# Patient Record
Sex: FEMALE | Race: WHITE | NOT HISPANIC OR LATINO | Employment: OTHER | URBAN - METROPOLITAN AREA
[De-identification: names, ages, dates, MRNs, and addresses within clinical notes are randomized per-mention and may not be internally consistent; named-entity substitution may affect disease eponyms.]

---

## 2018-08-29 ENCOUNTER — HISTORICAL (OUTPATIENT)
Dept: RADIOLOGY | Facility: HOSPITAL | Age: 48
End: 2018-08-29

## 2018-09-13 ENCOUNTER — HISTORICAL (OUTPATIENT)
Dept: RADIOLOGY | Facility: HOSPITAL | Age: 48
End: 2018-09-13

## 2019-08-27 ENCOUNTER — HISTORICAL (OUTPATIENT)
Dept: ADMINISTRATIVE | Facility: HOSPITAL | Age: 49
End: 2019-08-27

## 2019-09-13 ENCOUNTER — HISTORICAL (OUTPATIENT)
Dept: RADIOLOGY | Facility: HOSPITAL | Age: 49
End: 2019-09-13

## 2019-09-25 ENCOUNTER — HISTORICAL (OUTPATIENT)
Dept: RADIOLOGY | Facility: HOSPITAL | Age: 49
End: 2019-09-25

## 2023-06-20 DIAGNOSIS — D37.4 VILLOUS ADENOMA OF COLON: ICD-10-CM

## 2023-06-20 DIAGNOSIS — K56.600 PARTIAL SMALL BOWEL OBSTRUCTION: Primary | ICD-10-CM

## 2023-06-21 ENCOUNTER — HOSPITAL ENCOUNTER (OUTPATIENT)
Dept: RADIOLOGY | Facility: HOSPITAL | Age: 53
Discharge: HOME | End: 2023-06-21
Attending: INTERNAL MEDICINE
Payer: MEDICAID

## 2023-06-21 DIAGNOSIS — D37.4 VILLOUS ADENOMA OF COLON: ICD-10-CM

## 2023-06-21 DIAGNOSIS — K56.600 PARTIAL SMALL BOWEL OBSTRUCTION: ICD-10-CM

## 2023-06-21 PROCEDURE — 74178 CT ABD&PLV WO CNTR FLWD CNTR: CPT | Mod: TC

## 2023-06-21 PROCEDURE — 25500020 PHARM REV CODE 255

## 2023-06-21 RX ADMIN — IOPAMIDOL 100 ML: 755 INJECTION, SOLUTION INTRAVENOUS at 09:06

## 2023-06-28 DIAGNOSIS — C20 RECTAL CANCER: Primary | ICD-10-CM

## 2023-06-29 ENCOUNTER — TELEPHONE (OUTPATIENT)
Dept: HEMATOLOGY/ONCOLOGY | Facility: CLINIC | Age: 53
End: 2023-06-29
Payer: MEDICAID

## 2023-06-29 NOTE — NURSING
INNA Us spoke with patient for pre-visit introduction and to assist with any questions or concerns. Patient asking about foods she can eat due to GI issues; says she did get some medication from Dr. Ortiz office for cramping and spasms. INNA Us provided information on BRAT diet/bland foods, and eating 5-6 small meals per day. Informed of using Ensure/Boost. Discussed arrival time, location of clinic, and what to expect at consult visit. Provided contact number to call. Confirmed that patient plans to attend appointment for 7/5/23 at 1:00 pm.

## 2023-07-01 PROBLEM — C20 ADENOCARCINOMA OF RECTUM: Status: ACTIVE | Noted: 2023-07-01

## 2023-07-01 PROBLEM — K92.1 HEMATOCHEZIA: Status: ACTIVE | Noted: 2023-07-01

## 2023-07-01 PROBLEM — R59.0 PELVIC LYMPHADENOPATHY: Status: ACTIVE | Noted: 2023-07-01

## 2023-07-01 PROBLEM — C20 ADENOCARCINOMA OF RECTUM, STAGE 3: Status: ACTIVE | Noted: 2023-07-01

## 2023-07-01 PROBLEM — Z83.719 FAMILY HX COLONIC POLYPS: Status: ACTIVE | Noted: 2023-07-01

## 2023-07-01 PROBLEM — Z80.0 FAMILY HISTORY OF COLON CANCER: Status: ACTIVE | Noted: 2023-07-01

## 2023-07-01 NOTE — PROGRESS NOTES
History:  History reviewed. No pertinent past medical history.    History reviewed. No pertinent surgical history.   Past medical history:  Anxiety.    Procedure/surgical history:  Appendectomy.  Back surgery.  Social history:  Single.  Lives in [place], [place].  Has 2 children.  Works as a .  No history of tobacco, alcohol, or illicit drug abuse.  Family history:  Paternal grandfather experience colon cancer in his 70s.  Father experienced multiple colon polyps.  Health maintenance:  No screening colonoscopy prior to most recent colonoscopy which led to the diagnosis of rectal cancer.  -09/13/2019:  Bilateral screening mammogram pelvic comparison:  09/13/2018 mammogram):  BI-RADS 0 (indeterminate)  -09/25/2019:  Diagnostic left mammogram, limited ultrasound left breast (comparison:  09/13/2019 mammogram):  Overall study BI-RADS 2: Benign     History reviewed. No pertinent family history.     Reason for Follow-up:  Reason for consultation:  -adenocarcinoma rectum, moderately differentiated, partially obstructing mass, S/P colonoscopy 06/19/2023   -tubular adenoma transverse colon   -regional lymphadenopathy on CT abdomen pelvis with contrast 06/21/2023  -MMR proficient  -grandfather experienced colon cancer; father experienced colon polyp    History of Present Illness:   Anemia        Oncologic/Hematologic History:  Oncology History    No history exists.   53-year-old lady, referred by Migel Ortiz MD, GI, with rectal cancer.    Family history pos for colon cancer in grandfather, colon polyp in father  Evaluated by Migel Ortiz MD, GI, 05/22/2023 for nervous stomach; change in bowel habits/pattern; change in bowel function started several months ago; currently, 1 bowel movement daily.  Blood in stool.  Lower abdominal pain.  Heartburn.  Epigastric pain.  MiraLax resulted in improvement.  Bright red blood in stool.    06/13/2023: EGD:  1. Esophagus: Erythema of mucosa in the lower 3rd of esophagus,  compatible esophagitis  2. Stomach: Erythema of mucosa in the antrum, compatible with gastritis  3. Duodenum:  Normal mucosa in the whole duodenum      06/13/2023:  EGD:  1. Gastric antrum, biopsy:  Mild chronic inactive gastritis; negative for intestinal metaplasia; negative for H pylori organisms  2. Gastric body, biopsy:  Mild chronic inactive gastritis; negative for intestinal metaplasia; negative for H pylori organisms    06/19/2023:  Colonoscopy (screening colonoscopy):  -single 9 mm polyp transverse colon, polypectomy, completely removed  -ulcerated necrotic infiltrative 90% circumferential, bleeding, 5 cm in length, mass of malignant appearance in the rectum at 4 cm from the anus, causing partial obstruction; scope traversed the lesion  Pathology:  1. Transverse colon polyp, polypectomy:  Tubular adenoma  2. Rectal mass, biopsy:  Invasive moderately differentiated adenocarcinoma; no LVI    MMR proficient  Low probability of MSI-H    06/21/2023: CT abdomen pelvis with and without contrast:  -large rectal mass consistent with malignancy; some lymphadenopathy is seen adjacent to the mass  (large rectal mass with circumferential wall thickening in rectum; associated inflammatory changes; no bowel obstruction; some lymph nodes are seen in the perirectal region on the right and left side; representative lymph node on right side of rectum 1 cm x 8 mm; representative lymph node on the left side of rectum 8 mm x 8 mm)    Labs reviewed:  05/24/2023:  WBC 8.0.  Hemoglobin 12.5.  MCV 88.  Platelets 306 K.  Differential count normal.    07/05/2023:   Pleasant lady who presents for initial medical oncology consultation, accompanied by her brother.    Her symptoms started 2 years ago, in the form of intermittent small amount hematochezia, initially on toilet wife's, and for last few weeks, in toilet bowel as well.  She brought the symptoms to the attention of her gyn several months ago and does not remember the  recommendation.  Regardless, she never got a screening colonoscopy done.  Around [place] time 2022, she started feeling bloated.  She started having constant uncomfortable feeling because of bloating, as well as in the anorectal area.  Hematochezia continued.  She brought this to the attention of her PCP in [place] who suggested taking MiraLax.  The PCP also arranged for colonoscopy.  On today's visit, she reports that she has been constipated her entire life.  She has been constipated for at least 10 years.  Hematochezia for 2 years.  No anorectal pain.  Does have constant feeling of incomplete evacuation.  Appetite is down and she has lost 30 lb in last 3-4 months.  Appetite is more less preserved but she is afraid to eat because eating causes bloating and worsening of uncomfortable feeling in rectum.      Interval History:  [No matching plan found]   [No matching plan found]       Medications:  No current outpatient medications on file prior to visit.     No current facility-administered medications on file prior to visit.       Review of Systems:   All systems reviewed and found to be negative except for the symptoms detailed above    Physical Examination:   VITAL SIGNS:   Vitals:    07/05/23 1327   BP: 111/79   Pulse: 102   Resp: 20   Temp: 98.7 °F (37.1 °C)     GENERAL:  In no apparent distress.    HEAD:  No signs of head trauma.  EYES:  Pupils are equal.  Extraocular motions intact.    EARS:  Hearing grossly intact.  MOUTH:  Oropharynx is normal.   NECK:  No adenopathy, no JVD.     CHEST:  Chest with clear breath sounds bilaterally.  No wheezes, rales, rhonchi.    CARDIAC:  Regular rate and rhythm.  S1 and S2, without murmurs, gallops, rubs.  VASCULAR:  No Edema.  Peripheral pulses normal and equal in all extremities.  ABDOMEN:  Soft, without detectable tenderness.  No sign of distention.  No   rebound or guarding, and no masses palpated.   Bowel Sounds normal.  MUSCULOSKELETAL:  Good range of motion of  all major joints. Extremities without clubbing, cyanosis or edema.    NEUROLOGIC EXAM:  Alert and oriented x 3.  No focal sensory or strength deficits.   Speech normal.  Follows commands.  PSYCHIATRIC:  Mood normal.    No results for input(s): CBC in the last 72 hours.   No results for input(s): CMP in the last 72 hours.     Assessment:  Problem List Items Addressed This Visit          Oncology    Adenocarcinoma of rectum    Adenocarcinoma of rectum, stage 3    Family history of colon cancer       GI    Family hx colonic polyps    Hematochezia       Other    Pelvic lymphadenopathy     Other Visit Diagnoses       Rectal cancer              Adenocarcinoma of rectum, moderately differentiated:   -presentation:  05/2023:  Change in bowel habits, hematochezia, lower abdominal pain  -colonoscopy 06/19/2023:    9 mm tubular adenoma transverse colon, removed;   invasive moderately differentiated adenocarcinoma of rectum presenting as ulcerated necrotic infiltrative 90% circumferential, bleeding, partially obstructing rectal mass, 5 cm long, 4 cm from anus, scope traversed the lesion  -MMR proficient; low probability of MSI-H  -CT abdomen pelvis with contrast 06/21/2023:  No metastasis; large rectal mass and some regional lymphadenopathy on CT abdomen pelvis with contrast 06/21/2023, largest perirectal lymph node 1 cm x 8 mm  >>>  By virtue of regional lymphadenopathy, at least stage III      Family history of colon cancer:  -grandfather experienced colon cancer; father experienced colon polyp      Plan:  Need the following for workup:   CBC, CMP, CEA level  Noncontrast chest CT to rule out metastasis  Pelvic MRI with and without contrast for T and N staging  PET-CT scan is not indicated  Fertility risk discussion/counseling if premenopausal (she has been postmenopausal for last 2 years and that this time, has no desire to go for add preservation)    Family history of colon cancer & colon polyp   We will arrange for genetic  testing and counseling.    After appropriate workup, will most likely need total neoadjuvant therapy including chemotherapy with FOLFOX or CAPOX or FOLFIRINOX X 12-16 weeks, followed by long course chemoradiation therapy, then restaging, followed by resection versus surveillance, etc.  Perioperative treatment is recommended for up to a total of 6 months.    At least based upon CT scans of A/P, has at least stage III adenocarcinoma of rectum.  MMR proficient.  Low probability of MSI-H.  Treatment will be as follows:  Total neoadjuvant therapy:   1. Modified FOLFOX every 2 weeks X 16 weeks (8 cycles); followed by   2. Long course chemoradiation therapy with capecitabine; followed by  3. Restaging; followed by   4. Transabdominal resection; or if complete clinical response, consider surveillance    Response to treatment will be assessed as follows:  Contrast-enhanced CT scans of abdomen and chest, and MRI scan of rectum with and without contrast:  -after completion of 8 cycles of modified FOLFOX; aunt  -after completion of chemoradiation therapy    Refer to surgery for MediPort placement.    Will refer to [facility] for supportive services, including boost/ensure for high protein supplementation.    We will arrange for chemotherapy teaching with nursing staff ASAP.  Follow-up visit with me in 2 weeks, with results of requested studies.    Above discussed at length with the patient.  All questions answered.    Discussed labs, scans, pathology report, and staging of rectal cancer (pending MRI scan), and gave her copies of relevant reports.  Plan of management discussed, especially, neoadjuvant chemotherapy and chemoradiation therapy (to be finalized after MRI scan of pelvis).  She understands and agrees with this plan.    Follow-up:  No follow-ups on file.    Answers submitted by the patient for this visit:  Review of Systems Questionnaire (Submitted on 7/5/2023)  appetite change : Yes  unexpected weight change:  Yes  mouth sores: No  visual disturbance: No  cough: No  shortness of breath: No  chest pain: No  abdominal pain: No  diarrhea: No  frequency: Yes  back pain: No  rash: No  headaches: No  adenopathy: No  nervous/ anxious: Yes

## 2023-07-05 ENCOUNTER — OFFICE VISIT (OUTPATIENT)
Dept: HEMATOLOGY/ONCOLOGY | Facility: CLINIC | Age: 53
End: 2023-07-05
Attending: INTERNAL MEDICINE
Payer: MEDICAID

## 2023-07-05 VITALS
HEART RATE: 102 BPM | TEMPERATURE: 99 F | BODY MASS INDEX: 23.39 KG/M2 | RESPIRATION RATE: 20 BRPM | WEIGHT: 132 LBS | SYSTOLIC BLOOD PRESSURE: 111 MMHG | HEIGHT: 63 IN | DIASTOLIC BLOOD PRESSURE: 79 MMHG | OXYGEN SATURATION: 98 %

## 2023-07-05 DIAGNOSIS — C20 RECTAL CANCER: ICD-10-CM

## 2023-07-05 DIAGNOSIS — C20 ADENOCARCINOMA OF RECTUM, STAGE 3: ICD-10-CM

## 2023-07-05 DIAGNOSIS — K92.1 HEMATOCHEZIA: ICD-10-CM

## 2023-07-05 DIAGNOSIS — C20 ADENOCARCINOMA OF RECTUM: Primary | ICD-10-CM

## 2023-07-05 DIAGNOSIS — E87.6 HYPOKALEMIA: Primary | ICD-10-CM

## 2023-07-05 DIAGNOSIS — R59.0 PELVIC LYMPHADENOPATHY: ICD-10-CM

## 2023-07-05 DIAGNOSIS — C20 ADENOCARCINOMA OF RECTUM: ICD-10-CM

## 2023-07-05 DIAGNOSIS — Z80.0 FAMILY HISTORY OF COLON CANCER: ICD-10-CM

## 2023-07-05 DIAGNOSIS — Z83.719 FAMILY HX COLONIC POLYPS: ICD-10-CM

## 2023-07-05 LAB
ALBUMIN SERPL-MCNC: 3.6 G/DL (ref 3.5–5)
ALBUMIN/GLOB SERPL: 1.1 RATIO (ref 1.1–2)
ALP SERPL-CCNC: 74 UNIT/L (ref 40–150)
ALT SERPL-CCNC: 13 UNIT/L (ref 0–55)
AST SERPL-CCNC: 18 UNIT/L (ref 5–34)
BASOPHILS # BLD AUTO: 0.06 X10(3)/MCL
BASOPHILS NFR BLD AUTO: 0.8 %
BILIRUBIN DIRECT+TOT PNL SERPL-MCNC: 0.8 MG/DL
BUN SERPL-MCNC: 6.3 MG/DL (ref 9.8–20.1)
CALCIUM SERPL-MCNC: 9.7 MG/DL (ref 8.4–10.2)
CEA SERPL-MCNC: <1.73 NG/ML (ref 0–3)
CHLORIDE SERPL-SCNC: 108 MMOL/L (ref 98–107)
CO2 SERPL-SCNC: 21 MMOL/L (ref 22–29)
CREAT SERPL-MCNC: 0.81 MG/DL (ref 0.55–1.02)
EOSINOPHIL # BLD AUTO: 0.16 X10(3)/MCL (ref 0–0.9)
EOSINOPHIL NFR BLD AUTO: 2.2 %
ERYTHROCYTE [DISTWIDTH] IN BLOOD BY AUTOMATED COUNT: 13.8 % (ref 11.5–17)
GFR SERPLBLD CREATININE-BSD FMLA CKD-EPI: >60 MLS/MIN/1.73/M2
GLOBULIN SER-MCNC: 3.2 GM/DL (ref 2.4–3.5)
GLUCOSE SERPL-MCNC: 94 MG/DL (ref 74–100)
HCT VFR BLD AUTO: 39.4 % (ref 37–47)
HGB BLD-MCNC: 13.2 G/DL (ref 12–16)
IMM GRANULOCYTES # BLD AUTO: 0.03 X10(3)/MCL (ref 0–0.04)
IMM GRANULOCYTES NFR BLD AUTO: 0.4 %
LYMPHOCYTES # BLD AUTO: 1.72 X10(3)/MCL (ref 0.6–4.6)
LYMPHOCYTES NFR BLD AUTO: 23.4 %
MAGNESIUM SERPL-MCNC: 1.9 MG/DL (ref 1.6–2.6)
MCH RBC QN AUTO: 28.3 PG (ref 27–31)
MCHC RBC AUTO-ENTMCNC: 33.5 G/DL (ref 33–36)
MCV RBC AUTO: 84.4 FL (ref 80–94)
MONOCYTES # BLD AUTO: 0.53 X10(3)/MCL (ref 0.1–1.3)
MONOCYTES NFR BLD AUTO: 7.2 %
NEUTROPHILS # BLD AUTO: 4.86 X10(3)/MCL (ref 2.1–9.2)
NEUTROPHILS NFR BLD AUTO: 66 %
NRBC BLD AUTO-RTO: 0 %
PLATELET # BLD AUTO: 338 X10(3)/MCL (ref 130–400)
PMV BLD AUTO: 9.8 FL (ref 7.4–10.4)
POTASSIUM SERPL-SCNC: 3.3 MMOL/L (ref 3.5–5.1)
PROT SERPL-MCNC: 6.8 GM/DL (ref 6.4–8.3)
RBC # BLD AUTO: 4.67 X10(6)/MCL (ref 4.2–5.4)
SODIUM SERPL-SCNC: 144 MMOL/L (ref 136–145)
WBC # SPEC AUTO: 7.36 X10(3)/MCL (ref 4.5–11.5)

## 2023-07-05 PROCEDURE — 83735 ASSAY OF MAGNESIUM: CPT | Performed by: INTERNAL MEDICINE

## 2023-07-05 PROCEDURE — 82378 CARCINOEMBRYONIC ANTIGEN: CPT | Performed by: INTERNAL MEDICINE

## 2023-07-05 PROCEDURE — 3078F DIAST BP <80 MM HG: CPT | Mod: CPTII,,, | Performed by: INTERNAL MEDICINE

## 2023-07-05 PROCEDURE — 99213 OFFICE O/P EST LOW 20 MIN: CPT | Mod: PBBFAC | Performed by: INTERNAL MEDICINE

## 2023-07-05 PROCEDURE — 1160F RVW MEDS BY RX/DR IN RCRD: CPT | Mod: CPTII,,, | Performed by: INTERNAL MEDICINE

## 2023-07-05 PROCEDURE — 1159F MED LIST DOCD IN RCRD: CPT | Mod: CPTII,,, | Performed by: INTERNAL MEDICINE

## 2023-07-05 PROCEDURE — 80053 COMPREHEN METABOLIC PANEL: CPT | Performed by: INTERNAL MEDICINE

## 2023-07-05 PROCEDURE — 3074F PR MOST RECENT SYSTOLIC BLOOD PRESSURE < 130 MM HG: ICD-10-PCS | Mod: CPTII,,, | Performed by: INTERNAL MEDICINE

## 2023-07-05 PROCEDURE — 3008F BODY MASS INDEX DOCD: CPT | Mod: CPTII,,, | Performed by: INTERNAL MEDICINE

## 2023-07-05 PROCEDURE — 1160F PR REVIEW ALL MEDS BY PRESCRIBER/CLIN PHARMACIST DOCUMENTED: ICD-10-PCS | Mod: CPTII,,, | Performed by: INTERNAL MEDICINE

## 2023-07-05 PROCEDURE — 3078F PR MOST RECENT DIASTOLIC BLOOD PRESSURE < 80 MM HG: ICD-10-PCS | Mod: CPTII,,, | Performed by: INTERNAL MEDICINE

## 2023-07-05 PROCEDURE — 99205 PR OFFICE/OUTPT VISIT, NEW, LEVL V, 60-74 MIN: ICD-10-PCS | Mod: S$PBB,,, | Performed by: INTERNAL MEDICINE

## 2023-07-05 PROCEDURE — 3008F PR BODY MASS INDEX (BMI) DOCUMENTED: ICD-10-PCS | Mod: CPTII,,, | Performed by: INTERNAL MEDICINE

## 2023-07-05 PROCEDURE — 3074F SYST BP LT 130 MM HG: CPT | Mod: CPTII,,, | Performed by: INTERNAL MEDICINE

## 2023-07-05 PROCEDURE — 99205 OFFICE O/P NEW HI 60 MIN: CPT | Mod: S$PBB,,, | Performed by: INTERNAL MEDICINE

## 2023-07-05 PROCEDURE — 36415 COLL VENOUS BLD VENIPUNCTURE: CPT | Performed by: INTERNAL MEDICINE

## 2023-07-05 PROCEDURE — 1159F PR MEDICATION LIST DOCUMENTED IN MEDICAL RECORD: ICD-10-PCS | Mod: CPTII,,, | Performed by: INTERNAL MEDICINE

## 2023-07-05 PROCEDURE — 85025 COMPLETE CBC W/AUTO DIFF WBC: CPT | Performed by: INTERNAL MEDICINE

## 2023-07-05 RX ORDER — FLUOROURACIL 50 MG/ML
400 INJECTION, SOLUTION INTRAVENOUS
Status: CANCELLED | OUTPATIENT
Start: 2023-07-19

## 2023-07-05 RX ORDER — SODIUM CHLORIDE 0.9 % (FLUSH) 0.9 %
10 SYRINGE (ML) INJECTION
Status: CANCELLED | OUTPATIENT
Start: 2023-07-19

## 2023-07-05 RX ORDER — HEPARIN 100 UNIT/ML
500 SYRINGE INTRAVENOUS
Status: CANCELLED | OUTPATIENT
Start: 2023-08-01

## 2023-07-05 RX ORDER — EPINEPHRINE 0.3 MG/.3ML
0.3 INJECTION SUBCUTANEOUS ONCE AS NEEDED
Status: CANCELLED | OUTPATIENT
Start: 2023-07-19

## 2023-07-05 RX ORDER — EPINEPHRINE 0.3 MG/.3ML
0.3 INJECTION SUBCUTANEOUS ONCE AS NEEDED
Status: CANCELLED | OUTPATIENT
Start: 2023-08-01

## 2023-07-05 RX ORDER — POTASSIUM CHLORIDE 20 MEQ/1
20 TABLET, EXTENDED RELEASE ORAL DAILY
Qty: 14 TABLET | Refills: 0 | Status: SHIPPED | OUTPATIENT
Start: 2023-07-05 | End: 2023-08-14

## 2023-07-05 RX ORDER — SODIUM CHLORIDE 0.9 % (FLUSH) 0.9 %
10 SYRINGE (ML) INJECTION
Status: CANCELLED | OUTPATIENT
Start: 2023-08-03

## 2023-07-05 RX ORDER — SODIUM CHLORIDE 0.9 % (FLUSH) 0.9 %
10 SYRINGE (ML) INJECTION
Status: CANCELLED | OUTPATIENT
Start: 2023-08-01

## 2023-07-05 RX ORDER — HEPARIN 100 UNIT/ML
500 SYRINGE INTRAVENOUS
Status: CANCELLED | OUTPATIENT
Start: 2023-08-03

## 2023-07-05 RX ORDER — DIPHENHYDRAMINE HYDROCHLORIDE 50 MG/ML
50 INJECTION INTRAMUSCULAR; INTRAVENOUS ONCE AS NEEDED
Status: CANCELLED | OUTPATIENT
Start: 2023-08-01

## 2023-07-05 RX ORDER — FLUOROURACIL 50 MG/ML
400 INJECTION, SOLUTION INTRAVENOUS
Status: CANCELLED | OUTPATIENT
Start: 2023-08-01

## 2023-07-05 RX ORDER — HEPARIN 100 UNIT/ML
500 SYRINGE INTRAVENOUS
Status: CANCELLED | OUTPATIENT
Start: 2023-07-21

## 2023-07-05 RX ORDER — HEPARIN 100 UNIT/ML
500 SYRINGE INTRAVENOUS
Status: CANCELLED | OUTPATIENT
Start: 2023-07-19

## 2023-07-05 RX ORDER — SODIUM CHLORIDE 0.9 % (FLUSH) 0.9 %
10 SYRINGE (ML) INJECTION
Status: CANCELLED | OUTPATIENT
Start: 2023-07-21

## 2023-07-05 RX ORDER — DIPHENHYDRAMINE HYDROCHLORIDE 50 MG/ML
50 INJECTION INTRAMUSCULAR; INTRAVENOUS ONCE AS NEEDED
Status: CANCELLED | OUTPATIENT
Start: 2023-07-19

## 2023-07-05 NOTE — Clinical Note
Patient will need neoadjuvant chemotherapy, followed by neoadjuvant chemoradiation therapy  Chemotherapy: Modified FOLFOX every 2 weeks x8 cycles  Please arrange for chemo teaching with nursing staff ASAP Refer to surgery for MediPort placement Nutrition consult Refer the patient to [facility] for supportive services, especially for Ensure/boost.

## 2023-07-05 NOTE — PROGRESS NOTES
Labs reviewed.      Potassium 3.3, low.    Check magnesium level.    Start potassium chloride 20 mEq p.o. q.day x2 weeks; no refills   In 2 weeks, recheck CMP and magnesium level.

## 2023-07-05 NOTE — Clinical Note
Orders for today:  Check CBC, CMP, CEA level  Noncontrast chest CT at this time to rule out metastases Please order pelvic MRI with and without contrast for staging of rectal cancer  Genetic testing and counseling (she has family history of colon cancer and colon polyps)  Follow-up visit in 2 weeks, with results of requested studies.

## 2023-07-10 ENCOUNTER — OFFICE VISIT (OUTPATIENT)
Dept: HEMATOLOGY/ONCOLOGY | Facility: CLINIC | Age: 53
End: 2023-07-10
Payer: MEDICAID

## 2023-07-10 VITALS
BODY MASS INDEX: 23.42 KG/M2 | HEIGHT: 63 IN | OXYGEN SATURATION: 98 % | DIASTOLIC BLOOD PRESSURE: 88 MMHG | RESPIRATION RATE: 18 BRPM | WEIGHT: 132.19 LBS | HEART RATE: 93 BPM | SYSTOLIC BLOOD PRESSURE: 129 MMHG | TEMPERATURE: 98 F

## 2023-07-10 DIAGNOSIS — Z51.11 ENCOUNTER FOR ANTINEOPLASTIC CHEMOTHERAPY: ICD-10-CM

## 2023-07-10 DIAGNOSIS — C20 ADENOCARCINOMA OF RECTUM: Primary | ICD-10-CM

## 2023-07-10 PROCEDURE — 99215 PR OFFICE/OUTPT VISIT, EST, LEVL V, 40-54 MIN: ICD-10-PCS | Mod: S$PBB,,, | Performed by: NURSE PRACTITIONER

## 2023-07-10 PROCEDURE — 1159F PR MEDICATION LIST DOCUMENTED IN MEDICAL RECORD: ICD-10-PCS | Mod: CPTII,,, | Performed by: NURSE PRACTITIONER

## 2023-07-10 PROCEDURE — 99214 OFFICE O/P EST MOD 30 MIN: CPT | Mod: PBBFAC | Performed by: NURSE PRACTITIONER

## 2023-07-10 PROCEDURE — 3079F DIAST BP 80-89 MM HG: CPT | Mod: CPTII,,, | Performed by: NURSE PRACTITIONER

## 2023-07-10 PROCEDURE — 1159F MED LIST DOCD IN RCRD: CPT | Mod: CPTII,,, | Performed by: NURSE PRACTITIONER

## 2023-07-10 PROCEDURE — 1160F PR REVIEW ALL MEDS BY PRESCRIBER/CLIN PHARMACIST DOCUMENTED: ICD-10-PCS | Mod: CPTII,,, | Performed by: NURSE PRACTITIONER

## 2023-07-10 PROCEDURE — 3074F PR MOST RECENT SYSTOLIC BLOOD PRESSURE < 130 MM HG: ICD-10-PCS | Mod: CPTII,,, | Performed by: NURSE PRACTITIONER

## 2023-07-10 PROCEDURE — 99215 OFFICE O/P EST HI 40 MIN: CPT | Mod: S$PBB,,, | Performed by: NURSE PRACTITIONER

## 2023-07-10 PROCEDURE — 3008F PR BODY MASS INDEX (BMI) DOCUMENTED: ICD-10-PCS | Mod: CPTII,,, | Performed by: NURSE PRACTITIONER

## 2023-07-10 PROCEDURE — 3079F PR MOST RECENT DIASTOLIC BLOOD PRESSURE 80-89 MM HG: ICD-10-PCS | Mod: CPTII,,, | Performed by: NURSE PRACTITIONER

## 2023-07-10 PROCEDURE — 1160F RVW MEDS BY RX/DR IN RCRD: CPT | Mod: CPTII,,, | Performed by: NURSE PRACTITIONER

## 2023-07-10 PROCEDURE — 3074F SYST BP LT 130 MM HG: CPT | Mod: CPTII,,, | Performed by: NURSE PRACTITIONER

## 2023-07-10 PROCEDURE — 3008F BODY MASS INDEX DOCD: CPT | Mod: CPTII,,, | Performed by: NURSE PRACTITIONER

## 2023-07-10 RX ORDER — BUSPIRONE HYDROCHLORIDE 15 MG/1
15 TABLET ORAL 3 TIMES DAILY
COMMUNITY
Start: 2020-03-05 | End: 2023-08-14 | Stop reason: DRUGHIGH

## 2023-07-10 RX ORDER — DICYCLOMINE HYDROCHLORIDE 10 MG/1
10 CAPSULE ORAL EVERY 6 HOURS PRN
Qty: 60 CAPSULE | Refills: 0 | Status: SHIPPED | OUTPATIENT
Start: 2023-07-10 | End: 2023-10-02

## 2023-07-10 RX ORDER — FLUCONAZOLE 150 MG/1
150 TABLET ORAL
COMMUNITY
Start: 2023-06-28 | End: 2023-07-13

## 2023-07-10 RX ORDER — DICYCLOMINE HYDROCHLORIDE 10 MG/1
10 CAPSULE ORAL EVERY 6 HOURS PRN
COMMUNITY
Start: 2023-06-28 | End: 2023-07-10 | Stop reason: SDUPTHER

## 2023-07-10 RX ORDER — ONDANSETRON HYDROCHLORIDE 8 MG/1
8 TABLET, FILM COATED ORAL EVERY 8 HOURS PRN
Qty: 30 TABLET | Refills: 2 | Status: SHIPPED | OUTPATIENT
Start: 2023-07-10 | End: 2023-12-29 | Stop reason: SDUPTHER

## 2023-07-10 RX ORDER — DEXAMETHASONE 4 MG/1
TABLET ORAL
Qty: 32 TABLET | Refills: 0 | Status: SHIPPED | OUTPATIENT
Start: 2023-07-10 | End: 2023-12-04 | Stop reason: SDUPTHER

## 2023-07-10 RX ORDER — TRAZODONE HYDROCHLORIDE 50 MG/1
50 TABLET ORAL
COMMUNITY
Start: 2023-06-06 | End: 2023-08-14

## 2023-07-10 RX ORDER — PROCHLORPERAZINE MALEATE 10 MG
10 TABLET ORAL
Qty: 30 TABLET | Refills: 1 | Status: SHIPPED | OUTPATIENT
Start: 2023-07-10 | End: 2023-10-02 | Stop reason: SDUPTHER

## 2023-07-10 RX ORDER — FAMOTIDINE 40 MG/1
40 TABLET, FILM COATED ORAL NIGHTLY
COMMUNITY
Start: 2023-06-18 | End: 2023-10-02

## 2023-07-10 RX ORDER — DULOXETIN HYDROCHLORIDE 20 MG/1
20 CAPSULE, DELAYED RELEASE ORAL
COMMUNITY
Start: 2023-07-08 | End: 2023-07-13

## 2023-07-10 RX ORDER — SODIUM, POTASSIUM,MAG SULFATES 17.5-3.13G
SOLUTION, RECONSTITUTED, ORAL ORAL
COMMUNITY
Start: 2023-05-22 | End: 2023-12-04

## 2023-07-10 RX ORDER — HYOSCYAMINE SULFATE 0.125 MG
125 TABLET ORAL EVERY 6 HOURS PRN
COMMUNITY
Start: 2023-06-20 | End: 2024-04-02

## 2023-07-10 RX ORDER — PANTOPRAZOLE SODIUM 40 MG/1
40 TABLET, DELAYED RELEASE ORAL EVERY MORNING
COMMUNITY
Start: 2023-06-18 | End: 2024-01-26 | Stop reason: SDUPTHER

## 2023-07-11 ENCOUNTER — DOCUMENTATION ONLY (OUTPATIENT)
Dept: HEMATOLOGY/ONCOLOGY | Facility: CLINIC | Age: 53
End: 2023-07-11
Payer: MEDICAID

## 2023-07-11 DIAGNOSIS — C20 ADENOCARCINOMA OF RECTUM, STAGE 3: Primary | ICD-10-CM

## 2023-07-11 NOTE — PROGRESS NOTES
[facility]    PATIENT: Nikkie Sharpe  MRN: [id number]  DATE: 7/10/2023      Diagnosis:   1. Adenocarcinoma of rectum      Chief Complaint: Rectal Cancer (Adenocarcinoma of rectum )    Oncologic History:   53-year-old lady, referred by Migel Ortiz MD, GI, with rectal cancer.     Family history pos for colon cancer in grandfather, colon polyp in father  Evaluated by Migel Ortiz MD, GI, 05/22/2023 for nervous stomach; change in bowel habits/pattern; change in bowel function started several months ago; currently, 1 bowel movement daily.  Blood in stool.  Lower abdominal pain.  Heartburn.  Epigastric pain.  MiraLax resulted in improvement.  Bright red blood in stool.     06/13/2023: EGD:  1. Esophagus: Erythema of mucosa in the lower 3rd of esophagus, compatible esophagitis  2. Stomach: Erythema of mucosa in the antrum, compatible with gastritis  3. Duodenum:  Normal mucosa in the whole duodenum       06/13/2023:  EGD:  1. Gastric antrum, biopsy:  Mild chronic inactive gastritis; negative for intestinal metaplasia; negative for H pylori organisms  2. Gastric body, biopsy:  Mild chronic inactive gastritis; negative for intestinal metaplasia; negative for H pylori organisms     06/19/2023:  Colonoscopy (screening colonoscopy):  -single 9 mm polyp transverse colon, polypectomy, completely removed  -ulcerated necrotic infiltrative 90% circumferential, bleeding, 5 cm in length, mass of malignant appearance in the rectum at 4 cm from the anus, causing partial obstruction; scope traversed the lesion  Pathology:  1. Transverse colon polyp, polypectomy:  Tubular adenoma  2. Rectal mass, biopsy:  Invasive moderately differentiated adenocarcinoma; no LVI     MMR proficient  Low probability of MSI-H     06/21/2023: CT abdomen pelvis with and without contrast:  -large rectal mass consistent with malignancy; some lymphadenopathy is seen adjacent to the mass  (large rectal mass with circumferential  wall thickening in rectum; associated inflammatory changes; no bowel obstruction; some lymph nodes are seen in the perirectal region on the right and left side; representative lymph node on right side of rectum 1 cm x 8 mm; representative lymph node on the left side of rectum 8 mm x 8 mm)     Subjective:   Interval History: Ms. Sharpe presents to clinic today for chemotherapy education. She is accompanied by her brother.  She denies any changes from visit last week.   She has no new complaints today.     Past Medical History: Anxiety.      Past Surgical HIstory: Appendectomy.  Back surgery.    Family History: Paternal grandfather experience colon cancer in his 70s.  Father experienced multiple colon polyps.    Social History: Single.  Lives in [place], [place].  Has 2 children.  Works as a .  No history of tobacco, alcohol, or illicit drug abuse    Health maintenance:  No screening colonoscopy prior to most recent colonoscopy which led to the diagnosis of rectal cancer.    Allergies:  Review of patient's allergies indicates:   Allergen Reactions    Codeine Itching    Morphine Itching       Medications:  Current Outpatient Medications   Medication Sig Dispense Refill    busPIRone (BUSPAR) 15 MG tablet Take 15 mg by mouth.      dexAMETHasone (DECADRON) 4 MG Tab Take 2 tablets(8mg) by mouth daily on days 2 and 3 of each chemotherapy cycle. 32 tablet 0    dicyclomine (BENTYL) 10 MG capsule Take 10 mg by mouth every 6 (six) hours as needed.      DULoxetine (CYMBALTA) 20 MG capsule Take 20 mg by mouth.      famotidine (PEPCID) 40 MG tablet Take 40 mg by mouth every evening.      fluconazole (DIFLUCAN) 150 MG Tab Take 150 mg by mouth.      hyoscyamine (ANASPAZ,LEVSIN) 0.125 mg Tab Take 125 mcg by mouth every 6 (six) hours as needed.      ondansetron (ZOFRAN) 8 MG tablet Take 1 tablet (8 mg total) by mouth every 8 (eight) hours as needed for Nausea. 30 tablet 2    pantoprazole (PROTONIX) 40 MG tablet Take 40  "mg by mouth every morning.      potassium chloride SA (K-DUR,KLOR-CON) 20 MEQ tablet Take 1 tablet (20 mEq total) by mouth once daily. for 14 days 14 tablet 0    prochlorperazine (COMPAZINE) 10 MG tablet Take 1 tablet (10 mg total) by mouth every 6 to 8 hours as needed (nausea). 30 tablet 1    sodium,potassium,mag sulfates (SUPREP BOWEL PREP KIT) 17.5-3.13-1.6 gram SolR TAKE 1 KIT BY MOUTH SINGLE DOSE AS DIRECTED FOR 1 DAY      traZODone (DESYREL) 50 MG tablet Take 50 mg by mouth.       No current facility-administered medications for this visit.     Review of Systems:   All systems reviewed and found to be negative except for the symptoms detailed above    ECOG Performance Status: 1   Objective:      Vitals:   Vitals:    07/10/23 1325   BP: 129/88   BP Location: Left arm   Patient Position: Sitting   Pulse: 93   Resp: 18   Temp: 97.7 °F (36.5 °C)   TempSrc: Oral   SpO2: 98%   Weight: 60 kg (132 lb 3.2 oz)   Height: 5' 2.99" (1.6 m)     BMI: Body mass index is 23.42 kg/m².    Physical Exam:   GENERAL:  In no apparent distress.     EARS:  Hearing grossly intact.  CHEST:  Normal effort.    NEUROLOGIC EXAM:  Alert and oriented x 3. Speech normal.  Follows commands.  PSYCHIATRIC: Appropriate mood and affect.     Laboratory results:   No results for input(s): CBC in the last 72 hours.   No results for input(s): CMP in the last 72 hours.     CBC  Lab Results   Component Value Date    WBC 7.36 07/05/2023    RBC 4.67 07/05/2023    HGB 13.2 07/05/2023    HCT 39.4 07/05/2023    MCV 84.4 07/05/2023    MCH 28.3 07/05/2023    MCHC 33.5 07/05/2023    RDW 13.8 07/05/2023     07/05/2023    MPV 9.8 07/05/2023    EOS 0.3 03/03/2023    BASO 0.1 03/03/2023    EOSINOPHIL 6 03/03/2023    MG 1.90 07/05/2023        CMP  Lab Results   Component Value Date     07/05/2023    K 3.3 (L) 07/05/2023    CO2 21 (L) 07/05/2023    BUN 6.3 (L) 07/05/2023    CREATININE 0.81 07/05/2023    CALCIUM 9.7 07/05/2023    ALBUMIN 3.6 07/05/2023    " BILITOT 0.8 07/05/2023    ALKPHOS 74 07/05/2023    AST 18 07/05/2023    ALT 13 07/05/2023        Tumor Markers  Lab Results   Component Value Date    CEA <1.73 07/05/2023       TSH  Lab Results   Component Value Date    TSH 1.710 03/03/2023     Imaging:   Assessment:            Oncology     Adenocarcinoma of rectum     Adenocarcinoma of rectum, stage 3     Family history of colon cancer          GI     Family hx colonic polyps     Hematochezia          Other     Pelvic lymphadenopathy      Adenocarcinoma of rectum, moderately differentiated:   -presentation:  05/2023:  Change in bowel habits, hematochezia, lower abdominal pain  -colonoscopy 06/19/2023:    9 mm tubular adenoma transverse colon, removed;   invasive moderately differentiated adenocarcinoma of rectum presenting as ulcerated necrotic infiltrative 90% circumferential, bleeding, partially obstructing rectal mass, 5 cm long, 4 cm from anus, scope traversed the lesion  -MMR proficient; low probability of MSI-H  -CT abdomen pelvis with contrast 06/21/2023:  No metastasis; large rectal mass and some regional lymphadenopathy on CT abdomen pelvis with contrast 06/21/2023, largest perirectal lymph node 1 cm x 8 mm  >>>  By virtue of regional lymphadenopathy, at least stage III       Family history of colon cancer:   -grandfather experienced colon cancer; father experienced colon polyp      Encounter for antineoplastic chemotherapy:  Chemotherapy education provided to patient and brother.   Plan of care including chemotherapy regimen, schedule expectations, potential side effects, including but not limited to peripheral neuropathy, nausea, vomiting, diarrhea, mouth sores, low blood counts, fatigue, loss of appetite, taste changes, photophobia, darkening of skin and nails, hand-foot syndrome, chest pain, EKG changes, allergic reaction.   Prevention and management of potential side effects. Office phone number to call written down and instruction provided on  symptoms that warrant a call to the office, for example temperature off 100.4 or >, uncontrolled side effects, severe fatigue, etc. Infection precautions reviewed. Management of bodily waste.   Literature was provided to patient on Dexamethasone, Leucovorin, Fluorouracil and Oxaliplatin.   Prescriptions for Ondansetron, Dexamethasone and Compazine was e-sent to pharmacy.   She knows to  Immodium- AD to have on hand.   Time was given for patient and brother to ask questions. Majority of their questions were answered. They had a few that I could not answer, so they met with INNA Mitchell - nurse navigator after this visit. Patient was advised to call the office in the interim if they have any further questions or concerns. They both verbalized their understanding. Consent for mFOLFOX IV every 2 weeks was signed.     Greater than 50 % of this 60 minute visit was spent in face to face consultation and coordination of care. All other time was used to prepare educational materials and review chart notes.       Plan:  Noncontrast chest CT to rule out metastasis - scheduled for 7/18/2023  Pelvic MRI with and without contrast for T and N staging - scheduled for 7/18/2023  Medi-port consult appointment scheduled with general surgery for 7/13/2023     Treatment will be as follows:  Total neoadjuvant therapy:   1. Modified FOLFOX every 2 weeks X 16 weeks (8 cycles); followed by   2. Long course chemoradiation therapy with capecitabine; followed by  3. Restaging; followed by   4. Transabdominal resection; or if complete clinical response, consider surveillance     Response to treatment will be assessed as follows:  Contrast-enhanced CT scans of abdomen and chest, and MRI scan of rectum with and without contrast:  -after completion of 8 cycles of modified FOLFOX; aunt  -after completion of chemoradiation therapy     Will refer to [facility] for supportive services, including boost/ensure for high protein supplementation. -  Patient met with nurse navigator today to help with getting protein supplement.      RTC on 7/19/2023 with plans on starting mFOLFOX. Labs done morning prior to treatment.   Is scheduled with MD on 8/15/2023      Answers submitted by the patient for this visit:  Review of Systems Questionnaire (Submitted on 7/7/2023)  appetite change : No  unexpected weight change: No  mouth sores: No  visual disturbance: No  cough: No  shortness of breath: No  chest pain: No  abdominal pain: No  diarrhea: No  frequency: No  back pain: No  rash: No  headaches: No  adenopathy: No  nervous/ anxious: Yes

## 2023-07-11 NOTE — NURSING
Met with patient today after her chemo teaching appointment. Patient attended appointment accompanied by her brother. Provided patient with RN Magen contact and explained role in patient's care.    NCCN Distress Screen completed with a reported distress score of 10. Patient her distress is related to being able to continue to work and deal with her 2 adult sons. Reports she has resources of employment income, insurance and reliable transportation. Social support reported as adequate. Provided information on counseling services. Patient agreed to be referral to  services at [facility]. Patient says that she is unable to exercise likes she used to but does enjoy positive affirmations and reading to help her cope. Resource folder with written information of community and cancer resources, disability benefits, nutritional hints during cancer treatment, and Get a Game Plan hurricane preparedness given to patient. Informed of [facility] and [employer] referral that she may need to utilize during the course of her treatment such as nutritional supplement. Patient verbalized understanding and agreed to be referred. Patient agrees to contact [facility] if any needs or concerns arise.     Oncology Navigation   Intake  Cancer Type: GI  Initial Nurse Navigator Contact: 06/29/23  Date Worked: 07/10/23  Appointment Date: 07/05/23     Treatment  Current Status: Active    Surgery: Planned  Type of Surgery: resection    Medical Oncologist: John Bazan MD  Consult Date: 07/05/23  Chemotherapy: Planned  Chemotherapy Regimen: neoadjuvant therapy:   Modified FOLFOX every 2 weeks X 16 weeks (8 cycles); followed by Long course chemoradiation therapy with capecitabine    Radiation Therapy: Planned  Radiation Oncologist: [facility]       General Referrals: [employer]; [facility]       Radiation Oncologist: [facility]    Support Systems: Parent; Children; Family members; Friends / neighbors  Barriers  of Care: Financial concerns  Financial Concerns: Other  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

## 2023-07-12 ENCOUNTER — PATIENT MESSAGE (OUTPATIENT)
Dept: FAMILY MEDICINE | Facility: CLINIC | Age: 53
End: 2023-07-12
Payer: MEDICAID

## 2023-07-13 ENCOUNTER — OFFICE VISIT (OUTPATIENT)
Dept: SURGERY | Facility: CLINIC | Age: 53
End: 2023-07-13
Attending: INTERNAL MEDICINE
Payer: MEDICAID

## 2023-07-13 VITALS
OXYGEN SATURATION: 98 % | RESPIRATION RATE: 20 BRPM | BODY MASS INDEX: 20.7 KG/M2 | HEIGHT: 66 IN | SYSTOLIC BLOOD PRESSURE: 105 MMHG | WEIGHT: 128.81 LBS | HEART RATE: 97 BPM | DIASTOLIC BLOOD PRESSURE: 71 MMHG | TEMPERATURE: 98 F

## 2023-07-13 DIAGNOSIS — C20 ADENOCARCINOMA OF RECTUM, STAGE 3: ICD-10-CM

## 2023-07-13 DIAGNOSIS — C20 ADENOCARCINOMA OF RECTUM: ICD-10-CM

## 2023-07-13 PROCEDURE — 99215 OFFICE O/P EST HI 40 MIN: CPT | Mod: PBBFAC

## 2023-07-13 RX ORDER — DICYCLOMINE HYDROCHLORIDE 10 MG/1
CAPSULE ORAL
COMMUNITY
Start: 2023-06-28 | End: 2023-10-02

## 2023-07-13 RX ORDER — HEPARIN SODIUM 5000 [USP'U]/ML
5000 INJECTION, SOLUTION INTRAVENOUS; SUBCUTANEOUS ONCE
Status: CANCELLED | OUTPATIENT
Start: 2023-07-13

## 2023-07-13 RX ORDER — FAMOTIDINE 40 MG/1
TABLET, FILM COATED ORAL
COMMUNITY
Start: 2023-06-13 | End: 2023-10-02

## 2023-07-13 RX ORDER — SODIUM CHLORIDE, SODIUM LACTATE, POTASSIUM CHLORIDE, CALCIUM CHLORIDE 600; 310; 30; 20 MG/100ML; MG/100ML; MG/100ML; MG/100ML
INJECTION, SOLUTION INTRAVENOUS CONTINUOUS
Status: CANCELLED | OUTPATIENT
Start: 2023-07-13

## 2023-07-13 RX ORDER — CEFAZOLIN SODIUM 2 G/50ML
2 SOLUTION INTRAVENOUS
Status: CANCELLED | OUTPATIENT
Start: 2023-07-13

## 2023-07-13 RX ORDER — ONDANSETRON 2 MG/ML
4 INJECTION INTRAMUSCULAR; INTRAVENOUS EVERY 12 HOURS PRN
Status: CANCELLED | OUTPATIENT
Start: 2023-07-13

## 2023-07-13 NOTE — PROGRESS NOTES
[facility] [place] [facility] Note    CC: Mediport placement     HPI: Nikkie Sharpe is a 54 y/o F with a recent diagnosis of rectal adenocarcinoma who presents to surgery clinic for mediport placement evaluation. Reports prior surgical hx including , appendectomy, and back surgery. Prior to her diagnosis, she states that she was relatively healthy. Denies previous placement of central lines. Scheduled for CT chest & imaging on  with plans for starting chemotherapy on . Denies fever, chills, nausea, or vomiting; reports associated weight loss (~30lbs within 3 months) but contributes that to not eating because of discomfort. Does not take aspirin, Xarelto, or any other blood thinners/anti-platelet agents. Taking Buspar, famotidine, and dicyclomine.       PMH:   Past Medical History:   Diagnosis Date    Rectal cancer       Meds:   Current Outpatient Medications:     busPIRone (BUSPAR) 15 MG tablet, Take 15 mg by mouth., Disp: , Rfl:     dexAMETHasone (DECADRON) 4 MG Tab, Take 2 tablets(8mg) by mouth daily on days 2 and 3 of each chemotherapy cycle., Disp: 32 tablet, Rfl: 0    dicyclomine (BENTYL) 10 MG capsule, Take 1 capsule (10 mg total) by mouth every 6 (six) hours as needed (abdominal pain)., Disp: 60 capsule, Rfl: 0    dicyclomine (BENTYL) 10 MG capsule, , Disp: , Rfl:     famotidine (PEPCID) 40 MG tablet, Take 40 mg by mouth every evening., Disp: , Rfl:     famotidine (PEPCID) 40 MG tablet, , Disp: , Rfl:     hyoscyamine (ANASPAZ,LEVSIN) 0.125 mg Tab, Take 125 mcg by mouth every 6 (six) hours as needed., Disp: , Rfl:     ondansetron (ZOFRAN) 8 MG tablet, Take 1 tablet (8 mg total) by mouth every 8 (eight) hours as needed for Nausea., Disp: 30 tablet, Rfl: 2    pantoprazole (PROTONIX) 40 MG tablet, Take 40 mg by mouth every morning., Disp: , Rfl:     PANTOPRAZOLE 40 MG/100 ML 0.9 % NS IVPB, , Disp: , Rfl:     potassium chloride SA (K-DUR,KLOR-CON) 20 MEQ tablet, Take 1 tablet (20 mEq total) by mouth  once daily. for 14 days, Disp: 14 tablet, Rfl: 0    prochlorperazine (COMPAZINE) 10 MG tablet, Take 1 tablet (10 mg total) by mouth every 6 to 8 hours as needed (nausea)., Disp: 30 tablet, Rfl: 1    sodium,potassium,mag sulfates (SUPREP BOWEL PREP KIT) 17.5-3.13-1.6 gram SolR, TAKE 1 KIT BY MOUTH SINGLE DOSE AS DIRECTED FOR 1 DAY, Disp: , Rfl:     traZODone (DESYREL) 50 MG tablet, Take 50 mg by mouth., Disp: , Rfl:   Allergies:   Review of patient's allergies indicates:   Allergen Reactions    Codeine Itching    Morphine Itching     Social History:   Social History     Tobacco Use    Smoking status: Never    Smokeless tobacco: Never   Substance Use Topics    Alcohol use: Yes     Alcohol/week: 1.0 standard drink     Types: 1 Glasses of wine per week    Drug use: Never     Family History:   Family History   Problem Relation Age of Onset    Colon cancer Paternal Grandfather      Surgical History:   Past Surgical History:   Procedure Laterality Date    APPENDECTOMY      BACK SURGERY      GALLBLADDER SURGERY       Review of Systems:  Negative unless as stated in HPI     Objective:    Vitals:  Vitals:    23 1137   BP: 105/71   Pulse: 97   Resp: 20   Temp: 98.4 °F (36.9 °C)        Physical Exam:  Gen: NAD  Neuro: awake, alert, answering questions appropriately  CV: RRR  Resp: non-labored breathing, VIKRAM  Abd: soft, ND, NT  Ext: moves all 4 spontaneously and purposefully  Skin: warm, well perfused      Imagin2023:  EGD:  1. Gastric antrum, biopsy:  Mild chronic inactive gastritis; negative for intestinal metaplasia; negative for H pylori organisms  2. Gastric body, biopsy:  Mild chronic inactive gastritis; negative for intestinal metaplasia; negative for H pylori organisms     2023:  Colonoscopy (screening colonoscopy):  -single 9 mm polyp transverse colon, polypectomy, completely removed  -ulcerated necrotic infiltrative 90% circumferential, bleeding, 5 cm in length, mass of malignant appearance in  the rectum at 4 cm from the anus, causing partial obstruction; scope traversed the lesion  Pathology:  1. Transverse colon polyp, polypectomy:  Tubular adenoma  2. Rectal mass, biopsy:  Invasive moderately differentiated adenocarcinoma; no LVI     MMR proficient  Low probability of MSI-H     06/21/2023: CT abdomen pelvis with and without contrast:  -large rectal mass consistent with malignancy; some lymphadenopathy is seen adjacent to the mass  (large rectal mass with circumferential wall thickening in rectum; associated inflammatory changes; no bowel obstruction; some lymph nodes are seen in the perirectal region on the right and left side; representative lymph node on right side of rectum 1 cm x 8 mm; representative lymph node on the left side of rectum 8 mm x 8 mm)     Micro/Path/Other:   Oncology     Adenocarcinoma of rectum     Adenocarcinoma of rectum, stage 3     Family history of colon cancer     Assessment/Plan:  Nikkie Sharpe is a 54 y/o F with a recent diagnosis of rectal adenocarcinoma who presents to surgery clinic for mediport placement evaluation. Has not yet finished imaging workup (CT chest). Pt will require PICC placement for first chemo session and will ideally be able to use mediport placed 7/31 for second chemo session.     - Consented for mediport placement; discussed risks, benefits, and alternatives   - Scheduled for OR on 7/31   - will follow post-chemo labs 7/26   - Please call if any questions or concerns     Zina Kat MS4  [facility]     Pt seen and examined with student doctor North. Changes made above as necessary  Lurdes Monte MD

## 2023-07-13 NOTE — PROGRESS NOTES
Seen by Dr. Monte surgery for [facility] 07/31 consents signed f/u PRN

## 2023-07-14 NOTE — PROGRESS NOTES
I have reviewed the notes, assessments, and/or procedures performed by the resident, I concur with her/his documentation of Nikkie Sharpe.     Sunitha Wiley MD

## 2023-07-18 ENCOUNTER — HOSPITAL ENCOUNTER (OUTPATIENT)
Dept: RADIOLOGY | Facility: HOSPITAL | Age: 53
Discharge: HOME | End: 2023-07-18
Attending: INTERNAL MEDICINE
Payer: MEDICAID

## 2023-07-18 DIAGNOSIS — R59.0 PELVIC LYMPHADENOPATHY: ICD-10-CM

## 2023-07-18 DIAGNOSIS — C20 ADENOCARCINOMA OF RECTUM: ICD-10-CM

## 2023-07-18 DIAGNOSIS — C20 ADENOCARCINOMA OF RECTUM, STAGE 3: ICD-10-CM

## 2023-07-18 DIAGNOSIS — C20 ADENOCARCINOMA OF RECTUM, STAGE 3: Primary | ICD-10-CM

## 2023-07-18 PROCEDURE — 71250 CT THORAX DX C-: CPT | Mod: TC

## 2023-07-18 PROCEDURE — 72197 MRI PELVIS W/O & W/DYE: CPT | Mod: TC

## 2023-07-18 PROCEDURE — 25500020 PHARM REV CODE 255

## 2023-07-18 PROCEDURE — A9577 INJ MULTIHANCE: HCPCS

## 2023-07-18 RX ADMIN — GADOBENATE DIMEGLUMINE 12 ML: 529 INJECTION, SOLUTION INTRAVENOUS at 01:07

## 2023-07-19 ENCOUNTER — INFUSION (OUTPATIENT)
Dept: INFUSION THERAPY | Facility: HOSPITAL | Age: 53
End: 2023-07-19
Attending: INTERNAL MEDICINE
Payer: MEDICAID

## 2023-07-19 ENCOUNTER — DOCUMENTATION ONLY (OUTPATIENT)
Dept: HEMATOLOGY/ONCOLOGY | Facility: CLINIC | Age: 53
End: 2023-07-19

## 2023-07-19 ENCOUNTER — CLINICAL SUPPORT (OUTPATIENT)
Dept: HEMATOLOGY/ONCOLOGY | Facility: CLINIC | Age: 53
End: 2023-07-19
Payer: MEDICAID

## 2023-07-19 ENCOUNTER — HOSPITAL ENCOUNTER (OUTPATIENT)
Dept: RADIOLOGY | Facility: HOSPITAL | Age: 53
Discharge: HOME | End: 2023-07-19
Attending: INTERNAL MEDICINE
Payer: MEDICAID

## 2023-07-19 VITALS
RESPIRATION RATE: 20 BRPM | OXYGEN SATURATION: 99 % | DIASTOLIC BLOOD PRESSURE: 69 MMHG | WEIGHT: 132 LBS | TEMPERATURE: 98 F | SYSTOLIC BLOOD PRESSURE: 103 MMHG | HEIGHT: 63 IN | BODY MASS INDEX: 23.39 KG/M2 | HEART RATE: 103 BPM

## 2023-07-19 DIAGNOSIS — C20 ADENOCARCINOMA OF RECTUM, STAGE 3: Primary | ICD-10-CM

## 2023-07-19 PROCEDURE — 96413 CHEMO IV INFUSION 1 HR: CPT

## 2023-07-19 PROCEDURE — 96367 TX/PROPH/DG ADDL SEQ IV INF: CPT

## 2023-07-19 PROCEDURE — 96416 CHEMO PROLONG INFUSE W/PUMP: CPT

## 2023-07-19 PROCEDURE — 25000003 PHARM REV CODE 250: Performed by: INTERNAL MEDICINE

## 2023-07-19 PROCEDURE — 96411 CHEMO IV PUSH ADDL DRUG: CPT

## 2023-07-19 PROCEDURE — 96415 CHEMO IV INFUSION ADDL HR: CPT

## 2023-07-19 PROCEDURE — 63600175 PHARM REV CODE 636 W HCPCS: Performed by: INTERNAL MEDICINE

## 2023-07-19 RX ORDER — FLUOROURACIL 50 MG/ML
400 INJECTION, SOLUTION INTRAVENOUS
Status: COMPLETED | OUTPATIENT
Start: 2023-07-19 | End: 2023-07-19

## 2023-07-19 RX ORDER — SODIUM CHLORIDE 0.9 % (FLUSH) 0.9 %
10 SYRINGE (ML) INJECTION
Status: DISCONTINUED | OUTPATIENT
Start: 2023-07-19 | End: 2023-07-19 | Stop reason: HOSPADM

## 2023-07-19 RX ORDER — HEPARIN 100 UNIT/ML
500 SYRINGE INTRAVENOUS
Status: DISCONTINUED | OUTPATIENT
Start: 2023-07-19 | End: 2023-07-19 | Stop reason: HOSPADM

## 2023-07-19 RX ORDER — DEXAMETHASONE 4 MG/1
8 TABLET ORAL DAILY
Qty: 24 TABLET | Refills: 5 | Status: SHIPPED | OUTPATIENT
Start: 2023-07-20 | End: 2023-12-19

## 2023-07-19 RX ORDER — DIPHENHYDRAMINE HYDROCHLORIDE 50 MG/ML
50 INJECTION INTRAMUSCULAR; INTRAVENOUS ONCE AS NEEDED
Status: DISCONTINUED | OUTPATIENT
Start: 2023-07-19 | End: 2023-07-19 | Stop reason: HOSPADM

## 2023-07-19 RX ORDER — ONDANSETRON 8 MG/1
8 TABLET, ORALLY DISINTEGRATING ORAL EVERY 8 HOURS PRN
Qty: 60 TABLET | Refills: 5 | Status: SHIPPED | OUTPATIENT
Start: 2023-07-19 | End: 2023-12-19

## 2023-07-19 RX ORDER — EPINEPHRINE 0.3 MG/.3ML
0.3 INJECTION SUBCUTANEOUS ONCE AS NEEDED
Status: DISCONTINUED | OUTPATIENT
Start: 2023-07-19 | End: 2023-07-19 | Stop reason: HOSPADM

## 2023-07-19 RX ADMIN — OXALIPLATIN 139 MG: 100 INJECTION, SOLUTION, CONCENTRATE INTRAVENOUS at 10:07

## 2023-07-19 RX ADMIN — FLUOROURACIL 3900 MG: 50 INJECTION, SOLUTION INTRAVENOUS at 12:07

## 2023-07-19 RX ADMIN — DEXAMETHASONE SODIUM PHOSPHATE 0.25 MG: 4 INJECTION, SOLUTION INTRA-ARTICULAR; INTRALESIONAL; INTRAMUSCULAR; INTRAVENOUS; SOFT TISSUE at 09:07

## 2023-07-19 RX ADMIN — DEXTROSE MONOHYDRATE: 50 INJECTION, SOLUTION INTRAVENOUS at 09:07

## 2023-07-19 RX ADMIN — FLUOROURACIL 650 MG: 50 INJECTION, SOLUTION INTRAVENOUS at 12:07

## 2023-07-19 RX ADMIN — LEUCOVORIN CALCIUM 650 MG: 350 INJECTION, POWDER, LYOPHILIZED, FOR SUSPENSION INTRAMUSCULAR; INTRAVENOUS at 10:07

## 2023-07-19 NOTE — PROGRESS NOTES
"  Reason for Visit:  Chemo treatment for rectal cancer      PMHx: Constipation, Anxiety    Height: 63"  Weight:   Wt Readings from Last 15 Encounters:   07/19/23 59.9 kg (132 lb)   07/13/23 58.4 kg (128 lb 12.8 oz)   07/10/23 60 kg (132 lb 3.2 oz)   07/05/23 59.9 kg (132 lb)       Usual BW: 124 lb  Weight Change: Pt reports previously with wt gain to 160 lb, then recent wt loss indicating ~20% wt loss within the last 6 months  IBW:  115 lb  BMI: 23.3 (normal)    Allergies: Codeine and Morphine    Current Medications:    Current Outpatient Medications:     busPIRone (BUSPAR) 15 MG tablet, Take 15 mg by mouth., Disp: , Rfl:     dexAMETHasone (DECADRON) 4 MG Tab, Take 2 tablets(8mg) by mouth daily on days 2 and 3 of each chemotherapy cycle., Disp: 32 tablet, Rfl: 0    [START ON 7/20/2023] dexAMETHasone (DECADRON) 4 MG Tab, Take 2 tablets (8 mg total) by mouth once daily. Take as directed on days 2 and 3 of your chemotherapy cycle., Disp: 24 tablet, Rfl: 5    dicyclomine (BENTYL) 10 MG capsule, Take 1 capsule (10 mg total) by mouth every 6 (six) hours as needed (abdominal pain)., Disp: 60 capsule, Rfl: 0    dicyclomine (BENTYL) 10 MG capsule, , Disp: , Rfl:     famotidine (PEPCID) 40 MG tablet, Take 40 mg by mouth every evening., Disp: , Rfl:     famotidine (PEPCID) 40 MG tablet, , Disp: , Rfl:     hyoscyamine (ANASPAZ,LEVSIN) 0.125 mg Tab, Take 125 mcg by mouth every 6 (six) hours as needed., Disp: , Rfl:     ondansetron (ZOFRAN) 8 MG tablet, Take 1 tablet (8 mg total) by mouth every 8 (eight) hours as needed for Nausea., Disp: 30 tablet, Rfl: 2    ondansetron (ZOFRAN-ODT) 8 MG TbDL, Take 1 tablet (8 mg total) by mouth every 8 (eight) hours as needed (nausea/vomiting)., Disp: 60 tablet, Rfl: 5    pantoprazole (PROTONIX) 40 MG tablet, Take 40 mg by mouth every morning., Disp: , Rfl:     PANTOPRAZOLE 40 MG/100 ML 0.9 % NS IVPB, , Disp: , Rfl:     potassium chloride SA (K-DUR,KLOR-CON) 20 MEQ tablet, Take 1 tablet (20 mEq " total) by mouth once daily. for 14 days, Disp: 14 tablet, Rfl: 0    prochlorperazine (COMPAZINE) 10 MG tablet, Take 1 tablet (10 mg total) by mouth every 6 to 8 hours as needed (nausea)., Disp: 30 tablet, Rfl: 1    sodium,potassium,mag sulfates (SUPREP BOWEL PREP KIT) 17.5-3.13-1.6 gram SolR, TAKE 1 KIT BY MOUTH SINGLE DOSE AS DIRECTED FOR 1 DAY, Disp: , Rfl:     traZODone (DESYREL) 50 MG tablet, Take 50 mg by mouth., Disp: , Rfl:     Labs:  7/19/23 -- H/H 10.9/32.5 L, Glu 113, K 3.7, BUN 11.9, Cr 0.74    Diet History:   7/19/23 -- Pt beginning chancer treatment for rectal cancer; reports good appetite however avoiding eating d/t feeling of fullness/bloating and uncomfortableness d/t location of mass - encouraged 5-6 small meals, low residue/easy to digest foods; reports taking Miralax daily for constipation; nsing instructed pt on cold sensitivity -- pt reports normally likes ice cold water through out the day & smoothies for breakfast, alternative options to incorporate room temp foods for management of cold sensitivity such as room temp Ensure oral nutrition supplement & fruit cups or pudding; pt with ~20% wt loss within the last 6 months after previous gain, reports current wt is more of her UBW; Pt screens at high nutrition risk at this time -- will continue to follow up with patient during chemo treatment    Nutrition Education:    Patient educated on:  Nutrition during cancer treatment, Foods easy on your stomach .      Teaching Method:  Explanation and Printed Materials    Patient's Understanding: Verbalizes understanding    Barriers to learning: Emotional State    Expected Compliance:  good    All questions were answered. Dietitian's contact information provided.       Nutrition Diagnosis:   Problem:  malnutrition  Etiology (related to): tumor location  Signs/Symptoms (as evidenced by):  <75% nutrition intake > 1 month, > 10% wt loss x 6 months       Nutrition Rx: (based on 60 kg)  EEN: 2746-7319 kcal (30 -  32 kcal/kg)  EPN: 60-72 gm (1-1.2 gm/kg)  FLD: 1260-5480 ml (1ml/kcal)    Intervention:  Low Residue/Easy to digest diet, 5-6 small meals daily   Ensure Clear BID  Monitor Weights Weekly       Monitoring:  energy intake, GI tolerance, weight, and labs

## 2023-07-19 NOTE — NURSING
"0700 Pt heror labs, PICC placement & Cycle 1 Day 1 mFolfox; labs not drawn, pt stated she needs a stretcher as she "passes out" after blood draws; only a chair in phlebotomy room so labs will be drawn by PICC nurse; pt without chief complaint, stated she's "not a morning person", witnessed pt's consent for PICC placement; no questions at this time.  "

## 2023-07-19 NOTE — NURSING
After PICC placement, pt received mFolfox without incident; pt discharged with home chemo spill kit and oncall nurse phone number.

## 2023-07-19 NOTE — NURSING
[facility] met with patient for follow up visit. Patient in clinic for chemotherapy. Reviewed RN Magen contact information and role in patient's care.    Reviewed NCCN Distress Screen. Patient reported his/her level of distress has improved but just anxious to complete chemotherapy.   Provided educate on potential side effects and symptom management associated with chemotherapy. Spent additional time on signs of infection, infection prevention through good hand hygiene and wearing mask, adequate nutrition/hydration, skin care along with sunscreen, and oral care.    Reviewed contact information for clinic and information related to [facility] communication. Discussed communication process for some common scenarios in which patient should call provider for guidance vs. immediately report to the emergency room.     [facility] reviewed educational material regarding community and cancer resources. Patient says she did receive the Ensure clear through [facility]. Patient agrees to contact [facility] for any needs/concerns.      Oncology Navigation   Intake  Cancer Type: GI  Initial Nurse Navigator Contact: 06/29/23  Date Worked: 07/19/23  Appointment Date: 07/05/23  Start of Treatment: 07/19/23     Treatment  Current Status: Active    Surgery: Planned  Type of Surgery: resection    Medical Oncologist: John Bazan MD  Consult Date: 07/05/23  Chemotherapy: Planned  Chemotherapy Regimen: neoadjuvant therapy:   Modified FOLFOX every 2 weeks X 16 weeks (8 cycles); followed by Long course chemoradiation therapy with capecitabine    Radiation Therapy: Planned  Radiation Oncologist: [facility]       General Referrals: [employer]; [facility]       Radiation Oncologist: [facility]    Support Systems: Parent; Children; Family members; Friends / neighbors  Barriers of Care: Financial concerns  Financial Concerns: Other  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

## 2023-07-21 ENCOUNTER — INFUSION (OUTPATIENT)
Dept: INFUSION THERAPY | Facility: HOSPITAL | Age: 53
End: 2023-07-21
Attending: INTERNAL MEDICINE
Payer: MEDICAID

## 2023-07-21 VITALS
HEART RATE: 75 BPM | WEIGHT: 132.25 LBS | DIASTOLIC BLOOD PRESSURE: 66 MMHG | HEIGHT: 63 IN | BODY MASS INDEX: 23.43 KG/M2 | SYSTOLIC BLOOD PRESSURE: 103 MMHG | TEMPERATURE: 98 F | OXYGEN SATURATION: 98 % | RESPIRATION RATE: 20 BRPM

## 2023-07-21 DIAGNOSIS — C20 ADENOCARCINOMA OF RECTUM, STAGE 3: Primary | ICD-10-CM

## 2023-07-21 PROCEDURE — A4216 STERILE WATER/SALINE, 10 ML: HCPCS | Performed by: INTERNAL MEDICINE

## 2023-07-21 PROCEDURE — 25000003 PHARM REV CODE 250: Performed by: INTERNAL MEDICINE

## 2023-07-21 RX ORDER — HEPARIN 100 UNIT/ML
500 SYRINGE INTRAVENOUS
Status: DISCONTINUED | OUTPATIENT
Start: 2023-07-21 | End: 2023-07-21 | Stop reason: HOSPADM

## 2023-07-21 RX ORDER — SODIUM CHLORIDE 0.9 % (FLUSH) 0.9 %
10 SYRINGE (ML) INJECTION
Status: DISCONTINUED | OUTPATIENT
Start: 2023-07-21 | End: 2023-07-21 | Stop reason: HOSPADM

## 2023-07-21 RX ADMIN — Medication 10 ML: at 10:07

## 2023-07-21 NOTE — NURSING
Pt received 5-FU infusion via CADD pump which was discontinued and PICC lumen and saline locked, without incident.

## 2023-07-22 ENCOUNTER — HOSPITAL ENCOUNTER (EMERGENCY)
Facility: HOSPITAL | Age: 53
Discharge: HOME | End: 2023-07-23
Attending: EMERGENCY MEDICINE
Payer: MEDICAID

## 2023-07-22 DIAGNOSIS — R11.2 NAUSEA AND VOMITING, UNSPECIFIED VOMITING TYPE: ICD-10-CM

## 2023-07-22 DIAGNOSIS — R50.9 FEVER: ICD-10-CM

## 2023-07-22 DIAGNOSIS — E86.0 DEHYDRATION: Primary | ICD-10-CM

## 2023-07-22 PROCEDURE — 99284 EMERGENCY DEPT VISIT MOD MDM: CPT | Mod: 25

## 2023-07-22 PROCEDURE — 80307 DRUG TEST PRSMV CHEM ANLYZR: CPT | Performed by: EMERGENCY MEDICINE

## 2023-07-22 PROCEDURE — 81001 URINALYSIS AUTO W/SCOPE: CPT | Mod: 59 | Performed by: EMERGENCY MEDICINE

## 2023-07-22 PROCEDURE — 0240U COVID/FLU A&B PCR: CPT | Performed by: EMERGENCY MEDICINE

## 2023-07-22 RX ORDER — ONDANSETRON 2 MG/ML
4 INJECTION INTRAMUSCULAR; INTRAVENOUS
Status: COMPLETED | OUTPATIENT
Start: 2023-07-22 | End: 2023-07-23

## 2023-07-22 RX ORDER — KETOROLAC TROMETHAMINE 30 MG/ML
30 INJECTION, SOLUTION INTRAMUSCULAR; INTRAVENOUS
Status: COMPLETED | OUTPATIENT
Start: 2023-07-22 | End: 2023-07-23

## 2023-07-23 VITALS
WEIGHT: 132 LBS | DIASTOLIC BLOOD PRESSURE: 53 MMHG | RESPIRATION RATE: 18 BRPM | BODY MASS INDEX: 24.29 KG/M2 | SYSTOLIC BLOOD PRESSURE: 106 MMHG | TEMPERATURE: 98 F | OXYGEN SATURATION: 99 % | HEART RATE: 94 BPM | HEIGHT: 62 IN

## 2023-07-23 LAB
ALBUMIN SERPL-MCNC: 3.6 G/DL (ref 3.5–5)
ALBUMIN/GLOB SERPL: 1.2 RATIO (ref 1.1–2)
ALP SERPL-CCNC: 63 UNIT/L (ref 40–150)
ALT SERPL-CCNC: 13 UNIT/L (ref 0–55)
AMPHET UR QL SCN: NEGATIVE
APPEARANCE UR: CLEAR
AST SERPL-CCNC: 15 UNIT/L (ref 5–34)
BACTERIA #/AREA URNS AUTO: NORMAL /HPF
BARBITURATE SCN PRESENT UR: NEGATIVE
BASOPHILS # BLD AUTO: 0.01 X10(3)/MCL
BASOPHILS NFR BLD AUTO: 0.1 %
BENZODIAZ UR QL SCN: NEGATIVE
BILIRUB UR QL STRIP.AUTO: NEGATIVE
BILIRUBIN DIRECT+TOT PNL SERPL-MCNC: 1.9 MG/DL
BUN SERPL-MCNC: 12.8 MG/DL (ref 9.8–20.1)
CALCIUM SERPL-MCNC: 9.3 MG/DL (ref 8.4–10.2)
CANNABINOIDS UR QL SCN: POSITIVE
CHLORIDE SERPL-SCNC: 106 MMOL/L (ref 98–107)
CO2 SERPL-SCNC: 22 MMOL/L (ref 22–29)
COCAINE UR QL SCN: NEGATIVE
COLOR UR: NORMAL
CREAT SERPL-MCNC: 0.76 MG/DL (ref 0.55–1.02)
CRP SERPL-MCNC: 37.8 MG/L
EOSINOPHIL # BLD AUTO: 0 X10(3)/MCL (ref 0–0.9)
EOSINOPHIL NFR BLD AUTO: 0 %
ERYTHROCYTE [DISTWIDTH] IN BLOOD BY AUTOMATED COUNT: 14.2 % (ref 11.5–17)
FENTANYL UR QL SCN: NEGATIVE
FLUAV AG UPPER RESP QL IA.RAPID: NOT DETECTED
FLUBV AG UPPER RESP QL IA.RAPID: NOT DETECTED
GFR SERPLBLD CREATININE-BSD FMLA CKD-EPI: >60 MLS/MIN/1.73/M2
GLOBULIN SER-MCNC: 3 GM/DL (ref 2.4–3.5)
GLUCOSE SERPL-MCNC: 119 MG/DL (ref 74–100)
GLUCOSE UR QL STRIP.AUTO: NORMAL
HCT VFR BLD AUTO: 36.8 % (ref 37–47)
HGB BLD-MCNC: 12.4 G/DL (ref 12–16)
HYALINE CASTS #/AREA URNS LPF: NORMAL /LPF
IMM GRANULOCYTES # BLD AUTO: 0.03 X10(3)/MCL (ref 0–0.04)
IMM GRANULOCYTES NFR BLD AUTO: 0.4 %
KETONES UR QL STRIP.AUTO: NEGATIVE
LACTATE SERPL-SCNC: 1.5 MMOL/L (ref 0.5–2.2)
LEUKOCYTE ESTERASE UR QL STRIP.AUTO: NEGATIVE
LIPASE SERPL-CCNC: 48 U/L
LYMPHOCYTES # BLD AUTO: 1.05 X10(3)/MCL (ref 0.6–4.6)
LYMPHOCYTES NFR BLD AUTO: 13.7 %
MCH RBC QN AUTO: 28.2 PG (ref 27–31)
MCHC RBC AUTO-ENTMCNC: 33.7 G/DL (ref 33–36)
MCV RBC AUTO: 83.8 FL (ref 80–94)
MDMA UR QL SCN: NEGATIVE
MONOCYTES # BLD AUTO: 0.02 X10(3)/MCL (ref 0.1–1.3)
MONOCYTES NFR BLD AUTO: 0.3 %
NEUTROPHILS # BLD AUTO: 6.57 X10(3)/MCL (ref 2.1–9.2)
NEUTROPHILS NFR BLD AUTO: 85.5 %
NITRITE UR QL STRIP.AUTO: NEGATIVE
NRBC BLD AUTO-RTO: 0 %
OPIATES UR QL SCN: NEGATIVE
PCP UR QL: NEGATIVE
PH UR STRIP.AUTO: 7.5 [PH]
PH UR: 7.5 [PH] (ref 3–11)
PLATELET # BLD AUTO: 199 X10(3)/MCL (ref 130–400)
PMV BLD AUTO: 9.8 FL (ref 7.4–10.4)
POTASSIUM SERPL-SCNC: 3.6 MMOL/L (ref 3.5–5.1)
PROT SERPL-MCNC: 6.6 GM/DL (ref 6.4–8.3)
PROT UR QL STRIP.AUTO: NEGATIVE
RBC # BLD AUTO: 4.39 X10(6)/MCL (ref 4.2–5.4)
RBC #/AREA URNS AUTO: NORMAL /HPF
RBC UR QL AUTO: NEGATIVE
SARS-COV-2 RNA RESP QL NAA+PROBE: NOT DETECTED
SODIUM SERPL-SCNC: 140 MMOL/L (ref 136–145)
SP GR UR STRIP.AUTO: 1
SPECIFIC GRAVITY, URINE AUTO (.000) (OHS): 1 (ref 1–1.03)
SQUAMOUS #/AREA URNS LPF: NORMAL /HPF
UROBILINOGEN UR STRIP-ACNC: NORMAL
WBC # SPEC AUTO: 7.68 X10(3)/MCL (ref 4.5–11.5)
WBC #/AREA URNS AUTO: NORMAL /HPF

## 2023-07-23 PROCEDURE — 83690 ASSAY OF LIPASE: CPT | Performed by: EMERGENCY MEDICINE

## 2023-07-23 PROCEDURE — 96361 HYDRATE IV INFUSION ADD-ON: CPT

## 2023-07-23 PROCEDURE — 63600175 PHARM REV CODE 636 W HCPCS: Performed by: EMERGENCY MEDICINE

## 2023-07-23 PROCEDURE — 96375 TX/PRO/DX INJ NEW DRUG ADDON: CPT

## 2023-07-23 PROCEDURE — 85025 COMPLETE CBC W/AUTO DIFF WBC: CPT | Performed by: EMERGENCY MEDICINE

## 2023-07-23 PROCEDURE — 25000003 PHARM REV CODE 250: Performed by: EMERGENCY MEDICINE

## 2023-07-23 PROCEDURE — 80053 COMPREHEN METABOLIC PANEL: CPT | Performed by: EMERGENCY MEDICINE

## 2023-07-23 PROCEDURE — 86140 C-REACTIVE PROTEIN: CPT | Performed by: EMERGENCY MEDICINE

## 2023-07-23 PROCEDURE — 96374 THER/PROPH/DIAG INJ IV PUSH: CPT

## 2023-07-23 PROCEDURE — 83605 ASSAY OF LACTIC ACID: CPT | Performed by: EMERGENCY MEDICINE

## 2023-07-23 RX ADMIN — ONDANSETRON 4 MG: 2 INJECTION INTRAMUSCULAR; INTRAVENOUS at 12:07

## 2023-07-23 RX ADMIN — KETOROLAC TROMETHAMINE 30 MG: 30 INJECTION, SOLUTION INTRAMUSCULAR; INTRAVENOUS at 12:07

## 2023-07-23 RX ADMIN — SODIUM CHLORIDE 1797 ML: 9 INJECTION, SOLUTION INTRAVENOUS at 12:07

## 2023-07-23 NOTE — ED PROVIDER NOTES
Encounter Date: 2023       History     Chief Complaint   Patient presents with    Fatigue    Vomiting     States first chemo on Wednesday and today started with chills and vomiting.  Now extreme weakness and dizziness.      Dizziness     Nausea, fever after starting chemotherapy for colorectal adenocarcinoma (diagnosed 2023);       Emesis   This is a new problem. The current episode started today. The problem occurs every few hours. The problem has been gradually improving. The emesis has an appearance of stomach contents. Associated symptoms include chills and myalgias. Pertinent negatives include no abdominal pain, no arthralgias, no cough, no diarrhea, no fever, no headaches, no sweats and no URI. Risk factors: recent chemotherapy ;   Review of patient's allergies indicates:   Allergen Reactions    Codeine Itching    Morphine Itching     Past Medical History:   Diagnosis Date    Rectal cancer      Past Surgical History:   Procedure Laterality Date    APPENDECTOMY      BACK SURGERY       SECTION      cyst coccyx       Family History   Problem Relation Age of Onset    Colon cancer Paternal Grandfather      Social History     Tobacco Use    Smoking status: Never    Smokeless tobacco: Never   Substance Use Topics    Alcohol use: Not Currently     Alcohol/week: 1.0 standard drink     Types: 1 Glasses of wine per week    Drug use: Yes     Types: Marijuana     Comment: medical marijuana     Review of Systems   Constitutional:  Positive for chills. Negative for fever.   Respiratory:  Negative for cough.    Gastrointestinal:  Positive for vomiting. Negative for abdominal pain and diarrhea.   Musculoskeletal:  Positive for myalgias. Negative for arthralgias.   Neurological:  Negative for headaches.   All other systems reviewed and are negative.    Physical Exam     Initial Vitals [23 2224]   BP Pulse Resp Temp SpO2   101/69 107 18 98.7 °F (37.1 °C) 98 %      MAP       --         Physical  Exam    Nursing note and vitals reviewed.  Constitutional: She appears well-developed and well-nourished.   HENT:   Head: Normocephalic and atraumatic.   Eyes: Conjunctivae and EOM are normal. Pupils are equal, round, and reactive to light.   Neck: No tracheal deviation present.   Normal range of motion.  Cardiovascular:  Normal rate, regular rhythm and normal heart sounds.           Pulmonary/Chest: Breath sounds normal. No respiratory distress. She exhibits no tenderness.   Abdominal: Abdomen is soft. She exhibits no distension. There is no abdominal tenderness. There is no rebound.   Musculoskeletal:         General: No tenderness. Normal range of motion.      Cervical back: Normal range of motion.     Neurological: She is alert and oriented to person, place, and time. GCS score is 15. GCS eye subscore is 4. GCS verbal subscore is 5. GCS motor subscore is 6.   Skin: Skin is warm and dry. No rash and no abscess noted.   Psychiatric: She has a normal mood and affect. Her behavior is normal. Judgment and thought content normal.       ED Course   Procedures  Labs Reviewed   COMPREHENSIVE METABOLIC PANEL - Abnormal; Notable for the following components:       Result Value    Glucose Level 119 (*)     Bilirubin Total 1.9 (*)     All other components within normal limits   C-REACTIVE PROTEIN - Abnormal; Notable for the following components:    C-Reactive Protein 37.80 (*)     All other components within normal limits   DRUG SCREEN, URINE (BEAKER) - Abnormal; Notable for the following components:    Cannabinoids, Urine Positive (*)     All other components within normal limits    Narrative:     Cut off concentrations:    Amphetamines - 1000 ng/ml  Barbiturates - 200 ng/ml  Benzodiazepine - 200 ng/ml  Cannabinoids (THC) - 50 ng/ml  Cocaine - 300 ng/ml  Fentanyl - 1.0 ng/ml  MDMA - 500 ng/ml  Opiates - 300 ng/ml   Phencyclidine (PCP) - 25 ng/ml    Specimen submitted for drug analysis and tested for pH and specific gravity  in order to evaluate sample integrity. Suspect tampering if specific gravity is <1.003 and/or pH is not within the range of 4.5 - 8.0  False negatives may result form substances such as bleach added to urine.  False positives may result for the presence of a substance with similar chemical structure to the drug or its metabolite.    This test provides only a PRELIMINARY analytical test result. A more specific alternate chemical method must be used in order to obtain a confirmed analytical result. Gas chromatography/mass spectrometry (GC/MS) is the preferred confirmatory method. Other chemical confirmation methods are available. Clinical consideration and professional judgement should be applied to any drug of abuse test result, particularly when preliminary positive results are used.    Positive results will be confirmed only at the physicians request. Unconfirmed screening results are to be used only for medical purposes (treatment).        CBC WITH DIFFERENTIAL - Abnormal; Notable for the following components:    Hct 36.8 (*)     Mono # 0.02 (*)     All other components within normal limits   LIPASE - Normal   COVID/FLU A&B PCR - Normal    Narrative:     The Xpert Xpress SARS-CoV-2/FLU/RSV plus is a rapid, multiplexed real-time PCR test intended for the simultaneous qualitative detection and differentiation of SARS-CoV-2, Influenza A, Influenza B, and respiratory syncytial virus (RSV) viral RNA in either nasopharyngeal swab or nasal swab specimens.         LACTIC ACID, PLASMA - Normal   CBC W/ AUTO DIFFERENTIAL    Narrative:     The following orders were created for panel order CBC Auto Differential.  Procedure                               Abnormality         Status                     ---------                               -----------         ------                     CBC with Differential[[id number]]        Abnormal            Final result                 Please view results for these tests on the individual  orders.   URINALYSIS, REFLEX TO URINE CULTURE   EXTRA TUBES    Narrative:     The following orders were created for panel order EXTRA TUBES.  Procedure                               Abnormality         Status                     ---------                               -----------         ------                     Light Blue Top Hold[[id number]]                              In process                 Gold Top Hold[[id number]]                                    In process                   Please view results for these tests on the individual orders.   LIGHT BLUE TOP HOLD   GOLD TOP HOLD          Imaging Results              X-Ray Chest AP Portable (Preliminary result)  Result time 07/23/23 00:42:09      Wet Read by Mike Coleman MD (07/23/23 00:42:09, [facility], Emergency Medicine)    Normal chest XR ;                                     Medications   ketorolac injection 30 mg (30 mg Intravenous Given 7/23/23 0016)   sodium chloride 0.9% bolus 1,797 mL 1,797 mL (0 mLs Intravenous Stopped 7/23/23 0117)   ondansetron injection 4 mg (4 mg Intravenous Given 7/23/23 0016)     Medical Decision Making:   History:   Old Medical Records: I decided to obtain old medical records.  Old Records Summarized: other records.       <> Summary of Records: Previous records confirm recent diagnosis colorectal adenocarcinoma, recently begun on chemotherapy.  Initial Assessment:   53-year-old woman presents this evening endorsing subjective fever with associated chills and presyncopal symptoms after initiating chemotherapy.  Patient is modestly hypotensive in the emergency department though not tachycardic.  Differential Diagnosis:   Side effect of chemotherapy leads the differential, nevertheless risk of alternatives sufficient to warrant expanded evaluation with objective data.  The alternatives include relative versus absolute hypovolemia, pneumonia, UTI, viral syndrome, other sources of sepsis, others  ...  Clinical Tests:   Lab Tests: Ordered and Reviewed  Radiological Study: Ordered and Reviewed  ED Management:  Patient is improved in the ED in response to crystalloid, conservative interventions.  Patient's objective data are found reassuring today, though CRP modestly elevated.  Results and options including admission discussed with patient.  Patient strongly prefers an outpatient course, and mental status is more than sufficient to participate in dialogue around informed consent.  Given reassuring results to date, find outpatient plan reasonable.  Patient is discharged home with anticipatory guidance, return precautions, follow-up instructions.  Home in improved condition without event.           ED Course as of 07/23/23 0221   Sun Jul 23, 2023   0120 Negative lactate ; [CT]   0120 Normal lipase ; [CT]   0120 Reassuring hemogram ; [CT]   0120 Reassuring chemistries ; [CT]   0120 Modestly elevated crp ; [CT]   0120 Utox positive for cannabinoids ; [CT]   0121 Normal ua ; [CT]   0206 Respiratory pathogens negative by pcr ; [CT]      ED Course User Index  [CT] Mike Coleman MD                 Clinical Impression:   Final diagnoses:  [R50.9] Fever  [E86.0] Dehydration (Primary)  [R11.2] Nausea and vomiting, unspecified vomiting type        ED Disposition Condition    Discharge Stable          ED Prescriptions    None       Follow-up Information       Follow up With Specialties Details Why Contact Info    [facility] Emergency Medicine  As needed, If symptoms worsen [address] [facility] [address] [phone]             Mike Coleman MD  07/23/23 0221

## 2023-07-24 PROBLEM — K21.9 GERD (GASTROESOPHAGEAL REFLUX DISEASE): Status: ACTIVE | Noted: 2023-07-24

## 2023-07-24 PROBLEM — F41.9 ANXIETY DISORDER, UNSPECIFIED: Status: ACTIVE | Noted: 2023-07-24

## 2023-07-26 ENCOUNTER — TELEPHONE (OUTPATIENT)
Dept: HEMATOLOGY/ONCOLOGY | Facility: CLINIC | Age: 53
End: 2023-07-26

## 2023-07-27 ENCOUNTER — OFFICE VISIT (OUTPATIENT)
Dept: HEMATOLOGY/ONCOLOGY | Facility: CLINIC | Age: 53
End: 2023-07-27
Payer: MEDICAID

## 2023-07-27 ENCOUNTER — INFUSION (OUTPATIENT)
Dept: INFUSION THERAPY | Facility: HOSPITAL | Age: 53
End: 2023-07-27
Attending: INTERNAL MEDICINE
Payer: MEDICAID

## 2023-07-27 ENCOUNTER — HOSPITAL ENCOUNTER (OUTPATIENT)
Dept: RADIOLOGY | Facility: HOSPITAL | Age: 53
Discharge: HOME | End: 2023-07-27
Attending: NURSE PRACTITIONER
Payer: MEDICAID

## 2023-07-27 ENCOUNTER — PATIENT MESSAGE (OUTPATIENT)
Dept: HEMATOLOGY/ONCOLOGY | Facility: CLINIC | Age: 53
End: 2023-07-27

## 2023-07-27 VITALS
BODY MASS INDEX: 21.62 KG/M2 | SYSTOLIC BLOOD PRESSURE: 109 MMHG | TEMPERATURE: 97 F | HEIGHT: 64 IN | DIASTOLIC BLOOD PRESSURE: 70 MMHG | HEART RATE: 91 BPM | RESPIRATION RATE: 20 BRPM | WEIGHT: 126.63 LBS | OXYGEN SATURATION: 100 %

## 2023-07-27 DIAGNOSIS — M79.602 PAIN AND SWELLING OF LEFT UPPER EXTREMITY: Primary | ICD-10-CM

## 2023-07-27 DIAGNOSIS — M79.89 PAIN AND SWELLING OF LEFT UPPER EXTREMITY: Primary | ICD-10-CM

## 2023-07-27 DIAGNOSIS — K12.30 STOMATITIS AND MUCOSITIS: ICD-10-CM

## 2023-07-27 DIAGNOSIS — C20 ADENOCARCINOMA OF RECTUM: ICD-10-CM

## 2023-07-27 DIAGNOSIS — M79.89 PAIN AND SWELLING OF LEFT UPPER EXTREMITY: ICD-10-CM

## 2023-07-27 DIAGNOSIS — C20 ADENOCARCINOMA OF RECTUM, STAGE 3: ICD-10-CM

## 2023-07-27 DIAGNOSIS — M79.602 PAIN AND SWELLING OF LEFT UPPER EXTREMITY: ICD-10-CM

## 2023-07-27 DIAGNOSIS — C20 ADENOCARCINOMA OF RECTUM, STAGE 3: Primary | ICD-10-CM

## 2023-07-27 DIAGNOSIS — T82.868A: ICD-10-CM

## 2023-07-27 DIAGNOSIS — K12.1 STOMATITIS AND MUCOSITIS: ICD-10-CM

## 2023-07-27 PROCEDURE — 1159F MED LIST DOCD IN RCRD: CPT | Mod: CPTII,,, | Performed by: NURSE PRACTITIONER

## 2023-07-27 PROCEDURE — 3074F SYST BP LT 130 MM HG: CPT | Mod: CPTII,,, | Performed by: NURSE PRACTITIONER

## 2023-07-27 PROCEDURE — 99213 OFFICE O/P EST LOW 20 MIN: CPT | Mod: PBBFAC,25 | Performed by: NURSE PRACTITIONER

## 2023-07-27 PROCEDURE — 99215 PR OFFICE/OUTPT VISIT, EST, LEVL V, 40-54 MIN: ICD-10-PCS | Mod: S$PBB,,, | Performed by: NURSE PRACTITIONER

## 2023-07-27 PROCEDURE — 3078F PR MOST RECENT DIASTOLIC BLOOD PRESSURE < 80 MM HG: ICD-10-PCS | Mod: CPTII,,, | Performed by: NURSE PRACTITIONER

## 2023-07-27 PROCEDURE — 99215 OFFICE O/P EST HI 40 MIN: CPT | Mod: S$PBB,,, | Performed by: NURSE PRACTITIONER

## 2023-07-27 PROCEDURE — 1159F PR MEDICATION LIST DOCUMENTED IN MEDICAL RECORD: ICD-10-PCS | Mod: CPTII,,, | Performed by: NURSE PRACTITIONER

## 2023-07-27 PROCEDURE — 3008F PR BODY MASS INDEX (BMI) DOCUMENTED: ICD-10-PCS | Mod: CPTII,,, | Performed by: NURSE PRACTITIONER

## 2023-07-27 PROCEDURE — 93971 EXTREMITY STUDY: CPT | Mod: LT

## 2023-07-27 PROCEDURE — 1160F PR REVIEW ALL MEDS BY PRESCRIBER/CLIN PHARMACIST DOCUMENTED: ICD-10-PCS | Mod: CPTII,,, | Performed by: NURSE PRACTITIONER

## 2023-07-27 PROCEDURE — 3078F DIAST BP <80 MM HG: CPT | Mod: CPTII,,, | Performed by: NURSE PRACTITIONER

## 2023-07-27 PROCEDURE — 3074F PR MOST RECENT SYSTOLIC BLOOD PRESSURE < 130 MM HG: ICD-10-PCS | Mod: CPTII,,, | Performed by: NURSE PRACTITIONER

## 2023-07-27 PROCEDURE — 3008F BODY MASS INDEX DOCD: CPT | Mod: CPTII,,, | Performed by: NURSE PRACTITIONER

## 2023-07-27 PROCEDURE — 1160F RVW MEDS BY RX/DR IN RCRD: CPT | Mod: CPTII,,, | Performed by: NURSE PRACTITIONER

## 2023-07-27 RX ORDER — DULOXETIN HYDROCHLORIDE 20 MG/1
20 CAPSULE, DELAYED RELEASE ORAL
COMMUNITY
Start: 2023-07-18 | End: 2023-08-14

## 2023-07-27 RX ORDER — OXYCODONE AND ACETAMINOPHEN 5; 325 MG/1; MG/1
1 TABLET ORAL EVERY 6 HOURS PRN
Qty: 56 TABLET | Refills: 0 | Status: CANCELLED | OUTPATIENT
Start: 2023-07-27 | End: 2023-08-10

## 2023-07-27 RX ORDER — OXYCODONE AND ACETAMINOPHEN 5; 325 MG/1; MG/1
1 TABLET ORAL EVERY 4 HOURS PRN
Qty: 56 TABLET | Refills: 0 | Status: SHIPPED | OUTPATIENT
Start: 2023-07-27 | End: 2023-10-02 | Stop reason: SDUPTHER

## 2023-07-27 RX ORDER — APIXABAN 5 MG (74)
KIT ORAL
Qty: 14 TABLET | Refills: 0 | Status: SHIPPED | OUTPATIENT
Start: 2023-07-27 | End: 2023-07-27 | Stop reason: SDUPTHER

## 2023-07-27 NOTE — PROGRESS NOTES
Reason for Follow-up:  Reason for consultation:  -adenocarcinoma rectum, moderately differentiated, partially obstructing mass, S/P colonoscopy 06/19/2023   -tubular adenoma transverse colon   -regional lymphadenopathy on CT abdomen pelvis with contrast 06/21/2023  -MMR proficient  -grandfather experienced colon cancer; father experienced colon polyp     Current Treatment:  Modified FOLFOX every 2 weeks X 16 weeks (8 cycles);  Start 7/19/23    Treatment History:  Past medical history:  Anxiety.    Procedure/surgical history:  Appendectomy.  Back surgery.  Social history:  Single.  Lives in [place], [place].  Has 2 children.  Works as a .  No history of tobacco, alcohol, or illicit drug abuse.  Family history:  Paternal grandfather experience colon cancer in his 70s.  Father experienced multiple colon polyps.  Health maintenance:  No screening colonoscopy prior to most recent colonoscopy which led to the diagnosis of rectal cancer.  -09/13/2019:  Bilateral screening mammogram pelvic comparison:  09/13/2018 mammogram):  BI-RADS 0 (indeterminate)  -09/25/2019:  Diagnostic left mammogram, limited ultrasound left breast (comparison:  09/13/2019 mammogram):  Overall study BI-RADS 2: Benign       History of Present Illness:     Oncologic/Hematologic History:  Oncology History   Adenocarcinoma of rectum, stage 3   7/1/2023 Initial Diagnosis    Adenocarcinoma of rectum, stage 3     7/19/2023 -  Chemotherapy    Treatment Summary   Plan Name: OP mFOLFOX 6 Q2W  Treatment Goal: Curative  Status: Active  Start Date: 7/19/2023  End Date: 10/27/2023 (Planned)  Provider: John Bazan MD  Chemotherapy: fluorouraciL injection 650 mg, 400 mg/m2 = 650 mg, Intravenous, Clinic/HOD 1 time, 1 of 8 cycles  Administration: 650 mg (7/19/2023)  oxaliplatin (ELOXATIN) 85 mg/m2 = 139 mg in dextrose 5 % (D5W) 592.8 mL chemo infusion, 85 mg/m2 = 139 mg, Intravenous, Clinic/HOD 1 time, 1 of 8 cycles  Administration: 139 mg  (7/19/2023)     53-year-old lady, referred by Migel Ortiz MD, GI, with rectal cancer.    Family history pos for colon cancer in grandfather, colon polyp in father  Evaluated by Migel Ortiz MD, GI, 05/22/2023 for nervous stomach; change in bowel habits/pattern; change in bowel function started several months ago; currently, 1 bowel movement daily.  Blood in stool.  Lower abdominal pain.  Heartburn.  Epigastric pain.  MiraLax resulted in improvement.  Bright red blood in stool.    06/13/2023: EGD:  1. Esophagus: Erythema of mucosa in the lower 3rd of esophagus, compatible esophagitis  2. Stomach: Erythema of mucosa in the antrum, compatible with gastritis  3. Duodenum:  Normal mucosa in the whole duodenum      06/13/2023:  EGD:  1. Gastric antrum, biopsy:  Mild chronic inactive gastritis; negative for intestinal metaplasia; negative for H pylori organisms  2. Gastric body, biopsy:  Mild chronic inactive gastritis; negative for intestinal metaplasia; negative for H pylori organisms    06/19/2023:  Colonoscopy (screening colonoscopy):  -single 9 mm polyp transverse colon, polypectomy, completely removed  -ulcerated necrotic infiltrative 90% circumferential, bleeding, 5 cm in length, mass of malignant appearance in the rectum at 4 cm from the anus, causing partial obstruction; scope traversed the lesion  Pathology:  1. Transverse colon polyp, polypectomy:  Tubular adenoma  2. Rectal mass, biopsy:  Invasive moderately differentiated adenocarcinoma; no LVI    MMR proficient  Low probability of MSI-H    06/21/2023: CT abdomen pelvis with and without contrast:  -large rectal mass consistent with malignancy; some lymphadenopathy is seen adjacent to the mass  (large rectal mass with circumferential wall thickening in rectum; associated inflammatory changes; no bowel obstruction; some lymph nodes are seen in the perirectal region on the right and left side; representative lymph node on right side of rectum 1 cm x 8 mm;  representative lymph node on the left side of rectum 8 mm x 8 mm)    Labs reviewed:  05/24/2023:  WBC 8.0.  Hemoglobin 12.5.  MCV 88.  Platelets 306 K.  Differential count normal.    Interval History 7/27/23:  Patient presents to the clinic today for a toxicity check after completing cycle #1 of folfox. Patient was very tearful during the visit. Patient voiced that she has been in pain to the Left upper arm for the last 2 days. Patient stated that this is worst pain she has ever experienced. She stated that she is barely able to move the arm and it feels swollen. Patients arm circumference measure 33cm today. At time of PICC line insertion her arm circumference measured 30cm.  Patient admits to having an episode of chills and fever on Saturday (7/22). Patient presented to the ER and she stated that the Er told her that she does not have an active infection. Patient stated that she does not have a working thermometer at house at the time; so she did not take her temperature. Patient admits to a decreased appetite since starting treatment. Admits to having mouth sores that are unrelieved with salt/water rinse. Patient admits to having rectal bleeding with defecation. She states that the bleeding has significantly decreased since treatment started. Patient does admit to having abdominal cramping/ pain at night. Labs work reviewed with the patient-Stable. All future appointments were discussed with the patient. All questions were answered fully.     Review of Systems:   All systems reviewed and found to be negative except for the symptoms detailed above    Physical Examination:   VITAL SIGNS:   Vitals:    07/27/23 1149   BP: 109/70   Pulse: 91   Resp: 20   Temp: 97.4 °F (36.3 °C)      Lab Results   Component Value Date    WBC 7.68 07/23/2023    HGB 12.4 07/23/2023    HCT 36.8 (L) 07/23/2023    MCV 83.8 07/23/2023     07/23/2023       CMP  Sodium Level   Date Value Ref Range Status   07/23/2023 140 136 - 145  mmol/L Final     Potassium Level   Date Value Ref Range Status   07/23/2023 3.6 3.5 - 5.1 mmol/L Final     Carbon Dioxide   Date Value Ref Range Status   07/23/2023 22 22 - 29 mmol/L Final     Blood Urea Nitrogen   Date Value Ref Range Status   07/23/2023 12.8 9.8 - 20.1 mg/dL Final     Creatinine   Date Value Ref Range Status   07/23/2023 0.76 0.55 - 1.02 mg/dL Final     Calcium Level Total   Date Value Ref Range Status   07/23/2023 9.3 8.4 - 10.2 mg/dL Final     Albumin Level   Date Value Ref Range Status   07/23/2023 3.6 3.5 - 5.0 g/dL Final     Bilirubin Total   Date Value Ref Range Status   07/23/2023 1.9 (H) <=1.5 mg/dL Final     Alkaline Phosphatase   Date Value Ref Range Status   07/23/2023 63 40 - 150 unit/L Final     Aspartate Aminotransferase   Date Value Ref Range Status   07/23/2023 15 5 - 34 unit/L Final     Alanine Aminotransferase   Date Value Ref Range Status   07/23/2023 13 0 - 55 unit/L Final     eGFR   Date Value Ref Range Status   07/23/2023 >60 mls/min/1.73/m2 Final         GENERAL:  In no apparent distress.    HEAD:  No signs of head trauma.  EYES:  Pupils are equal.  Extraocular motions intact.    EARS:  Hearing grossly intact.  MOUTH:  Oropharynx is normal.   NECK:  No adenopathy, no JVD.     CHEST:  Chest with clear breath sounds bilaterally.  No wheezes, rales, rhonchi.    CARDIAC:  Regular rate and rhythm.  S1 and S2, without murmurs, gallops, rubs.  VASCULAR:  No Edema.  Peripheral pulses normal and equal in all extremities.  ABDOMEN:  Soft, without detectable tenderness.  No sign of distention.  No   rebound or guarding, and no masses palpated.   Bowel Sounds normal.  MUSCULOSKELETAL:  Good range of motion of all major joints. Extremities without clubbing, cyanosis or edema.    NEUROLOGIC EXAM:  Alert and oriented x 3.  No focal sensory or strength deficits.   Speech normal.  Follows commands.  PSYCHIATRIC:  Mood normal.    Assessment:  Adenocarcinoma of rectum, moderately  differentiated:   -presentation:  05/2023:  Change in bowel habits, hematochezia, lower abdominal pain  -colonoscopy 06/19/2023:    9 mm tubular adenoma transverse colon, removed;   invasive moderately differentiated adenocarcinoma of rectum presenting as ulcerated necrotic infiltrative 90% circumferential, bleeding, partially obstructing rectal mass, 5 cm long, 4 cm from anus, scope traversed the lesion  -MMR proficient; low probability of MSI-H  -CT abdomen pelvis with contrast 06/21/2023:  No metastasis; large rectal mass and some regional lymphadenopathy on CT abdomen pelvis with contrast 06/21/2023, largest perirectal lymph node 1 cm x 8 mm  >>>  By virtue of regional lymphadenopathy, at least stage III      Family history of colon cancer:  -grandfather experienced colon cancer; father experienced colon polyp      Diagnostic studies  CT chest non contrast 7/18/23: No CT evidence for metastatic disease to the chest.  MRI Pelvis 7/20/23: Large mid to distal rectal mass with perirectal adenopathy as discussed.  Local staging is T3bN2b.    Plan:  Adenocarcinoma of rectum, moderately differentiated    After appropriate workup, will most likely need total neoadjuvant therapy including chemotherapy with FOLFOX or CAPOX or FOLFIRINOX X 12-16 weeks, followed by long course chemoradiation therapy, then restaging, followed by resection versus surveillance, etc.  Perioperative treatment is recommended for up to a total of 6 months.    At least based upon CT scans of A/P, has at least stage III adenocarcinoma of rectum.  MMR proficient.  Low probability of MSI-H.  Treatment will be as follows:  Total neoadjuvant therapy:   1. Modified FOLFOX every 2 weeks X 16 weeks (8 cycles); followed by   2. Long course chemoradiation therapy with capecitabine; followed by  3. Restaging; followed by   4. Transabdominal resection; or if complete clinical response, consider surveillance    Response to treatment will be assessed as  follows:  Contrast-enhanced CT scans of abdomen and chest, and MRI scan of rectum with and without contrast:  -after completion of 8 cycles of modified FOLFOX; aunt  -after completion of chemoradiation therapy    PET-CT scan is not indicated  Fertility risk discussion/counseling if premenopausal (she has been postmenopausal for last 2 years and that this time, has no desire to go for add preservation)        Magic Mouth was prescribed for mucositis PRN- RX was sent to pharmacy  Percocet 5mg was prescribed for pain -RX sent to pharmacy  Continue to take Bentyl as needed for stomach cramping  RTC 8/2 for lab work (cbc,cmp,mg) and infusion of Cycle #2 of Folfox, return on day 3 for cadd pump removal  Follow up with  on 8/15/23 with lab work (cbc,cmp,mg) + requested investigational studies (CT chest, MRI pelvis and lab work)  Return on 8/16/23 no lab work (use lab results from 8/15/23) for cycle 3 Folfox, return on day 3 for cadd pump removal       Family history of colon cancer & colon polyp   We will arrange for genetic testing and counseling-Scheduled for 12/4/23 7/27/23 Thrombosis right arm d/t picc line   Take starter pack of Eliquis (2-5mg tablets BID) for 7 days then take the (1) 5mg BID for at least 90 days-rx sent to pharmacy  Discontinue Picc line today in infusion.  Reinsert PICC line (on Tuesday 8/1)- in the right arm prior to initiating cycle #2 of folfox    Above discussed at length with the patient.  All questions answered.    Plan of management discussed, especially, neoadjuvant chemotherapy and chemoradiation therapy (to be finalized after MRI scan of pelvis).  She understands and agrees with this plan.

## 2023-07-27 NOTE — Clinical Note
Infusion today- Remove PICC line  Please schedule a NP visit on 8/2 with infusion and labs  Cadd TD on 8/4 with IVFs.   Please move MD appt to 8/16 so she can get chemo that day.

## 2023-07-27 NOTE — PLAN OF CARE
Patient here for PICC removal LOUIS Sauceda. LOUIS Sauceda spoke to Dr. Sunitha Wiley, surgeon to clarify if surgery needs to be done. Surgery for mediport will be cancelled on Monday. A new PICC line will need to placed next week.  LOUIS Sauceda will put in order.

## 2023-07-28 RX ORDER — APIXABAN 5 MG (74)
KIT ORAL
Qty: 14 TABLET | Refills: 0 | Status: SHIPPED | OUTPATIENT
Start: 2023-07-28 | End: 2023-08-14

## 2023-08-01 ENCOUNTER — HOSPITAL ENCOUNTER (OUTPATIENT)
Dept: RADIOLOGY | Facility: HOSPITAL | Age: 53
Discharge: HOME | End: 2023-08-01
Attending: INTERNAL MEDICINE
Payer: MEDICAID

## 2023-08-01 ENCOUNTER — INFUSION (OUTPATIENT)
Dept: INFUSION THERAPY | Facility: HOSPITAL | Age: 53
End: 2023-08-01
Attending: INTERNAL MEDICINE
Payer: MEDICAID

## 2023-08-01 VITALS
HEIGHT: 64 IN | HEART RATE: 71 BPM | BODY MASS INDEX: 21.61 KG/M2 | DIASTOLIC BLOOD PRESSURE: 59 MMHG | WEIGHT: 126.56 LBS | RESPIRATION RATE: 20 BRPM | TEMPERATURE: 98 F | SYSTOLIC BLOOD PRESSURE: 106 MMHG

## 2023-08-01 DIAGNOSIS — C20 ADENOCARCINOMA OF RECTUM, STAGE 3: Primary | ICD-10-CM

## 2023-08-01 DIAGNOSIS — E87.6 HYPOKALEMIA: ICD-10-CM

## 2023-08-01 PROCEDURE — 63600175 PHARM REV CODE 636 W HCPCS: Performed by: INTERNAL MEDICINE

## 2023-08-01 PROCEDURE — 96413 CHEMO IV INFUSION 1 HR: CPT

## 2023-08-01 PROCEDURE — 96411 CHEMO IV PUSH ADDL DRUG: CPT

## 2023-08-01 PROCEDURE — 96368 THER/DIAG CONCURRENT INF: CPT

## 2023-08-01 PROCEDURE — 96375 TX/PRO/DX INJ NEW DRUG ADDON: CPT

## 2023-08-01 PROCEDURE — 96416 CHEMO PROLONG INFUSE W/PUMP: CPT

## 2023-08-01 PROCEDURE — 25000003 PHARM REV CODE 250: Performed by: INTERNAL MEDICINE

## 2023-08-01 PROCEDURE — 96415 CHEMO IV INFUSION ADDL HR: CPT

## 2023-08-01 RX ORDER — POTASSIUM CHLORIDE 20 MEQ/1
20 TABLET, EXTENDED RELEASE ORAL DAILY
Qty: 30 TABLET | Refills: 0 | Status: SHIPPED | OUTPATIENT
Start: 2023-08-01 | End: 2023-11-07

## 2023-08-01 RX ORDER — HEPARIN 100 UNIT/ML
500 SYRINGE INTRAVENOUS
Status: CANCELLED | OUTPATIENT
Start: 2023-08-01

## 2023-08-01 RX ORDER — HEPARIN 100 UNIT/ML
500 SYRINGE INTRAVENOUS
Status: DISCONTINUED | OUTPATIENT
Start: 2023-08-01 | End: 2023-08-01 | Stop reason: HOSPADM

## 2023-08-01 RX ORDER — FLUOROURACIL 50 MG/ML
400 INJECTION, SOLUTION INTRAVENOUS
Status: COMPLETED | OUTPATIENT
Start: 2023-08-01 | End: 2023-08-01

## 2023-08-01 RX ORDER — EPINEPHRINE 0.3 MG/.3ML
0.3 INJECTION SUBCUTANEOUS ONCE AS NEEDED
Status: DISCONTINUED | OUTPATIENT
Start: 2023-08-01 | End: 2023-08-01 | Stop reason: HOSPADM

## 2023-08-01 RX ORDER — SODIUM CHLORIDE 0.9 % (FLUSH) 0.9 %
10 SYRINGE (ML) INJECTION
Status: CANCELLED | OUTPATIENT
Start: 2023-08-01

## 2023-08-01 RX ORDER — DIPHENHYDRAMINE HYDROCHLORIDE 50 MG/ML
50 INJECTION INTRAMUSCULAR; INTRAVENOUS ONCE AS NEEDED
Status: COMPLETED | OUTPATIENT
Start: 2023-08-01 | End: 2023-08-01

## 2023-08-01 RX ORDER — SODIUM CHLORIDE 0.9 % (FLUSH) 0.9 %
10 SYRINGE (ML) INJECTION
Status: DISCONTINUED | OUTPATIENT
Start: 2023-08-01 | End: 2023-08-01 | Stop reason: HOSPADM

## 2023-08-01 RX ADMIN — DIPHENHYDRAMINE HYDROCHLORIDE 50 MG: 50 INJECTION INTRAMUSCULAR; INTRAVENOUS at 10:08

## 2023-08-01 RX ADMIN — DEXTROSE MONOHYDRATE: 50 INJECTION, SOLUTION INTRAVENOUS at 10:08

## 2023-08-01 RX ADMIN — FLUOROURACIL 650 MG: 50 INJECTION, SOLUTION INTRAVENOUS at 01:08

## 2023-08-01 RX ADMIN — DEXTROSE MONOHYDRATE 650 MG: 50 INJECTION, SOLUTION INTRAVENOUS at 10:08

## 2023-08-01 RX ADMIN — FLUOROURACIL 3900 MG: 50 INJECTION, SOLUTION INTRAVENOUS at 01:08

## 2023-08-01 RX ADMIN — DEXAMETHASONE SODIUM PHOSPHATE 0.25 MG: 4 INJECTION, SOLUTION INTRA-ARTICULAR; INTRALESIONAL; INTRAMUSCULAR; INTRAVENOUS; SOFT TISSUE at 10:08

## 2023-08-01 RX ADMIN — SODIUM CHLORIDE: 9 INJECTION, SOLUTION INTRAVENOUS at 10:08

## 2023-08-01 RX ADMIN — OXALIPLATIN 139 MG: 100 INJECTION, SOLUTION, CONCENTRATE INTRAVENOUS at 10:08

## 2023-08-01 NOTE — NURSING
left upper arm picc placed 07/19/2023, developed DVT. anticoagulants initiated. right upper arm vessel large. instructed patient to keep any type of compression sleeves or anything that may compress vessel off arm and to use arm normally to maintain normal circulation. patient verbalized understanding. Labs drawn post procedure. Tolerated well.

## 2023-08-03 ENCOUNTER — INFUSION (OUTPATIENT)
Dept: INFUSION THERAPY | Facility: HOSPITAL | Age: 53
End: 2023-08-03
Attending: INTERNAL MEDICINE
Payer: MEDICAID

## 2023-08-03 VITALS
DIASTOLIC BLOOD PRESSURE: 77 MMHG | RESPIRATION RATE: 18 BRPM | HEART RATE: 60 BPM | SYSTOLIC BLOOD PRESSURE: 121 MMHG | TEMPERATURE: 98 F | OXYGEN SATURATION: 100 %

## 2023-08-03 DIAGNOSIS — C20 ADENOCARCINOMA OF RECTUM, STAGE 3: Primary | ICD-10-CM

## 2023-08-03 PROCEDURE — 96360 HYDRATION IV INFUSION INIT: CPT

## 2023-08-03 PROCEDURE — 25000003 PHARM REV CODE 250: Performed by: NURSE PRACTITIONER

## 2023-08-03 RX ORDER — SODIUM CHLORIDE 0.9 % (FLUSH) 0.9 %
10 SYRINGE (ML) INJECTION
Status: DISCONTINUED | OUTPATIENT
Start: 2023-08-03 | End: 2023-08-03 | Stop reason: HOSPADM

## 2023-08-03 RX ORDER — HEPARIN 100 UNIT/ML
500 SYRINGE INTRAVENOUS
Status: DISCONTINUED | OUTPATIENT
Start: 2023-08-03 | End: 2023-08-03 | Stop reason: HOSPADM

## 2023-08-03 RX ADMIN — SODIUM CHLORIDE 1000 ML: 9 INJECTION, SOLUTION INTRAVENOUS at 12:08

## 2023-08-08 ENCOUNTER — INFUSION (OUTPATIENT)
Dept: INFUSION THERAPY | Facility: HOSPITAL | Age: 53
End: 2023-08-08
Attending: INTERNAL MEDICINE
Payer: MEDICAID

## 2023-08-08 VITALS
HEIGHT: 64 IN | OXYGEN SATURATION: 99 % | BODY MASS INDEX: 22.13 KG/M2 | DIASTOLIC BLOOD PRESSURE: 75 MMHG | WEIGHT: 129.63 LBS | RESPIRATION RATE: 20 BRPM | HEART RATE: 90 BPM | TEMPERATURE: 98 F | SYSTOLIC BLOOD PRESSURE: 105 MMHG

## 2023-08-08 DIAGNOSIS — C20 ADENOCARCINOMA OF RECTUM, STAGE 3: Primary | ICD-10-CM

## 2023-08-08 PROCEDURE — A4216 STERILE WATER/SALINE, 10 ML: HCPCS | Performed by: INTERNAL MEDICINE

## 2023-08-08 PROCEDURE — 96523 IRRIG DRUG DELIVERY DEVICE: CPT

## 2023-08-08 PROCEDURE — 25000003 PHARM REV CODE 250: Performed by: INTERNAL MEDICINE

## 2023-08-08 RX ORDER — SODIUM CHLORIDE 0.9 % (FLUSH) 0.9 %
10 SYRINGE (ML) INJECTION
Status: COMPLETED | OUTPATIENT
Start: 2023-08-08 | End: 2023-08-08

## 2023-08-08 RX ORDER — SODIUM CHLORIDE 0.9 % (FLUSH) 0.9 %
10 SYRINGE (ML) INJECTION
Status: CANCELLED | OUTPATIENT
Start: 2023-08-08

## 2023-08-08 RX ORDER — HEPARIN 100 UNIT/ML
500 SYRINGE INTRAVENOUS
Status: CANCELLED | OUTPATIENT
Start: 2023-08-08

## 2023-08-08 RX ORDER — HEPARIN 100 UNIT/ML
500 SYRINGE INTRAVENOUS
Status: DISCONTINUED | OUTPATIENT
Start: 2023-08-08 | End: 2023-08-08 | Stop reason: HOSPADM

## 2023-08-08 RX ADMIN — Medication 10 ML: at 12:08

## 2023-08-08 NOTE — NURSING
12:05 Arrive for q wk Picc line dressing change c/o of soreness to right area below arm pit pt report related to havin the present picc line inserted. C/O's of not sleeping well at times. Given printed future appointment information. Next Picc line dressing change scheduled for 8/15/23.

## 2023-08-13 PROBLEM — K63.5 POLYP OF TRANSVERSE COLON: Status: ACTIVE | Noted: 2023-08-13

## 2023-08-13 PROBLEM — T82.868A THROMBOSIS IN PERIPHERALLY INSERTED CENTRAL CATHETER (PICC): Status: ACTIVE | Noted: 2023-08-13

## 2023-08-13 PROBLEM — I82.4Y2: Status: ACTIVE | Noted: 2023-08-13

## 2023-08-13 RX ORDER — SODIUM CHLORIDE 0.9 % (FLUSH) 0.9 %
10 SYRINGE (ML) INJECTION
Status: CANCELLED | OUTPATIENT
Start: 2023-10-03

## 2023-08-13 RX ORDER — SODIUM CHLORIDE 0.9 % (FLUSH) 0.9 %
10 SYRINGE (ML) INJECTION
Status: CANCELLED | OUTPATIENT
Start: 2023-09-19

## 2023-08-13 RX ORDER — SODIUM CHLORIDE 0.9 % (FLUSH) 0.9 %
10 SYRINGE (ML) INJECTION
Status: CANCELLED | OUTPATIENT
Start: 2023-08-15

## 2023-08-13 RX ORDER — HEPARIN 100 UNIT/ML
500 SYRINGE INTRAVENOUS
Status: CANCELLED | OUTPATIENT
Start: 2023-09-05

## 2023-08-13 RX ORDER — HEPARIN 100 UNIT/ML
500 SYRINGE INTRAVENOUS
Status: CANCELLED | OUTPATIENT
Start: 2023-09-19

## 2023-08-13 RX ORDER — SODIUM CHLORIDE 0.9 % (FLUSH) 0.9 %
10 SYRINGE (ML) INJECTION
Status: CANCELLED | OUTPATIENT
Start: 2023-09-05

## 2023-08-13 RX ORDER — DIPHENHYDRAMINE HYDROCHLORIDE 50 MG/ML
50 INJECTION INTRAMUSCULAR; INTRAVENOUS ONCE AS NEEDED
Status: CANCELLED | OUTPATIENT
Start: 2023-08-15

## 2023-08-13 RX ORDER — HEPARIN 100 UNIT/ML
500 SYRINGE INTRAVENOUS
Status: CANCELLED | OUTPATIENT
Start: 2023-08-15

## 2023-08-13 RX ORDER — SODIUM CHLORIDE 0.9 % (FLUSH) 0.9 %
10 SYRINGE (ML) INJECTION
Status: CANCELLED | OUTPATIENT
Start: 2023-08-24

## 2023-08-13 RX ORDER — FLUOROURACIL 50 MG/ML
400 INJECTION, SOLUTION INTRAVENOUS
Status: CANCELLED | OUTPATIENT
Start: 2023-09-05

## 2023-08-13 RX ORDER — HEPARIN 100 UNIT/ML
500 SYRINGE INTRAVENOUS
Status: CANCELLED | OUTPATIENT
Start: 2023-10-03

## 2023-08-13 RX ORDER — HEPARIN 100 UNIT/ML
500 SYRINGE INTRAVENOUS
Status: CANCELLED | OUTPATIENT
Start: 2023-10-01

## 2023-08-13 RX ORDER — EPINEPHRINE 0.3 MG/.3ML
0.3 INJECTION SUBCUTANEOUS ONCE AS NEEDED
Status: CANCELLED | OUTPATIENT
Start: 2023-09-05

## 2023-08-13 RX ORDER — DIPHENHYDRAMINE HYDROCHLORIDE 50 MG/ML
50 INJECTION INTRAMUSCULAR; INTRAVENOUS ONCE AS NEEDED
Status: CANCELLED | OUTPATIENT
Start: 2023-10-01

## 2023-08-13 RX ORDER — FLUOROURACIL 50 MG/ML
400 INJECTION, SOLUTION INTRAVENOUS
Status: CANCELLED | OUTPATIENT
Start: 2023-09-19

## 2023-08-13 RX ORDER — HEPARIN 100 UNIT/ML
500 SYRINGE INTRAVENOUS
Status: CANCELLED | OUTPATIENT
Start: 2023-08-24

## 2023-08-13 RX ORDER — DIPHENHYDRAMINE HYDROCHLORIDE 50 MG/ML
50 INJECTION INTRAMUSCULAR; INTRAVENOUS ONCE AS NEEDED
Status: CANCELLED | OUTPATIENT
Start: 2023-09-05

## 2023-08-13 RX ORDER — EPINEPHRINE 0.3 MG/.3ML
0.3 INJECTION SUBCUTANEOUS ONCE AS NEEDED
Status: CANCELLED | OUTPATIENT
Start: 2023-08-15

## 2023-08-13 RX ORDER — FLUOROURACIL 50 MG/ML
400 INJECTION, SOLUTION INTRAVENOUS
Status: CANCELLED | OUTPATIENT
Start: 2023-08-15

## 2023-08-13 RX ORDER — EPINEPHRINE 0.3 MG/.3ML
0.3 INJECTION SUBCUTANEOUS ONCE AS NEEDED
Status: CANCELLED | OUTPATIENT
Start: 2023-10-01

## 2023-08-13 RX ORDER — SODIUM CHLORIDE 0.9 % (FLUSH) 0.9 %
10 SYRINGE (ML) INJECTION
Status: CANCELLED | OUTPATIENT
Start: 2023-10-01

## 2023-08-13 NOTE — PROGRESS NOTES
History:  Past Medical History:   Diagnosis Date    Anxiety disorder, unspecified     GERD (gastroesophageal reflux disease)     Rectal cancer        Past Surgical History:   Procedure Laterality Date    APPENDECTOMY      BACK SURGERY       SECTION  2004    cyst coccyx        Past medical history:  Anxiety.    Procedure/surgical history:  Appendectomy.  Back surgery.  Social history:  Single.  Lives in [place], [place].  Has 2 children.  Works as a .  No history of tobacco, alcohol, or illicit drug abuse.  Family history:  Paternal grandfather experience colon cancer in his 70s.  Father experienced multiple colon polyps.  Health maintenance:  No screening colonoscopy prior to most recent colonoscopy which led to the diagnosis of rectal cancer.  -2019:  Bilateral screening mammogram pelvic comparison:  2018 mammogram):  BI-RADS 0 (indeterminate)  -2019:  Diagnostic left mammogram, limited ultrasound left breast (comparison:  2019 mammogram):  Overall study BI-RADS 2: Benign     Family History   Problem Relation Age of Onset    Colon cancer Paternal Grandfather       Reason for Follow-up:  -adenocarcinoma rectum, moderately differentiated, partially obstructing mass, S/P colonoscopy 2023   -tubular adenoma transverse colon   -regional lymphadenopathy on CT abdomen pelvis with contrast 2023  -MMR proficient  -acute left upper extremity DVT related to PICC line  -grandfather experienced colon cancer; father experienced colon polyp    History of Present Illness:   No chief complaint on file.        Oncologic/Hematologic History:  Oncology History   Adenocarcinoma of rectum, stage 3   2023 Initial Diagnosis    Adenocarcinoma of rectum, stage 3     2023 -  Chemotherapy    Treatment Summary   Plan Name: OP mFOLFOX 6 Q2W  Treatment Goal: Curative  Status: Active  Start Date: 2023  End Date: 10/26/2023 (Planned)  Provider: John Bazan  MD  Chemotherapy: fluorouraciL injection 650 mg, 400 mg/m2 = 650 mg, Intravenous, Clinic/HOD 1 time, 2 of 8 cycles  Administration: 650 mg (7/19/2023), 650 mg (8/1/2023)  oxaliplatin (ELOXATIN) 85 mg/m2 = 139 mg in dextrose 5 % (D5W) 592.8 mL chemo infusion, 85 mg/m2 = 139 mg, Intravenous, Clinic/HOD 1 time, 2 of 8 cycles  Administration: 139 mg (7/19/2023), 139 mg (8/1/2023)     53-year-old lady, referred by Migel Ortiz MD, GI, with rectal cancer.    Family history pos for colon cancer in grandfather, colon polyp in father  Evaluated by Migel Ortiz MD, GI, 05/22/2023 for nervous stomach; change in bowel habits/pattern; change in bowel function started several months ago; currently, 1 bowel movement daily.  Blood in stool.  Lower abdominal pain.  Heartburn.  Epigastric pain.  MiraLax resulted in improvement.  Bright red blood in stool.    06/13/2023: EGD:  1. Esophagus: Erythema of mucosa in the lower 3rd of esophagus, compatible esophagitis  2. Stomach: Erythema of mucosa in the antrum, compatible with gastritis  3. Duodenum:  Normal mucosa in the whole duodenum      06/13/2023:  EGD:  1. Gastric antrum, biopsy:  Mild chronic inactive gastritis; negative for intestinal metaplasia; negative for H pylori organisms  2. Gastric body, biopsy:  Mild chronic inactive gastritis; negative for intestinal metaplasia; negative for H pylori organisms    06/19/2023:  Colonoscopy (screening colonoscopy):  -single 9 mm polyp transverse colon, polypectomy, completely removed  -ulcerated necrotic infiltrative 90% circumferential, bleeding, 5 cm in length, mass of malignant appearance in the rectum at 4 cm from the anus, causing partial obstruction; scope traversed the lesion  Pathology:  1. Transverse colon polyp, polypectomy:  Tubular adenoma  2. Rectal mass, biopsy:  Invasive moderately differentiated adenocarcinoma; no LVI    MMR proficient  Low probability of MSI-H    06/21/2023: CT abdomen pelvis with and without  contrast:  -large rectal mass consistent with malignancy; some lymphadenopathy is seen adjacent to the mass  (large rectal mass with circumferential wall thickening in rectum; associated inflammatory changes; no bowel obstruction; some lymph nodes are seen in the perirectal region on the right and left side; representative lymph node on right side of rectum 1 cm x 8 mm; representative lymph node on the left side of rectum 8 mm x 8 mm)    Labs reviewed:  05/24/2023:  WBC 8.0.  Hemoglobin 12.5.  MCV 88.  Platelets 306 K.  Differential count normal.    07/05/2023:   Pleasant lady who presents for initial medical oncology consultation, accompanied by her brother.    Her symptoms started 2 years ago, in the form of intermittent small amount hematochezia, initially on toilet wife's, and for last few weeks, in toilet bowel as well.  She brought the symptoms to the attention of her gyn several months ago and does not remember the recommendation.  Regardless, she never got a screening colonoscopy done.  Around [place] time 2022, she started feeling bloated.  She started having constant uncomfortable feeling because of bloating, as well as in the anorectal area.  Hematochezia continued.  She brought this to the attention of her PCP in [place] who suggested taking MiraLax.  The PCP also arranged for colonoscopy.  On today's visit, she reports that she has been constipated her entire life.  She has been constipated for at least 10 years.  Hematochezia for 2 years.  No anorectal pain.  Does have constant feeling of incomplete evacuation.  Appetite is down and she has lost 30 lb in last 3-4 months.  Appetite is more less preserved but she is afraid to eat because eating causes bloating and worsening of uncomfortable feeling in rectum.    Interval History:  PICC DOUBLE LUMEN LINE FLUSH   OP mFOLFOX 6 Q2W     08/15/2023:  -07/05/2023: CBC normal.  Hemoglobin 13.2.  -07/05/2023: CEA level < 1.73.  LFTs normal.  -07/18/2023:   Noncontrast chest CT for staging: No intrathoracic metastasis  -07/18/2023:  MRI pelvis with and without contrast for staging (comparison:  06/21/2023): Large mid to distal rectal mass with perirectal adenopathy as discussed.  Local staging is T3bN2b (stage IIIC) (large mid to distal rectal mass, 7 cm craniocaudad; the most distal aspect is 15 mm from the anal verge; nearly circumferential with some sparing around 3-4 o'clock; involvement of the muscularis propria with some areas of mild extension through the propria but <5 mm; no tumor invasion of other pelvic structures; at least 10 perirectal lymph nodes demonstrate increased signal on DWI imaging, the largest measures 9 mm  -07/27/2023:  CV ultrasound Doppler venous left arm (pain and swelling of left upper extremity):  There was occlusive acute thrombus within the left subclavian, axillary, and brachial veins.  The distal brachial vein is partially occluded with trickle flow.  There was occlusive acute thrombus within the left cephalic vein from the proximal upper arm to above the antecubital fossa.  There was occlusive acute thrombus associated with a PICC line from the proximal upper arm to the point of PICC line insertion within the left brachial vein  -07/27/2023:  Left upper extremity PICC line removed  -07/27/2023: Anticoagulation with Eliquis started; PICC line removed (subsequently, PICC line placed in the right upper extremity)  -neoadjuvant FOLFOX started 07/19/2023; cycle 2 started 08/01/2023  Presents for a follow-up visit.  Tolerating chemotherapy well.  Some fatigue.  Fair appetite.  Bowels are moving better.  Occasional, small amount of blood in stool.  No anorectal pain.  No nausea, vomiting, or significant weakness or fatigue.  No severe cytopenias.  Taking Eliquis twice daily; no unusual bleeding.  Left upper extremity swelling has subsided.  No pain left upper extremity.    Medications:  Current Outpatient Medications on File Prior to Visit    Medication Sig Dispense Refill    (Magic mouthwash) 1:1:1 diphenhydrAMINE(Benadryl) 12.5mg/5ml liq, aluminum & magnesium hydroxide-simethicone (Maalox), LIDOcaine viscous 2% Swish and spit 5 mLs every 4 (four) hours as needed (Mouth sores). for mouth sores 90 mL 0    ALPRAZolam (XANAX) 0.5 MG tablet Take 0.5 mg by mouth 3 (three) times daily as needed.      apixaban (ELIQUIS) 5 mg Tab Take 1 tablet (5 mg total) by mouth 2 (two) times daily. 180 tablet 0    busPIRone (BUSPAR) 15 MG tablet Take 15 mg by mouth 3 (three) times daily.      dexAMETHasone (DECADRON) 4 MG Tab Take 2 tablets(8mg) by mouth daily on days 2 and 3 of each chemotherapy cycle. 32 tablet 0    dexAMETHasone (DECADRON) 4 MG Tab Take 2 tablets (8 mg total) by mouth once daily. Take as directed on days 2 and 3 of your chemotherapy cycle. 24 tablet 5    dicyclomine (BENTYL) 10 MG capsule Take 1 capsule (10 mg total) by mouth every 6 (six) hours as needed (abdominal pain). 60 capsule 0    dicyclomine (BENTYL) 10 MG capsule       DULoxetine (CYMBALTA) 20 MG capsule Take 20 mg by mouth.      famotidine (PEPCID) 40 MG tablet Take 40 mg by mouth every evening.      famotidine (PEPCID) 40 MG tablet       hyoscyamine (ANASPAZ,LEVSIN) 0.125 mg Tab Take 125 mcg by mouth every 6 (six) hours as needed.      ondansetron (ZOFRAN) 8 MG tablet Take 1 tablet (8 mg total) by mouth every 8 (eight) hours as needed for Nausea. 30 tablet 2    ondansetron (ZOFRAN-ODT) 8 MG TbDL Take 1 tablet (8 mg total) by mouth every 8 (eight) hours as needed (nausea/vomiting). 60 tablet 5    oxyCODONE-acetaminophen (PERCOCET) 5-325 mg per tablet Take 1 tablet by mouth every 4 (four) hours as needed for Pain. 56 tablet 0    pantoprazole (PROTONIX) 40 MG tablet Take 40 mg by mouth every morning.      PANTOPRAZOLE 40 MG/100 ML 0.9 % NS IVPB       potassium chloride SA (K-DUR,KLOR-CON) 20 MEQ tablet Take 1 tablet (20 mEq total) by mouth once daily. 30 tablet 0    prochlorperazine  "(COMPAZINE) 10 MG tablet Take 1 tablet (10 mg total) by mouth every 6 to 8 hours as needed (nausea). 30 tablet 1    sodium,potassium,mag sulfates (SUPREP BOWEL PREP KIT) 17.5-3.13-1.6 gram SolR TAKE 1 KIT BY MOUTH SINGLE DOSE AS DIRECTED FOR 1 DAY      traZODone (DESYREL) 50 MG tablet Take 50 mg by mouth.       No current facility-administered medications on file prior to visit.       Review of Systems:   All systems reviewed and found to be negative except for the symptoms detailed above    Physical Examination:   VITAL SIGNS:   There were no vitals filed for this visit.    GENERAL:  In no apparent distress.    HEAD:  No signs of head trauma.  EYES:  Pupils are equal.  Extraocular motions intact.    EARS:  Hearing grossly intact.  MOUTH:  Oropharynx is normal.   NECK:  No adenopathy, no JVD.     CHEST:  Chest with clear breath sounds bilaterally.  No wheezes, rales, rhonchi.    CARDIAC:  Regular rate and rhythm.  S1 and S2, without murmurs, gallops, rubs.  VASCULAR:  No Edema.  Peripheral pulses normal and equal in all extremities.  ABDOMEN:  Soft, without detectable tenderness.  No sign of distention.  No   rebound or guarding, and no masses palpated.   Bowel Sounds normal.  MUSCULOSKELETAL:  Good range of motion of all major joints. Extremities without clubbing, cyanosis or edema.    NEUROLOGIC EXAM:  Alert and oriented x 3.  No focal sensory or strength deficits.   Speech normal.  Follows commands.  PSYCHIATRIC:  Mood normal.    No results for input(s): "CBC" in the last 72 hours.   No results for input(s): "CMP" in the last 72 hours.     Assessment:  Problem List Items Addressed This Visit    None    Adenocarcinoma of rectum, moderately differentiated:   -presentation:  05/2023:  Change in bowel habits, hematochezia, lower abdominal pain  -colonoscopy 06/19/2023:    9 mm tubular adenoma transverse colon, removed;   invasive moderately differentiated adenocarcinoma of rectum presenting as ulcerated necrotic " infiltrative 90% circumferential, bleeding, partially obstructing rectal mass, 5 cm long, 4 cm from anus, scope traversed the lesion  -MMR proficient; low probability of MSI-H  -CT abdomen pelvis with contrast 06/21/2023:  No metastasis; large rectal mass and some regional lymphadenopathy on CT abdomen pelvis with contrast 06/21/2023, largest perirectal lymph node 1 cm x 8 mm  -CEA level < 1.73, normal (07/05/2023)  -no lung metastases on CT chest 07/18/2023  -MRI pelvis 07/18/2023: Large mid to distal rectal mass (T3b); at least 10 perirectal lymph nodes with increased signal on DWI imaging, largest 9 mm (T2b)  >> radiologically, T3b N2b (stage IIIC)  -developed acute DVT left subclavian/axillary/brachial veins secondary to PICC line; started on Eliquis; PICC line removed  -neoadjuvant FOLFOX started 07/19/2023      Left upper extremity DVT secondary to PICC line 07/27/2023:  -developed acute DVT left subclavian/axillary/brachial veins secondary to PICC line; started on Eliquis; PICC line removed      Family history of colon cancer:  -grandfather experienced colon cancer; father experienced colon polyp      Plan:  Continue FOLFOX every 2 weeks for a total of 8 cycles   Check CBC and CMP every couple of weeks before each cycle of chemotherapy  Re-stage with contrast enhanced CT scans of chest/abdomen, and MRI scan of pelvis with and without contrast after completion of 4 cycles of chemotherapy, then after 8 cycles of chemotherapy  Continue anticoagulation for 3 months (until the end of October)  Arrange for genetic testing and counseling (family history of colon cancer and colon polyp)  Follow-up with NP in 2 weeks.  -----------------------        -adenocarcinoma rectum, moderately differentiated  -presentation:  05/2023: Change in bowel habits, hematochezia, lower abdominal pain  -colonoscopy 06/19/2023:  Ulcerated necrotic infiltrative 90% circumferential bleeding partially obstructing rectal mass, 5 cm long, 4 cm  from anus  -MMR proficient, low probability of MSI-H  -no metastasis   -baseline CEA level normal (07/05/2023)   -MRI pelvis 07/18/2023: Large mid to distal rectal mass (T3b); at least 10 perirectal lymph nodes, suspicious for metastasis (T2b)  -radiologically, T3b N2b, stage IIIC  -neoadjuvant FOLFOX started 07/19/2023  >>>  Continue neoadjuvant FOLFOX every 2 weeks x8 cycles  Check CBC and CMP every couple of weeks with each cycle of chemotherapy   Re-stage with contrast enhanced CT scans of chest/abdomen, and MRI pelvis with and without contrast after 4 cycles, then after 8 cycles  Subsequently, neoadjuvant chemoradiation therapy    Plan of treatment:  Neoadjuvant therapy:  Perioperative treatment is recommended for up to a total of 6 months  MMR proficient.  Low probability of MSI-H.  Regimen:  Total neoadjuvant therapy:  1. Modified FOLFOX every 2 weeks X 16 weeks (8 cycles); followed by  2. Restaging with contrast-enhanced CT scans of chest/abdomen, and MRI pelvis with contrast after 4 cycles, then after 8 cycles; followed by   3. Long course chemoradiation therapy with capecitabine; followed by  4. Restaging with contrast-enhanced CT scans of chest/abdomen, and MRI pelvis with contrast; followed by   5. Transabdominal resection; or, if complete clinical response, then consider surveillance      -developed PICC line related left upper extremity DVT 07/27/2023   PICC line removed   Started on Eliquis  >>>  Continue anticoagulation for at least 3 months (until the end of October)   Monitor for any bleeding    Fertility risk discussion/counseling if premenopausal (she has been postmenopausal for last 2 years and that this time, has no desire to go for oocyte preservation)    Family history of colon cancer & colon polyp   We will arrange for genetic testing and counseling.    Follow-up with NP in 2 weeks.    Above discussed leg length with her.  All questions answered.    Discussed labs and scans and gave her  copies of relevant reports.    Plan of management discussed in detail once again.    She understands and agrees with this plan.    Follow-up:  No follow-ups on file.

## 2023-08-14 ENCOUNTER — OFFICE VISIT (OUTPATIENT)
Dept: FAMILY MEDICINE | Facility: CLINIC | Age: 53
End: 2023-08-14
Attending: INTERNAL MEDICINE
Payer: MEDICAID

## 2023-08-14 VITALS
RESPIRATION RATE: 20 BRPM | BODY MASS INDEX: 22.85 KG/M2 | WEIGHT: 133.81 LBS | HEART RATE: 94 BPM | OXYGEN SATURATION: 98 % | TEMPERATURE: 98 F | HEIGHT: 64 IN | DIASTOLIC BLOOD PRESSURE: 77 MMHG | SYSTOLIC BLOOD PRESSURE: 117 MMHG

## 2023-08-14 DIAGNOSIS — F43.21 SITUATIONAL DEPRESSION: Primary | ICD-10-CM

## 2023-08-14 DIAGNOSIS — C80.1 ANXIETY ASSOCIATED WITH CANCER DIAGNOSIS: ICD-10-CM

## 2023-08-14 DIAGNOSIS — F43.21 GRIEF: ICD-10-CM

## 2023-08-14 DIAGNOSIS — C20 ADENOCARCINOMA OF RECTUM: ICD-10-CM

## 2023-08-14 DIAGNOSIS — Z59.9 FINANCIAL DIFFICULTY: ICD-10-CM

## 2023-08-14 DIAGNOSIS — C20 ADENOCARCINOMA OF RECTUM, STAGE 3: Primary | ICD-10-CM

## 2023-08-14 DIAGNOSIS — C20 ADENOCARCINOMA OF RECTUM, STAGE 3: ICD-10-CM

## 2023-08-14 DIAGNOSIS — F41.1 ANXIETY ASSOCIATED WITH CANCER DIAGNOSIS: ICD-10-CM

## 2023-08-14 PROCEDURE — 3078F PR MOST RECENT DIASTOLIC BLOOD PRESSURE < 80 MM HG: ICD-10-PCS | Mod: CPTII,,, | Performed by: NURSE PRACTITIONER

## 2023-08-14 PROCEDURE — 90838 PSYTX W PT W E/M 60 MIN: CPT | Mod: SA,HB,S$PBB, | Performed by: NURSE PRACTITIONER

## 2023-08-14 PROCEDURE — 90838 PR PSYCHOTHERAPY W/PATIENT W/E&M, 60 MIN (ADD ON): ICD-10-PCS | Mod: SA,HB,S$PBB, | Performed by: NURSE PRACTITIONER

## 2023-08-14 PROCEDURE — 3008F PR BODY MASS INDEX (BMI) DOCUMENTED: ICD-10-PCS | Mod: CPTII,,, | Performed by: NURSE PRACTITIONER

## 2023-08-14 PROCEDURE — 99202 PR OFFICE/OUTPT VISIT, NEW, LEVL II, 15-29 MIN: ICD-10-PCS | Mod: SA,HB,S$PBB, | Performed by: NURSE PRACTITIONER

## 2023-08-14 PROCEDURE — 99215 OFFICE O/P EST HI 40 MIN: CPT | Mod: PBBFAC | Performed by: NURSE PRACTITIONER

## 2023-08-14 PROCEDURE — 1159F MED LIST DOCD IN RCRD: CPT | Mod: CPTII,,, | Performed by: NURSE PRACTITIONER

## 2023-08-14 PROCEDURE — 90838 PSYTX W PT W E/M 60 MIN: CPT | Mod: PBBFAC | Performed by: NURSE PRACTITIONER

## 2023-08-14 PROCEDURE — 3074F SYST BP LT 130 MM HG: CPT | Mod: CPTII,,, | Performed by: NURSE PRACTITIONER

## 2023-08-14 PROCEDURE — 3008F BODY MASS INDEX DOCD: CPT | Mod: CPTII,,, | Performed by: NURSE PRACTITIONER

## 2023-08-14 PROCEDURE — 3074F PR MOST RECENT SYSTOLIC BLOOD PRESSURE < 130 MM HG: ICD-10-PCS | Mod: CPTII,,, | Performed by: NURSE PRACTITIONER

## 2023-08-14 PROCEDURE — 3078F DIAST BP <80 MM HG: CPT | Mod: CPTII,,, | Performed by: NURSE PRACTITIONER

## 2023-08-14 PROCEDURE — 99202 OFFICE O/P NEW SF 15 MIN: CPT | Mod: SA,HB,S$PBB, | Performed by: NURSE PRACTITIONER

## 2023-08-14 PROCEDURE — 1159F PR MEDICATION LIST DOCUMENTED IN MEDICAL RECORD: ICD-10-PCS | Mod: CPTII,,, | Performed by: NURSE PRACTITIONER

## 2023-08-14 SDOH — SOCIAL DETERMINANTS OF HEALTH (SDOH): PROBLEM RELATED TO HOUSING AND ECONOMIC CIRCUMSTANCES, UNSPECIFIED: Z59.9

## 2023-08-14 NOTE — PROGRESS NOTES
06/19/17 0810   Discharge Reassessment   Assessment Type Discharge Planning Reassessment   Can the patient answer the patient profile reliably? No, cognitively impaired   How does the patient rate their overall health at the present time? (gregor)   Describe the patient's ability to walk at the present time. Does not walk or unable to take any steps at all   How often would a person be available to care for the patient? Whenever needed   Number of comorbid conditions (as recorded on the chart) Two   During the past month, has the patient often been bothered by feeling down, depressed or hopeless? (gregor)   During the past month, has the patient often been bothered by little interest or pleasure in doing things? (greogr)   Provided patient/caregiver education on the expected discharge date and the discharge plan No   Discharge Plan A Long-term acute care facility (LTAC)   Discharge Plan B Skilled Nursing Facility   Change in patient condition or support system No   Patient choice form signed by patient/caregiver N/A   Explained to the the patient/caregiver why the discharge planned changed: No   Involved the patient/caregiver in establishing a new discharge plan: No     Discharge plan to be determined when medically stable.    Shauna Contreras CM  [id number]   "Chief Complaint: "Trying to keep all the balls in the air"--Dxd June 19, 2023    Goals:   "Hoping to see how to fit this into everything else".      Mental Status Exam    Mood: Depressed, Sad anxious at times  PHQ:  5 (5-9=mild depression) Self Rating: Depression: 2.5/10 and Anxiety: 3/10  Thought Process: Goal directed   Thought Content: Hallucinations: Not present Delusions: Not present  Speech: No deficits  Attitude: Cooperative  Affect: Sad  Appearance: Neatly groomed, Clean, and Casual  Motor Activity: No deficits  Attention/Concentration: Intact  Judgement: Intact  Orientation: Date: Yes, Place: Yes, Person: Yes, Situations: Yes  Memory: Immediate: 3/3, Recent: 3/3, Remote: 3/3  Abstract Thinking: Age Appropriate  Gross Est. Of Intelligence: Average  Assets: Motivated for tx, intelligent, educated, verbal, support system , insightful, independent, and friendly  Insight: Intact  Self Harm: Not present  Harm to Others: Not present    Assessments:   PHQ9:   PHQ9 8/14/2023   Total Score 5       Depression Patient Health Questionnaire 8/14/2023   Over the last two weeks how often have you been bothered by little interest or pleasure in doing things Not at all   Over the last two weeks how often have you been bothered by feeling down, depressed or hopeless Not at all   PHQ-2 Total Score 0   Over the last two weeks how often have you been bothered by trouble falling or staying asleep, or sleeping too much Several days   Over the last two weeks how often have you been bothered by feeling tired or having little energy Several days   Over the last two weeks how often have you been bothered by a poor appetite or overeating Nearly every day   Over the last two weeks how often have you been bothered by feeling bad about yourself - or that you are a failure or have let yourself or your family down Not at all   Over the last two weeks how often have you been bothered by trouble concentrating on things, such as reading the " "newspaper or watching television Not at all   Over the last two weeks how often have you been bothered by moving or speaking so slowly that other people could have noticed. Or the opposite - being so fidgety or restless that you have been moving around a lot more than usual. Not at all   Over the last two weeks how often have you been bothered by thoughts that you would be better off dead, or of hurting yourself Not at all   If you checked off any problems, how difficult have these problems made it for you to do your work, take care of things at home or get along with other people? Somewhat difficult   Total Score 5   Interpretation Mild          GAD7:   GAD7 8/14/2023   1. Feeling nervous, anxious, or on edge? 1   2. Not being able to stop or control worrying? 0   3. Worrying too much about different things? 0   4. Trouble relaxing? 0   5. Being so restless that it is hard to sit still? 0   6. Becoming easily annoyed or irritable? 1   7. Feeling afraid as if something awful might happen? 0   8. If you checked off any problems, how difficult have these problems made it for you to do your work, take care of things at home, or get along with other people? 1   TAY-7 Score 2   Trauma History:  History of Emotional/Mental abuse: Denies  History of Physical abuse: Denies  History of Sexual abuse: Denies  History of other trauma: Denies    Legal:  Denies prior arrests, charges, conviction  Denies any upcoming court dates  Denies any pending charges.    Substance Use:    Denies all substance use    Family History:     Maternal:   "They are  and raging alcoholics"  Paternal: Aunts: Depression  One completed a suicide  Siblings: Brother: Substance Use: Alcohol and drugs, Brother: Substance Use: Alcohol and drugs, and Brother: Substance Use: Alcohol and drugs    Social History:   Grew up in: [place], [place]  Raised by: Biological mother  Number of siblings:  8 paternal half-siblings 2 maternal " "half-siblings  Patient birth order: [facility]  Describes household as:  "I grew up cautious--both parents were drinkers at the time"  Education: Undergraduate Degree  Bachelors Degree in Applied Sciences - 1992 - [employer]  Marital status:   Number of children:  Two sons -- 22 and 18 y/o  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  First time was when she 13 y/o and her father made her go to counseling.  Has been in therapy off/on over the years.  Currently seeing a local provider with whom she has had a relationship for ~ the last 20 years. She has been with the patient to multiple major life challenges including divorce.   Has begun dealing with cancer-related issues with her.    Support System: 1)  84 y/o mother; 2) Younger brother who is an RN; 3)  Long-time girlfriends; 4)  S/o for 7 years off/on    Coping Strategies: 1)  Meditation; 2)  Positive affirmations    Stressors: 1)  Recently diagnosed with colorectal cancer; 2)  Finances--single mother--nothing from father of her sons; 3)  " Overwhelmed a lot".    Current Presentation:  PCP: DESMOND Jacobo NP  Referred by:  JAROCHO Bazan MD  Initial Visit: Yes  Last Visit: n/a    54 y/o patient here for initial visit. Kept looking at her phone frequently until this provider politely requested that she put the device away, which she did.  Diagnosed with colorectal cancer, is taking chemotherapy treatments every other Tuesday, and has six more.  Then daily  radiation for an undetermiend period of time.  Next will be surgery to remove the walnut-sized tumor on the edge of her rectum.  Processes concerns with treatment outcomes, and what it might mean if treatment isn't successful.  "All of this is happening so fast, there are so many appointments, and working is hard right now".  "I have so many balls in the air, and it's hard right now".  " "    Processes relationships with her parents.  Her father is .  Their relationship requried a lot of work.  He was the medical director of a local chemical dependence program, dedicating his life getting peopole into recovery.  Sicne she didn't have substance issues, he didn't know how to relate to her.  Harshil has always been "exceptional: and there for her. When any of  her farther's  8 children came in "it was crazy--people came and went".    Processes having a successful academic career in high scoool and college.  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 After high school she began working with a local [employer] firm for 25 years.  Later got a  licence and worked with that for 10 years on nights and weekends.  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  Processes fatgiue and concentration problems as side effects from chemo.      She was 25 y/o when she  the man with whom she had been in realtionship for eight years.  They had two sons, and  after "a good 20 years".  While he was a good provider, and she felt like she was doing everything associated with family responsibilities, so she thought she would be better off alone dealing with less stress than staying in the marriage.        Reveals that she is in a long-time counseling relationship with a  local provider.  Decision made that she will continue with that person.  They have an appointment a week from today.  Patient understand she can return to  clinic if needed.      Endorses:   Depression symptoms: difficulty concentrating anxiety loss of energy/fatigue disturbed sleep decreased libido decreased appetite decreased motivation decreased hygiene sleep pattern-  ever since  when  her best friend passed away, she wakes up at 4:30 a.m. which is the time she received the call about the death.  Doesn't go back to sleep, and is not rested when she gets up.  " "May go to sleep at 9, 10, 11 p.m.  Anxiety symptoms: several nights /week  she "startles" awake, heart rate increases, takes a minute to catch her breath;    Psychotherapy:  Target symptoms: depression, anxiety , grief, work stress  Why chosen therapy is appropriate versus another modality: relevant to diagnosis, evidence based practice  Outcome monitoring methods: self-report, observation, checklist/rating scale  Therapeutic intervention type: supportive psychotherapy  Topics discussed/themes:  recent cancer diagnosis, work stress, building skills sets for symptom management, symptom recognition, financial stressors  The patient's response to the intervention is accepting.   The patient's progress toward treatment goals is good.       Review of systems:   See most recent ROS per PCP    Assessment:      1. Situational depression    2. Anxiety associated with cancer diagnosis    3. Financial difficulty    4. Grief    5. Adenocarcinoma of rectum, stage 3           Plan:   Patient Instructions   Meds as directed  Keep all healthcare appointments  Utilize self-interventions from patient education materials  Call [facility] or go to nearest ER if overwhelmed  Patient has a provider with whom she has been in relationship for many year--no further  clinic appointments             I spent 60 minutes in face-to-face psychotherapy with an additional 15 minutes for E/M services on this date of service.     "

## 2023-08-14 NOTE — PATIENT INSTRUCTIONS
Meds as directed  Keep all healthcare appointments  Utilize self-interventions from patient education materials  Call [facility] or go to nearest ER if overwhelmed  Patient has a provider with whom she has been in relationship for many year--no further  clinic appointments

## 2023-08-15 ENCOUNTER — DOCUMENTATION ONLY (OUTPATIENT)
Dept: HEMATOLOGY/ONCOLOGY | Facility: CLINIC | Age: 53
End: 2023-08-15

## 2023-08-15 ENCOUNTER — TELEPHONE (OUTPATIENT)
Dept: HEMATOLOGY/ONCOLOGY | Facility: CLINIC | Age: 53
End: 2023-08-15

## 2023-08-15 ENCOUNTER — INFUSION (OUTPATIENT)
Dept: INFUSION THERAPY | Facility: HOSPITAL | Age: 53
End: 2023-08-15
Attending: INTERNAL MEDICINE
Payer: MEDICAID

## 2023-08-15 ENCOUNTER — CLINICAL SUPPORT (OUTPATIENT)
Dept: HEMATOLOGY/ONCOLOGY | Facility: CLINIC | Age: 53
End: 2023-08-15
Attending: INTERNAL MEDICINE
Payer: MEDICAID

## 2023-08-15 VITALS
TEMPERATURE: 98 F | HEIGHT: 64 IN | HEART RATE: 75 BPM | DIASTOLIC BLOOD PRESSURE: 80 MMHG | OXYGEN SATURATION: 100 % | SYSTOLIC BLOOD PRESSURE: 120 MMHG | BODY MASS INDEX: 22.2 KG/M2 | RESPIRATION RATE: 20 BRPM | WEIGHT: 130 LBS

## 2023-08-15 VITALS — WEIGHT: 129.81 LBS | HEIGHT: 64 IN | BODY MASS INDEX: 22.16 KG/M2

## 2023-08-15 DIAGNOSIS — Z80.0 FAMILY HISTORY OF COLON CANCER: ICD-10-CM

## 2023-08-15 DIAGNOSIS — D70.1 CHEMOTHERAPY INDUCED NEUTROPENIA: ICD-10-CM

## 2023-08-15 DIAGNOSIS — K92.1 HEMATOCHEZIA: ICD-10-CM

## 2023-08-15 DIAGNOSIS — R59.0 PELVIC LYMPHADENOPATHY: ICD-10-CM

## 2023-08-15 DIAGNOSIS — C20 ADENOCARCINOMA OF RECTUM, STAGE 3: ICD-10-CM

## 2023-08-15 DIAGNOSIS — C20 ADENOCARCINOMA OF RECTUM: ICD-10-CM

## 2023-08-15 DIAGNOSIS — Z83.719 FAMILY HX COLONIC POLYPS: ICD-10-CM

## 2023-08-15 DIAGNOSIS — C20 ADENOCARCINOMA OF RECTUM, STAGE 3: Primary | ICD-10-CM

## 2023-08-15 DIAGNOSIS — T45.1X5A CHEMOTHERAPY INDUCED NEUTROPENIA: ICD-10-CM

## 2023-08-15 DIAGNOSIS — E87.6 HYPOKALEMIA: Primary | ICD-10-CM

## 2023-08-15 DIAGNOSIS — D12.3 ADENOMATOUS POLYP OF TRANSVERSE COLON: ICD-10-CM

## 2023-08-15 DIAGNOSIS — T82.868A THROMBOSIS IN PERIPHERALLY INSERTED CENTRAL CATHETER (PICC): ICD-10-CM

## 2023-08-15 DIAGNOSIS — I82.4Y2: ICD-10-CM

## 2023-08-15 LAB
ALBUMIN SERPL-MCNC: 3.3 G/DL (ref 3.5–5)
ALBUMIN/GLOB SERPL: 1.2 RATIO (ref 1.1–2)
ALP SERPL-CCNC: 89 UNIT/L (ref 40–150)
ALT SERPL-CCNC: 46 UNIT/L (ref 0–55)
AST SERPL-CCNC: 38 UNIT/L (ref 5–34)
BASOPHILS # BLD AUTO: 0.03 X10(3)/MCL
BASOPHILS NFR BLD AUTO: 1.2 %
BILIRUB SERPL-MCNC: 1.1 MG/DL
BUN SERPL-MCNC: 7.5 MG/DL (ref 9.8–20.1)
CALCIUM SERPL-MCNC: 9.1 MG/DL (ref 8.4–10.2)
CHLORIDE SERPL-SCNC: 111 MMOL/L (ref 98–107)
CO2 SERPL-SCNC: 24 MMOL/L (ref 22–29)
CREAT SERPL-MCNC: 0.8 MG/DL (ref 0.55–1.02)
EOSINOPHIL # BLD AUTO: 0.12 X10(3)/MCL (ref 0–0.9)
EOSINOPHIL NFR BLD AUTO: 4.8 %
ERYTHROCYTE [DISTWIDTH] IN BLOOD BY AUTOMATED COUNT: 15.7 % (ref 11.5–17)
GFR SERPLBLD CREATININE-BSD FMLA CKD-EPI: >60 MLS/MIN/1.73/M2
GLOBULIN SER-MCNC: 2.8 GM/DL (ref 2.4–3.5)
GLUCOSE SERPL-MCNC: 92 MG/DL (ref 74–100)
HCT VFR BLD AUTO: 32.9 % (ref 37–47)
HGB BLD-MCNC: 10.7 G/DL (ref 12–16)
IMM GRANULOCYTES # BLD AUTO: 0.03 X10(3)/MCL (ref 0–0.04)
IMM GRANULOCYTES NFR BLD AUTO: 1.2 %
LYMPHOCYTES # BLD AUTO: 1.49 X10(3)/MCL (ref 0.6–4.6)
LYMPHOCYTES NFR BLD AUTO: 60.1 %
MAGNESIUM SERPL-MCNC: 2 MG/DL (ref 1.6–2.6)
MCH RBC QN AUTO: 28.2 PG (ref 27–31)
MCHC RBC AUTO-ENTMCNC: 32.5 G/DL (ref 33–36)
MCV RBC AUTO: 86.6 FL (ref 80–94)
MONOCYTES # BLD AUTO: 0.51 X10(3)/MCL (ref 0.1–1.3)
MONOCYTES NFR BLD AUTO: 20.6 %
NEUTROPHILS # BLD AUTO: 0.3 X10(3)/MCL (ref 2.1–9.2)
NEUTROPHILS NFR BLD AUTO: 12.1 %
NRBC BLD AUTO-RTO: 0 %
PLATELET # BLD AUTO: 176 X10(3)/MCL (ref 130–400)
PMV BLD AUTO: 9.5 FL (ref 7.4–10.4)
POTASSIUM SERPL-SCNC: 3.4 MMOL/L (ref 3.5–5.1)
PROT SERPL-MCNC: 6.1 GM/DL (ref 6.4–8.3)
RBC # BLD AUTO: 3.8 X10(6)/MCL (ref 4.2–5.4)
SODIUM SERPL-SCNC: 143 MMOL/L (ref 136–145)
WBC # SPEC AUTO: 2.48 X10(3)/MCL (ref 4.5–11.5)

## 2023-08-15 PROCEDURE — 1160F RVW MEDS BY RX/DR IN RCRD: CPT | Mod: CPTII,,, | Performed by: INTERNAL MEDICINE

## 2023-08-15 PROCEDURE — 1159F MED LIST DOCD IN RCRD: CPT | Mod: CPTII,,, | Performed by: INTERNAL MEDICINE

## 2023-08-15 PROCEDURE — 1159F PR MEDICATION LIST DOCUMENTED IN MEDICAL RECORD: ICD-10-PCS | Mod: CPTII,,, | Performed by: INTERNAL MEDICINE

## 2023-08-15 PROCEDURE — 3008F BODY MASS INDEX DOCD: CPT | Mod: CPTII,,, | Performed by: INTERNAL MEDICINE

## 2023-08-15 PROCEDURE — 63600175 PHARM REV CODE 636 W HCPCS: Performed by: INTERNAL MEDICINE

## 2023-08-15 PROCEDURE — 96372 THER/PROPH/DIAG INJ SC/IM: CPT

## 2023-08-15 PROCEDURE — 85025 COMPLETE CBC W/AUTO DIFF WBC: CPT

## 2023-08-15 PROCEDURE — 99214 OFFICE O/P EST MOD 30 MIN: CPT | Mod: S$PBB,,, | Performed by: INTERNAL MEDICINE

## 2023-08-15 PROCEDURE — 83735 ASSAY OF MAGNESIUM: CPT

## 2023-08-15 PROCEDURE — 99214 PR OFFICE/OUTPT VISIT, EST, LEVL IV, 30-39 MIN: ICD-10-PCS | Mod: S$PBB,,, | Performed by: INTERNAL MEDICINE

## 2023-08-15 PROCEDURE — 3079F DIAST BP 80-89 MM HG: CPT | Mod: CPTII,,, | Performed by: INTERNAL MEDICINE

## 2023-08-15 PROCEDURE — 36415 COLL VENOUS BLD VENIPUNCTURE: CPT

## 2023-08-15 PROCEDURE — 3074F SYST BP LT 130 MM HG: CPT | Mod: CPTII,,, | Performed by: INTERNAL MEDICINE

## 2023-08-15 PROCEDURE — 96375 TX/PRO/DX INJ NEW DRUG ADDON: CPT

## 2023-08-15 PROCEDURE — 3008F PR BODY MASS INDEX (BMI) DOCUMENTED: ICD-10-PCS | Mod: CPTII,,, | Performed by: INTERNAL MEDICINE

## 2023-08-15 PROCEDURE — 3074F PR MOST RECENT SYSTOLIC BLOOD PRESSURE < 130 MM HG: ICD-10-PCS | Mod: CPTII,,, | Performed by: INTERNAL MEDICINE

## 2023-08-15 PROCEDURE — 80053 COMPREHEN METABOLIC PANEL: CPT

## 2023-08-15 PROCEDURE — 99215 OFFICE O/P EST HI 40 MIN: CPT | Mod: PBBFAC,25 | Performed by: INTERNAL MEDICINE

## 2023-08-15 PROCEDURE — 1160F PR REVIEW ALL MEDS BY PRESCRIBER/CLIN PHARMACIST DOCUMENTED: ICD-10-PCS | Mod: CPTII,,, | Performed by: INTERNAL MEDICINE

## 2023-08-15 PROCEDURE — 3079F PR MOST RECENT DIASTOLIC BLOOD PRESSURE 80-89 MM HG: ICD-10-PCS | Mod: CPTII,,, | Performed by: INTERNAL MEDICINE

## 2023-08-15 RX ORDER — DIPHENHYDRAMINE HYDROCHLORIDE 50 MG/ML
50 INJECTION INTRAMUSCULAR; INTRAVENOUS ONCE AS NEEDED
Status: DISCONTINUED | OUTPATIENT
Start: 2023-08-15 | End: 2023-08-15

## 2023-08-15 RX ORDER — FLUOROURACIL 50 MG/ML
400 INJECTION, SOLUTION INTRAVENOUS
Status: CANCELLED | OUTPATIENT
Start: 2023-08-22

## 2023-08-15 RX ORDER — SODIUM CHLORIDE 0.9 % (FLUSH) 0.9 %
10 SYRINGE (ML) INJECTION
Status: CANCELLED | OUTPATIENT
Start: 2023-11-08

## 2023-08-15 RX ORDER — FLUOROURACIL 50 MG/ML
400 INJECTION, SOLUTION INTRAVENOUS
Status: DISCONTINUED | OUTPATIENT
Start: 2023-08-15 | End: 2023-08-15

## 2023-08-15 RX ORDER — SODIUM CHLORIDE 0.9 % (FLUSH) 0.9 %
10 SYRINGE (ML) INJECTION
Status: CANCELLED | OUTPATIENT
Start: 2023-08-22

## 2023-08-15 RX ORDER — CIPROFLOXACIN 500 MG/1
500 TABLET ORAL 2 TIMES DAILY
Qty: 14 TABLET | Refills: 0 | Status: SHIPPED | OUTPATIENT
Start: 2023-08-15 | End: 2023-08-22

## 2023-08-15 RX ORDER — EPINEPHRINE 0.3 MG/.3ML
0.3 INJECTION SUBCUTANEOUS ONCE AS NEEDED
Status: CANCELLED | OUTPATIENT
Start: 2023-08-22

## 2023-08-15 RX ORDER — SODIUM CHLORIDE 0.9 % (FLUSH) 0.9 %
10 SYRINGE (ML) INJECTION
Status: DISCONTINUED | OUTPATIENT
Start: 2023-08-15 | End: 2023-08-15

## 2023-08-15 RX ORDER — HEPARIN 100 UNIT/ML
500 SYRINGE INTRAVENOUS
Status: DISCONTINUED | OUTPATIENT
Start: 2023-08-15 | End: 2023-08-15

## 2023-08-15 RX ORDER — HEPARIN 100 UNIT/ML
500 SYRINGE INTRAVENOUS
Status: CANCELLED | OUTPATIENT
Start: 2023-11-08

## 2023-08-15 RX ORDER — FLUOROURACIL 50 MG/ML
400 INJECTION, SOLUTION INTRAVENOUS
Status: CANCELLED | OUTPATIENT
Start: 2023-10-03

## 2023-08-15 RX ORDER — SODIUM CHLORIDE 0.9 % (FLUSH) 0.9 %
10 SYRINGE (ML) INJECTION
Status: CANCELLED | OUTPATIENT
Start: 2023-10-29

## 2023-08-15 RX ORDER — HEPARIN 100 UNIT/ML
500 SYRINGE INTRAVENOUS
Status: CANCELLED | OUTPATIENT
Start: 2023-10-29

## 2023-08-15 RX ORDER — POTASSIUM CHLORIDE 20 MEQ/1
40 TABLET, EXTENDED RELEASE ORAL DAILY
Qty: 28 TABLET | Refills: 0 | Status: SHIPPED | OUTPATIENT
Start: 2023-08-15 | End: 2023-08-29

## 2023-08-15 RX ORDER — DIPHENHYDRAMINE HYDROCHLORIDE 50 MG/ML
50 INJECTION INTRAMUSCULAR; INTRAVENOUS ONCE AS NEEDED
Status: CANCELLED | OUTPATIENT
Start: 2023-08-22

## 2023-08-15 RX ORDER — EPINEPHRINE 0.3 MG/.3ML
0.3 INJECTION SUBCUTANEOUS ONCE AS NEEDED
Status: CANCELLED | OUTPATIENT
Start: 2023-10-29

## 2023-08-15 RX ORDER — EPINEPHRINE 0.3 MG/.3ML
0.3 INJECTION SUBCUTANEOUS ONCE AS NEEDED
Status: DISCONTINUED | OUTPATIENT
Start: 2023-08-15 | End: 2023-08-15

## 2023-08-15 RX ORDER — HEPARIN 100 UNIT/ML
500 SYRINGE INTRAVENOUS
Status: CANCELLED | OUTPATIENT
Start: 2023-08-22

## 2023-08-15 RX ORDER — DIPHENHYDRAMINE HYDROCHLORIDE 50 MG/ML
50 INJECTION INTRAMUSCULAR; INTRAVENOUS ONCE AS NEEDED
Status: CANCELLED | OUTPATIENT
Start: 2023-10-29

## 2023-08-15 RX ORDER — DULOXETIN HYDROCHLORIDE 20 MG/1
20 CAPSULE, DELAYED RELEASE ORAL
COMMUNITY
Start: 2023-08-14 | End: 2023-10-17

## 2023-08-15 RX ADMIN — FILGRASTIM-SNDZ 300 MCG: 300 INJECTION, SOLUTION INTRAVENOUS; SUBCUTANEOUS at 11:08

## 2023-08-15 NOTE — PROGRESS NOTES
ANC 0.3.    Patient is severely neutropenic.    Hold chemotherapy by a week  Educate in neutropenic precautions.    Start prophylactic ciprofloxacin 500 mg p.o. b.i.d. for 7 days   Start Neupogen 300 mcg subQ daily for 5 days   Recheck CBC and CMP Friday

## 2023-08-15 NOTE — NURSING
Patient into clinic for labs, Oncology visit then Folfox infusion is appropriate.   1205-Patient's Folfox was held today per Dr. Bazan-White count below parameters. Patient aware and understands will receive Zarxio daily x 5 days and labs repeated. She is aware that Cipro was called into pharmacy and will start today.  She is also aware to take her potassium as she had spoken to [place], Dr. Kelli ESCOBAR. She states understanding of neutropenic precautions as discussed with Xi Hobson RN and reinforced by myself. Patients mother also states understanding.    1205-patient received 1st Zarxio injection, has schedule to return daily x 4 more days, will  prescription and use neutropenic precautions as discussed. Patient stable upon discharge to home-wearing a mask.

## 2023-08-15 NOTE — PROGRESS NOTES
"  Reason for Visit:  Chemo treatment for rectal cancer      PMHx:   Past Medical History:   Diagnosis Date    Anxiety disorder, unspecified     GERD (gastroesophageal reflux disease)     Rectal cancer        Height: 63"  Weight:   Wt Readings from Last 15 Encounters:   08/15/23 58.9 kg (129 lb 12.8 oz)   08/15/23 59 kg (130 lb)   08/14/23 60.7 kg (133 lb 13.1 oz)   08/08/23 58.8 kg (129 lb 10.1 oz)   08/01/23 57.4 kg (126 lb 8.7 oz)   07/27/23 57.4 kg (126 lb 9.6 oz)   07/22/23 59.9 kg (132 lb)   07/21/23 60 kg (132 lb 4.4 oz)   07/19/23 59.9 kg (132 lb)   07/13/23 58.4 kg (128 lb 12.8 oz)   07/10/23 60 kg (132 lb 3.2 oz)   07/05/23 59.9 kg (132 lb)       Usual BW: 124 lb  Weight Change:Pt reports previously with wt gain to 160 lb, then recent wt loss indicating ~20% wt loss within the last 6 months; Wt currently Stable  IBW:  115 lb  BMI: 22.19 (normal)    Allergies: Codeine and Morphine    Current Medications:    Current Outpatient Medications:     (Magic mouthwash) 1:1:1 diphenhydrAMINE(Benadryl) 12.5mg/5ml liq, aluminum & magnesium hydroxide-simethicone (Maalox), LIDOcaine viscous 2%, Swish and spit 5 mLs every 4 (four) hours as needed (Mouth sores). for mouth sores, Disp: 90 mL, Rfl: 0    apixaban (ELIQUIS) 5 mg Tab, Take 1 tablet (5 mg total) by mouth 2 (two) times daily., Disp: 180 tablet, Rfl: 0    ciprofloxacin HCl (CIPRO) 500 MG tablet, Take 1 tablet (500 mg total) by mouth 2 (two) times daily. for 7 days, Disp: 14 tablet, Rfl: 0    dexAMETHasone (DECADRON) 4 MG Tab, Take 2 tablets(8mg) by mouth daily on days 2 and 3 of each chemotherapy cycle., Disp: 32 tablet, Rfl: 0    dexAMETHasone (DECADRON) 4 MG Tab, Take 2 tablets (8 mg total) by mouth once daily. Take as directed on days 2 and 3 of your chemotherapy cycle., Disp: 24 tablet, Rfl: 5    dicyclomine (BENTYL) 10 MG capsule, Take 1 capsule (10 mg total) by mouth every 6 (six) hours as needed (abdominal pain)., Disp: 60 capsule, Rfl: 0    dicyclomine " (BENTYL) 10 MG capsule, , Disp: , Rfl:     DULoxetine (CYMBALTA) 20 MG capsule, Take 20 mg by mouth., Disp: , Rfl:     famotidine (PEPCID) 40 MG tablet, Take 40 mg by mouth every evening., Disp: , Rfl:     famotidine (PEPCID) 40 MG tablet, , Disp: , Rfl:     hyoscyamine (ANASPAZ,LEVSIN) 0.125 mg Tab, Take 125 mcg by mouth every 6 (six) hours as needed., Disp: , Rfl:     ondansetron (ZOFRAN) 8 MG tablet, Take 1 tablet (8 mg total) by mouth every 8 (eight) hours as needed for Nausea., Disp: 30 tablet, Rfl: 2    ondansetron (ZOFRAN-ODT) 8 MG TbDL, Take 1 tablet (8 mg total) by mouth every 8 (eight) hours as needed (nausea/vomiting). (Patient not taking: Reported on 8/15/2023), Disp: 60 tablet, Rfl: 5    oxyCODONE-acetaminophen (PERCOCET) 5-325 mg per tablet, Take 1 tablet by mouth every 4 (four) hours as needed for Pain. (Patient not taking: Reported on 8/15/2023), Disp: 56 tablet, Rfl: 0    pantoprazole (PROTONIX) 40 MG tablet, Take 40 mg by mouth every morning., Disp: , Rfl:     PANTOPRAZOLE 40 MG/100 ML 0.9 % NS IVPB, , Disp: , Rfl:     potassium chloride SA (K-DUR,KLOR-CON) 20 MEQ tablet, Take 1 tablet (20 mEq total) by mouth once daily. (Patient not taking: Reported on 8/15/2023), Disp: 30 tablet, Rfl: 0    potassium chloride SA (K-DUR,KLOR-CON) 20 MEQ tablet, Take 2 tablets (40 mEq total) by mouth once daily. for 14 days, Disp: 28 tablet, Rfl: 0    prochlorperazine (COMPAZINE) 10 MG tablet, Take 1 tablet (10 mg total) by mouth every 6 to 8 hours as needed (nausea)., Disp: 30 tablet, Rfl: 1    sodium,potassium,mag sulfates (SUPREP BOWEL PREP KIT) 17.5-3.13-1.6 gram SolR, TAKE 1 KIT BY MOUTH SINGLE DOSE AS DIRECTED FOR 1 DAY, Disp: , Rfl:     Labs:  8/15/23 -- WBC 2.48 L, K 3.4 L, BUN 7.5 L, Cr 0.8, Na 143    Diet History:     8/15/23 -- Pt visible upset d/t chemo held today; pt reports continues early satiety only eating ~ 1 meal daily as well as being scared to eat and dcold sensitivity restrictions; pt reports  likes eating fruit however reports no energy to wash fruits -- encouraged use of fruit cups; Not currently drinking Ensure Clear however does report tasting & liking, encouraged incorporating Ensure Clear  with goal of 2 daily; weight stable at this time; pt screens at high nutrition risk at this time    7/19/23 -- Pt beginning chancer treatment for rectal cancer; reports good appetite however avoiding eating d/t feeling of fullness/bloating and uncomfortableness d/t location of mass - encouraged 5-6 small meals, low residue/easy to digest foods; reports taking Miralax daily for constipation; nsing instructed pt on cold sensitivity -- pt reports normally likes ice cold water through out the day & smoothies for breakfast, alternative options to incorporate room temp foods for management of cold sensitivity such as room temp Ensure oral nutrition supplement & fruit cups or pudding; pt with ~20% wt loss within the last 6 months after previous gain, reports current wt is more of her UBW; Pt screens at high nutrition risk at this time -- will continue to follow up with patient during chemo treatment    Nutrition Diagnosis:   Problem:  malnutrition  Etiology (related to): poor appetite and tumor location  Signs/Symptoms (as evidenced by):  < 75% nutrition intake > 1 month, > 10% wt loss x 6 months         Nutrition Rx: based on 60 kg)  EEN: 4400-9684 kcal (30 - 32 kcal/kg)  EPN: 60-72 gm (1 - 1.2 gm/kg)  FLD: 7401-2237 ml (1ml/kcal)    Intervention:  Low Residue/Easy to digest diet, 5-6 small meals daily   Ensure Clear BID  Monitor Weights Weekly       Monitoring:  energy intake, GI tolerance, weight, and labs

## 2023-08-15 NOTE — PROGRESS NOTES
Potassium 3.4, low.      Check magnesium level.    Start potassium chloride 20 mEq p.o. q.day x2 weeks; no refills   In 2 weeks, recheck CMP and magnesium level.

## 2023-08-15 NOTE — Clinical Note
Continue FOLFOX every 2 weeks for a total of 8 cycles  Check CBC and CMP every couple of weeks before each cycle of chemotherapy Re-stage with contrast enhanced CT scans of chest/abdomen, and MRI scan of pelvis with and without contrast after completion of 4 cycles of chemotherapy, then after 8 cycles of chemotherapy Continue anticoagulation for 3 months (until the end of October) Arrange for genetic testing and counseling (family history of colon cancer and colon polyp) Follow-up with NP in 2 weeks.

## 2023-08-16 ENCOUNTER — INFUSION (OUTPATIENT)
Dept: INFUSION THERAPY | Facility: HOSPITAL | Age: 53
End: 2023-08-16
Attending: INTERNAL MEDICINE
Payer: MEDICAID

## 2023-08-16 VITALS
RESPIRATION RATE: 18 BRPM | SYSTOLIC BLOOD PRESSURE: 125 MMHG | HEART RATE: 78 BPM | BODY MASS INDEX: 22.35 KG/M2 | TEMPERATURE: 98 F | HEIGHT: 64 IN | OXYGEN SATURATION: 100 % | WEIGHT: 130.94 LBS | DIASTOLIC BLOOD PRESSURE: 64 MMHG

## 2023-08-16 DIAGNOSIS — D70.1 CHEMOTHERAPY INDUCED NEUTROPENIA: Primary | ICD-10-CM

## 2023-08-16 DIAGNOSIS — C20 ADENOCARCINOMA OF RECTUM: ICD-10-CM

## 2023-08-16 DIAGNOSIS — T45.1X5A CHEMOTHERAPY INDUCED NEUTROPENIA: Primary | ICD-10-CM

## 2023-08-16 PROCEDURE — 96372 THER/PROPH/DIAG INJ SC/IM: CPT

## 2023-08-16 PROCEDURE — 63600175 PHARM REV CODE 636 W HCPCS: Performed by: INTERNAL MEDICINE

## 2023-08-16 RX ADMIN — FILGRASTIM-SNDZ 300 MCG: 300 INJECTION, SOLUTION INTRAVENOUS; SUBCUTANEOUS at 12:08

## 2023-08-16 NOTE — NURSING
Patient into infusion for Zarxio injection. States Picc line area itching. Upon assessment patient has bruising to the area as has been scratching and slight pink to the picc insertion sight with no drainage, swelling or warmth. Patient denies pain to the area. Per Carlota Hobson RN patients dressing was changed to sterile dressing with CHG impregnated sponge instead of CHG jell on tegaderm as she suspects that may be the cause the itching. Patient is neutropenic and I again reinforced neutropenic precautions particularly for her to not scratch her picc line area. She states understanding to go to ER if pain develops in that area, any redness, drainage on the bandage, if she begins to run a fever or any new symptoms.   1330-patient was discharged to home stable, no questions and has future appointments.

## 2023-08-17 ENCOUNTER — INFUSION (OUTPATIENT)
Dept: INFUSION THERAPY | Facility: HOSPITAL | Age: 53
End: 2023-08-17
Attending: INTERNAL MEDICINE
Payer: MEDICAID

## 2023-08-17 VITALS
DIASTOLIC BLOOD PRESSURE: 88 MMHG | WEIGHT: 131.81 LBS | OXYGEN SATURATION: 100 % | TEMPERATURE: 98 F | RESPIRATION RATE: 18 BRPM | BODY MASS INDEX: 22.51 KG/M2 | HEART RATE: 83 BPM | SYSTOLIC BLOOD PRESSURE: 143 MMHG

## 2023-08-17 DIAGNOSIS — C20 ADENOCARCINOMA OF RECTUM: ICD-10-CM

## 2023-08-17 DIAGNOSIS — T45.1X5A CHEMOTHERAPY INDUCED NEUTROPENIA: Primary | ICD-10-CM

## 2023-08-17 DIAGNOSIS — D70.1 CHEMOTHERAPY INDUCED NEUTROPENIA: Primary | ICD-10-CM

## 2023-08-17 PROCEDURE — 63600175 PHARM REV CODE 636 W HCPCS: Performed by: INTERNAL MEDICINE

## 2023-08-17 PROCEDURE — 96372 THER/PROPH/DIAG INJ SC/IM: CPT

## 2023-08-17 RX ADMIN — FILGRASTIM-SNDZ 300 MCG: 300 INJECTION, SOLUTION INTRAVENOUS; SUBCUTANEOUS at 01:08

## 2023-08-17 NOTE — NURSING
Patient into infusion for Zarxio injection.  Tolerated well. Has schedule of appointments. Reinforced neutropenic precautions. Patient denies any fever, Right arm pain at PICC insertion sight, bandage in place, patient states not itching, there is no drainage on bandage, discoloration or swelling of the arm or sight. Patient states she has had had signs of fever or infection. She will return tomorrow for labs to determine further treatment.  Patient discharged to home 1340-denies needs. Stable upon discharge. (Patient is taking her Cipro bid).

## 2023-08-18 ENCOUNTER — INFUSION (OUTPATIENT)
Dept: INFUSION THERAPY | Facility: HOSPITAL | Age: 53
End: 2023-08-18
Attending: INTERNAL MEDICINE
Payer: MEDICAID

## 2023-08-18 NOTE — NURSING
1122  Pt did labs and ANC has gone from 300 to 15,315 after 3 doses of zarxio.  Messaged Dr. Bazan with results and he stated to discontinue zarxio and have pt return next week to reeval labs and possible chemo.

## 2023-08-22 ENCOUNTER — INFUSION (OUTPATIENT)
Dept: INFUSION THERAPY | Facility: HOSPITAL | Age: 53
End: 2023-08-22
Attending: INTERNAL MEDICINE
Payer: MEDICAID

## 2023-08-22 VITALS
BODY MASS INDEX: 22.36 KG/M2 | WEIGHT: 130.94 LBS | SYSTOLIC BLOOD PRESSURE: 122 MMHG | HEART RATE: 81 BPM | TEMPERATURE: 98 F | OXYGEN SATURATION: 100 % | RESPIRATION RATE: 17 BRPM | DIASTOLIC BLOOD PRESSURE: 79 MMHG

## 2023-08-22 DIAGNOSIS — C20 ADENOCARCINOMA OF RECTUM, STAGE 3: Primary | ICD-10-CM

## 2023-08-22 PROCEDURE — 96415 CHEMO IV INFUSION ADDL HR: CPT

## 2023-08-22 PROCEDURE — 96416 CHEMO PROLONG INFUSE W/PUMP: CPT

## 2023-08-22 PROCEDURE — 96411 CHEMO IV PUSH ADDL DRUG: CPT

## 2023-08-22 PROCEDURE — 96367 TX/PROPH/DG ADDL SEQ IV INF: CPT

## 2023-08-22 PROCEDURE — 96413 CHEMO IV INFUSION 1 HR: CPT

## 2023-08-22 PROCEDURE — 25000003 PHARM REV CODE 250: Performed by: INTERNAL MEDICINE

## 2023-08-22 PROCEDURE — 96368 THER/DIAG CONCURRENT INF: CPT

## 2023-08-22 PROCEDURE — 63600175 PHARM REV CODE 636 W HCPCS: Performed by: INTERNAL MEDICINE

## 2023-08-22 RX ORDER — HEPARIN 100 UNIT/ML
500 SYRINGE INTRAVENOUS
Status: DISCONTINUED | OUTPATIENT
Start: 2023-08-22 | End: 2023-08-22 | Stop reason: HOSPADM

## 2023-08-22 RX ORDER — EPINEPHRINE 0.3 MG/.3ML
0.3 INJECTION SUBCUTANEOUS ONCE AS NEEDED
Status: DISCONTINUED | OUTPATIENT
Start: 2023-08-22 | End: 2023-08-22 | Stop reason: HOSPADM

## 2023-08-22 RX ORDER — FLUOROURACIL 50 MG/ML
400 INJECTION, SOLUTION INTRAVENOUS
Status: COMPLETED | OUTPATIENT
Start: 2023-08-22 | End: 2023-08-22

## 2023-08-22 RX ORDER — SODIUM CHLORIDE 0.9 % (FLUSH) 0.9 %
10 SYRINGE (ML) INJECTION
Status: DISCONTINUED | OUTPATIENT
Start: 2023-08-22 | End: 2023-08-22 | Stop reason: HOSPADM

## 2023-08-22 RX ORDER — DIPHENHYDRAMINE HYDROCHLORIDE 50 MG/ML
50 INJECTION INTRAMUSCULAR; INTRAVENOUS ONCE AS NEEDED
Status: DISCONTINUED | OUTPATIENT
Start: 2023-08-22 | End: 2023-08-22 | Stop reason: HOSPADM

## 2023-08-22 RX ADMIN — DEXAMETHASONE SODIUM PHOSPHATE 0.25 MG: 4 INJECTION, SOLUTION INTRA-ARTICULAR; INTRALESIONAL; INTRAMUSCULAR; INTRAVENOUS; SOFT TISSUE at 09:08

## 2023-08-22 RX ADMIN — FLUOROURACIL 650 MG: 50 INJECTION, SOLUTION INTRAVENOUS at 01:08

## 2023-08-22 RX ADMIN — FLUOROURACIL 3900 MG: 50 INJECTION, SOLUTION INTRAVENOUS at 01:08

## 2023-08-22 RX ADMIN — OXALIPLATIN 135 MG: 100 INJECTION, SOLUTION, CONCENTRATE INTRAVENOUS at 10:08

## 2023-08-22 RX ADMIN — SODIUM CHLORIDE: 9 INJECTION, SOLUTION INTRAVENOUS at 09:08

## 2023-08-22 RX ADMIN — DEXTROSE MONOHYDRATE 652 MG: 50 INJECTION, SOLUTION INTRAVENOUS at 10:08

## 2023-08-22 RX ADMIN — DEXTROSE MONOHYDRATE: 50 INJECTION, SOLUTION INTRAVENOUS at 10:08

## 2023-08-23 ENCOUNTER — TELEPHONE (OUTPATIENT)
Dept: HEMATOLOGY/ONCOLOGY | Facility: CLINIC | Age: 53
End: 2023-08-23
Payer: MEDICAID

## 2023-08-23 NOTE — NURSING
Received phone call from patient regarding surgery consult tomorrow 8/24/23. Patient says that after her appt with Dr. Bazan, she understand that she did not need surgery consult for mediport placement as her treatment is not long term.    Please advise.

## 2023-08-24 ENCOUNTER — INFUSION (OUTPATIENT)
Dept: INFUSION THERAPY | Facility: HOSPITAL | Age: 53
End: 2023-08-24
Attending: INTERNAL MEDICINE
Payer: MEDICAID

## 2023-08-24 VITALS
DIASTOLIC BLOOD PRESSURE: 86 MMHG | TEMPERATURE: 98 F | SYSTOLIC BLOOD PRESSURE: 135 MMHG | OXYGEN SATURATION: 100 % | HEART RATE: 68 BPM | RESPIRATION RATE: 20 BRPM

## 2023-08-24 DIAGNOSIS — C20 ADENOCARCINOMA OF RECTUM, STAGE 3: Primary | ICD-10-CM

## 2023-08-24 PROCEDURE — 96360 HYDRATION IV INFUSION INIT: CPT

## 2023-08-24 PROCEDURE — 25000003 PHARM REV CODE 250: Performed by: INTERNAL MEDICINE

## 2023-08-24 RX ORDER — SODIUM CHLORIDE 0.9 % (FLUSH) 0.9 %
10 SYRINGE (ML) INJECTION
Status: DISCONTINUED | OUTPATIENT
Start: 2023-08-24 | End: 2023-08-24 | Stop reason: HOSPADM

## 2023-08-24 RX ORDER — HEPARIN 100 UNIT/ML
500 SYRINGE INTRAVENOUS
Status: DISCONTINUED | OUTPATIENT
Start: 2023-08-24 | End: 2023-08-24 | Stop reason: HOSPADM

## 2023-08-24 RX ADMIN — SODIUM CHLORIDE 1000 ML: 9 INJECTION, SOLUTION INTRAVENOUS at 01:08

## 2023-08-29 ENCOUNTER — INFUSION (OUTPATIENT)
Dept: INFUSION THERAPY | Facility: HOSPITAL | Age: 53
End: 2023-08-29
Attending: INTERNAL MEDICINE
Payer: MEDICAID

## 2023-08-29 VITALS
HEIGHT: 64 IN | TEMPERATURE: 98 F | WEIGHT: 130.75 LBS | SYSTOLIC BLOOD PRESSURE: 128 MMHG | HEART RATE: 68 BPM | BODY MASS INDEX: 22.32 KG/M2 | DIASTOLIC BLOOD PRESSURE: 81 MMHG | RESPIRATION RATE: 20 BRPM | OXYGEN SATURATION: 99 %

## 2023-08-29 DIAGNOSIS — C20 ADENOCARCINOMA OF RECTUM, STAGE 3: Primary | ICD-10-CM

## 2023-08-29 PROCEDURE — 25000003 PHARM REV CODE 250: Performed by: INTERNAL MEDICINE

## 2023-08-29 PROCEDURE — 96523 IRRIG DRUG DELIVERY DEVICE: CPT

## 2023-08-29 PROCEDURE — A4216 STERILE WATER/SALINE, 10 ML: HCPCS | Performed by: INTERNAL MEDICINE

## 2023-08-29 RX ORDER — SODIUM CHLORIDE 0.9 % (FLUSH) 0.9 %
10 SYRINGE (ML) INJECTION
Status: COMPLETED | OUTPATIENT
Start: 2023-08-29 | End: 2023-08-29

## 2023-08-29 RX ORDER — HEPARIN 100 UNIT/ML
500 SYRINGE INTRAVENOUS
Status: DISCONTINUED | OUTPATIENT
Start: 2023-08-29 | End: 2023-08-29 | Stop reason: HOSPADM

## 2023-08-29 RX ORDER — SODIUM CHLORIDE 0.9 % (FLUSH) 0.9 %
10 SYRINGE (ML) INJECTION
Status: CANCELLED | OUTPATIENT
Start: 2023-08-29

## 2023-08-29 RX ORDER — HEPARIN 100 UNIT/ML
500 SYRINGE INTRAVENOUS
Status: CANCELLED | OUTPATIENT
Start: 2023-08-29

## 2023-08-29 RX ADMIN — Medication 10 ML: at 02:08

## 2023-08-29 RX ADMIN — SODIUM CHLORIDE, PRESERVATIVE FREE 10 ML: 5 INJECTION INTRAVENOUS at 02:08

## 2023-09-05 ENCOUNTER — INFUSION (OUTPATIENT)
Dept: INFUSION THERAPY | Facility: HOSPITAL | Age: 53
End: 2023-09-05
Attending: INTERNAL MEDICINE
Payer: MEDICAID

## 2023-09-05 VITALS
OXYGEN SATURATION: 100 % | HEIGHT: 64 IN | HEART RATE: 79 BPM | RESPIRATION RATE: 20 BRPM | WEIGHT: 135.81 LBS | BODY MASS INDEX: 23.18 KG/M2 | TEMPERATURE: 98 F | SYSTOLIC BLOOD PRESSURE: 128 MMHG | DIASTOLIC BLOOD PRESSURE: 87 MMHG

## 2023-09-05 DIAGNOSIS — C20 ADENOCARCINOMA OF RECTUM, STAGE 3: Primary | ICD-10-CM

## 2023-09-05 PROCEDURE — 96523 IRRIG DRUG DELIVERY DEVICE: CPT

## 2023-09-05 PROCEDURE — A4216 STERILE WATER/SALINE, 10 ML: HCPCS | Performed by: INTERNAL MEDICINE

## 2023-09-05 PROCEDURE — 25000003 PHARM REV CODE 250: Performed by: INTERNAL MEDICINE

## 2023-09-05 RX ORDER — SODIUM CHLORIDE 0.9 % (FLUSH) 0.9 %
10 SYRINGE (ML) INJECTION
Status: CANCELLED | OUTPATIENT
Start: 2023-09-05

## 2023-09-05 RX ORDER — HEPARIN 100 UNIT/ML
500 SYRINGE INTRAVENOUS
Status: CANCELLED | OUTPATIENT
Start: 2023-09-05

## 2023-09-05 RX ORDER — HEPARIN 100 UNIT/ML
500 SYRINGE INTRAVENOUS
Status: DISCONTINUED | OUTPATIENT
Start: 2023-09-05 | End: 2023-09-05 | Stop reason: HOSPADM

## 2023-09-05 RX ORDER — SODIUM CHLORIDE 0.9 % (FLUSH) 0.9 %
10 SYRINGE (ML) INJECTION
Status: COMPLETED | OUTPATIENT
Start: 2023-09-05 | End: 2023-09-05

## 2023-09-05 RX ADMIN — Medication 10 ML: at 12:09

## 2023-09-05 NOTE — NURSING
10:45 Labs (CBC,CMP) resulted reviewed with SHEY Hodge RN ANC level below treatment parameter secure message to Dr Bazan advise to pospone Folfox treatment for today repeat lab and provider visit 9/8/23.  11:26 Arrive for Folfox q 2 wks/ Picc line drg change q wk. Folfox on hold for 1 wk. Instructed to avoid crowded areas, wash fresh fruits and vegetables also to report to the ER for fever or signs of infection verbalizes understanding instructions.. Have access to patient portal able to view scheduled appointments. Scheduled for lab and provider visit 9/8/23.

## 2023-09-12 ENCOUNTER — INFUSION (OUTPATIENT)
Dept: INFUSION THERAPY | Facility: HOSPITAL | Age: 53
End: 2023-09-12
Attending: INTERNAL MEDICINE
Payer: MEDICAID

## 2023-09-12 VITALS
BODY MASS INDEX: 23.16 KG/M2 | DIASTOLIC BLOOD PRESSURE: 81 MMHG | HEART RATE: 70 BPM | TEMPERATURE: 98 F | WEIGHT: 134.94 LBS | SYSTOLIC BLOOD PRESSURE: 149 MMHG | RESPIRATION RATE: 20 BRPM

## 2023-09-12 PROCEDURE — 96523 IRRIG DRUG DELIVERY DEVICE: CPT

## 2023-09-12 NOTE — PLAN OF CARE
Here for C4 Folfox -     Notified Dr Bazan of labs  His reply-Hold chemotherapy 1 week, then recheck CBC.  Discontinue 5 FU bolus.  Chemotherapy next week if ANC at least 1500 mm3.

## 2023-09-18 ENCOUNTER — INFUSION (OUTPATIENT)
Dept: INFUSION THERAPY | Facility: HOSPITAL | Age: 53
End: 2023-09-18
Attending: INTERNAL MEDICINE
Payer: MEDICAID

## 2023-09-18 ENCOUNTER — DOCUMENTATION ONLY (OUTPATIENT)
Dept: HEMATOLOGY/ONCOLOGY | Facility: CLINIC | Age: 53
End: 2023-09-18
Payer: MEDICAID

## 2023-09-18 VITALS
SYSTOLIC BLOOD PRESSURE: 147 MMHG | HEART RATE: 77 BPM | TEMPERATURE: 98 F | RESPIRATION RATE: 18 BRPM | BODY MASS INDEX: 22.93 KG/M2 | WEIGHT: 133.63 LBS | DIASTOLIC BLOOD PRESSURE: 74 MMHG

## 2023-09-18 DIAGNOSIS — C20 ADENOCARCINOMA OF RECTUM, STAGE 3: Primary | ICD-10-CM

## 2023-09-18 PROCEDURE — 96367 TX/PROPH/DG ADDL SEQ IV INF: CPT

## 2023-09-18 PROCEDURE — 63600175 PHARM REV CODE 636 W HCPCS: Performed by: INTERNAL MEDICINE

## 2023-09-18 PROCEDURE — 25000003 PHARM REV CODE 250: Performed by: INTERNAL MEDICINE

## 2023-09-18 PROCEDURE — 96375 TX/PRO/DX INJ NEW DRUG ADDON: CPT

## 2023-09-18 PROCEDURE — 96368 THER/DIAG CONCURRENT INF: CPT

## 2023-09-18 PROCEDURE — 96413 CHEMO IV INFUSION 1 HR: CPT

## 2023-09-18 PROCEDURE — 96416 CHEMO PROLONG INFUSE W/PUMP: CPT

## 2023-09-18 PROCEDURE — 96415 CHEMO IV INFUSION ADDL HR: CPT

## 2023-09-18 RX ORDER — EPINEPHRINE 0.3 MG/.3ML
0.3 INJECTION SUBCUTANEOUS ONCE AS NEEDED
Status: DISCONTINUED | OUTPATIENT
Start: 2023-09-18 | End: 2023-09-18 | Stop reason: HOSPADM

## 2023-09-18 RX ORDER — DIPHENHYDRAMINE HYDROCHLORIDE 50 MG/ML
50 INJECTION INTRAMUSCULAR; INTRAVENOUS ONCE AS NEEDED
Status: COMPLETED | OUTPATIENT
Start: 2023-09-18 | End: 2023-09-18

## 2023-09-18 RX ORDER — HEPARIN 100 UNIT/ML
500 SYRINGE INTRAVENOUS
Status: DISCONTINUED | OUTPATIENT
Start: 2023-09-18 | End: 2023-09-18 | Stop reason: HOSPADM

## 2023-09-18 RX ORDER — SODIUM CHLORIDE 0.9 % (FLUSH) 0.9 %
10 SYRINGE (ML) INJECTION
Status: DISCONTINUED | OUTPATIENT
Start: 2023-09-18 | End: 2023-09-18 | Stop reason: HOSPADM

## 2023-09-18 RX ADMIN — DEXTROSE MONOHYDRATE: 50 INJECTION, SOLUTION INTRAVENOUS at 08:09

## 2023-09-18 RX ADMIN — DEXTROSE MONOHYDRATE 650 MG: 50 INJECTION, SOLUTION INTRAVENOUS at 10:09

## 2023-09-18 RX ADMIN — FLUOROURACIL 3900 MG: 50 INJECTION, SOLUTION INTRAVENOUS at 12:09

## 2023-09-18 RX ADMIN — DEXAMETHASONE SODIUM PHOSPHATE 0.25 MG: 4 INJECTION, SOLUTION INTRA-ARTICULAR; INTRALESIONAL; INTRAMUSCULAR; INTRAVENOUS; SOFT TISSUE at 09:09

## 2023-09-18 RX ADMIN — DIPHENHYDRAMINE HYDROCHLORIDE 50 MG: 50 INJECTION, SOLUTION INTRAMUSCULAR; INTRAVENOUS at 08:09

## 2023-09-18 RX ADMIN — SODIUM CHLORIDE: 9 INJECTION, SOLUTION INTRAVENOUS at 08:09

## 2023-09-18 RX ADMIN — OXALIPLATIN 140 MG: 5 INJECTION, SOLUTION INTRAVENOUS at 10:09

## 2023-09-18 NOTE — PROGRESS NOTES
"Reason for Visit:  Chemo treatment for rectal cancer        PMHx:        Past Medical History:   Diagnosis Date    Anxiety disorder, unspecified      GERD (gastroesophageal reflux disease)      Rectal cancer           Height: 63"  Weight:   Wt Readings from Last 15 Encounters:   09/12/23 61.2 kg (134 lb 14.7 oz)   09/05/23 61.6 kg (135 lb 12.9 oz)   08/29/23 59.3 kg (130 lb 11.7 oz)   08/22/23 59.4 kg (130 lb 15.3 oz)   08/17/23 59.8 kg (131 lb 13.4 oz)   08/16/23 59.4 kg (130 lb 15.3 oz)   08/15/23 58.9 kg (129 lb 12.8 oz)   08/15/23 59 kg (130 lb)   08/14/23 60.7 kg (133 lb 13.1 oz)   08/08/23 58.8 kg (129 lb 10.1 oz)   08/01/23 57.4 kg (126 lb 8.7 oz)   07/27/23 57.4 kg (126 lb 9.6 oz)   07/22/23 59.9 kg (132 lb)   07/21/23 60 kg (132 lb 4.4 oz)   07/19/23 59.9 kg (132 lb)       Usual BW: 124 lb  Weight Change: Pt reports previously with wt gain to 160 lb, then recent wt loss indicating ~20% wt loss within the last 6 months; Wt currently Stable  IBW:  115 lb  BMI: 23 (normal)    Allergies: Codeine and Morphine    Current Medications:    Current Outpatient Medications:     (Magic mouthwash) 1:1:1 diphenhydrAMINE(Benadryl) 12.5mg/5ml liq, aluminum & magnesium hydroxide-simethicone (Maalox), LIDOcaine viscous 2%, Swish and spit 5 mLs every 4 (four) hours as needed (Mouth sores). for mouth sores, Disp: 90 mL, Rfl: 0    apixaban (ELIQUIS) 5 mg Tab, Take 1 tablet (5 mg total) by mouth 2 (two) times daily., Disp: 180 tablet, Rfl: 0    dexAMETHasone (DECADRON) 4 MG Tab, Take 2 tablets(8mg) by mouth daily on days 2 and 3 of each chemotherapy cycle., Disp: 32 tablet, Rfl: 0    dexAMETHasone (DECADRON) 4 MG Tab, Take 2 tablets (8 mg total) by mouth once daily. Take as directed on days 2 and 3 of your chemotherapy cycle., Disp: 24 tablet, Rfl: 5    dicyclomine (BENTYL) 10 MG capsule, Take 1 capsule (10 mg total) by mouth every 6 (six) hours as needed (abdominal pain)., Disp: 60 capsule, Rfl: 0    dicyclomine (BENTYL) 10 MG " capsule, , Disp: , Rfl:     DULoxetine (CYMBALTA) 20 MG capsule, Take 20 mg by mouth., Disp: , Rfl:     famotidine (PEPCID) 40 MG tablet, Take 40 mg by mouth every evening., Disp: , Rfl:     famotidine (PEPCID) 40 MG tablet, , Disp: , Rfl:     hyoscyamine (ANASPAZ,LEVSIN) 0.125 mg Tab, Take 125 mcg by mouth every 6 (six) hours as needed., Disp: , Rfl:     ondansetron (ZOFRAN) 8 MG tablet, Take 1 tablet (8 mg total) by mouth every 8 (eight) hours as needed for Nausea., Disp: 30 tablet, Rfl: 2    ondansetron (ZOFRAN-ODT) 8 MG TbDL, Take 1 tablet (8 mg total) by mouth every 8 (eight) hours as needed (nausea/vomiting). (Patient not taking: Reported on 8/15/2023), Disp: 60 tablet, Rfl: 5    oxyCODONE-acetaminophen (PERCOCET) 5-325 mg per tablet, Take 1 tablet by mouth every 4 (four) hours as needed for Pain. (Patient not taking: Reported on 8/15/2023), Disp: 56 tablet, Rfl: 0    pantoprazole (PROTONIX) 40 MG tablet, Take 40 mg by mouth every morning., Disp: , Rfl:     PANTOPRAZOLE 40 MG/100 ML 0.9 % NS IVPB, , Disp: , Rfl:     potassium chloride SA (K-DUR,KLOR-CON) 20 MEQ tablet, Take 1 tablet (20 mEq total) by mouth once daily. (Patient not taking: Reported on 8/15/2023), Disp: 30 tablet, Rfl: 0    prochlorperazine (COMPAZINE) 10 MG tablet, Take 1 tablet (10 mg total) by mouth every 6 to 8 hours as needed (nausea)., Disp: 30 tablet, Rfl: 1    sodium,potassium,mag sulfates (SUPREP BOWEL PREP KIT) 17.5-3.13-1.6 gram SolR, TAKE 1 KIT BY MOUTH SINGLE DOSE AS DIRECTED FOR 1 DAY, Disp: , Rfl:     Labs:  9/12/23 -- WBC 3.6 L, K 4.1, BUN 8.8 L, Cr 0.84, Alb 3.7  9/18/23 -- WBC 6, K 3.8, BUN 9 L, Cr 0.86, Alb 3.9    Diet History:     9/18/23 -- Pt unable to receive last 2-3 chemo treatments; receiving chemo today; reports continued decrease po intake which she relates to fatigue & weakness vs decreased appetite, discussed & encouraged use of quick easy high kcal/protein foods/snacks & ONS BID; pt dislikes Ensure products, did  try & like Atkins shake -- continue ONS BID of choice; Suggest daily MVI, may still consider Megace to stimulate appetite; weight stable at this time    8/15/23 -- Pt visible upset d/t chemo held today; pt reports continues early satiety only eating ~ 1 meal daily as well as being scared to eat and dcold sensitivity restrictions; pt reports likes eating fruit however reports no energy to wash fruits -- encouraged use of fruit cups; Not currently drinking Ensure Clear however does report tasting & liking, encouraged incorporating Ensure Clear  with goal of 2 daily; weight stable at this time; pt screens at high nutrition risk at this time     7/19/23 -- Pt beginning chancer treatment for rectal cancer; reports good appetite however avoiding eating d/t feeling of fullness/bloating and uncomfortableness d/t location of mass - encouraged 5-6 small meals, low residue/easy to digest foods; reports taking Miralax daily for constipation; nsing instructed pt on cold sensitivity -- pt reports normally likes ice cold water through out the day & smoothies for breakfast, alternative options to incorporate room temp foods for management of cold sensitivity such as room temp Ensure oral nutrition supplement & fruit cups or pudding; pt with ~20% wt loss within the last 6 months after previous gain, reports current wt is more of her UBW; Pt screens at high nutrition risk at this time -- will continue to follow up with patient during chemo treatment    Nutrition Education:    Patient educated on:  Quick Easy Foods/Snacks, Food Safety .      Teaching Method:  Explanation    Patient's Understanding: Verbalizes understanding    Barriers to learning: None evident    Expected Compliance:  good    All questions were answered. Dietitian's contact information provided.       Nutrition Diagnosis:   Problem:  malnutrition  Etiology (related to): tumor location  Signs/Symptoms (as evidenced by):  < 75% nutrition intake > 1 month, > 10% wt loss  x 6 months        Nutrition Rx: based on 60 kg  EEN: 9517-7693 kcal (30 - 32 kcal/kg)  EPN: 60-72 gm (1 - 1.2 gm/kg)  FLD: 5964-7150 ml (1ml/kcal)      Intervention:  High kcal/protein diet, 5-6 small meals daily  ONS of choice BID  Suggest daily MVI  May consider appetite stimulant  Monitor Weight Weekly       Monitoring:  energy intake, GI tolerance, weight, and labs

## 2023-09-20 ENCOUNTER — INFUSION (OUTPATIENT)
Dept: INFUSION THERAPY | Facility: HOSPITAL | Age: 53
End: 2023-09-20
Attending: INTERNAL MEDICINE
Payer: MEDICAID

## 2023-09-20 VITALS
SYSTOLIC BLOOD PRESSURE: 157 MMHG | HEART RATE: 80 BPM | TEMPERATURE: 98 F | RESPIRATION RATE: 18 BRPM | DIASTOLIC BLOOD PRESSURE: 84 MMHG

## 2023-09-20 DIAGNOSIS — R11.0 NAUSEA: ICD-10-CM

## 2023-09-20 DIAGNOSIS — C20 ADENOCARCINOMA OF RECTUM, STAGE 3: Primary | ICD-10-CM

## 2023-09-20 DIAGNOSIS — R11.0 NAUSEA: Primary | ICD-10-CM

## 2023-09-20 PROCEDURE — 63600175 PHARM REV CODE 636 W HCPCS: Performed by: NURSE PRACTITIONER

## 2023-09-20 PROCEDURE — 96374 THER/PROPH/DIAG INJ IV PUSH: CPT

## 2023-09-20 PROCEDURE — 96361 HYDRATE IV INFUSION ADD-ON: CPT

## 2023-09-20 PROCEDURE — 25000003 PHARM REV CODE 250: Performed by: INTERNAL MEDICINE

## 2023-09-20 RX ORDER — HEPARIN 100 UNIT/ML
500 SYRINGE INTRAVENOUS
Status: DISCONTINUED | OUTPATIENT
Start: 2023-09-20 | End: 2023-09-20 | Stop reason: HOSPADM

## 2023-09-20 RX ORDER — SODIUM CHLORIDE 0.9 % (FLUSH) 0.9 %
10 SYRINGE (ML) INJECTION
Status: DISCONTINUED | OUTPATIENT
Start: 2023-09-20 | End: 2023-09-20 | Stop reason: HOSPADM

## 2023-09-20 RX ORDER — ONDANSETRON 2 MG/ML
8 INJECTION INTRAMUSCULAR; INTRAVENOUS ONCE
Status: CANCELLED
Start: 2023-09-20

## 2023-09-20 RX ORDER — ONDANSETRON 2 MG/ML
8 INJECTION INTRAMUSCULAR; INTRAVENOUS
Status: COMPLETED | OUTPATIENT
Start: 2023-09-20 | End: 2023-09-20

## 2023-09-20 RX ADMIN — SODIUM CHLORIDE 1000 ML: 9 INJECTION, SOLUTION INTRAVENOUS at 01:09

## 2023-09-20 RX ADMIN — ONDANSETRON 8 MG: 2 INJECTION INTRAMUSCULAR; INTRAVENOUS at 03:09

## 2023-09-20 NOTE — PLAN OF CARE
C4 D3 CADD takedown 1L NS given + zofran 8 mg per DEMI MYERS    Patient has compazine but refuses to take due to sedating Side effect    Took only one dose of Zofran at 8a today    Educated patient per Kizzy MYERS  to take Zofran ATC Every 6 hrs

## 2023-09-20 NOTE — PROGRESS NOTES
Patient reports to infusion for cadd pump removal and hydration. She says she has been unable to keep anything down and she took Zofran around 8am this monring and it was not effective inmanaging nausea. Patient advised to take Zofran on scheduled basis every 6 hours even if she is not nauseated. Patient was instructed to alternate zofran and compazine at last visit however patient says she does not take Compazine becauase it makes her sleepy.     Give Zofran 8mg IVP x 1 dose now then DC home

## 2023-09-25 ENCOUNTER — INFUSION (OUTPATIENT)
Dept: INFUSION THERAPY | Facility: HOSPITAL | Age: 53
End: 2023-09-25
Attending: INTERNAL MEDICINE
Payer: MEDICAID

## 2023-09-25 VITALS
HEART RATE: 76 BPM | HEIGHT: 64 IN | TEMPERATURE: 98 F | WEIGHT: 129.88 LBS | DIASTOLIC BLOOD PRESSURE: 89 MMHG | SYSTOLIC BLOOD PRESSURE: 133 MMHG | BODY MASS INDEX: 22.17 KG/M2 | RESPIRATION RATE: 20 BRPM | OXYGEN SATURATION: 98 %

## 2023-09-25 DIAGNOSIS — C20 ADENOCARCINOMA OF RECTUM, STAGE 3: Primary | ICD-10-CM

## 2023-09-25 PROCEDURE — A4216 STERILE WATER/SALINE, 10 ML: HCPCS | Performed by: INTERNAL MEDICINE

## 2023-09-25 PROCEDURE — 96523 IRRIG DRUG DELIVERY DEVICE: CPT

## 2023-09-25 PROCEDURE — 25000003 PHARM REV CODE 250: Performed by: INTERNAL MEDICINE

## 2023-09-25 RX ORDER — HEPARIN 100 UNIT/ML
500 SYRINGE INTRAVENOUS
Status: CANCELLED | OUTPATIENT
Start: 2023-09-25

## 2023-09-25 RX ORDER — SODIUM CHLORIDE 0.9 % (FLUSH) 0.9 %
10 SYRINGE (ML) INJECTION
Status: CANCELLED | OUTPATIENT
Start: 2023-09-25

## 2023-09-25 RX ORDER — SODIUM CHLORIDE 0.9 % (FLUSH) 0.9 %
10 SYRINGE (ML) INJECTION
Status: COMPLETED | OUTPATIENT
Start: 2023-09-25 | End: 2023-09-25

## 2023-09-25 RX ORDER — HEPARIN 100 UNIT/ML
500 SYRINGE INTRAVENOUS
Status: DISCONTINUED | OUTPATIENT
Start: 2023-09-25 | End: 2023-09-25 | Stop reason: HOSPADM

## 2023-09-25 RX ADMIN — Medication 10 ML: at 02:09

## 2023-10-02 ENCOUNTER — OFFICE VISIT (OUTPATIENT)
Dept: HEMATOLOGY/ONCOLOGY | Facility: CLINIC | Age: 53
End: 2023-10-02
Attending: INTERNAL MEDICINE
Payer: MEDICAID

## 2023-10-02 ENCOUNTER — INFUSION (OUTPATIENT)
Dept: INFUSION THERAPY | Facility: HOSPITAL | Age: 53
End: 2023-10-02
Attending: INTERNAL MEDICINE
Payer: MEDICAID

## 2023-10-02 VITALS
HEIGHT: 64 IN | TEMPERATURE: 98 F | HEART RATE: 82 BPM | SYSTOLIC BLOOD PRESSURE: 155 MMHG | RESPIRATION RATE: 20 BRPM | BODY MASS INDEX: 22.43 KG/M2 | WEIGHT: 131.38 LBS | DIASTOLIC BLOOD PRESSURE: 81 MMHG | OXYGEN SATURATION: 100 %

## 2023-10-02 VITALS — HEIGHT: 62 IN | WEIGHT: 132.5 LBS | BODY MASS INDEX: 24.38 KG/M2

## 2023-10-02 DIAGNOSIS — C20 ADENOCARCINOMA OF RECTUM, STAGE 3: ICD-10-CM

## 2023-10-02 DIAGNOSIS — C20 ADENOCARCINOMA OF RECTUM: ICD-10-CM

## 2023-10-02 DIAGNOSIS — R11.0 NAUSEA: ICD-10-CM

## 2023-10-02 DIAGNOSIS — R10.9 ABDOMINAL CRAMPS: Primary | ICD-10-CM

## 2023-10-02 DIAGNOSIS — R11.0 NAUSEA: Primary | ICD-10-CM

## 2023-10-02 PROCEDURE — 1160F PR REVIEW ALL MEDS BY PRESCRIBER/CLIN PHARMACIST DOCUMENTED: ICD-10-PCS | Mod: CPTII,,, | Performed by: NURSE PRACTITIONER

## 2023-10-02 PROCEDURE — 3008F PR BODY MASS INDEX (BMI) DOCUMENTED: ICD-10-PCS | Mod: CPTII,,, | Performed by: NURSE PRACTITIONER

## 2023-10-02 PROCEDURE — 25000003 PHARM REV CODE 250: Performed by: NURSE PRACTITIONER

## 2023-10-02 PROCEDURE — 3008F BODY MASS INDEX DOCD: CPT | Mod: CPTII,,, | Performed by: NURSE PRACTITIONER

## 2023-10-02 PROCEDURE — 3079F PR MOST RECENT DIASTOLIC BLOOD PRESSURE 80-89 MM HG: ICD-10-PCS | Mod: CPTII,,, | Performed by: NURSE PRACTITIONER

## 2023-10-02 PROCEDURE — 99215 PR OFFICE/OUTPT VISIT, EST, LEVL V, 40-54 MIN: ICD-10-PCS | Mod: S$PBB,,, | Performed by: NURSE PRACTITIONER

## 2023-10-02 PROCEDURE — 1160F RVW MEDS BY RX/DR IN RCRD: CPT | Mod: CPTII,,, | Performed by: NURSE PRACTITIONER

## 2023-10-02 PROCEDURE — 25000003 PHARM REV CODE 250: Performed by: INTERNAL MEDICINE

## 2023-10-02 PROCEDURE — 96416 CHEMO PROLONG INFUSE W/PUMP: CPT

## 2023-10-02 PROCEDURE — 1159F MED LIST DOCD IN RCRD: CPT | Mod: CPTII,,, | Performed by: NURSE PRACTITIONER

## 2023-10-02 PROCEDURE — 3079F DIAST BP 80-89 MM HG: CPT | Mod: CPTII,,, | Performed by: NURSE PRACTITIONER

## 2023-10-02 PROCEDURE — 63600175 PHARM REV CODE 636 W HCPCS: Performed by: NURSE PRACTITIONER

## 2023-10-02 PROCEDURE — 96367 TX/PROPH/DG ADDL SEQ IV INF: CPT

## 2023-10-02 PROCEDURE — 63600175 PHARM REV CODE 636 W HCPCS: Performed by: INTERNAL MEDICINE

## 2023-10-02 PROCEDURE — 96415 CHEMO IV INFUSION ADDL HR: CPT

## 2023-10-02 PROCEDURE — 3077F SYST BP >= 140 MM HG: CPT | Mod: CPTII,,, | Performed by: NURSE PRACTITIONER

## 2023-10-02 PROCEDURE — 99215 OFFICE O/P EST HI 40 MIN: CPT | Mod: S$PBB,,, | Performed by: NURSE PRACTITIONER

## 2023-10-02 PROCEDURE — 3077F PR MOST RECENT SYSTOLIC BLOOD PRESSURE >= 140 MM HG: ICD-10-PCS | Mod: CPTII,,, | Performed by: NURSE PRACTITIONER

## 2023-10-02 PROCEDURE — 96375 TX/PRO/DX INJ NEW DRUG ADDON: CPT

## 2023-10-02 PROCEDURE — 99215 OFFICE O/P EST HI 40 MIN: CPT | Mod: PBBFAC | Performed by: NURSE PRACTITIONER

## 2023-10-02 PROCEDURE — 96368 THER/DIAG CONCURRENT INF: CPT

## 2023-10-02 PROCEDURE — 96413 CHEMO IV INFUSION 1 HR: CPT

## 2023-10-02 PROCEDURE — 1159F PR MEDICATION LIST DOCUMENTED IN MEDICAL RECORD: ICD-10-PCS | Mod: CPTII,,, | Performed by: NURSE PRACTITIONER

## 2023-10-02 RX ORDER — BUSPIRONE HYDROCHLORIDE 15 MG/1
15 TABLET ORAL 2 TIMES DAILY
COMMUNITY
Start: 2023-09-14

## 2023-10-02 RX ORDER — OXYCODONE AND ACETAMINOPHEN 5; 325 MG/1; MG/1
1 TABLET ORAL EVERY 4 HOURS PRN
Qty: 56 TABLET | Refills: 0 | Status: SHIPPED | OUTPATIENT
Start: 2023-10-02

## 2023-10-02 RX ORDER — EPINEPHRINE 0.3 MG/.3ML
0.3 INJECTION SUBCUTANEOUS ONCE AS NEEDED
Status: DISCONTINUED | OUTPATIENT
Start: 2023-10-02 | End: 2023-10-02 | Stop reason: HOSPADM

## 2023-10-02 RX ORDER — FAMOTIDINE 10 MG/ML
20 INJECTION INTRAVENOUS
Status: COMPLETED | OUTPATIENT
Start: 2023-10-02 | End: 2023-10-02

## 2023-10-02 RX ORDER — FAMOTIDINE 40 MG/1
40 TABLET, FILM COATED ORAL NIGHTLY PRN
Qty: 30 TABLET | Refills: 3 | Status: SHIPPED | OUTPATIENT
Start: 2023-10-02 | End: 2024-01-26 | Stop reason: SDUPTHER

## 2023-10-02 RX ORDER — SODIUM CHLORIDE 0.9 % (FLUSH) 0.9 %
10 SYRINGE (ML) INJECTION
Status: DISCONTINUED | OUTPATIENT
Start: 2023-10-02 | End: 2023-10-02 | Stop reason: HOSPADM

## 2023-10-02 RX ORDER — DICYCLOMINE HYDROCHLORIDE 20 MG/1
20 TABLET ORAL EVERY 6 HOURS
Qty: 120 TABLET | Refills: 1 | Status: SHIPPED | OUTPATIENT
Start: 2023-10-02 | End: 2024-01-26 | Stop reason: SDUPTHER

## 2023-10-02 RX ORDER — DIPHENHYDRAMINE HYDROCHLORIDE 50 MG/ML
25 INJECTION INTRAMUSCULAR; INTRAVENOUS
Status: COMPLETED | OUTPATIENT
Start: 2023-10-02 | End: 2023-10-02

## 2023-10-02 RX ORDER — PROCHLORPERAZINE MALEATE 10 MG
10 TABLET ORAL 4 TIMES DAILY
Qty: 56 TABLET | Refills: 3 | Status: SHIPPED | OUTPATIENT
Start: 2023-10-02 | End: 2023-11-27

## 2023-10-02 RX ORDER — DIPHENHYDRAMINE HYDROCHLORIDE 50 MG/ML
50 INJECTION INTRAMUSCULAR; INTRAVENOUS ONCE AS NEEDED
Status: DISCONTINUED | OUTPATIENT
Start: 2023-10-02 | End: 2023-10-02 | Stop reason: HOSPADM

## 2023-10-02 RX ORDER — FAMOTIDINE 10 MG/ML
20 INJECTION INTRAVENOUS
Status: CANCELLED
Start: 2023-10-02

## 2023-10-02 RX ORDER — DIPHENHYDRAMINE HYDROCHLORIDE 50 MG/ML
25 INJECTION INTRAMUSCULAR; INTRAVENOUS
Status: CANCELLED
Start: 2023-10-02

## 2023-10-02 RX ORDER — HEPARIN 100 UNIT/ML
500 SYRINGE INTRAVENOUS
Status: DISCONTINUED | OUTPATIENT
Start: 2023-10-02 | End: 2023-10-02 | Stop reason: HOSPADM

## 2023-10-02 RX ADMIN — FAMOTIDINE 20 MG: 10 INJECTION, SOLUTION INTRAVENOUS at 10:10

## 2023-10-02 RX ADMIN — OXALIPLATIN 135 MG: 50 INJECTION, SOLUTION, CONCENTRATE INTRAVENOUS at 10:10

## 2023-10-02 RX ADMIN — FLUOROURACIL 3850 MG: 50 INJECTION, SOLUTION INTRAVENOUS at 01:10

## 2023-10-02 RX ADMIN — PALONOSETRON HYDROCHLORIDE 0.25 MG: 0.25 INJECTION, SOLUTION INTRAVENOUS at 09:10

## 2023-10-02 RX ADMIN — DIPHENHYDRAMINE HYDROCHLORIDE 25 MG: 50 INJECTION INTRAMUSCULAR; INTRAVENOUS at 10:10

## 2023-10-02 RX ADMIN — DEXTROSE MONOHYDRATE 650 MG: 50 INJECTION, SOLUTION INTRAVENOUS at 10:10

## 2023-10-02 NOTE — PROGRESS NOTES
Reason for Follow-up:  Reason for consultation:  -adenocarcinoma rectum, moderately differentiated, partially obstructing mass, S/P colonoscopy 06/19/2023   -tubular adenoma transverse colon   -regional lymphadenopathy on CT abdomen pelvis with contrast 06/21/2023  -MMR proficient  -grandfather experienced colon cancer; father experienced colon polyp     Current Treatment:  Modified FOLFOX every 2 weeks X 16 weeks (8 cycles);  Start 7/19/23    Treatment History:  Past medical history:  Anxiety.    Procedure/surgical history:  Appendectomy.  Back surgery.  Social history:  Single.  Lives in [place], [place].  Has 2 children.  Works as a .  No history of tobacco, alcohol, or illicit drug abuse.  Family history:  Paternal grandfather experience colon cancer in his 70s.  Father experienced multiple colon polyps.  Health maintenance:  No screening colonoscopy prior to most recent colonoscopy which led to the diagnosis of rectal cancer.  -09/13/2019:  Bilateral screening mammogram pelvic comparison:  09/13/2018 mammogram):  BI-RADS 0 (indeterminate)  -09/25/2019:  Diagnostic left mammogram, limited ultrasound left breast (comparison:  09/13/2019 mammogram):  Overall study BI-RADS 2: Benign       History of Present Illness:     Oncologic/Hematologic History:  Oncology History   Adenocarcinoma of rectum, stage 3   7/1/2023 Initial Diagnosis    Adenocarcinoma of rectum, stage 3     7/19/2023 -  Chemotherapy    Treatment Summary   Plan Name: OP mFOLFOX 6 Q2W  Treatment Goal: Curative  Status: Active  Start Date: 7/19/2023  End Date: 11/14/2023 (Planned)  Provider: John Bazan MD  Chemotherapy: fluorouraciL injection 650 mg, 400 mg/m2 = 650 mg, Intravenous, Clinic/HOD 1 time, 3 of 3 cycles  Administration: 650 mg (7/19/2023), 650 mg (8/1/2023), 650 mg (8/22/2023)  oxaliplatin (ELOXATIN) 85 mg/m2 = 139 mg in dextrose 5 % (D5W) 592.8 mL chemo infusion, 85 mg/m2 = 139 mg, Intravenous, Clinic/HOD 1 time, 5 of 8  cycles  Administration: 139 mg (7/19/2023), 139 mg (8/1/2023), 140 mg (9/18/2023), 135 mg (8/22/2023)     53-year-old lady, referred by Migel Ortiz MD, GI, with rectal cancer.    Family history pos for colon cancer in grandfather, colon polyp in father  Evaluated by Migel Ortiz MD, GI, 05/22/2023 for nervous stomach; change in bowel habits/pattern; change in bowel function started several months ago; currently, 1 bowel movement daily.  Blood in stool.  Lower abdominal pain.  Heartburn.  Epigastric pain.  MiraLax resulted in improvement.  Bright red blood in stool.    06/13/2023: EGD:  1. Esophagus: Erythema of mucosa in the lower 3rd of esophagus, compatible esophagitis  2. Stomach: Erythema of mucosa in the antrum, compatible with gastritis  3. Duodenum:  Normal mucosa in the whole duodenum      06/13/2023:  EGD:  1. Gastric antrum, biopsy:  Mild chronic inactive gastritis; negative for intestinal metaplasia; negative for H pylori organisms  2. Gastric body, biopsy:  Mild chronic inactive gastritis; negative for intestinal metaplasia; negative for H pylori organisms    06/19/2023:  Colonoscopy (screening colonoscopy):  -single 9 mm polyp transverse colon, polypectomy, completely removed  -ulcerated necrotic infiltrative 90% circumferential, bleeding, 5 cm in length, mass of malignant appearance in the rectum at 4 cm from the anus, causing partial obstruction; scope traversed the lesion  Pathology:  1. Transverse colon polyp, polypectomy:  Tubular adenoma  2. Rectal mass, biopsy:  Invasive moderately differentiated adenocarcinoma; no LVI    MMR proficient  Low probability of MSI-H    06/21/2023: CT abdomen pelvis with and without contrast:  -large rectal mass consistent with malignancy; some lymphadenopathy is seen adjacent to the mass  (large rectal mass with circumferential wall thickening in rectum; associated inflammatory changes; no bowel obstruction; some lymph nodes are seen in the perirectal region on  the right and left side; representative lymph node on right side of rectum 1 cm x 8 mm; representative lymph node on the left side of rectum 8 mm x 8 mm)    Labs reviewed:  05/24/2023:  WBC 8.0.  Hemoglobin 12.5.  MCV 88.  Platelets 306 K.  Differential count normal.    Interval History 10/2/23: Patient presents to the clinic along with her  for cycle 5 Folfox. Patient reports severe nausea, weakness, fatigue s/p cycle 4 Folfox. She says she usually donot experience these symptoms until after cadd pump removal  however symptoms presented on day 2 of treatment. She reports that Zofran ineffective in managing nausea. She also reports neuropathy symptoms worser than usual primarily in fingers. Patient admits to persistent rectal bleeding with bowel movements but no more than ususal. She also admits to persistnet abdominal cramps managed well with dicyclomine. She request refills on Dicyclomine, nausea medication, Pepcid and pain medication. Lab work reviewed with patient, stable for treatment. Blood pressure slightly elevated. She is due for CT and MRI s/p 4 cycles of Folfox which was completed 2 weeks ago. DIscussed plan of care and follow up.       Review of Systems:   All systems reviewed and found to be negative except for the symptoms detailed above    Physical Examination:   VITAL SIGNS:   Vitals:    10/02/23 0822   BP: (!) 155/81   Pulse: 82   Resp: 20   Temp: 97.7 °F (36.5 °C)      Lab Results   Component Value Date    WBC 4.01 (L) 10/02/2023    HGB 11.7 (L) 10/02/2023    HCT 35.5 (L) 10/02/2023    MCV 87.2 10/02/2023     10/02/2023       CMP  Sodium Level   Date Value Ref Range Status   10/02/2023 141 136 - 145 mmol/L Final     Potassium Level   Date Value Ref Range Status   10/02/2023 3.6 3.5 - 5.1 mmol/L Final     Carbon Dioxide   Date Value Ref Range Status   10/02/2023 24 22 - 29 mmol/L Final     Blood Urea Nitrogen   Date Value Ref Range Status   10/02/2023 7.1 (L) 9.8 - 20.1 mg/dL Final      Creatinine   Date Value Ref Range Status   10/02/2023 0.79 0.55 - 1.02 mg/dL Final     Calcium Level Total   Date Value Ref Range Status   10/02/2023 9.5 8.4 - 10.2 mg/dL Final     Albumin Level   Date Value Ref Range Status   10/02/2023 3.9 3.5 - 5.0 g/dL Final     Bilirubin Total   Date Value Ref Range Status   10/02/2023 0.7 <=1.5 mg/dL Final     Alkaline Phosphatase   Date Value Ref Range Status   10/02/2023 83 40 - 150 unit/L Final     Aspartate Aminotransferase   Date Value Ref Range Status   10/02/2023 25 5 - 34 unit/L Final     Alanine Aminotransferase   Date Value Ref Range Status   10/02/2023 20 0 - 55 unit/L Final     eGFR   Date Value Ref Range Status   10/02/2023 >60 mls/min/1.73/m2 Final       General: Alert and oriented. NAD  Eye: Pupils are equal, round and reactive to light, Extraocular movements are intact. Normal conjunctiva  HENT: Normocephalic. Oropharynx exam deferred; mask in place due to coronavirus  Neck: Supple, Non-tender  Respiratory: Respirations are non-labored, Symmetrical chest wall expansion.  Cardiovascular: Regular rate, rhythm, Normal peripheral perfusion, No bilateral lower extremity edema  Gastrointestinal: Non-distended  Genitourinary: Exam deferred  Lymphatics: No lymphadenopathy appreciated  Musculoskeletal: Moves all extremities  Integumentary: Intact. Warm, dry. No rashes, or lesions to visible skin  Neurologic: No focal deficits  Psychiatric: Cooperative. Appropriate mood and affect   ECOG Performance Scale: 1 - Capable of all self-care able to carry out light work activities.       Assessment:  Adenocarcinoma of rectum, moderately differentiated:   -presentation:  05/2023:  Change in bowel habits, hematochezia, lower abdominal pain  -colonoscopy 06/19/2023:    9 mm tubular adenoma transverse colon, removed;   invasive moderately differentiated adenocarcinoma of rectum presenting as ulcerated necrotic infiltrative 90% circumferential, bleeding, partially obstructing  rectal mass, 5 cm long, 4 cm from anus, scope traversed the lesion  -MMR proficient; low probability of MSI-H  -CT abdomen pelvis with contrast 06/21/2023:  No metastasis; large rectal mass and some regional lymphadenopathy on CT abdomen pelvis with contrast 06/21/2023, largest perirectal lymph node 1 cm x 8 mm  >>>  By virtue of regional lymphadenopathy, at least stage III      Family history of colon cancer:  -grandfather experienced colon cancer; father experienced colon polyp      Diagnostic studies  CT chest non contrast 7/18/23: No CT evidence for metastatic disease to the chest.  MRI Pelvis 7/20/23: Large mid to distal rectal mass with perirectal adenopathy as discussed.  Local staging is [id number].    Plan:  Adenocarcinoma of rectum, moderately differentiated    After appropriate workup, will most likely need total neoadjuvant therapy including chemotherapy with FOLFOX or CAPOX or FOLFIRINOX X 12-16 weeks, followed by long course chemoradiation therapy, then restaging, followed by resection versus surveillance, etc.  Perioperative treatment is recommended for up to a total of 6 months.    At least based upon CT scans of A/P, has at least stage III adenocarcinoma of rectum.  MMR proficient.  Low probability of MSI-H.  Treatment will be as follows:  Total neoadjuvant therapy:   1. Modified FOLFOX every 2 weeks X 16 weeks (8 cycles); followed by   2. Long course chemoradiation therapy with capecitabine; followed by  3. Restaging; followed by   4. Transabdominal resection; or if complete clinical response, consider surveillance    Response to treatment will be assessed as follows:  Contrast-enhanced CT scans of abdomen and chest, and MRI scan of rectum with and without contrast:  -after completion of 8 cycles of modified FOLFOX; aunt  -after completion of chemoradiation therapy    PET-CT scan is not indicated  Fertility risk discussion/counseling if premenopausal (she has been postmenopausal for last 2 years and  that this time, has no desire to go for add preservation)      -Okay to proceed with cycle 5 of Folfox today, return on day 3 for cadd pump removal  -Pre-medications adjusted-Added Pepcid 20mg IV, Benadryl 25mg IVP, Increased Dexamethasone 20mg IV to help with symptom managment  -Follow up with  in 2 weeks with lab work and CT +MRI results prior to cycle 6 Folfox, return on day 3 for cadd pump removal  -Percocet 5mg was prescribed for pain -Refills sent to pharmacy   -Continue to take Bentyl as needed for stomach cramping  -Hold Zofran, Start Compazine 10mg po every 6 hours prn for nausea -rx sent to pharmacy   -Re-stage with contrast enhanced CT scans of chest/abdomen, and MRI scan of pelvis with and without contrast after completion of 4 cycles of chemotherapy, then after 8 cycles of chemotherapy  -Continue anticoagulation for 3 months (until the end of October)  --Request documentation confirming that patient has cancer and currently undgoing chemotherpay treatment which will extend past 2023. -Request sent to Paula PATEL and Bere Kohler   -----------------------    Family history of colon cancer & colon polyp   We will arrange for genetic testing and counseling-Scheduled for 12/4/23 7/27/23 Thrombosis right arm d/t picc line   PICC line removed   Started on Eliquis  Continue anticoagulation for at least 3 months (until the end of October)   Monitor for any bleeding       Above discussed at length with the patient.  All questions answered.    Plan of management discussed, especially, neoadjuvant chemotherapy and chemoradiation therapy (to be finalized after MRI scan of pelvis).  She understands and agrees with this plan.

## 2023-10-02 NOTE — Clinical Note
-infusion today Folfox cycle 5 -infusion cadd pump removal on 10/4/23 -fu w/ in 2 weeks on 10/16/23  with lab work and CT + MRI results + infusion cycle 6 Folfox  -Infusion cadd pump removal on 10/18/23 -Schedule CT C/A/P and MRI pelvis -to be completed prior to fu w/ on 10/16

## 2023-10-04 ENCOUNTER — INFUSION (OUTPATIENT)
Dept: INFUSION THERAPY | Facility: HOSPITAL | Age: 53
End: 2023-10-04
Attending: INTERNAL MEDICINE
Payer: MEDICAID

## 2023-10-04 VITALS
WEIGHT: 133.31 LBS | OXYGEN SATURATION: 98 % | DIASTOLIC BLOOD PRESSURE: 91 MMHG | RESPIRATION RATE: 18 BRPM | HEIGHT: 63 IN | SYSTOLIC BLOOD PRESSURE: 159 MMHG | BODY MASS INDEX: 23.62 KG/M2 | TEMPERATURE: 98 F | HEART RATE: 77 BPM

## 2023-10-04 DIAGNOSIS — R11.0 NAUSEA: Primary | ICD-10-CM

## 2023-10-04 DIAGNOSIS — C20 ADENOCARCINOMA OF RECTUM, STAGE 3: ICD-10-CM

## 2023-10-04 PROCEDURE — 25000003 PHARM REV CODE 250: Performed by: INTERNAL MEDICINE

## 2023-10-04 PROCEDURE — 96360 HYDRATION IV INFUSION INIT: CPT

## 2023-10-04 RX ORDER — HEPARIN 100 UNIT/ML
500 SYRINGE INTRAVENOUS
Status: DISCONTINUED | OUTPATIENT
Start: 2023-10-04 | End: 2023-10-04 | Stop reason: HOSPADM

## 2023-10-04 RX ORDER — SODIUM CHLORIDE 0.9 % (FLUSH) 0.9 %
10 SYRINGE (ML) INJECTION
Status: DISCONTINUED | OUTPATIENT
Start: 2023-10-04 | End: 2023-10-04 | Stop reason: HOSPADM

## 2023-10-04 RX ADMIN — SODIUM CHLORIDE 1000 ML: 9 INJECTION, SOLUTION INTRAVENOUS at 01:10

## 2023-10-05 ENCOUNTER — INFUSION (OUTPATIENT)
Dept: INFUSION THERAPY | Facility: HOSPITAL | Age: 53
End: 2023-10-05
Attending: INTERNAL MEDICINE
Payer: MEDICAID

## 2023-10-05 ENCOUNTER — TELEPHONE (OUTPATIENT)
Dept: HEMATOLOGY/ONCOLOGY | Facility: CLINIC | Age: 53
End: 2023-10-05
Payer: MEDICAID

## 2023-10-05 VITALS
HEART RATE: 80 BPM | DIASTOLIC BLOOD PRESSURE: 85 MMHG | TEMPERATURE: 99 F | OXYGEN SATURATION: 99 % | SYSTOLIC BLOOD PRESSURE: 171 MMHG

## 2023-10-05 DIAGNOSIS — R11.0 NAUSEA: Primary | ICD-10-CM

## 2023-10-05 DIAGNOSIS — C20 ADENOCARCINOMA OF RECTUM, STAGE 3: ICD-10-CM

## 2023-10-05 DIAGNOSIS — F41.9 ANXIETY: Primary | ICD-10-CM

## 2023-10-05 PROCEDURE — 63600175 PHARM REV CODE 636 W HCPCS: Performed by: NURSE PRACTITIONER

## 2023-10-05 PROCEDURE — 96374 THER/PROPH/DIAG INJ IV PUSH: CPT

## 2023-10-05 RX ORDER — LORAZEPAM 2 MG/ML
1 INJECTION INTRAMUSCULAR ONCE
Status: COMPLETED | OUTPATIENT
Start: 2023-10-05 | End: 2023-10-05

## 2023-10-05 RX ORDER — LORAZEPAM 2 MG/ML
1 INJECTION INTRAMUSCULAR ONCE
Status: CANCELLED
Start: 2023-10-05

## 2023-10-05 RX ORDER — LORAZEPAM 0.5 MG/1
0.5 TABLET ORAL EVERY 12 HOURS PRN
Qty: 28 TABLET | Refills: 0 | Status: SHIPPED | OUTPATIENT
Start: 2023-10-05 | End: 2023-10-19

## 2023-10-05 RX ADMIN — LORAZEPAM 1 MG: 2 INJECTION INTRAMUSCULAR; INTRAVENOUS at 01:10

## 2023-10-05 NOTE — PROGRESS NOTES
Patient called reports chills and no appetite. Patient had cadd pump take down yesterday and she also received hydration. Patient advised to report back to clinic if feel that she need more fluids.     Patient arrives to clinic and blood pressure slightly elevated, and noted patient to be very anxious. She linus any chest pain, palpitations or SOB. Discussed with patient expected side effects s/p chemotherapy treatments.   Give Ativan 1mg IVP now, reassess BP after 30minutes   At this time donot feel that patient need more fluids since blood pressure already elevated; therefore we will treat anxiety at this time and also start patient on Ativan 0.5mg bid prn for anxiety.

## 2023-10-05 NOTE — PLAN OF CARE
Pt presented back to hospital per DEMI Sharpe FNP after calling n c/o weakness and not sleeping well last night    Ordered Ativan 1mg IVP    DC to home feeling Much better @15:30 accompaied with mom

## 2023-10-05 NOTE — TELEPHONE ENCOUNTER
Pt called with concerns about symptoms she began to experience after receiving 5fu treatment and fluids on 10/04/23, pt stated she has no appetite and can't regulate a normal body temp. Pt states she hasn't had fever but keeps feeling cold. After speaking with CAILIN FONTAINE, Per NP I instructed pt to go to nearest ER if she begins to have fever. Also advised pt that symptoms she is experiencing is normal with treatment. Pt has also been advised to try drinking nutritional supplements to help with appetite.

## 2023-10-09 ENCOUNTER — HOSPITAL ENCOUNTER (OUTPATIENT)
Dept: RADIOLOGY | Facility: HOSPITAL | Age: 53
Discharge: HOME | End: 2023-10-09
Attending: NURSE PRACTITIONER
Payer: MEDICAID

## 2023-10-09 ENCOUNTER — INFUSION (OUTPATIENT)
Dept: INFUSION THERAPY | Facility: HOSPITAL | Age: 53
End: 2023-10-09
Attending: INTERNAL MEDICINE
Payer: MEDICAID

## 2023-10-09 VITALS
BODY MASS INDEX: 23.39 KG/M2 | RESPIRATION RATE: 20 BRPM | WEIGHT: 132 LBS | HEART RATE: 90 BPM | DIASTOLIC BLOOD PRESSURE: 87 MMHG | SYSTOLIC BLOOD PRESSURE: 155 MMHG | OXYGEN SATURATION: 98 % | TEMPERATURE: 98 F | HEIGHT: 63 IN

## 2023-10-09 DIAGNOSIS — C20 ADENOCARCINOMA OF RECTUM: ICD-10-CM

## 2023-10-09 DIAGNOSIS — C20 ADENOCARCINOMA OF RECTUM, STAGE 3: Primary | ICD-10-CM

## 2023-10-09 PROCEDURE — A4216 STERILE WATER/SALINE, 10 ML: HCPCS | Performed by: INTERNAL MEDICINE

## 2023-10-09 PROCEDURE — 25000003 PHARM REV CODE 250: Performed by: INTERNAL MEDICINE

## 2023-10-09 PROCEDURE — 25500020 PHARM REV CODE 255

## 2023-10-09 PROCEDURE — 96523 IRRIG DRUG DELIVERY DEVICE: CPT

## 2023-10-09 PROCEDURE — 74160 CT ABDOMEN W/CONTRAST: CPT | Mod: TC

## 2023-10-09 PROCEDURE — 71260 CT THORAX DX C+: CPT | Mod: TC

## 2023-10-09 RX ORDER — SODIUM CHLORIDE 0.9 % (FLUSH) 0.9 %
10 SYRINGE (ML) INJECTION
Status: CANCELLED | OUTPATIENT
Start: 2023-10-09

## 2023-10-09 RX ORDER — SODIUM CHLORIDE 0.9 % (FLUSH) 0.9 %
10 SYRINGE (ML) INJECTION
Status: COMPLETED | OUTPATIENT
Start: 2023-10-09 | End: 2023-10-09

## 2023-10-09 RX ORDER — HEPARIN 100 UNIT/ML
500 SYRINGE INTRAVENOUS
Status: CANCELLED | OUTPATIENT
Start: 2023-10-09

## 2023-10-09 RX ORDER — HEPARIN 100 UNIT/ML
500 SYRINGE INTRAVENOUS
Status: DISCONTINUED | OUTPATIENT
Start: 2023-10-09 | End: 2023-10-09 | Stop reason: HOSPADM

## 2023-10-09 RX ADMIN — SODIUM CHLORIDE, PRESERVATIVE FREE 10 ML: 5 INJECTION INTRAVENOUS at 09:10

## 2023-10-09 RX ADMIN — IOHEXOL 100 ML: 350 INJECTION, SOLUTION INTRAVENOUS at 08:10

## 2023-10-09 NOTE — PLAN OF CARE
Pt here for PICC dsg change.  Old dsg removed.  Site free of redness or swelling.  Site and tubing cleaned and new dsg applied and dsg change date marked.  Pt tolerated well.  Pt will return on 10/17/23 for labs, ONC appt, and dsg change and treatment.

## 2023-10-16 RX ORDER — HEPARIN 100 UNIT/ML
500 SYRINGE INTRAVENOUS
Status: CANCELLED | OUTPATIENT
Start: 2023-11-22

## 2023-10-16 RX ORDER — DIPHENHYDRAMINE HYDROCHLORIDE 50 MG/ML
50 INJECTION INTRAMUSCULAR; INTRAVENOUS ONCE AS NEEDED
Status: CANCELLED | OUTPATIENT
Start: 2023-11-20

## 2023-10-16 RX ORDER — HEPARIN 100 UNIT/ML
500 SYRINGE INTRAVENOUS
Status: CANCELLED | OUTPATIENT
Start: 2023-11-20

## 2023-10-16 RX ORDER — SODIUM CHLORIDE 0.9 % (FLUSH) 0.9 %
10 SYRINGE (ML) INJECTION
Status: CANCELLED | OUTPATIENT
Start: 2023-11-20

## 2023-10-16 RX ORDER — DIPHENHYDRAMINE HYDROCHLORIDE 50 MG/ML
25 INJECTION INTRAMUSCULAR; INTRAVENOUS
Status: CANCELLED
Start: 2023-11-20

## 2023-10-16 RX ORDER — DIPHENHYDRAMINE HYDROCHLORIDE 50 MG/ML
25 INJECTION INTRAMUSCULAR; INTRAVENOUS
Status: CANCELLED
Start: 2023-10-29

## 2023-10-16 RX ORDER — FAMOTIDINE 10 MG/ML
20 INJECTION INTRAVENOUS
Status: CANCELLED
Start: 2023-10-29

## 2023-10-16 RX ORDER — FAMOTIDINE 10 MG/ML
20 INJECTION INTRAVENOUS
Status: CANCELLED
Start: 2023-11-20

## 2023-10-16 RX ORDER — SODIUM CHLORIDE 0.9 % (FLUSH) 0.9 %
10 SYRINGE (ML) INJECTION
Status: CANCELLED | OUTPATIENT
Start: 2023-11-22

## 2023-10-16 RX ORDER — EPINEPHRINE 0.3 MG/.3ML
0.3 INJECTION SUBCUTANEOUS ONCE AS NEEDED
Status: CANCELLED | OUTPATIENT
Start: 2023-11-20

## 2023-10-17 ENCOUNTER — OFFICE VISIT (OUTPATIENT)
Dept: HEMATOLOGY/ONCOLOGY | Facility: CLINIC | Age: 53
End: 2023-10-17
Attending: INTERNAL MEDICINE
Payer: MEDICAID

## 2023-10-17 ENCOUNTER — INFUSION (OUTPATIENT)
Dept: INFUSION THERAPY | Facility: HOSPITAL | Age: 53
End: 2023-10-17
Attending: INTERNAL MEDICINE
Payer: MEDICAID

## 2023-10-17 VITALS
HEIGHT: 63 IN | OXYGEN SATURATION: 98 % | TEMPERATURE: 98 F | HEART RATE: 88 BPM | SYSTOLIC BLOOD PRESSURE: 154 MMHG | BODY MASS INDEX: 22.18 KG/M2 | RESPIRATION RATE: 20 BRPM | DIASTOLIC BLOOD PRESSURE: 92 MMHG | WEIGHT: 125.19 LBS

## 2023-10-17 VITALS
RESPIRATION RATE: 20 BRPM | HEART RATE: 81 BPM | HEIGHT: 62 IN | WEIGHT: 125 LBS | SYSTOLIC BLOOD PRESSURE: 155 MMHG | BODY MASS INDEX: 23 KG/M2 | OXYGEN SATURATION: 99 % | DIASTOLIC BLOOD PRESSURE: 83 MMHG | TEMPERATURE: 98 F

## 2023-10-17 DIAGNOSIS — C20 ADENOCARCINOMA OF RECTUM, STAGE 3: ICD-10-CM

## 2023-10-17 DIAGNOSIS — C20 ADENOCARCINOMA OF RECTUM, STAGE 3: Primary | ICD-10-CM

## 2023-10-17 DIAGNOSIS — G62.0 CHEMOTHERAPY-INDUCED NEUROPATHY: Primary | ICD-10-CM

## 2023-10-17 DIAGNOSIS — T45.1X5A CHEMOTHERAPY-INDUCED NEUROPATHY: ICD-10-CM

## 2023-10-17 DIAGNOSIS — D70.1 CHEMOTHERAPY INDUCED NEUTROPENIA: ICD-10-CM

## 2023-10-17 DIAGNOSIS — R59.0 PELVIC LYMPHADENOPATHY: ICD-10-CM

## 2023-10-17 DIAGNOSIS — Z51.11 CHEMOTHERAPY MANAGEMENT, ENCOUNTER FOR: ICD-10-CM

## 2023-10-17 DIAGNOSIS — T45.1X5A CHEMOTHERAPY INDUCED NEUTROPENIA: ICD-10-CM

## 2023-10-17 DIAGNOSIS — T45.1X5A CHEMOTHERAPY-INDUCED NEUROPATHY: Primary | ICD-10-CM

## 2023-10-17 DIAGNOSIS — T82.868A THROMBOSIS IN PERIPHERALLY INSERTED CENTRAL CATHETER (PICC): Primary | ICD-10-CM

## 2023-10-17 DIAGNOSIS — G62.0 CHEMOTHERAPY-INDUCED NEUROPATHY: ICD-10-CM

## 2023-10-17 DIAGNOSIS — Z83.719 FAMILY HX COLONIC POLYPS: ICD-10-CM

## 2023-10-17 DIAGNOSIS — Z80.0 FAMILY HISTORY OF COLON CANCER: ICD-10-CM

## 2023-10-17 PROCEDURE — 3008F BODY MASS INDEX DOCD: CPT | Mod: CPTII,,, | Performed by: INTERNAL MEDICINE

## 2023-10-17 PROCEDURE — A4216 STERILE WATER/SALINE, 10 ML: HCPCS | Performed by: INTERNAL MEDICINE

## 2023-10-17 PROCEDURE — 3008F PR BODY MASS INDEX (BMI) DOCUMENTED: ICD-10-PCS | Mod: CPTII,,, | Performed by: INTERNAL MEDICINE

## 2023-10-17 PROCEDURE — 3080F PR MOST RECENT DIASTOLIC BLOOD PRESSURE >= 90 MM HG: ICD-10-PCS | Mod: CPTII,,, | Performed by: INTERNAL MEDICINE

## 2023-10-17 PROCEDURE — 96523 IRRIG DRUG DELIVERY DEVICE: CPT

## 2023-10-17 PROCEDURE — 25000003 PHARM REV CODE 250: Performed by: INTERNAL MEDICINE

## 2023-10-17 PROCEDURE — 3077F SYST BP >= 140 MM HG: CPT | Mod: CPTII,,, | Performed by: INTERNAL MEDICINE

## 2023-10-17 PROCEDURE — 3077F PR MOST RECENT SYSTOLIC BLOOD PRESSURE >= 140 MM HG: ICD-10-PCS | Mod: CPTII,,, | Performed by: INTERNAL MEDICINE

## 2023-10-17 PROCEDURE — 99215 OFFICE O/P EST HI 40 MIN: CPT | Mod: PBBFAC | Performed by: INTERNAL MEDICINE

## 2023-10-17 PROCEDURE — 3080F DIAST BP >= 90 MM HG: CPT | Mod: CPTII,,, | Performed by: INTERNAL MEDICINE

## 2023-10-17 PROCEDURE — 99215 OFFICE O/P EST HI 40 MIN: CPT | Mod: S$PBB,,, | Performed by: INTERNAL MEDICINE

## 2023-10-17 PROCEDURE — 99215 PR OFFICE/OUTPT VISIT, EST, LEVL V, 40-54 MIN: ICD-10-PCS | Mod: S$PBB,,, | Performed by: INTERNAL MEDICINE

## 2023-10-17 RX ORDER — SODIUM CHLORIDE 0.9 % (FLUSH) 0.9 %
10 SYRINGE (ML) INJECTION
Status: COMPLETED | OUTPATIENT
Start: 2023-10-17 | End: 2023-10-17

## 2023-10-17 RX ORDER — SODIUM CHLORIDE 0.9 % (FLUSH) 0.9 %
10 SYRINGE (ML) INJECTION
Status: CANCELLED | OUTPATIENT
Start: 2023-10-17

## 2023-10-17 RX ORDER — DULOXETIN HYDROCHLORIDE 30 MG/1
30 CAPSULE, DELAYED RELEASE ORAL DAILY
Qty: 30 CAPSULE | Refills: 2 | Status: SHIPPED | OUTPATIENT
Start: 2023-10-17 | End: 2023-12-04

## 2023-10-17 RX ORDER — HEPARIN 100 UNIT/ML
500 SYRINGE INTRAVENOUS
Status: DISCONTINUED | OUTPATIENT
Start: 2023-10-17 | End: 2023-10-17 | Stop reason: HOSPADM

## 2023-10-17 RX ORDER — HEPARIN 100 UNIT/ML
500 SYRINGE INTRAVENOUS
Status: CANCELLED | OUTPATIENT
Start: 2023-10-17

## 2023-10-17 RX ORDER — POLYETHYLENE GLYCOL 3350 17 G/17G
17 POWDER, FOR SOLUTION ORAL
COMMUNITY
Start: 2023-10-03

## 2023-10-17 RX ORDER — SODIUM CHLORIDE 0.9 % (FLUSH) 0.9 %
10 SYRINGE (ML) INJECTION
Status: DISCONTINUED | OUTPATIENT
Start: 2023-10-17 | End: 2023-10-17 | Stop reason: HOSPADM

## 2023-10-17 RX ADMIN — Medication 10 ML: at 12:10

## 2023-10-17 NOTE — PROGRESS NOTES
History:  Past Medical History:   Diagnosis Date    Anxiety disorder, unspecified     GERD (gastroesophageal reflux disease)     Rectal cancer        Past Surgical History:   Procedure Laterality Date    APPENDECTOMY      BACK SURGERY       SECTION  2004    cyst coccyx        Past medical history:  Anxiety.    Procedure/surgical history:  Appendectomy.  Back surgery.  Social history:  Single.  Lives in [place], [place].  Has 2 children.  Works as a .  No history of tobacco, alcohol, or illicit drug abuse.  Family history:  Paternal grandfather experience colon cancer in his 70s.  Father experienced multiple colon polyps.  Health maintenance:  No screening colonoscopy prior to most recent colonoscopy which led to the diagnosis of rectal cancer.  -2019:  Bilateral screening mammogram pelvic comparison:  2018 mammogram):  BI-RADS 0 (indeterminate)  -2019:  Diagnostic left mammogram, limited ultrasound left breast (comparison:  2019 mammogram):  Overall study BI-RADS 2: Benign     Family History   Problem Relation Age of Onset    Colon cancer Paternal Grandfather       Reason for Follow-up:  -adenocarcinoma rectum, moderately differentiated, partially obstructing mass, S/P colonoscopy 2023   -tubular adenoma transverse colon   -regional lymphadenopathy on CT abdomen pelvis with contrast 2023  -MMR proficient  -acute left upper extremity DVT related to PICC line  -grandfather experienced colon cancer; father experienced colon polyp    History of Present Illness:   Adenocarcinoma of rectum, stage 3        Oncologic/Hematologic History:  Oncology History   Adenocarcinoma of rectum, stage 3   2023 Initial Diagnosis    Adenocarcinoma of rectum, stage 3     2023 -  Chemotherapy    Treatment Summary   Plan Name: OP mFOLFOX 6 Q2W  Treatment Goal: Curative  Status: Active  Start Date: 2023  End Date: 2023 (Planned)  Provider: John Bazan  MD  Chemotherapy: fluorouraciL injection 650 mg, 400 mg/m2 = 650 mg, Intravenous, Clinic/HOD 1 time, 3 of 3 cycles  Administration: 650 mg (7/19/2023), 650 mg (8/1/2023), 650 mg (8/22/2023)  oxaliplatin (ELOXATIN) 85 mg/m2 = 139 mg in dextrose 5 % (D5W) 592.8 mL chemo infusion, 85 mg/m2 = 139 mg, Intravenous, Clinic/HOD 1 time, 5 of 8 cycles  Administration: 139 mg (7/19/2023), 139 mg (8/1/2023), 140 mg (9/18/2023), 135 mg (10/2/2023), 135 mg (8/22/2023)     53-year-old lady, referred by Migel Ortiz MD, GI, with rectal cancer.    Family history pos for colon cancer in grandfather, colon polyp in father  Evaluated by Migel Ortiz MD, GI, 05/22/2023 for nervous stomach; change in bowel habits/pattern; change in bowel function started several months ago; currently, 1 bowel movement daily.  Blood in stool.  Lower abdominal pain.  Heartburn.  Epigastric pain.  MiraLax resulted in improvement.  Bright red blood in stool.    06/13/2023: EGD:  1. Esophagus: Erythema of mucosa in the lower 3rd of esophagus, compatible esophagitis  2. Stomach: Erythema of mucosa in the antrum, compatible with gastritis  3. Duodenum:  Normal mucosa in the whole duodenum      06/13/2023:  EGD:  1. Gastric antrum, biopsy:  Mild chronic inactive gastritis; negative for intestinal metaplasia; negative for H pylori organisms  2. Gastric body, biopsy:  Mild chronic inactive gastritis; negative for intestinal metaplasia; negative for H pylori organisms    06/19/2023:  Colonoscopy (screening colonoscopy):  -single 9 mm polyp transverse colon, polypectomy, completely removed  -ulcerated necrotic infiltrative 90% circumferential, bleeding, 5 cm in length, mass of malignant appearance in the rectum at 4 cm from the anus, causing partial obstruction; scope traversed the lesion  Pathology:  1. Transverse colon polyp, polypectomy:  Tubular adenoma  2. Rectal mass, biopsy:  Invasive moderately differentiated adenocarcinoma; no LVI    MMR proficient  Low  probability of MSI-H    06/21/2023: CT abdomen pelvis with and without contrast:  -large rectal mass consistent with malignancy; some lymphadenopathy is seen adjacent to the mass  (large rectal mass with circumferential wall thickening in rectum; associated inflammatory changes; no bowel obstruction; some lymph nodes are seen in the perirectal region on the right and left side; representative lymph node on right side of rectum 1 cm x 8 mm; representative lymph node on the left side of rectum 8 mm x 8 mm)    Labs reviewed:  05/24/2023:  WBC 8.0.  Hemoglobin 12.5.  MCV 88.  Platelets 306 K.  Differential count normal.    07/05/2023:   Pleasant lady who presents for initial medical oncology consultation, accompanied by her brother.    Her symptoms started 2 years ago, in the form of intermittent small amount hematochezia, initially on toilet wife's, and for last few weeks, in toilet bowel as well.  She brought the symptoms to the attention of her gyn several months ago and does not remember the recommendation.  Regardless, she never got a screening colonoscopy done.  Around [place] time 2022, she started feeling bloated.  She started having constant uncomfortable feeling because of bloating, as well as in the anorectal area.  Hematochezia continued.  She brought this to the attention of her PCP in [place] who suggested taking MiraLax.  The PCP also arranged for colonoscopy.  On today's visit, she reports that she has been constipated her entire life.  She has been constipated for at least 10 years.  Hematochezia for 2 years.  No anorectal pain.  Does have constant feeling of incomplete evacuation.  Appetite is down and she has lost 30 lb in last 3-4 months.  Appetite is more less preserved but she is afraid to eat because eating causes bloating and worsening of uncomfortable feeling in rectum.    Interval History:  PICC DOUBLE LUMEN LINE FLUSH   OP mFOLFOX 6 Q2W     10/17/2023:   -cycle 4 of neoadjuvant FOLFOX  started 09/18/2023  -cycle 5 of neoadjuvant FOLFOX started 10/02/2023  -10/09/2023: Restaging CTs C/A/P with contrast (post neoadjuvant FOLFOX x5 cycles) (comparison: CT chest 07/18/2023; CT abdomen pelvis 06/21/2023):  1. Positive response to treatment with marked improvement in the rectal mass with persistent circumferential thickening of the rectum.  The enlarged perirectal lymph node seen on the prior exam have improved as above.  2. Unchanged 5 mm hypodensity in segment 6 of the liver which is unchanged from the prior exam and is indeterminate.  -10/17/2023: Labs reviewed; WBC 2.76 K; hemoglobin 12.5; platelets 156 K; ANC 1.14 K; CMP reviewed  Presents for a follow-up visit, accompanied by a female .  Says that she is experiencing side effects of chemotherapy.  Feels anxious no psychotic symptoms.  Ativan did not help.  Says that she is experiencing chemotherapy brain.  Can not concentrate.  Can not watch television.  Word-finding difficulty.  No unusual headaches or focal neurological symptoms.  Continues on anticoagulation for history of PICC line related upper extremity DVT.  No bleeding.  Variable appetite but making up for that by eating every couple of hours.  Some nausea.  Taking Zofran every 4-6 hours prn.  Advised her to contact her primary care provider for management of hypertension.  Tingling in hands; no pain or numbness; no neuropathy in feet.  We will start Cymbalta 30 mg p.o. q.h.s..  Bowels are more or less okay.  Takes stool softeners.  Occasionally, small amount of blood with stool.  No anorectal pain.      Medications:  Current Outpatient Medications on File Prior to Visit   Medication Sig Dispense Refill    (Magic mouthwash) 1:1:1 diphenhydrAMINE(Benadryl) 12.5mg/5ml liq, aluminum & magnesium hydroxide-simethicone (Maalox), LIDOcaine viscous 2% Swish and spit 5 mLs every 4 (four) hours as needed (Mouth sores). for mouth sores 90 mL 0    apixaban (ELIQUIS) 5 mg Tab Take 1 tablet (5  mg total) by mouth 2 (two) times daily. 180 tablet 0    busPIRone (BUSPAR) 15 MG tablet Take 15 mg by mouth 2 (two) times daily.      dexAMETHasone (DECADRON) 4 MG Tab Take 2 tablets(8mg) by mouth daily on days 2 and 3 of each chemotherapy cycle. 32 tablet 0    dexAMETHasone (DECADRON) 4 MG Tab Take 2 tablets (8 mg total) by mouth once daily. Take as directed on days 2 and 3 of your chemotherapy cycle. 24 tablet 5    dicyclomine (BENTYL) 20 mg tablet Take 1 tablet (20 mg total) by mouth every 6 (six) hours. 120 tablet 1    DULoxetine (CYMBALTA) 20 MG capsule Take 20 mg by mouth.      famotidine (PEPCID) 40 MG tablet Take 1 tablet (40 mg total) by mouth nightly as needed for Heartburn. 30 tablet 3    hyoscyamine (ANASPAZ,LEVSIN) 0.125 mg Tab Take 125 mcg by mouth every 6 (six) hours as needed.      LORazepam (ATIVAN) 0.5 MG tablet Take 1 tablet (0.5 mg total) by mouth every 12 (twelve) hours as needed for Anxiety. 28 tablet 0    MIRALAX 17 gram/dose powder Take 17 g by mouth.      ondansetron (ZOFRAN) 8 MG tablet Take 1 tablet (8 mg total) by mouth every 8 (eight) hours as needed for Nausea. 30 tablet 2    ondansetron (ZOFRAN-ODT) 8 MG TbDL Take 1 tablet (8 mg total) by mouth every 8 (eight) hours as needed (nausea/vomiting). (Patient not taking: Reported on 8/15/2023) 60 tablet 5    oxyCODONE-acetaminophen (PERCOCET) 5-325 mg per tablet Take 1 tablet by mouth every 4 (four) hours as needed for Pain. 56 tablet 0    pantoprazole (PROTONIX) 40 MG tablet Take 40 mg by mouth every morning.      PANTOPRAZOLE 40 MG/100 ML 0.9 % NS IVPB       potassium chloride SA (K-DUR,KLOR-CON) 20 MEQ tablet Take 1 tablet (20 mEq total) by mouth once daily. (Patient not taking: Reported on 8/15/2023) 30 tablet 0    prochlorperazine (COMPAZINE) 10 MG tablet Take 1 tablet (10 mg total) by mouth 4 (four) times daily. 56 tablet 3    sodium,potassium,mag sulfates (SUPREP BOWEL PREP KIT) 17.5-3.13-1.6 gram SolR TAKE 1 KIT BY MOUTH SINGLE DOSE  "AS DIRECTED FOR 1 DAY       No current facility-administered medications on file prior to visit.       Review of Systems:   All systems reviewed and found to be negative except for the symptoms detailed above    Physical Examination:   VITAL SIGNS:   Vitals:    10/17/23 0940   BP: (!) 154/92   Pulse:    Resp:    Temp:        GENERAL:  In no apparent distress.    HEAD:  No signs of head trauma.  EYES:  Pupils are equal.  Extraocular motions intact.    EARS:  Hearing grossly intact.  MOUTH:  Oropharynx is normal.   NECK:  No adenopathy, no JVD.     CHEST:  Chest with clear breath sounds bilaterally.  No wheezes, rales, rhonchi.    CARDIAC:  Regular rate and rhythm.  S1 and S2, without murmurs, gallops, rubs.  VASCULAR:  No Edema.  Peripheral pulses normal and equal in all extremities.  ABDOMEN:  Soft, without detectable tenderness.  No sign of distention.  No   rebound or guarding, and no masses palpated.   Bowel Sounds normal.  MUSCULOSKELETAL:  Good range of motion of all major joints. Extremities without clubbing, cyanosis or edema.    NEUROLOGIC EXAM:  Alert and oriented x 3.  No focal sensory or strength deficits.   Speech normal.  Follows commands.  PSYCHIATRIC:  Mood normal.    No results for input(s): "CBC" in the last 72 hours.   No results for input(s): "CMP" in the last 72 hours.     Assessment:  Problem List Items Addressed This Visit          Cardiac/Vascular    Thrombosis in peripherally inserted central catheter (PICC) - Primary       Oncology    Adenocarcinoma of rectum, stage 3    Family history of colon cancer       GI    Family hx colonic polyps       Other    Pelvic lymphadenopathy     Adenocarcinoma of rectum, moderately differentiated:   -presentation:  05/2023:  Change in bowel habits, hematochezia, lower abdominal pain  -colonoscopy 06/19/2023:    9 mm tubular adenoma transverse colon, removed;   invasive moderately differentiated adenocarcinoma of rectum presenting as ulcerated necrotic " infiltrative 90% circumferential, bleeding, partially obstructing rectal mass, 5 cm long, 4 cm from anus, scope traversed the lesion  -MMR proficient; low probability of MSI-H  -CT abdomen pelvis with contrast 06/21/2023:  No metastasis; large rectal mass and some regional lymphadenopathy on CT abdomen pelvis with contrast 06/21/2023, largest perirectal lymph node 1 cm x 8 mm  -CEA level < 1.73, normal (07/05/2023)  -no lung metastases on CT chest 07/18/2023  -MRI pelvis 07/18/2023: Large mid to distal rectal mass (T3b); at least 10 perirectal lymph nodes with increased signal on DWI imaging, largest 9 mm (T2b)  >> radiologically, T3b N2b (stage IIIC)  -07/27/2023:  developed acute DVT left subclavian/axillary/brachial veins secondary to PICC line; started on Eliquis; PICC line removed  -neoadjuvant FOLFOX started 07/19/2023  -cycle 4 of neoadjuvant FOLFOX started 09/18/2023  -cycle 5 of neoadjuvant FOLFOX started 10/02/2023  -positive response on restaging CTs C/A/P 10 09/2023, S/P neoadjuvant FOLFOX x5 cycles (marked improvement in rectal mass; improved perirectal lymphadenopathy)      Left upper extremity DVT secondary to PICC line 07/27/2023:  -developed acute DVT left subclavian/axillary/brachial veins secondary to PICC line; started on Eliquis; PICC line removed      Family history of colon cancer:  -grandfather experienced colon cancer; father experienced colon polyp      Plan:  ANC 1.14 K; hold chemotherapy today  Recheck CBC in 1 week, then decide whether to proceed with chemotherapy  Needs neoadjuvant FOLFOX every 2 weeks X a total of 8 cycles  Check CBC and CMP every couple of weeks with each cycle of chemotherapy  Re-stage with contrast enhanced CT scans of chest and abdomen, and MRI pelvis with and without contrast after completion of 8 cycles of neoadjuvant FOLFOX  Continue anticoagulation until the end of October  Start Cymbalta 30 mg p.o. q.day for chemotherapy-induced peripheral  neuropathy.  Follow-up with NP in 1 week with CBC and CMP, to assess for suitability to resume   Refer to behavioral health for management of anxiety.chemotherapy.  --------------------------        10/17/2020: Chemotherapy induced peripheral neuropathy in the form of tingling in hands; no neuropathy in feet; no numbness or pain  -start Cymbalta 30 mg p.o. q.h.s.; we will uptitrate dose depending upon response    -10/17/2023: Refer to behavioral health for management of anxiety.    -adenocarcinoma rectum, moderately differentiated  -presentation:  05/2023: Change in bowel habits, hematochezia, lower abdominal pain  -colonoscopy 06/19/2023:  Ulcerated necrotic infiltrative 90% circumferential bleeding partially obstructing rectal mass, 5 cm long, 4 cm from anus  -MMR proficient, low probability of MSI-H  -no metastasis   -baseline CEA level normal (07/05/2023)   -MRI pelvis 07/18/2023: Large mid to distal rectal mass (T3b); at least 10 perirectal lymph nodes, suspicious for metastasis (T2b)  -radiologically, T3b N2b, stage IIIC  -neoadjuvant FOLFOX started 07/19/2023  -cycle 4 of neoadjuvant FOLFOX started 09/18/2023  -cycle 5 of neoadjuvant FOLFOX started 10/02/2023  -positive response on restaging CTs C/A/P 10 09/2023, S/P neoadjuvant FOLFOX x5 cycles (marked improvement in rectal mass; improved perirectal lymphadenopathy)  >>>  Continue neoadjuvant FOLFOX every 2 weeks x8 cycles  Check CBC and CMP every couple of weeks with each cycle of chemotherapy   Re-stage with contrast enhanced CT scans of chest/abdomen, and MRI pelvis with and without contrast after completion of 8 cycles of neoadjuvant FOLFOX  Subsequently, neoadjuvant chemoradiation therapy    Plan of treatment:  Neoadjuvant therapy:  Perioperative treatment is recommended for up to a total of 6 months  MMR proficient.  Low probability of MSI-H.  Regimen:  Total neoadjuvant therapy:  1. Modified FOLFOX every 2 weeks X 16 weeks (8 cycles); followed by  2.  Restaging with contrast-enhanced CT scans of chest/abdomen, and MRI pelvis with contrast after 4 cycles, then after 8 cycles; followed by   3. Long course chemoradiation therapy with capecitabine; followed by  4. Restaging with contrast-enhanced CT scans of chest/abdomen, and MRI pelvis with contrast; followed by   5. Transabdominal resection; or, if complete clinical response, then consider surveillance      -developed PICC line related left upper extremity DVT 07/27/2023   PICC line removed   Started on Eliquis  >>>  Continue anticoagulation for at least 3 months (until the end of October)   Monitor for any bleeding    Fertility risk discussion/counseling if premenopausal   (she has been postmenopausal for last 2 years and indicated that she had no desire to go for oocyte preservation)    Family history of colon cancer & colon polyp   Have already requested genetic testing and counseling.    Follow-up with NP in 1 week with CBC and CMP, to assess for suitability to resume chemotherapy.    Above discussed leg length with her.  All questions answered.    Discussed labs and scans and gave her copies of relevant reports.    Plan of management discussed in detail once again.    She understands and agrees with this plan.    Follow-up:  No follow-ups on file.

## 2023-10-17 NOTE — Clinical Note
ANC 1.14 K; hold chemotherapy today Recheck CBC in 1 week, then decide whether to proceed with chemotherapy Needs neoadjuvant FOLFOX every 2 weeks X a total of 8 cycles Check CBC and CMP every couple of weeks with each cycle of chemotherapy Re-stage with contrast enhanced CT scans of chest and abdomen, and MRI pelvis with and without contrast after completion of 8 cycles of neoadjuvant FOLFOX Continue anticoagulation until the end of October Follow-up with NP in 1 week with CBC and CMP, to assess for suitability to resume chemotherapy.

## 2023-10-17 NOTE — NURSING
10:05 Arrive for Folfox infusion treatment held by Dr Bazan due to WBC result 2.76 scheduled to repeat lab next week. Have no c/o's. Picc line dressing changed as previously scheduled.

## 2023-10-24 ENCOUNTER — OFFICE VISIT (OUTPATIENT)
Dept: HEMATOLOGY/ONCOLOGY | Facility: CLINIC | Age: 53
End: 2023-10-24
Payer: MEDICAID

## 2023-10-24 ENCOUNTER — HOSPITAL ENCOUNTER (OUTPATIENT)
Dept: RADIOLOGY | Facility: HOSPITAL | Age: 53
Discharge: HOME | End: 2023-10-24
Attending: NURSE PRACTITIONER
Payer: MEDICAID

## 2023-10-24 ENCOUNTER — APPOINTMENT (OUTPATIENT)
Dept: HEMATOLOGY/ONCOLOGY | Facility: CLINIC | Age: 53
End: 2023-10-24
Payer: MEDICAID

## 2023-10-24 VITALS
TEMPERATURE: 98 F | WEIGHT: 129.63 LBS | OXYGEN SATURATION: 100 % | HEART RATE: 100 BPM | RESPIRATION RATE: 20 BRPM | DIASTOLIC BLOOD PRESSURE: 119 MMHG | SYSTOLIC BLOOD PRESSURE: 159 MMHG | BODY MASS INDEX: 23.85 KG/M2 | HEIGHT: 62 IN

## 2023-10-24 DIAGNOSIS — G62.0 CHEMOTHERAPY-INDUCED NEUROPATHY: ICD-10-CM

## 2023-10-24 DIAGNOSIS — T45.1X5A CHEMOTHERAPY-INDUCED NEUROPATHY: ICD-10-CM

## 2023-10-24 DIAGNOSIS — C20 ADENOCARCINOMA OF RECTUM, STAGE 3: Primary | ICD-10-CM

## 2023-10-24 DIAGNOSIS — C20 ADENOCARCINOMA OF RECTUM: ICD-10-CM

## 2023-10-24 DIAGNOSIS — C20 ADENOCARCINOMA OF RECTUM, STAGE 3: ICD-10-CM

## 2023-10-24 DIAGNOSIS — F41.9 ANXIETY: ICD-10-CM

## 2023-10-24 LAB
ALBUMIN SERPL-MCNC: 4.1 G/DL (ref 3.5–5)
ALBUMIN/GLOB SERPL: 1.4 RATIO (ref 1.1–2)
ALP SERPL-CCNC: 83 UNIT/L (ref 40–150)
ALT SERPL-CCNC: 26 UNIT/L (ref 0–55)
AST SERPL-CCNC: 26 UNIT/L (ref 5–34)
BASOPHILS # BLD AUTO: 0.07 X10(3)/MCL
BASOPHILS NFR BLD AUTO: 1.6 %
BILIRUB SERPL-MCNC: 0.9 MG/DL
BUN SERPL-MCNC: 8.2 MG/DL (ref 9.8–20.1)
CALCIUM SERPL-MCNC: 10.3 MG/DL (ref 8.4–10.2)
CHLORIDE SERPL-SCNC: 105 MMOL/L (ref 98–107)
CO2 SERPL-SCNC: 26 MMOL/L (ref 22–29)
CREAT SERPL-MCNC: 0.99 MG/DL (ref 0.55–1.02)
EOSINOPHIL # BLD AUTO: 0.17 X10(3)/MCL (ref 0–0.9)
EOSINOPHIL NFR BLD AUTO: 3.8 %
ERYTHROCYTE [DISTWIDTH] IN BLOOD BY AUTOMATED COUNT: 14.7 % (ref 11.5–17)
GFR SERPLBLD CREATININE-BSD FMLA CKD-EPI: >60 MLS/MIN/1.73/M2
GLOBULIN SER-MCNC: 3 GM/DL (ref 2.4–3.5)
GLUCOSE SERPL-MCNC: 117 MG/DL (ref 74–100)
HCT VFR BLD AUTO: 39.8 % (ref 37–47)
HGB BLD-MCNC: 13.2 G/DL (ref 12–16)
IMM GRANULOCYTES # BLD AUTO: 0.01 X10(3)/MCL (ref 0–0.04)
IMM GRANULOCYTES NFR BLD AUTO: 0.2 %
LYMPHOCYTES # BLD AUTO: 1.58 X10(3)/MCL (ref 0.6–4.6)
LYMPHOCYTES NFR BLD AUTO: 35.7 %
MAGNESIUM SERPL-MCNC: 2 MG/DL (ref 1.6–2.6)
MCH RBC QN AUTO: 29.6 PG (ref 27–31)
MCHC RBC AUTO-ENTMCNC: 33.2 G/DL (ref 33–36)
MCV RBC AUTO: 89.2 FL (ref 80–94)
MONOCYTES # BLD AUTO: 0.57 X10(3)/MCL (ref 0.1–1.3)
MONOCYTES NFR BLD AUTO: 12.9 %
NEUTROPHILS # BLD AUTO: 2.03 X10(3)/MCL (ref 2.1–9.2)
NEUTROPHILS NFR BLD AUTO: 45.8 %
NRBC BLD AUTO-RTO: 0 %
PLATELET # BLD AUTO: 215 X10(3)/MCL (ref 130–400)
PMV BLD AUTO: 9.5 FL (ref 7.4–10.4)
POTASSIUM SERPL-SCNC: 3.8 MMOL/L (ref 3.5–5.1)
PROT SERPL-MCNC: 7.1 GM/DL (ref 6.4–8.3)
RBC # BLD AUTO: 4.46 X10(6)/MCL (ref 4.2–5.4)
SODIUM SERPL-SCNC: 142 MMOL/L (ref 136–145)
WBC # SPEC AUTO: 4.43 X10(3)/MCL (ref 4.5–11.5)

## 2023-10-24 PROCEDURE — 3077F SYST BP >= 140 MM HG: CPT | Mod: CPTII,,, | Performed by: NURSE PRACTITIONER

## 2023-10-24 PROCEDURE — 1160F PR REVIEW ALL MEDS BY PRESCRIBER/CLIN PHARMACIST DOCUMENTED: ICD-10-PCS | Mod: CPTII,,, | Performed by: NURSE PRACTITIONER

## 2023-10-24 PROCEDURE — 25500020 PHARM REV CODE 255: Performed by: NURSE PRACTITIONER

## 2023-10-24 PROCEDURE — 1160F RVW MEDS BY RX/DR IN RCRD: CPT | Mod: CPTII,,, | Performed by: NURSE PRACTITIONER

## 2023-10-24 PROCEDURE — 85025 COMPLETE CBC W/AUTO DIFF WBC: CPT

## 2023-10-24 PROCEDURE — 1159F PR MEDICATION LIST DOCUMENTED IN MEDICAL RECORD: ICD-10-PCS | Mod: CPTII,,, | Performed by: NURSE PRACTITIONER

## 2023-10-24 PROCEDURE — 3008F PR BODY MASS INDEX (BMI) DOCUMENTED: ICD-10-PCS | Mod: CPTII,,, | Performed by: NURSE PRACTITIONER

## 2023-10-24 PROCEDURE — 3077F PR MOST RECENT SYSTOLIC BLOOD PRESSURE >= 140 MM HG: ICD-10-PCS | Mod: CPTII,,, | Performed by: NURSE PRACTITIONER

## 2023-10-24 PROCEDURE — 83735 ASSAY OF MAGNESIUM: CPT | Performed by: NURSE PRACTITIONER

## 2023-10-24 PROCEDURE — 4010F PR ACE/ARB THEARPY RXD/TAKEN: ICD-10-PCS | Mod: CPTII,,, | Performed by: NURSE PRACTITIONER

## 2023-10-24 PROCEDURE — A9577 INJ MULTIHANCE: HCPCS | Performed by: NURSE PRACTITIONER

## 2023-10-24 PROCEDURE — 99214 PR OFFICE/OUTPT VISIT, EST, LEVL IV, 30-39 MIN: ICD-10-PCS | Mod: S$PBB,,, | Performed by: NURSE PRACTITIONER

## 2023-10-24 PROCEDURE — 72197 MRI PELVIS W/O & W/DYE: CPT | Mod: TC

## 2023-10-24 PROCEDURE — 80053 COMPREHEN METABOLIC PANEL: CPT

## 2023-10-24 PROCEDURE — 99214 OFFICE O/P EST MOD 30 MIN: CPT | Mod: PBBFAC | Performed by: NURSE PRACTITIONER

## 2023-10-24 PROCEDURE — 4010F ACE/ARB THERAPY RXD/TAKEN: CPT | Mod: CPTII,,, | Performed by: NURSE PRACTITIONER

## 2023-10-24 PROCEDURE — 99214 OFFICE O/P EST MOD 30 MIN: CPT | Mod: S$PBB,,, | Performed by: NURSE PRACTITIONER

## 2023-10-24 PROCEDURE — 1159F MED LIST DOCD IN RCRD: CPT | Mod: CPTII,,, | Performed by: NURSE PRACTITIONER

## 2023-10-24 PROCEDURE — 3008F BODY MASS INDEX DOCD: CPT | Mod: CPTII,,, | Performed by: NURSE PRACTITIONER

## 2023-10-24 PROCEDURE — 3080F PR MOST RECENT DIASTOLIC BLOOD PRESSURE >= 90 MM HG: ICD-10-PCS | Mod: CPTII,,, | Performed by: NURSE PRACTITIONER

## 2023-10-24 PROCEDURE — 36415 COLL VENOUS BLD VENIPUNCTURE: CPT

## 2023-10-24 PROCEDURE — 3080F DIAST BP >= 90 MM HG: CPT | Mod: CPTII,,, | Performed by: NURSE PRACTITIONER

## 2023-10-24 RX ORDER — LISINOPRIL 10 MG/1
10 TABLET ORAL EVERY MORNING
COMMUNITY
Start: 2023-10-18

## 2023-10-24 RX ORDER — EPINEPHRINE 0.3 MG/.3ML
0.3 INJECTION SUBCUTANEOUS ONCE AS NEEDED
Status: CANCELLED | OUTPATIENT
Start: 2023-10-25

## 2023-10-24 RX ORDER — FAMOTIDINE 10 MG/ML
20 INJECTION INTRAVENOUS
Status: CANCELLED
Start: 2023-10-25

## 2023-10-24 RX ORDER — DIPHENHYDRAMINE HYDROCHLORIDE 50 MG/ML
50 INJECTION INTRAMUSCULAR; INTRAVENOUS ONCE AS NEEDED
Status: CANCELLED | OUTPATIENT
Start: 2023-10-25

## 2023-10-24 RX ORDER — SODIUM CHLORIDE 0.9 % (FLUSH) 0.9 %
10 SYRINGE (ML) INJECTION
Status: CANCELLED | OUTPATIENT
Start: 2023-10-27

## 2023-10-24 RX ORDER — ALPRAZOLAM 0.5 MG/1
0.5 TABLET ORAL 2 TIMES DAILY PRN
COMMUNITY
Start: 2023-10-18

## 2023-10-24 RX ORDER — SODIUM CHLORIDE 0.9 % (FLUSH) 0.9 %
10 SYRINGE (ML) INJECTION
Status: CANCELLED | OUTPATIENT
Start: 2023-10-25

## 2023-10-24 RX ORDER — HEPARIN 100 UNIT/ML
500 SYRINGE INTRAVENOUS
Status: CANCELLED | OUTPATIENT
Start: 2023-10-25

## 2023-10-24 RX ORDER — HEPARIN 100 UNIT/ML
500 SYRINGE INTRAVENOUS
Status: CANCELLED | OUTPATIENT
Start: 2023-10-27

## 2023-10-24 RX ORDER — DIPHENHYDRAMINE HYDROCHLORIDE 50 MG/ML
25 INJECTION INTRAMUSCULAR; INTRAVENOUS
Status: CANCELLED
Start: 2023-10-25

## 2023-10-24 RX ADMIN — GADOBENATE DIMEGLUMINE 11 ML: 529 INJECTION, SOLUTION INTRAVENOUS at 03:10

## 2023-10-24 NOTE — PROGRESS NOTES
Reason for Follow-up:  Reason for consultation:  -adenocarcinoma rectum, moderately differentiated, partially obstructing mass, S/P colonoscopy 06/19/2023   -tubular adenoma transverse colon   -regional lymphadenopathy on CT abdomen pelvis with contrast 06/21/2023  -MMR proficient  -grandfather experienced colon cancer; father experienced colon polyp     Current Treatment:  Modified FOLFOX every 2 weeks X 16 weeks (8 cycles);  Start 7/19/23    Treatment History:  Past medical history:  Anxiety.    Procedure/surgical history:  Appendectomy.  Back surgery.  Social history:  Single.  Lives in [place], [place].  Has 2 children.  Works as a .  No history of tobacco, alcohol, or illicit drug abuse.  Family history:  Paternal grandfather experience colon cancer in his 70s.  Father experienced multiple colon polyps.  Health maintenance:  No screening colonoscopy prior to most recent colonoscopy which led to the diagnosis of rectal cancer.  -09/13/2019:  Bilateral screening mammogram pelvic comparison:  09/13/2018 mammogram):  BI-RADS 0 (indeterminate)  -09/25/2019:  Diagnostic left mammogram, limited ultrasound left breast (comparison:  09/13/2019 mammogram):  Overall study BI-RADS 2: Benign       History of Present Illness:     Oncologic/Hematologic History:  Oncology History   Adenocarcinoma of rectum, stage 3   7/1/2023 Initial Diagnosis    Adenocarcinoma of rectum, stage 3     7/19/2023 -  Chemotherapy    Treatment Summary   Plan Name: OP mFOLFOX 6 Q2W  Treatment Goal: Curative  Status: Active  Start Date: 7/19/2023  End Date: 11/28/2023 (Planned)  Provider: John Bazan MD  Chemotherapy: fluorouraciL injection 650 mg, 400 mg/m2 = 650 mg, Intravenous, Clinic/HOD 1 time, 3 of 3 cycles  Administration: 650 mg (7/19/2023), 650 mg (8/1/2023), 650 mg (8/22/2023)  oxaliplatin (ELOXATIN) 85 mg/m2 = 139 mg in dextrose 5 % (D5W) 592.8 mL chemo infusion, 85 mg/m2 = 139 mg, Intravenous, Clinic/HOD 1 time, 5 of 8  cycles  Administration: 139 mg (7/19/2023), 139 mg (8/1/2023), 140 mg (9/18/2023), 135 mg (10/2/2023), 135 mg (8/22/2023)     53-year-old lady, referred by Migel Ortiz MD, GI, with rectal cancer.    Family history pos for colon cancer in grandfather, colon polyp in father  Evaluated by Migel Ortiz MD, GI, 05/22/2023 for nervous stomach; change in bowel habits/pattern; change in bowel function started several months ago; currently, 1 bowel movement daily.  Blood in stool.  Lower abdominal pain.  Heartburn.  Epigastric pain.  MiraLax resulted in improvement.  Bright red blood in stool.    Interval History 10/24/23: Patient presents along with her friend for scheduled follow up to manage adenocarcinoma of the rectum. Patient says she goes for MRI today at 2pm. She is aware that she will receive cycel 6 of Folfox tomorrow. Patient says she is no longer taking Ativan for anxiety because she felt that it made her symptoms worse. Patient says xanax has worked best in managing her symptoms which are prescribed by another PCP. Elevated BP today, patient says she recently started a new blood pressure medication and she reprots compliance. She will follow up with PCP to address elevation in BP. She reports severe fatigue, weakness, numbness/tingling in fers and toes, N/V, abdominal cramps. She richard any SOB, worsening rectal bleeding. Lab work reviewed with patient, stable.Patient also reports that she is currently taking Cymbalta 20mg for neuropathy symptoms patient advised that she can increase to 40mg HS prn until able to get the 30mg tablets of Cymbalta.  Discussed plan of care and follow up.    Review of Systems:   All systems reviewed and found to be negative except for the symptoms detailed above    Physical Examination:   VITAL SIGNS:   Vitals:    10/24/23 0931   BP: (!) 159/119   Pulse: 100   Resp: 20   Temp: 98 °F (36.7 °C)     Lab Results   Component Value Date    WBC 4.43 (L) 10/24/2023    RBC 4.46 10/24/2023     HGB 13.2 10/24/2023    HCT 39.8 10/24/2023    MCV 89.2 10/24/2023    MCH 29.6 10/24/2023    MCHC 33.2 10/24/2023    RDW 14.7 10/24/2023     10/24/2023    MPV 9.5 10/24/2023    EOS 0.3 03/03/2023    BASO 0.1 03/03/2023    EOSINOPHIL 6 03/03/2023     CMP  Sodium Level   Date Value Ref Range Status   10/24/2023 142 136 - 145 mmol/L Final     Potassium Level   Date Value Ref Range Status   10/24/2023 3.8 3.5 - 5.1 mmol/L Final     Carbon Dioxide   Date Value Ref Range Status   10/24/2023 26 22 - 29 mmol/L Final     Blood Urea Nitrogen   Date Value Ref Range Status   10/24/2023 8.2 (L) 9.8 - 20.1 mg/dL Final     Creatinine   Date Value Ref Range Status   10/24/2023 0.99 0.55 - 1.02 mg/dL Final     Calcium Level Total   Date Value Ref Range Status   10/24/2023 10.3 (H) 8.4 - 10.2 mg/dL Final     Albumin Level   Date Value Ref Range Status   10/24/2023 4.1 3.5 - 5.0 g/dL Final     Bilirubin Total   Date Value Ref Range Status   10/24/2023 0.9 <=1.5 mg/dL Final     Alkaline Phosphatase   Date Value Ref Range Status   10/24/2023 83 40 - 150 unit/L Final     Aspartate Aminotransferase   Date Value Ref Range Status   10/24/2023 26 5 - 34 unit/L Final     Alanine Aminotransferase   Date Value Ref Range Status   10/24/2023 26 0 - 55 unit/L Final     eGFR   Date Value Ref Range Status   10/24/2023 >60 mls/min/1.73/m2 Final       General: Alert and oriented. NAD  Eye: Pupils are equal, round and reactive to light, Extraocular movements are intact. Normal conjunctiva  HENT: Normocephalic. Oropharynx exam deferred;   Neck: Supple, Non-tender  Respiratory: Respirations are non-labored, Symmetrical chest wall expansion.  Cardiovascular: Regular rate, rhythm, Normal peripheral perfusion, No bilateral lower extremity edema  Gastrointestinal: Non-distended  Genitourinary: Exam deferred  Lymphatics: No lymphadenopathy appreciated  Musculoskeletal: Moves all extremities  Integumentary: Intact. Warm, dry. No rashes, or lesions to  visible skin  Neurologic: No focal deficits  Psychiatric: Cooperative. Appropriate mood and affect   ECOG Performance Scale: 1 - Capable of all self-care able to carry out light work activities.       Assessment:  Adenocarcinoma of rectum, moderately differentiated:   -presentation:  05/2023:  Change in bowel habits, hematochezia, lower abdominal pain  -colonoscopy 06/19/2023:    9 mm tubular adenoma transverse colon, removed;   invasive moderately differentiated adenocarcinoma of rectum presenting as ulcerated necrotic infiltrative 90% circumferential, bleeding, partially obstructing rectal mass, 5 cm long, 4 cm from anus, scope traversed the lesion  -MMR proficient; low probability of MSI-H  -CT abdomen pelvis with contrast 06/21/2023:  No metastasis; large rectal mass and some regional lymphadenopathy on CT abdomen pelvis with contrast 06/21/2023, largest perirectal lymph node 1 cm x 8 mm  >>>  By virtue of regional lymphadenopathy, at least stage III      Family history of colon cancer:  -grandfather experienced colon cancer; father experienced colon polyp      Diagnostic studies  CT chest non contrast 7/18/23: No CT evidence for metastatic disease to the chest.  MRI Pelvis 7/20/23: Large mid to distal rectal mass with perirectal adenopathy as discussed.  Local staging is [id number].    Plan:  Adenocarcinoma of rectum, moderately differentiated    After appropriate workup, will most likely need total neoadjuvant therapy including chemotherapy with FOLFOX or CAPOX or FOLFIRINOX X 12-16 weeks, followed by long course chemoradiation therapy, then restaging, followed by resection versus surveillance, etc.  Perioperative treatment is recommended for up to a total of 6 months.    At least based upon CT scans of A/P, has at least stage III adenocarcinoma of rectum.  MMR proficient.  Low probability of MSI-H.  Treatment will be as follows:  Total neoadjuvant therapy:   1. Modified FOLFOX every 2 weeks X 16 weeks (8 cycles);  followed by   2. Long course chemoradiation therapy with capecitabine; followed by  3. Restaging; followed by   4. Transabdominal resection; or if complete clinical response, consider surveillance    Response to treatment will be assessed as follows:  Contrast-enhanced CT scans of abdomen and chest, and MRI scan of rectum with and without contrast:  -after completion of 8 cycles of modified FOLFOX; aunt  -after completion of chemoradiation therapy    PET-CT scan is not indicated  Fertility risk discussion/counseling if premenopausal (she has been postmenopausal for last 2 years and that this time, has no desire to go for add preservation)      -Okay to proceed with cycle 6 of Folfox tomorrow, return on day 3 for cadd pump removal  -Pre-medications adjusted-Added Pepcid 20mg IV, Benadryl 25mg IVP, Increased Dexamethasone 20mg IV to help with symptom managment  -Follow up with  in 2 weeks with lab work (cbc,cmp,mg) and CT +MRI results prior to cycle 7 Folfox, return on day 3 for cadd pump removal  -Percocet 5mg was prescribed for pain -Refills sent to pharmacy   -Continue to take Bentyl as needed for stomach cramping  -Continue Zofran, Compazine 10mg po every 6 hours prn for nausea -  -Re-stage with contrast enhanced CT scans of chest/abdomen, and MRI scan of pelvis with and without contrast after completion of 4 cycles of chemotherapy, then after 8 cycles of chemotherapy  -----------------------    Family history of colon cancer & colon polyp    genetic testing and counseling-Scheduled for 12/4/23 7/27/23 Thrombosis right arm d/t picc line   PICC line removed   Started on Eliquis  Continue anticoagulation for at least 3 months (until the end of October)   Monitor for any bleeding     Above discussed at length with the patient.  All questions answered.    Plan of management discussed, especially, neoadjuvant chemotherapy and chemoradiation therapy (to be finalized after MRI scan of pelvis).  She  understands and agrees with this plan.

## 2023-10-25 ENCOUNTER — DOCUMENTATION ONLY (OUTPATIENT)
Dept: HEMATOLOGY/ONCOLOGY | Facility: CLINIC | Age: 53
End: 2023-10-25
Payer: MEDICAID

## 2023-10-25 ENCOUNTER — INFUSION (OUTPATIENT)
Dept: INFUSION THERAPY | Facility: HOSPITAL | Age: 53
End: 2023-10-25
Attending: INTERNAL MEDICINE
Payer: MEDICAID

## 2023-10-25 VITALS
WEIGHT: 129.63 LBS | HEART RATE: 87 BPM | SYSTOLIC BLOOD PRESSURE: 134 MMHG | DIASTOLIC BLOOD PRESSURE: 83 MMHG | BODY MASS INDEX: 23.85 KG/M2 | OXYGEN SATURATION: 99 % | HEIGHT: 62 IN | TEMPERATURE: 98 F | RESPIRATION RATE: 20 BRPM

## 2023-10-25 DIAGNOSIS — C20 ADENOCARCINOMA OF RECTUM, STAGE 3: ICD-10-CM

## 2023-10-25 DIAGNOSIS — R11.0 NAUSEA: Primary | ICD-10-CM

## 2023-10-25 PROCEDURE — 96413 CHEMO IV INFUSION 1 HR: CPT

## 2023-10-25 PROCEDURE — 96368 THER/DIAG CONCURRENT INF: CPT

## 2023-10-25 PROCEDURE — 96415 CHEMO IV INFUSION ADDL HR: CPT

## 2023-10-25 PROCEDURE — 96416 CHEMO PROLONG INFUSE W/PUMP: CPT

## 2023-10-25 PROCEDURE — 63600175 PHARM REV CODE 636 W HCPCS: Performed by: NURSE PRACTITIONER

## 2023-10-25 PROCEDURE — 25000003 PHARM REV CODE 250: Performed by: NURSE PRACTITIONER

## 2023-10-25 PROCEDURE — 96375 TX/PRO/DX INJ NEW DRUG ADDON: CPT

## 2023-10-25 PROCEDURE — 96367 TX/PROPH/DG ADDL SEQ IV INF: CPT

## 2023-10-25 RX ORDER — DIPHENHYDRAMINE HYDROCHLORIDE 50 MG/ML
25 INJECTION INTRAMUSCULAR; INTRAVENOUS
Status: COMPLETED | OUTPATIENT
Start: 2023-10-25 | End: 2023-10-25

## 2023-10-25 RX ORDER — HEPARIN 100 UNIT/ML
500 SYRINGE INTRAVENOUS
Status: DISCONTINUED | OUTPATIENT
Start: 2023-10-25 | End: 2023-10-25 | Stop reason: HOSPADM

## 2023-10-25 RX ORDER — SODIUM CHLORIDE 0.9 % (FLUSH) 0.9 %
10 SYRINGE (ML) INJECTION
Status: DISCONTINUED | OUTPATIENT
Start: 2023-10-25 | End: 2023-10-25 | Stop reason: HOSPADM

## 2023-10-25 RX ORDER — DIPHENHYDRAMINE HYDROCHLORIDE 50 MG/ML
50 INJECTION INTRAMUSCULAR; INTRAVENOUS ONCE AS NEEDED
Status: DISCONTINUED | OUTPATIENT
Start: 2023-10-25 | End: 2023-10-25 | Stop reason: HOSPADM

## 2023-10-25 RX ORDER — EPINEPHRINE 0.3 MG/.3ML
0.3 INJECTION SUBCUTANEOUS ONCE AS NEEDED
Status: DISCONTINUED | OUTPATIENT
Start: 2023-10-25 | End: 2023-10-25 | Stop reason: HOSPADM

## 2023-10-25 RX ORDER — FAMOTIDINE 10 MG/ML
20 INJECTION INTRAVENOUS
Status: COMPLETED | OUTPATIENT
Start: 2023-10-25 | End: 2023-10-25

## 2023-10-25 RX ADMIN — FLUOROURACIL 3850 MG: 50 INJECTION, SOLUTION INTRAVENOUS at 10:10

## 2023-10-25 RX ADMIN — DEXTROSE MONOHYDRATE: 50 INJECTION, SOLUTION INTRAVENOUS at 08:10

## 2023-10-25 RX ADMIN — PALONOSETRON HYDROCHLORIDE 0.25 MG: 0.25 INJECTION, SOLUTION INTRAVENOUS at 07:10

## 2023-10-25 RX ADMIN — FAMOTIDINE 20 MG: 10 INJECTION, SOLUTION INTRAVENOUS at 07:10

## 2023-10-25 RX ADMIN — OXALIPLATIN 135 MG: 5 INJECTION, SOLUTION INTRAVENOUS at 08:10

## 2023-10-25 RX ADMIN — DIPHENHYDRAMINE HYDROCHLORIDE 25 MG: 50 INJECTION INTRAMUSCULAR; INTRAVENOUS at 07:10

## 2023-10-25 RX ADMIN — SODIUM CHLORIDE: 9 INJECTION, SOLUTION INTRAVENOUS at 07:10

## 2023-10-25 RX ADMIN — LEUCOVORIN CALCIUM 650 MG: 200 INJECTION, POWDER, LYOPHILIZED, FOR SOLUTION INTRAMUSCULAR; INTRAVENOUS at 08:10

## 2023-10-25 NOTE — NURSING
Pt ambulated with mom for Folfox infusion; pt received Folfox infusion without incident and requested WC for discharge which was provided.

## 2023-10-25 NOTE — PROGRESS NOTES
"  Reason for Visit:  Chemo treatment for rectal cancer    PMHx:   Past Medical History:   Diagnosis Date    Anxiety disorder, unspecified     GERD (gastroesophageal reflux disease)     Rectal cancer        Height: 63"    Weight:   Wt Readings from Last 15 Encounters:   10/25/23 58.8 kg (129 lb 10.1 oz)   10/24/23 58.8 kg (129 lb 9.6 oz)   10/17/23 56.7 kg (125 lb)   10/17/23 56.8 kg (125 lb 3.2 oz)   10/09/23 59.9 kg (132 lb)   10/04/23 60.5 kg (133 lb 4.8 oz)   10/02/23 60.1 kg (132 lb 8 oz)   10/02/23 59.6 kg (131 lb 6.4 oz)   09/25/23 58.9 kg (129 lb 13.6 oz)   09/18/23 60.6 kg (133 lb 9.6 oz)   09/12/23 61.2 kg (134 lb 14.7 oz)   09/05/23 61.6 kg (135 lb 12.9 oz)   08/29/23 59.3 kg (130 lb 11.7 oz)   08/22/23 59.4 kg (130 lb 15.3 oz)   08/17/23 59.8 kg (131 lb 13.4 oz)        Usual BW: 124 lb  Weight Change: Stable over the last 3 months (59-61 kg)Pt reports previously with wt gain to 160 lb, then recent wt loss indicating ~20% wt loss within the last 6 months  IBW:  115 lb  BMI: 23.8 (normal)    Allergies: Codeine and Morphine    Current Medications:    Current Outpatient Medications:     (Magic mouthwash) 1:1:1 diphenhydrAMINE(Benadryl) 12.5mg/5ml liq, aluminum & magnesium hydroxide-simethicone (Maalox), LIDOcaine viscous 2%, Swish and spit 5 mLs every 4 (four) hours as needed (Mouth sores). for mouth sores, Disp: 90 mL, Rfl: 0    ALPRAZolam (XANAX) 0.5 MG tablet, Take 0.5 mg by mouth 2 (two) times daily as needed., Disp: , Rfl:     apixaban (ELIQUIS) 5 mg Tab, Take 1 tablet (5 mg total) by mouth 2 (two) times daily., Disp: 180 tablet, Rfl: 0    busPIRone (BUSPAR) 15 MG tablet, Take 15 mg by mouth 2 (two) times daily., Disp: , Rfl:     dexAMETHasone (DECADRON) 4 MG Tab, Take 2 tablets(8mg) by mouth daily on days 2 and 3 of each chemotherapy cycle., Disp: 32 tablet, Rfl: 0    dexAMETHasone (DECADRON) 4 MG Tab, Take 2 tablets (8 mg total) by mouth once daily. Take as directed on days 2 and 3 of your chemotherapy " cycle., Disp: 24 tablet, Rfl: 5    dicyclomine (BENTYL) 20 mg tablet, Take 1 tablet (20 mg total) by mouth every 6 (six) hours., Disp: 120 tablet, Rfl: 1    DULoxetine (CYMBALTA) 30 MG capsule, Take 1 capsule (30 mg total) by mouth once daily., Disp: 30 capsule, Rfl: 2    famotidine (PEPCID) 40 MG tablet, Take 1 tablet (40 mg total) by mouth nightly as needed for Heartburn., Disp: 30 tablet, Rfl: 3    hyoscyamine (ANASPAZ,LEVSIN) 0.125 mg Tab, Take 125 mcg by mouth every 6 (six) hours as needed., Disp: , Rfl:     lisinopriL 10 MG tablet, Take 10 mg by mouth every morning., Disp: , Rfl:     LORazepam (ATIVAN) 0.5 MG tablet, Take 1 tablet (0.5 mg total) by mouth every 12 (twelve) hours as needed for Anxiety., Disp: 28 tablet, Rfl: 0    MIRALAX 17 gram/dose powder, Take 17 g by mouth., Disp: , Rfl:     ondansetron (ZOFRAN) 8 MG tablet, Take 1 tablet (8 mg total) by mouth every 8 (eight) hours as needed for Nausea., Disp: 30 tablet, Rfl: 2    ondansetron (ZOFRAN-ODT) 8 MG TbDL, Take 1 tablet (8 mg total) by mouth every 8 (eight) hours as needed (nausea/vomiting). (Patient not taking: Reported on 8/15/2023), Disp: 60 tablet, Rfl: 5    oxyCODONE-acetaminophen (PERCOCET) 5-325 mg per tablet, Take 1 tablet by mouth every 4 (four) hours as needed for Pain., Disp: 56 tablet, Rfl: 0    pantoprazole (PROTONIX) 40 MG tablet, Take 40 mg by mouth every morning., Disp: , Rfl:     PANTOPRAZOLE 40 MG/100 ML 0.9 % NS IVPB, , Disp: , Rfl:     potassium chloride SA (K-DUR,KLOR-CON) 20 MEQ tablet, Take 1 tablet (20 mEq total) by mouth once daily. (Patient not taking: Reported on 8/15/2023), Disp: 30 tablet, Rfl: 0    prochlorperazine (COMPAZINE) 10 MG tablet, Take 1 tablet (10 mg total) by mouth 4 (four) times daily., Disp: 56 tablet, Rfl: 3    sodium,potassium,mag sulfates (SUPREP BOWEL PREP KIT) 17.5-3.13-1.6 gram SolR, TAKE 1 KIT BY MOUTH SINGLE DOSE    Labs:  10/24/23 -- WBC 4.4 L, H/H 13.2/39.8, K 3.8, BUN 8.2, Cr 0.99, Alb  4.1    Diet History:     10/25/23 -- Pt receiving chemo this am, pt reports nausea & weakness following last chemo; premeds adjusted & pepcid added, compazine added for nausea; pt reports managing symptoms & mainly snacking on fruit & trail mixes, apple/PB, does eat better when cold sensitivity is managed reporting making homemade shakes; further snack ideas provided such as protein granola bars; Pt does report taking MVI as well; weight remains stable between 59-61 kg    9/18/23 -- Pt unable to receive last 2-3 chemo treatments; receiving chemo today; reports continued decrease po intake which she relates to fatigue & weakness vs decreased appetite, discussed & encouraged use of quick easy high kcal/protein foods/snacks & ONS BID; pt dislikes Ensure products, did try & like Atkins shake -- continue ONS BID of choice; Suggest daily MVI, may still consider Megace to stimulate appetite; weight stable at this time     8/15/23 -- Pt visible upset d/t chemo held today; pt reports continues early satiety only eating ~ 1 meal daily as well as being scared to eat and dcold sensitivity restrictions; pt reports likes eating fruit however reports no energy to wash fruits -- encouraged use of fruit cups; Not currently drinking Ensure Clear however does report tasting & liking, encouraged incorporating Ensure Clear  with goal of 2 daily; weight stable at this time; pt screens at high nutrition risk at this time     7/19/23 -- Pt beginning chancer treatment for rectal cancer; reports good appetite however avoiding eating d/t feeling of fullness/bloating and uncomfortableness d/t location of mass - encouraged 5-6 small meals, low residue/easy to digest foods; reports taking Miralax daily for constipation; nsing instructed pt on cold sensitivity -- pt reports normally likes ice cold water through out the day & smoothies for breakfast, alternative options to incorporate room temp foods for management of cold sensitivity such as room  temp Ensure oral nutrition supplement & fruit cups or pudding; pt with ~20% wt loss within the last 6 months after previous gain, reports current wt is more of her UBW; Pt screens at high nutrition risk at this time -- will continue to follow up with patient during chemo treatment     Nutrition Diagnosis:   Problem:  malnutrition  Etiology (related to): tumor location  Signs/Symptoms (as evidenced by):  < 75% nutrition intake > 1 month, > 10% wt loss x 6 months                   Nutrition Rx: based on 60 kg  EEN: 9797-3013 kcal (30 - 32 kcal/kg)  EPN: 60-72 gm (1 - 1.2 gm/kg)  FLD: 9236-8145 ml (1ml/kcal)      Intervention:  High kcal/protein diet, 5-6 small meals daily  ONS of choice BID  Daily MVI  Monitor Weight Weekly     Monitoring:  energy intake, GI tolerance, weight, and labs

## 2023-10-27 ENCOUNTER — INFUSION (OUTPATIENT)
Dept: INFUSION THERAPY | Facility: HOSPITAL | Age: 53
End: 2023-10-27
Attending: INTERNAL MEDICINE
Payer: MEDICAID

## 2023-10-27 VITALS
RESPIRATION RATE: 20 BRPM | WEIGHT: 129.63 LBS | HEART RATE: 85 BPM | TEMPERATURE: 1 F | DIASTOLIC BLOOD PRESSURE: 80 MMHG | BODY MASS INDEX: 23.85 KG/M2 | OXYGEN SATURATION: 98 % | HEIGHT: 62 IN | SYSTOLIC BLOOD PRESSURE: 153 MMHG

## 2023-10-27 DIAGNOSIS — R11.0 NAUSEA: Primary | ICD-10-CM

## 2023-10-27 DIAGNOSIS — C20 ADENOCARCINOMA OF RECTUM, STAGE 3: ICD-10-CM

## 2023-10-27 PROCEDURE — A4216 STERILE WATER/SALINE, 10 ML: HCPCS | Performed by: NURSE PRACTITIONER

## 2023-10-27 PROCEDURE — 25000003 PHARM REV CODE 250: Performed by: NURSE PRACTITIONER

## 2023-10-27 PROCEDURE — 96360 HYDRATION IV INFUSION INIT: CPT

## 2023-10-27 RX ORDER — SODIUM CHLORIDE 0.9 % (FLUSH) 0.9 %
10 SYRINGE (ML) INJECTION
Status: DISCONTINUED | OUTPATIENT
Start: 2023-10-27 | End: 2023-10-27 | Stop reason: HOSPADM

## 2023-10-27 RX ORDER — HEPARIN 100 UNIT/ML
500 SYRINGE INTRAVENOUS
Status: DISCONTINUED | OUTPATIENT
Start: 2023-10-27 | End: 2023-10-27 | Stop reason: HOSPADM

## 2023-10-27 RX ADMIN — SODIUM CHLORIDE 1000 ML: 9 INJECTION, SOLUTION INTRAVENOUS at 10:10

## 2023-10-27 RX ADMIN — Medication 10 ML: at 10:10

## 2023-10-27 NOTE — NURSING
1018  Pt arrived for C 6 D 3 cadd pump removal and 1 L NS.  Pt denies any pain.  States she feels tired and weak and the fluids should help her feel better.

## 2023-10-30 ENCOUNTER — DOCUMENTATION ONLY (OUTPATIENT)
Dept: HEMATOLOGY/ONCOLOGY | Facility: CLINIC | Age: 53
End: 2023-10-30
Payer: MEDICAID

## 2023-10-30 NOTE — PROGRESS NOTES
Last office visit patient reported worsening neuropathic stymptoms. Patient was previously prescribed Cymbalta 20mg by another provider. Patient was advised at last visit to slowly increase cymbalta at bedtime to help with neurpathic symptoms. Patient called today  and reported increased anxiety ever since starting Cymbalta. She says she never did increase dose she only stayed at the 20mg. Patient advised to discontinue Cymbalta due to intolerable side effects. Patient voiced understanding

## 2023-11-01 ENCOUNTER — INFUSION (OUTPATIENT)
Dept: INFUSION THERAPY | Facility: HOSPITAL | Age: 53
End: 2023-11-01
Attending: INTERNAL MEDICINE
Payer: MEDICAID

## 2023-11-01 VITALS
HEART RATE: 99 BPM | WEIGHT: 126.31 LBS | OXYGEN SATURATION: 99 % | RESPIRATION RATE: 18 BRPM | DIASTOLIC BLOOD PRESSURE: 86 MMHG | SYSTOLIC BLOOD PRESSURE: 120 MMHG | TEMPERATURE: 98 F | BODY MASS INDEX: 23.29 KG/M2

## 2023-11-01 PROCEDURE — 96523 IRRIG DRUG DELIVERY DEVICE: CPT

## 2023-11-06 RX ORDER — SODIUM CHLORIDE 0.9 % (FLUSH) 0.9 %
10 SYRINGE (ML) INJECTION
Status: CANCELLED | OUTPATIENT
Start: 2023-11-28

## 2023-11-06 RX ORDER — EPINEPHRINE 0.3 MG/.3ML
0.3 INJECTION SUBCUTANEOUS ONCE AS NEEDED
Status: CANCELLED | OUTPATIENT
Start: 2023-11-26

## 2023-11-06 RX ORDER — HEPARIN 100 UNIT/ML
500 SYRINGE INTRAVENOUS
Status: CANCELLED | OUTPATIENT
Start: 2023-11-26

## 2023-11-06 RX ORDER — DIPHENHYDRAMINE HYDROCHLORIDE 50 MG/ML
25 INJECTION INTRAMUSCULAR; INTRAVENOUS
Status: CANCELLED
Start: 2023-11-26

## 2023-11-06 RX ORDER — HEPARIN 100 UNIT/ML
500 SYRINGE INTRAVENOUS
Status: CANCELLED | OUTPATIENT
Start: 2023-11-28

## 2023-11-06 RX ORDER — FAMOTIDINE 10 MG/ML
20 INJECTION INTRAVENOUS
Status: CANCELLED
Start: 2023-11-26

## 2023-11-06 RX ORDER — SODIUM CHLORIDE 0.9 % (FLUSH) 0.9 %
10 SYRINGE (ML) INJECTION
Status: CANCELLED | OUTPATIENT
Start: 2023-11-26

## 2023-11-06 RX ORDER — DIPHENHYDRAMINE HYDROCHLORIDE 50 MG/ML
50 INJECTION INTRAMUSCULAR; INTRAVENOUS ONCE AS NEEDED
Status: CANCELLED | OUTPATIENT
Start: 2023-11-26

## 2023-11-07 ENCOUNTER — INFUSION (OUTPATIENT)
Dept: INFUSION THERAPY | Facility: HOSPITAL | Age: 53
End: 2023-11-07
Attending: INTERNAL MEDICINE
Payer: MEDICAID

## 2023-11-07 ENCOUNTER — OFFICE VISIT (OUTPATIENT)
Dept: HEMATOLOGY/ONCOLOGY | Facility: CLINIC | Age: 53
End: 2023-11-07
Attending: INTERNAL MEDICINE
Payer: MEDICAID

## 2023-11-07 VITALS
RESPIRATION RATE: 20 BRPM | DIASTOLIC BLOOD PRESSURE: 79 MMHG | HEART RATE: 109 BPM | TEMPERATURE: 98 F | SYSTOLIC BLOOD PRESSURE: 116 MMHG | WEIGHT: 125.25 LBS | HEIGHT: 62 IN | OXYGEN SATURATION: 99 % | BODY MASS INDEX: 23.05 KG/M2

## 2023-11-07 VITALS
OXYGEN SATURATION: 99 % | DIASTOLIC BLOOD PRESSURE: 79 MMHG | WEIGHT: 125 LBS | TEMPERATURE: 98 F | HEIGHT: 61 IN | RESPIRATION RATE: 20 BRPM | HEART RATE: 109 BPM | BODY MASS INDEX: 23.6 KG/M2 | SYSTOLIC BLOOD PRESSURE: 116 MMHG

## 2023-11-07 DIAGNOSIS — T45.1X5A CHEMOTHERAPY-INDUCED NEUROPATHY: Primary | ICD-10-CM

## 2023-11-07 DIAGNOSIS — Z80.0 FAMILY HISTORY OF COLON CANCER: ICD-10-CM

## 2023-11-07 DIAGNOSIS — R11.0 NAUSEA: Primary | ICD-10-CM

## 2023-11-07 DIAGNOSIS — R59.0 PELVIC LYMPHADENOPATHY: ICD-10-CM

## 2023-11-07 DIAGNOSIS — C20 ADENOCARCINOMA OF RECTUM, STAGE 3: ICD-10-CM

## 2023-11-07 DIAGNOSIS — F41.9 ANXIETY DISORDER, UNSPECIFIED TYPE: ICD-10-CM

## 2023-11-07 DIAGNOSIS — Z51.11 CHEMOTHERAPY MANAGEMENT, ENCOUNTER FOR: ICD-10-CM

## 2023-11-07 DIAGNOSIS — T82.868A THROMBOSIS IN PERIPHERALLY INSERTED CENTRAL CATHETER (PICC): ICD-10-CM

## 2023-11-07 DIAGNOSIS — E87.6 HYPOKALEMIA: ICD-10-CM

## 2023-11-07 DIAGNOSIS — E87.6 HYPOKALEMIA: Primary | ICD-10-CM

## 2023-11-07 DIAGNOSIS — C20 ADENOCARCINOMA OF RECTUM: ICD-10-CM

## 2023-11-07 DIAGNOSIS — G62.0 CHEMOTHERAPY-INDUCED NEUROPATHY: Primary | ICD-10-CM

## 2023-11-07 DIAGNOSIS — Z83.719 FAMILY HX COLONIC POLYPS: ICD-10-CM

## 2023-11-07 PROCEDURE — 96375 TX/PRO/DX INJ NEW DRUG ADDON: CPT

## 2023-11-07 PROCEDURE — 1160F PR REVIEW ALL MEDS BY PRESCRIBER/CLIN PHARMACIST DOCUMENTED: ICD-10-PCS | Mod: CPTII,,, | Performed by: INTERNAL MEDICINE

## 2023-11-07 PROCEDURE — 63600175 PHARM REV CODE 636 W HCPCS: Performed by: INTERNAL MEDICINE

## 2023-11-07 PROCEDURE — 3074F PR MOST RECENT SYSTOLIC BLOOD PRESSURE < 130 MM HG: ICD-10-PCS | Mod: CPTII,,, | Performed by: INTERNAL MEDICINE

## 2023-11-07 PROCEDURE — 1159F MED LIST DOCD IN RCRD: CPT | Mod: CPTII,,, | Performed by: INTERNAL MEDICINE

## 2023-11-07 PROCEDURE — 96413 CHEMO IV INFUSION 1 HR: CPT

## 2023-11-07 PROCEDURE — 3074F SYST BP LT 130 MM HG: CPT | Mod: CPTII,,, | Performed by: INTERNAL MEDICINE

## 2023-11-07 PROCEDURE — 25000003 PHARM REV CODE 250: Performed by: INTERNAL MEDICINE

## 2023-11-07 PROCEDURE — 3008F BODY MASS INDEX DOCD: CPT | Mod: CPTII,,, | Performed by: INTERNAL MEDICINE

## 2023-11-07 PROCEDURE — 96367 TX/PROPH/DG ADDL SEQ IV INF: CPT

## 2023-11-07 PROCEDURE — 96416 CHEMO PROLONG INFUSE W/PUMP: CPT

## 2023-11-07 PROCEDURE — 3078F DIAST BP <80 MM HG: CPT | Mod: CPTII,,, | Performed by: INTERNAL MEDICINE

## 2023-11-07 PROCEDURE — 96368 THER/DIAG CONCURRENT INF: CPT

## 2023-11-07 PROCEDURE — 99215 OFFICE O/P EST HI 40 MIN: CPT | Mod: PBBFAC,25 | Performed by: INTERNAL MEDICINE

## 2023-11-07 PROCEDURE — 4010F ACE/ARB THERAPY RXD/TAKEN: CPT | Mod: CPTII,,, | Performed by: INTERNAL MEDICINE

## 2023-11-07 PROCEDURE — 96415 CHEMO IV INFUSION ADDL HR: CPT

## 2023-11-07 PROCEDURE — 99214 OFFICE O/P EST MOD 30 MIN: CPT | Mod: S$PBB,,, | Performed by: INTERNAL MEDICINE

## 2023-11-07 PROCEDURE — 1160F RVW MEDS BY RX/DR IN RCRD: CPT | Mod: CPTII,,, | Performed by: INTERNAL MEDICINE

## 2023-11-07 PROCEDURE — 3078F PR MOST RECENT DIASTOLIC BLOOD PRESSURE < 80 MM HG: ICD-10-PCS | Mod: CPTII,,, | Performed by: INTERNAL MEDICINE

## 2023-11-07 PROCEDURE — 3008F PR BODY MASS INDEX (BMI) DOCUMENTED: ICD-10-PCS | Mod: CPTII,,, | Performed by: INTERNAL MEDICINE

## 2023-11-07 PROCEDURE — 99214 PR OFFICE/OUTPT VISIT, EST, LEVL IV, 30-39 MIN: ICD-10-PCS | Mod: S$PBB,,, | Performed by: INTERNAL MEDICINE

## 2023-11-07 PROCEDURE — 1159F PR MEDICATION LIST DOCUMENTED IN MEDICAL RECORD: ICD-10-PCS | Mod: CPTII,,, | Performed by: INTERNAL MEDICINE

## 2023-11-07 PROCEDURE — A4216 STERILE WATER/SALINE, 10 ML: HCPCS | Performed by: INTERNAL MEDICINE

## 2023-11-07 PROCEDURE — 4010F PR ACE/ARB THEARPY RXD/TAKEN: ICD-10-PCS | Mod: CPTII,,, | Performed by: INTERNAL MEDICINE

## 2023-11-07 RX ORDER — FAMOTIDINE 10 MG/ML
20 INJECTION INTRAVENOUS
Status: COMPLETED | OUTPATIENT
Start: 2023-11-07 | End: 2023-11-07

## 2023-11-07 RX ORDER — DIPHENHYDRAMINE HYDROCHLORIDE 50 MG/ML
25 INJECTION INTRAMUSCULAR; INTRAVENOUS
Status: COMPLETED | OUTPATIENT
Start: 2023-11-07 | End: 2023-11-07

## 2023-11-07 RX ORDER — DICYCLOMINE HYDROCHLORIDE 20 MG/1
1 TABLET ORAL EVERY 6 HOURS
COMMUNITY
Start: 2023-10-02 | End: 2023-12-01

## 2023-11-07 RX ORDER — ALPRAZOLAM 0.5 MG/1
0.5 TABLET ORAL 2 TIMES DAILY PRN
COMMUNITY
Start: 2023-10-18 | End: 2023-11-17

## 2023-11-07 RX ORDER — DIPHENHYDRAMINE HYDROCHLORIDE 50 MG/ML
50 INJECTION INTRAMUSCULAR; INTRAVENOUS ONCE AS NEEDED
Status: DISCONTINUED | OUTPATIENT
Start: 2023-11-07 | End: 2023-11-07 | Stop reason: HOSPADM

## 2023-11-07 RX ORDER — DEXAMETHASONE 4 MG/1
2 TABLET ORAL EVERY MORNING
COMMUNITY
Start: 2023-07-10

## 2023-11-07 RX ORDER — POTASSIUM CHLORIDE 1500 MG/1
40 TABLET, EXTENDED RELEASE ORAL DAILY
Qty: 28 TABLET | Refills: 0 | Status: SHIPPED | OUTPATIENT
Start: 2023-11-07 | End: 2023-11-21

## 2023-11-07 RX ORDER — SODIUM CHLORIDE 0.9 % (FLUSH) 0.9 %
10 SYRINGE (ML) INJECTION
Status: DISCONTINUED | OUTPATIENT
Start: 2023-11-07 | End: 2023-11-07 | Stop reason: HOSPADM

## 2023-11-07 RX ORDER — HYOSCYAMINE SULFATE 0.125 MG
1 TABLET ORAL EVERY 6 HOURS PRN
COMMUNITY
Start: 2023-06-20 | End: 2024-04-02

## 2023-11-07 RX ORDER — DULOXETIN HYDROCHLORIDE 20 MG/1
20 CAPSULE, DELAYED RELEASE ORAL
COMMUNITY
Start: 2023-10-22

## 2023-11-07 RX ORDER — LISINOPRIL 10 MG/1
20 TABLET ORAL DAILY
COMMUNITY
Start: 2023-10-18 | End: 2024-04-15

## 2023-11-07 RX ORDER — HEPARIN 100 UNIT/ML
500 SYRINGE INTRAVENOUS
Status: DISCONTINUED | OUTPATIENT
Start: 2023-11-07 | End: 2023-11-07 | Stop reason: HOSPADM

## 2023-11-07 RX ORDER — PANTOPRAZOLE SODIUM 40 MG/1
1 TABLET, DELAYED RELEASE ORAL EVERY MORNING
COMMUNITY

## 2023-11-07 RX ORDER — EPINEPHRINE 0.3 MG/.3ML
0.3 INJECTION SUBCUTANEOUS ONCE AS NEEDED
Status: DISCONTINUED | OUTPATIENT
Start: 2023-11-07 | End: 2023-11-07 | Stop reason: HOSPADM

## 2023-11-07 RX ADMIN — FLUOROURACIL 3850 MG: 50 INJECTION, SOLUTION INTRAVENOUS at 02:11

## 2023-11-07 RX ADMIN — SODIUM CHLORIDE: 9 INJECTION, SOLUTION INTRAVENOUS at 10:11

## 2023-11-07 RX ADMIN — DEXTROSE MONOHYDRATE: 50 INJECTION, SOLUTION INTRAVENOUS at 11:11

## 2023-11-07 RX ADMIN — DIPHENHYDRAMINE HYDROCHLORIDE 25 MG: 50 INJECTION INTRAMUSCULAR; INTRAVENOUS at 10:11

## 2023-11-07 RX ADMIN — LEUCOVORIN CALCIUM 650 MG: 200 INJECTION, POWDER, LYOPHILIZED, FOR SOLUTION INTRAMUSCULAR; INTRAVENOUS at 11:11

## 2023-11-07 RX ADMIN — OXALIPLATIN 135 MG: 5 INJECTION, SOLUTION INTRAVENOUS at 11:11

## 2023-11-07 RX ADMIN — FAMOTIDINE 20 MG: 10 INJECTION, SOLUTION INTRAVENOUS at 10:11

## 2023-11-07 RX ADMIN — PALONOSETRON HYDROCHLORIDE 0.25 MG: 0.25 INJECTION, SOLUTION INTRAVENOUS at 11:11

## 2023-11-07 RX ADMIN — Medication 10 ML: at 02:11

## 2023-11-07 NOTE — Clinical Note
Potassium 3.1, low  Magnesium level 2.0, normal Start potassium chloride 40 mEq p.o. q.day x2 weeks; no refills  In 2 weeks, recheck CMP and magnesium level Continue neoadjuvant FOLFOX for a total of 8 cycles Check CBC and CMP every 2 weeks with each cycle of chemotherapy  Re-stage with contrast enhanced CT scans of chest and abdomen, and contrast-enhanced MRI pelvis after completion of 8 cycles of FOLFOX (in approximately 6 weeks) Genetic testing and counseling because of family history of colon cancer and colon polyp Follow-up visit with NP in 2 weeks  Follow-up visit with me in 6 weeks, with scans.

## 2023-11-07 NOTE — PROGRESS NOTES
History:  Past Medical History:   Diagnosis Date    Anxiety disorder, unspecified     GERD (gastroesophageal reflux disease)     Rectal cancer        Past Surgical History:   Procedure Laterality Date    APPENDECTOMY      BACK SURGERY       SECTION  2004    cyst coccyx        Past medical history:  Anxiety.    Procedure/surgical history:  Appendectomy.  Back surgery.  Social history:  Single.  Lives in [place], [place].  Has 2 children.  Works as a .  No history of tobacco, alcohol, or illicit drug abuse.  Family history:  Paternal grandfather experience colon cancer in his 70s.  Father experienced multiple colon polyps.  Health maintenance:  No screening colonoscopy prior to most recent colonoscopy which led to the diagnosis of rectal cancer.  -2019:  Bilateral screening mammogram pelvic comparison:  2018 mammogram):  BI-RADS 0 (indeterminate)  -2019:  Diagnostic left mammogram, limited ultrasound left breast (comparison:  2019 mammogram):  Overall study BI-RADS 2: Benign     Family History   Problem Relation Age of Onset    Colon cancer Paternal Grandfather       Reason for Follow-up:  -adenocarcinoma rectum, moderately differentiated, partially obstructing mass, S/P colonoscopy 2023   -tubular adenoma transverse colon   -regional lymphadenopathy on CT abdomen pelvis with contrast 2023  -MMR proficient  -acute left upper extremity DVT related to PICC line  -grandfather experienced colon cancer; father experienced colon polyp  -chemotherapy-induced peripheral neuropathy    History of Present Illness:   No chief complaint on file.        Oncologic/Hematologic History:  Oncology History   Adenocarcinoma of rectum, stage 3   2023 Initial Diagnosis    Adenocarcinoma of rectum, stage 3     2023 -  Chemotherapy    Treatment Summary   Plan Name: OP mFOLFOX 6 Q2W  Treatment Goal: Curative  Status: Active  Start Date: 2023  End Date: 2023  (Planned)  Provider: John Bazan MD  Chemotherapy: fluorouraciL injection 650 mg, 400 mg/m2 = 650 mg, Intravenous, Clinic/HOD 1 time, 3 of 3 cycles  Administration: 650 mg (7/19/2023), 650 mg (8/1/2023), 650 mg (8/22/2023)  oxaliplatin (ELOXATIN) 85 mg/m2 = 139 mg in dextrose 5 % (D5W) 592.8 mL chemo infusion, 85 mg/m2 = 139 mg, Intravenous, Clinic/HOD 1 time, 6 of 8 cycles  Administration: 139 mg (7/19/2023), 139 mg (8/1/2023), 140 mg (9/18/2023), 135 mg (10/2/2023), 135 mg (8/22/2023), 135 mg (10/25/2023)     53-year-old lady, referred by Migel Ortiz MD, GI, with rectal cancer.    Family history pos for colon cancer in grandfather, colon polyp in father  Evaluated by Migel Ortiz MD, GI, 05/22/2023 for nervous stomach; change in bowel habits/pattern; change in bowel function started several months ago; currently, 1 bowel movement daily.  Blood in stool.  Lower abdominal pain.  Heartburn.  Epigastric pain.  MiraLax resulted in improvement.  Bright red blood in stool.    06/13/2023: EGD:  1. Esophagus: Erythema of mucosa in the lower 3rd of esophagus, compatible esophagitis  2. Stomach: Erythema of mucosa in the antrum, compatible with gastritis  3. Duodenum:  Normal mucosa in the whole duodenum      06/13/2023:  EGD:  1. Gastric antrum, biopsy:  Mild chronic inactive gastritis; negative for intestinal metaplasia; negative for H pylori organisms  2. Gastric body, biopsy:  Mild chronic inactive gastritis; negative for intestinal metaplasia; negative for H pylori organisms    06/19/2023:  Colonoscopy (screening colonoscopy):  -single 9 mm polyp transverse colon, polypectomy, completely removed  -ulcerated necrotic infiltrative 90% circumferential, bleeding, 5 cm in length, mass of malignant appearance in the rectum at 4 cm from the anus, causing partial obstruction; scope traversed the lesion  Pathology:  1. Transverse colon polyp, polypectomy:  Tubular adenoma  2. Rectal mass, biopsy:  Invasive moderately  differentiated adenocarcinoma; no LVI    MMR proficient  Low probability of MSI-H    06/21/2023: CT abdomen pelvis with and without contrast:  -large rectal mass consistent with malignancy; some lymphadenopathy is seen adjacent to the mass  (large rectal mass with circumferential wall thickening in rectum; associated inflammatory changes; no bowel obstruction; some lymph nodes are seen in the perirectal region on the right and left side; representative lymph node on right side of rectum 1 cm x 8 mm; representative lymph node on the left side of rectum 8 mm x 8 mm)    Labs reviewed:  05/24/2023:  WBC 8.0.  Hemoglobin 12.5.  MCV 88.  Platelets 306 K.  Differential count normal.    07/05/2023:   Pleasant lady who presents for initial medical oncology consultation, accompanied by her brother.    Her symptoms started 2 years ago, in the form of intermittent small amount hematochezia, initially on toilet wife's, and for last few weeks, in toilet bowel as well.  She brought the symptoms to the attention of her gyn several months ago and does not remember the recommendation.  Regardless, she never got a screening colonoscopy done.  Around [place] time 2022, she started feeling bloated.  She started having constant uncomfortable feeling because of bloating, as well as in the anorectal area.  Hematochezia continued.  She brought this to the attention of her PCP in [place] who suggested taking MiraLax.  The PCP also arranged for colonoscopy.  On today's visit, she reports that she has been constipated her entire life.  She has been constipated for at least 10 years.  Hematochezia for 2 years.  No anorectal pain.  Does have constant feeling of incomplete evacuation.  Appetite is down and she has lost 30 lb in last 3-4 months.  Appetite is more less preserved but she is afraid to eat because eating causes bloating and worsening of uncomfortable feeling in rectum.    Interval History:  PICC DOUBLE LUMEN LINE FLUSH   OP  mFOLFOX 6 Q2W     11/07/2023:   -10/17/2023: ANC 1.14  -10/24/2023: Restaging MRI pelvis with and without contrast (comparison:  CT 10/09/2023, MRI 07/18/2023):  Improved rectal mass and perirectal lymph nodes  -neoadjuvant FOLFOX:  Cycle 6 started 10/25/2023 (delayed because ANC was 1.14 on 10/17/2023; ANC improved to 2.03 K on 10/24/2023, enabling cycle 6 of chemotherapy to start 10/25/2023)  -10/30/2023:  Cymbalta, which was previously started for peripheral neuropathy, discontinued because she experienced increased anxiety with Cymbalta  Presents for a follow-up visit.  Today, scheduled to receive cycle 7 of neoadjuvant FOLFOX.  Overall, tolerating well.  Some tingling sensation in fingertips; can not pulled cold objects; otherwise, no pain or numbness.  Does not drop objects.  No functional impairment.  Did not tolerate Cymbalta.  I do not think that we need to start every other medication for mild neuropathy.  Some fatigue.  Appetite is suppressed.  She does not wish to start any appetite booster medication at this time.  ECOG 1.  Bowel movements are better in the sense that she feels less uncomfortable with bowel movements.  Occasional, small amount of blood with bowel movements.  No stomatitis, diarrhea, skin rash, or significant LFT abnormalities.        Medications:  Current Outpatient Medications on File Prior to Visit   Medication Sig Dispense Refill    ALPRAZolam (XANAX) 0.5 MG tablet Take 0.5 mg by mouth 2 (two) times daily as needed.      dexAMETHasone (DECADRON) 4 MG Tab Take 2 tablets by mouth every morning.      dicyclomine (BENTYL) 20 mg tablet Take 1 tablet by mouth every 6 (six) hours.      hyoscyamine (ANASPAZ,LEVSIN) 0.125 mg Tab Take 1 tablet by mouth every 6 (six) hours as needed.      lisinopriL 10 MG tablet Take 10 mg by mouth.      (Magic mouthwash) 1:1:1 diphenhydrAMINE(Benadryl) 12.5mg/5ml liq, aluminum & magnesium hydroxide-simethicone (Maalox), LIDOcaine viscous 2% Swish and spit 5  mLs every 4 (four) hours as needed (Mouth sores). for mouth sores 90 mL 0    ALPRAZolam (XANAX) 0.5 MG tablet Take 0.5 mg by mouth 2 (two) times daily as needed.      busPIRone (BUSPAR) 15 MG tablet Take 15 mg by mouth 2 (two) times daily.      dexAMETHasone (DECADRON) 4 MG Tab Take 2 tablets(8mg) by mouth daily on days 2 and 3 of each chemotherapy cycle. 32 tablet 0    dexAMETHasone (DECADRON) 4 MG Tab Take 2 tablets (8 mg total) by mouth once daily. Take as directed on days 2 and 3 of your chemotherapy cycle. 24 tablet 5    dicyclomine (BENTYL) 20 mg tablet Take 1 tablet (20 mg total) by mouth every 6 (six) hours. 120 tablet 1    DULoxetine (CYMBALTA) 20 MG capsule Take 20 mg by mouth.      DULoxetine (CYMBALTA) 30 MG capsule Take 1 capsule (30 mg total) by mouth once daily. 30 capsule 2    famotidine (PEPCID) 40 MG tablet Take 1 tablet (40 mg total) by mouth nightly as needed for Heartburn. 30 tablet 3    hyoscyamine (ANASPAZ,LEVSIN) 0.125 mg Tab Take 125 mcg by mouth every 6 (six) hours as needed.      lisinopriL 10 MG tablet Take 10 mg by mouth every morning.      LORazepam (ATIVAN) 0.5 MG tablet Take 1 tablet (0.5 mg total) by mouth every 12 (twelve) hours as needed for Anxiety. 28 tablet 0    MIRALAX 17 gram/dose powder Take 17 g by mouth.      ondansetron (ZOFRAN) 8 MG tablet Take 1 tablet (8 mg total) by mouth every 8 (eight) hours as needed for Nausea. 30 tablet 2    ondansetron (ZOFRAN-ODT) 8 MG TbDL Take 1 tablet (8 mg total) by mouth every 8 (eight) hours as needed (nausea/vomiting). (Patient not taking: Reported on 8/15/2023) 60 tablet 5    oxyCODONE-acetaminophen (PERCOCET) 5-325 mg per tablet Take 1 tablet by mouth every 4 (four) hours as needed for Pain. 56 tablet 0    pantoprazole (PROTONIX) 40 MG tablet Take 40 mg by mouth every morning.      pantoprazole (PROTONIX) 40 MG tablet Take 1 tablet by mouth every morning.      PANTOPRAZOLE 40 MG/100 ML 0.9 % NS IVPB       potassium chloride SA  "(K-DUR,KLOR-CON) 20 MEQ tablet Take 1 tablet (20 mEq total) by mouth once daily. (Patient not taking: Reported on 8/15/2023) 30 tablet 0    prochlorperazine (COMPAZINE) 10 MG tablet Take 1 tablet (10 mg total) by mouth 4 (four) times daily. 56 tablet 3    sodium,potassium,mag sulfates (SUPREP BOWEL PREP KIT) 17.5-3.13-1.6 gram SolR TAKE 1 KIT BY MOUTH SINGLE DOSE AS DIRECTED FOR 1 DAY       No current facility-administered medications on file prior to visit.       Review of Systems:   All systems reviewed and found to be negative except for the symptoms detailed above    Physical Examination:   VITAL SIGNS:   Vitals:    11/07/23 0855   BP: 116/79   Pulse: 109   Resp: 20   Temp: 97.7 °F (36.5 °C)       GENERAL:  In no apparent distress.    HEAD:  No signs of head trauma.  EYES:  Pupils are equal.  Extraocular motions intact.    EARS:  Hearing grossly intact.  MOUTH:  Oropharynx is normal.   NECK:  No adenopathy, no JVD.     CHEST:  Chest with clear breath sounds bilaterally.  No wheezes, rales, rhonchi.    CARDIAC:  Regular rate and rhythm.  S1 and S2, without murmurs, gallops, rubs.  VASCULAR:  No Edema.  Peripheral pulses normal and equal in all extremities.  ABDOMEN:  Soft, without detectable tenderness.  No sign of distention.  No   rebound or guarding, and no masses palpated.   Bowel Sounds normal.  MUSCULOSKELETAL:  Good range of motion of all major joints. Extremities without clubbing, cyanosis or edema.    NEUROLOGIC EXAM:  Alert and oriented x 3.  No focal sensory or strength deficits.   Speech normal.  Follows commands.  PSYCHIATRIC:  Mood normal.    No results for input(s): "CBC" in the last 72 hours.   No results for input(s): "CMP" in the last 72 hours.     Assessment:  Problem List Items Addressed This Visit          Neuro    Chemotherapy-induced neuropathy - Primary       Psychiatric    Anxiety disorder, unspecified       Cardiac/Vascular    Thrombosis in peripherally inserted central catheter (PICC) "       Oncology    Adenocarcinoma of rectum    Adenocarcinoma of rectum, stage 3    Family history of colon cancer    Chemotherapy management, encounter for       GI    Family hx colonic polyps       Other    Pelvic lymphadenopathy       Adenocarcinoma of rectum, moderately differentiated:   -presentation:  05/2023:  Change in bowel habits, hematochezia, lower abdominal pain  -colonoscopy 06/19/2023:    9 mm tubular adenoma transverse colon, removed;   invasive moderately differentiated adenocarcinoma of rectum presenting as ulcerated necrotic infiltrative 90% circumferential, bleeding, partially obstructing rectal mass, 5 cm long, 4 cm from anus, scope traversed the lesion  -MMR proficient; low probability of MSI-H  -CT abdomen pelvis with contrast 06/21/2023:  No metastasis; large rectal mass and some regional lymphadenopathy on CT abdomen pelvis with contrast 06/21/2023, largest perirectal lymph node 1 cm x 8 mm  -CEA level < 1.73, normal (07/05/2023)  -no lung metastases on CT chest 07/18/2023  -MRI pelvis 07/18/2023: Large mid to distal rectal mass (T3b); at least 10 perirectal lymph nodes with increased signal on DWI imaging, largest 9 mm (T2b)  >> radiologically, T3b N2b (stage IIIC)  -07/27/2023:  developed acute DVT left subclavian/axillary/brachial veins secondary to PICC line; started on Eliquis; PICC line removed  -neoadjuvant FOLFOX started 07/19/2023  -cycle 4 of neoadjuvant FOLFOX started 09/18/2023  -cycle 5 of neoadjuvant FOLFOX started 10/02/2023  -positive response on restaging CTs C/A/P 10 09/2023, S/P neoadjuvant FOLFOX x5 cycles (marked improvement in rectal mass; improved perirectal lymphadenopathy)  -restaging pelvic MRI 10/24/2020: Improved rectal mass and perirectal lymph nodes   -cycle 6 of neoadjuvant FOLFOX started 10/25/2023  -10/30/2023: Cymbalta discontinued because she experienced increased anxiety with Cymbalta      Left upper extremity DVT secondary to PICC line  07/27/2023:  -developed acute DVT left subclavian/axillary/brachial veins secondary to PICC line; started on Eliquis; PICC line removed      Chemotherapy-induced peripheral neuropathy:  -10/17/2023: Chemotherapy induced peripheral neuropathy in the form of tingling in hands; no neuropathy in feet; no numbness or pain  -10/17/2020: Started on Cymbalta 30 mg p.o. q.h.s.  -Cymbalta discontinued 10/30/2023 because of increased anxiety with Cymbalta reported by her      Family history of colon cancer:  -grandfather experienced colon cancer; father experienced colon polyp      Plan:  Potassium 3.1, low   Magnesium level 2.0, normal  Start potassium chloride 40 mEq p.o. q.day x2 weeks; no refills   In 2 weeks, recheck CMP and magnesium level  Continue neoadjuvant FOLFOX for a total of 8 cycles  Check CBC and CMP every 2 weeks with each cycle of chemotherapy   Re-stage with contrast enhanced CT scans of chest and abdomen, and contrast-enhanced MRI pelvis after completion of 8 cycles of FOLFOX (in approximately 6 weeks)  Genetic testing and counseling because of family history of colon cancer and colon polyp  Follow-up visit with NP in 2 weeks   Follow-up visit with me in 6 weeks, with scans.  --------------------------    -10/17/2023: Chemotherapy induced peripheral neuropathy in the form of tingling in hands; no neuropathy in feet; no numbness or pain  -10/17/2020: Started on Cymbalta 30 mg p.o. q.h.s.  -Cymbalta discontinued 10/30/2023 because of increased anxiety with Cymbalta reported by her    Potassium 3.1, low   Magnesium level 2.0, normal  Start potassium chloride 40 mEq p.o. q.day x2 weeks; no refills   In 2 weeks, recheck CMP and magnesium level    -10/17/2023:  Referred to behavioral health for management of anxiety.    -adenocarcinoma rectum, moderately differentiated  -presentation:  05/2023: Change in bowel habits, hematochezia, lower abdominal pain  -colonoscopy 06/19/2023:  Ulcerated necrotic infiltrative  90% circumferential bleeding partially obstructing rectal mass, 5 cm long, 4 cm from anus  -MMR proficient, low probability of MSI-H  -no metastasis   -baseline CEA level normal (07/05/2023)   -MRI pelvis 07/18/2023: Large mid to distal rectal mass (T3b); at least 10 perirectal lymph nodes, suspicious for metastasis (T2b)  -radiologically, T3b N2b, stage IIIC  -neoadjuvant FOLFOX started 07/19/2023  -cycle 4 of neoadjuvant FOLFOX started 09/18/2023  -cycle 5 of neoadjuvant FOLFOX started 10/02/2023  -positive response on restaging CTs C/A/P 10 09/2023, S/P neoadjuvant FOLFOX x5 cycles (marked improvement in rectal mass; improved perirectal lymphadenopathy)  -restaging pelvic MRI 10/24/2020: Improved rectal mass and perirectal lymph nodes   -cycle 6 of neoadjuvant FOLFOX started 10/25/2023  -10/30/2023: Cymbalta discontinued because she experienced increased anxiety with Cymbalta  >>>  Continue neoadjuvant FOLFOX every 2 weeks x8 cycles  Check CBC and CMP every couple of weeks with each cycle of chemotherapy   Re-stage with contrast enhanced CT scans of chest/abdomen, and MRI pelvis with and without contrast after completion of 8 cycles of neoadjuvant FOLFOX (in approximately 6 weeks)  Subsequently, neoadjuvant chemoradiation therapy    Plan of treatment:  Neoadjuvant therapy:  Perioperative treatment is recommended for up to a total of 6 months  MMR proficient.  Low probability of MSI-H.  Regimen:  Total neoadjuvant therapy:  1. Modified FOLFOX every 2 weeks X 16 weeks (8 cycles); followed by  2. Restaging with contrast-enhanced CT scans of chest/abdomen, and MRI pelvis with contrast after 4 cycles, then after 8 cycles; followed by   3. Long course chemoradiation therapy with capecitabine; followed by  4. Restaging with contrast-enhanced CT scans of chest/abdomen, and MRI pelvis with contrast; followed by   5. Transabdominal resection; or, if complete clinical response, then consider surveillance      -developed  PICC line related left upper extremity DVT 07/27/2023   PICC line removed   Started on Eliquis  >>>  Continue anticoagulation for at least 3 months (until the end of October)   -11/07/2023: Tells me that she stopped anticoagulation Halloween day (10/31/2023)    Fertility risk discussion/counseling if premenopausal   (she has been postmenopausal for last 2 years and indicated that she had no desire to go for oocyte preservation)    Family history of colon cancer & colon polyp   Have already requested genetic testing and counseling.    Follow-up visit with NP in 2 weeks   Follow-up visit with me in 6 weeks, with scans.    Above discussed leg length with her.  All questions answered.    Discussed labs and scans and gave her copies of relevant reports.    Plan of management discussed in detail once again.    She understands and agrees with this plan.    Follow-up:  No follow-ups on file.

## 2023-11-07 NOTE — NURSING
0834  Pt here for labs, provider, and C 7 folfox.  Denies any pain but does report fatigue.  0849  Sterile PICC dressing change performed with good flow and blood return.  Pt will see provider and return for chemo.  Pt is accomp by her brother.

## 2023-11-09 ENCOUNTER — INFUSION (OUTPATIENT)
Dept: INFUSION THERAPY | Facility: HOSPITAL | Age: 53
End: 2023-11-09
Attending: INTERNAL MEDICINE
Payer: MEDICAID

## 2023-11-09 VITALS
DIASTOLIC BLOOD PRESSURE: 88 MMHG | HEART RATE: 102 BPM | SYSTOLIC BLOOD PRESSURE: 157 MMHG | TEMPERATURE: 99 F | RESPIRATION RATE: 20 BRPM

## 2023-11-09 DIAGNOSIS — R11.0 NAUSEA: Primary | ICD-10-CM

## 2023-11-09 DIAGNOSIS — C20 ADENOCARCINOMA OF RECTUM, STAGE 3: ICD-10-CM

## 2023-11-09 PROCEDURE — 25000003 PHARM REV CODE 250: Performed by: INTERNAL MEDICINE

## 2023-11-09 PROCEDURE — 96360 HYDRATION IV INFUSION INIT: CPT

## 2023-11-09 RX ORDER — SODIUM CHLORIDE 0.9 % (FLUSH) 0.9 %
10 SYRINGE (ML) INJECTION
Status: DISCONTINUED | OUTPATIENT
Start: 2023-11-09 | End: 2023-11-09 | Stop reason: HOSPADM

## 2023-11-09 RX ORDER — HEPARIN 100 UNIT/ML
500 SYRINGE INTRAVENOUS
Status: DISCONTINUED | OUTPATIENT
Start: 2023-11-09 | End: 2023-11-09 | Stop reason: HOSPADM

## 2023-11-09 RX ADMIN — SODIUM CHLORIDE 1000 ML: 9 INJECTION, SOLUTION INTRAVENOUS at 01:11

## 2023-11-15 ENCOUNTER — INFUSION (OUTPATIENT)
Dept: INFUSION THERAPY | Facility: HOSPITAL | Age: 53
End: 2023-11-15
Attending: INTERNAL MEDICINE
Payer: MEDICAID

## 2023-11-15 DIAGNOSIS — C20 ADENOCARCINOMA OF RECTUM, STAGE 3: Primary | ICD-10-CM

## 2023-11-15 PROCEDURE — 25000003 PHARM REV CODE 250: Performed by: INTERNAL MEDICINE

## 2023-11-15 PROCEDURE — 96523 IRRIG DRUG DELIVERY DEVICE: CPT

## 2023-11-15 PROCEDURE — A4216 STERILE WATER/SALINE, 10 ML: HCPCS | Performed by: INTERNAL MEDICINE

## 2023-11-15 RX ORDER — SODIUM CHLORIDE 0.9 % (FLUSH) 0.9 %
10 SYRINGE (ML) INJECTION
OUTPATIENT
Start: 2023-11-15

## 2023-11-15 RX ORDER — HEPARIN 100 UNIT/ML
500 SYRINGE INTRAVENOUS
OUTPATIENT
Start: 2023-11-15

## 2023-11-15 RX ORDER — SODIUM CHLORIDE 0.9 % (FLUSH) 0.9 %
10 SYRINGE (ML) INJECTION
Status: COMPLETED | OUTPATIENT
Start: 2023-11-15 | End: 2023-11-15

## 2023-11-15 RX ADMIN — SODIUM CHLORIDE, PRESERVATIVE FREE 10 ML: 5 INJECTION INTRAVENOUS at 01:11

## 2023-11-15 NOTE — NURSING
Pt arrived ambulatory to infusion alert and oriented with no complaints. Pt is here for PICC line dressing change and flush per protocol.  Pt tolerated well.    Lola Delaney RN

## 2023-11-17 DIAGNOSIS — C20 ADENOCARCINOMA OF RECTUM: Primary | ICD-10-CM

## 2023-11-20 ENCOUNTER — OFFICE VISIT (OUTPATIENT)
Dept: HEMATOLOGY/ONCOLOGY | Facility: CLINIC | Age: 53
End: 2023-11-20
Payer: MEDICAID

## 2023-11-20 ENCOUNTER — INFUSION (OUTPATIENT)
Dept: INFUSION THERAPY | Facility: HOSPITAL | Age: 53
End: 2023-11-20
Attending: INTERNAL MEDICINE
Payer: MEDICAID

## 2023-11-20 VITALS — SYSTOLIC BLOOD PRESSURE: 103 MMHG | DIASTOLIC BLOOD PRESSURE: 68 MMHG

## 2023-11-20 VITALS
WEIGHT: 121.81 LBS | DIASTOLIC BLOOD PRESSURE: 87 MMHG | SYSTOLIC BLOOD PRESSURE: 151 MMHG | TEMPERATURE: 98 F | HEART RATE: 120 BPM | BODY MASS INDEX: 23 KG/M2 | HEIGHT: 61 IN | OXYGEN SATURATION: 98 % | RESPIRATION RATE: 20 BRPM

## 2023-11-20 DIAGNOSIS — C20 ADENOCARCINOMA OF RECTUM: Primary | ICD-10-CM

## 2023-11-20 DIAGNOSIS — G62.0 CHEMOTHERAPY-INDUCED NEUROPATHY: ICD-10-CM

## 2023-11-20 DIAGNOSIS — E87.6 HYPOKALEMIA: ICD-10-CM

## 2023-11-20 DIAGNOSIS — C20 ADENOCARCINOMA OF RECTUM, STAGE 3: Primary | ICD-10-CM

## 2023-11-20 DIAGNOSIS — R11.0 NAUSEA: ICD-10-CM

## 2023-11-20 DIAGNOSIS — T45.1X5A CHEMOTHERAPY-INDUCED NEUROPATHY: ICD-10-CM

## 2023-11-20 PROCEDURE — 96375 TX/PRO/DX INJ NEW DRUG ADDON: CPT

## 2023-11-20 PROCEDURE — 3079F DIAST BP 80-89 MM HG: CPT | Mod: CPTII,,, | Performed by: NURSE PRACTITIONER

## 2023-11-20 PROCEDURE — 1159F MED LIST DOCD IN RCRD: CPT | Mod: CPTII,,, | Performed by: NURSE PRACTITIONER

## 2023-11-20 PROCEDURE — 96367 TX/PROPH/DG ADDL SEQ IV INF: CPT

## 2023-11-20 PROCEDURE — 4010F PR ACE/ARB THEARPY RXD/TAKEN: ICD-10-PCS | Mod: CPTII,,, | Performed by: NURSE PRACTITIONER

## 2023-11-20 PROCEDURE — 1159F PR MEDICATION LIST DOCUMENTED IN MEDICAL RECORD: ICD-10-PCS | Mod: CPTII,,, | Performed by: NURSE PRACTITIONER

## 2023-11-20 PROCEDURE — 99215 OFFICE O/P EST HI 40 MIN: CPT | Mod: PBBFAC,25 | Performed by: NURSE PRACTITIONER

## 2023-11-20 PROCEDURE — 99215 OFFICE O/P EST HI 40 MIN: CPT | Mod: S$PBB,,, | Performed by: NURSE PRACTITIONER

## 2023-11-20 PROCEDURE — 3077F SYST BP >= 140 MM HG: CPT | Mod: CPTII,,, | Performed by: NURSE PRACTITIONER

## 2023-11-20 PROCEDURE — 3008F BODY MASS INDEX DOCD: CPT | Mod: CPTII,,, | Performed by: NURSE PRACTITIONER

## 2023-11-20 PROCEDURE — 4010F ACE/ARB THERAPY RXD/TAKEN: CPT | Mod: CPTII,,, | Performed by: NURSE PRACTITIONER

## 2023-11-20 PROCEDURE — 63600175 PHARM REV CODE 636 W HCPCS: Performed by: INTERNAL MEDICINE

## 2023-11-20 PROCEDURE — 96416 CHEMO PROLONG INFUSE W/PUMP: CPT

## 2023-11-20 PROCEDURE — 3008F PR BODY MASS INDEX (BMI) DOCUMENTED: ICD-10-PCS | Mod: CPTII,,, | Performed by: NURSE PRACTITIONER

## 2023-11-20 PROCEDURE — 96415 CHEMO IV INFUSION ADDL HR: CPT

## 2023-11-20 PROCEDURE — 96413 CHEMO IV INFUSION 1 HR: CPT

## 2023-11-20 PROCEDURE — 1160F RVW MEDS BY RX/DR IN RCRD: CPT | Mod: CPTII,,, | Performed by: NURSE PRACTITIONER

## 2023-11-20 PROCEDURE — 3079F PR MOST RECENT DIASTOLIC BLOOD PRESSURE 80-89 MM HG: ICD-10-PCS | Mod: CPTII,,, | Performed by: NURSE PRACTITIONER

## 2023-11-20 PROCEDURE — 1160F PR REVIEW ALL MEDS BY PRESCRIBER/CLIN PHARMACIST DOCUMENTED: ICD-10-PCS | Mod: CPTII,,, | Performed by: NURSE PRACTITIONER

## 2023-11-20 PROCEDURE — 99215 PR OFFICE/OUTPT VISIT, EST, LEVL V, 40-54 MIN: ICD-10-PCS | Mod: S$PBB,,, | Performed by: NURSE PRACTITIONER

## 2023-11-20 PROCEDURE — 25000003 PHARM REV CODE 250: Performed by: INTERNAL MEDICINE

## 2023-11-20 PROCEDURE — 96368 THER/DIAG CONCURRENT INF: CPT

## 2023-11-20 PROCEDURE — 3077F PR MOST RECENT SYSTOLIC BLOOD PRESSURE >= 140 MM HG: ICD-10-PCS | Mod: CPTII,,, | Performed by: NURSE PRACTITIONER

## 2023-11-20 RX ORDER — HEPARIN 100 UNIT/ML
500 SYRINGE INTRAVENOUS
Status: DISCONTINUED | OUTPATIENT
Start: 2023-11-20 | End: 2023-11-20 | Stop reason: HOSPADM

## 2023-11-20 RX ORDER — SODIUM CHLORIDE 0.9 % (FLUSH) 0.9 %
10 SYRINGE (ML) INJECTION
Status: DISCONTINUED | OUTPATIENT
Start: 2023-11-20 | End: 2023-11-20 | Stop reason: HOSPADM

## 2023-11-20 RX ORDER — DIPHENHYDRAMINE HYDROCHLORIDE 50 MG/ML
25 INJECTION INTRAMUSCULAR; INTRAVENOUS
Status: COMPLETED | OUTPATIENT
Start: 2023-11-20 | End: 2023-11-20

## 2023-11-20 RX ORDER — FAMOTIDINE 10 MG/ML
20 INJECTION INTRAVENOUS
Status: COMPLETED | OUTPATIENT
Start: 2023-11-20 | End: 2023-11-20

## 2023-11-20 RX ORDER — EPINEPHRINE 0.3 MG/.3ML
0.3 INJECTION SUBCUTANEOUS ONCE AS NEEDED
Status: DISCONTINUED | OUTPATIENT
Start: 2023-11-20 | End: 2023-11-20 | Stop reason: HOSPADM

## 2023-11-20 RX ORDER — DIPHENHYDRAMINE HYDROCHLORIDE 50 MG/ML
50 INJECTION INTRAMUSCULAR; INTRAVENOUS ONCE AS NEEDED
Status: DISCONTINUED | OUTPATIENT
Start: 2023-11-20 | End: 2023-11-20 | Stop reason: HOSPADM

## 2023-11-20 RX ADMIN — FAMOTIDINE 20 MG: 10 INJECTION, SOLUTION INTRAVENOUS at 09:11

## 2023-11-20 RX ADMIN — OXALIPLATIN 130 MG: 5 INJECTION, SOLUTION INTRAVENOUS at 10:11

## 2023-11-20 RX ADMIN — LEUCOVORIN CALCIUM 600 MG: 500 INJECTION, POWDER, LYOPHILIZED, FOR SOLUTION INTRAMUSCULAR; INTRAVENOUS at 10:11

## 2023-11-20 RX ADMIN — FLUOROURACIL 3750 MG: 50 INJECTION, SOLUTION INTRAVENOUS at 12:11

## 2023-11-20 RX ADMIN — DIPHENHYDRAMINE HYDROCHLORIDE 25 MG: 50 INJECTION INTRAMUSCULAR; INTRAVENOUS at 09:11

## 2023-11-20 RX ADMIN — PALONOSETRON HYDROCHLORIDE 0.25 MG: 0.25 INJECTION, SOLUTION INTRAVENOUS at 09:11

## 2023-11-20 NOTE — Clinical Note
Return to clinic  with MD on 12/19/2023. CBC, CMP done prior  Scans scheduled for 12/15/2023.  Patient declined a 2 week post treatment f/u with NP Okay to treat today.

## 2023-11-20 NOTE — PROGRESS NOTES
History:  Past Medical History:   Diagnosis Date    Anxiety disorder, unspecified     GERD (gastroesophageal reflux disease)     Rectal cancer        Past Surgical History:   Procedure Laterality Date    APPENDECTOMY      BACK SURGERY       SECTION  2004    cyst coccyx        Past medical history:  Anxiety.    Procedure/surgical history:  Appendectomy.  Back surgery.  Social history:  Single.  Lives in [place], [place].  Has 2 children.  Works as a .  No history of tobacco, alcohol, or illicit drug abuse.  Family history:  Paternal grandfather experience colon cancer in his 70s.  Father experienced multiple colon polyps.  Health maintenance:  No screening colonoscopy prior to most recent colonoscopy which led to the diagnosis of rectal cancer.  -2019:  Bilateral screening mammogram pelvic comparison:  2018 mammogram):  BI-RADS 0 (indeterminate)  -2019:  Diagnostic left mammogram, limited ultrasound left breast (comparison:  2019 mammogram):  Overall study BI-RADS 2: Benign     Family History   Problem Relation Age of Onset    Colon cancer Paternal Grandfather       Reason for Follow-up:  -adenocarcinoma rectum, moderately differentiated, partially obstructing mass, S/P colonoscopy 2023   -tubular adenoma transverse colon   -regional lymphadenopathy on CT abdomen pelvis with contrast 2023  -MMR proficient  -acute left upper extremity DVT related to PICC line  -grandfather experienced colon cancer; father experienced colon polyp  -chemotherapy-induced peripheral neuropathy    History of Present Illness:     CC: follow up visit       Oncologic/Hematologic History:  Oncology History   Adenocarcinoma of rectum, stage 3   2023 Initial Diagnosis    Adenocarcinoma of rectum, stage 3     2023 -  Chemotherapy    Treatment Summary   Plan Name: OP mFOLFOX 6 Q2W  Treatment Goal: Curative  Status: Active  Start Date: 2023  End Date: 2023  (Planned)  Provider: John Bazan MD  Chemotherapy: fluorouraciL injection 650 mg, 400 mg/m2 = 650 mg, Intravenous, Clinic/HOD 1 time, 3 of 3 cycles  Administration: 650 mg (7/19/2023), 650 mg (8/1/2023), 650 mg (8/22/2023)  oxaliplatin (ELOXATIN) 85 mg/m2 = 139 mg in dextrose 5 % (D5W) 592.8 mL chemo infusion, 85 mg/m2 = 139 mg, Intravenous, Clinic/HOD 1 time, 7 of 8 cycles  Administration: 139 mg (7/19/2023), 139 mg (8/1/2023), 140 mg (9/18/2023), 135 mg (10/2/2023), 135 mg (11/7/2023), 135 mg (8/22/2023), 135 mg (10/25/2023)     53-year-old lady, referred by Migel Ortiz MD, GI, with rectal cancer.    Family history pos for colon cancer in grandfather, colon polyp in father  Evaluated by Migel Ortiz MD, GI, 05/22/2023 for nervous stomach; change in bowel habits/pattern; change in bowel function started several months ago; currently, 1 bowel movement daily.  Blood in stool.  Lower abdominal pain.  Heartburn.  Epigastric pain.  MiraLax resulted in improvement.  Bright red blood in stool.    06/13/2023: EGD:  1. Esophagus: Erythema of mucosa in the lower 3rd of esophagus, compatible esophagitis  2. Stomach: Erythema of mucosa in the antrum, compatible with gastritis  3. Duodenum:  Normal mucosa in the whole duodenum      06/13/2023:  EGD:  1. Gastric antrum, biopsy:  Mild chronic inactive gastritis; negative for intestinal metaplasia; negative for H pylori organisms  2. Gastric body, biopsy:  Mild chronic inactive gastritis; negative for intestinal metaplasia; negative for H pylori organisms    06/19/2023:  Colonoscopy (screening colonoscopy):  -single 9 mm polyp transverse colon, polypectomy, completely removed  -ulcerated necrotic infiltrative 90% circumferential, bleeding, 5 cm in length, mass of malignant appearance in the rectum at 4 cm from the anus, causing partial obstruction; scope traversed the lesion  Pathology:  1. Transverse colon polyp, polypectomy:  Tubular adenoma  2. Rectal mass, biopsy:   Invasive moderately differentiated adenocarcinoma; no LVI    MMR proficient  Low probability of MSI-H    06/21/2023: CT abdomen pelvis with and without contrast:  -large rectal mass consistent with malignancy; some lymphadenopathy is seen adjacent to the mass  (large rectal mass with circumferential wall thickening in rectum; associated inflammatory changes; no bowel obstruction; some lymph nodes are seen in the perirectal region on the right and left side; representative lymph node on right side of rectum 1 cm x 8 mm; representative lymph node on the left side of rectum 8 mm x 8 mm)    Labs reviewed:  05/24/2023:  WBC 8.0.  Hemoglobin 12.5.  MCV 88.  Platelets 306 K.  Differential count normal.    07/05/2023:   Pleasant lady who presents for initial medical oncology consultation, accompanied by her brother.    Her symptoms started 2 years ago, in the form of intermittent small amount hematochezia, initially on toilet wife's, and for last few weeks, in toilet bowel as well.  She brought the symptoms to the attention of her gyn several months ago and does not remember the recommendation.  Regardless, she never got a screening colonoscopy done.  Around [place] time 2022, she started feeling bloated.  She started having constant uncomfortable feeling because of bloating, as well as in the anorectal area.  Hematochezia continued.  She brought this to the attention of her PCP in [place] who suggested taking MiraLax.  The PCP also arranged for colonoscopy.  On today's visit, she reports that she has been constipated her entire life.  She has been constipated for at least 10 years.  Hematochezia for 2 years.  No anorectal pain.  Does have constant feeling of incomplete evacuation.  Appetite is down and she has lost 30 lb in last 3-4 months.  Appetite is more less preserved but she is afraid to eat because eating causes bloating and worsening of uncomfortable feeling in rectum.    Interval History:  PICC DOUBLE LUMEN  LINE FLUSH   OP mFOLFOX 6 Q2W     11/20/2023:   Patient presents to clinic today for a 2 week follow up and treatment visit for rectal cancer.   States treatment has been going well, feels like it is working.   Today is cycle # 8, last one.   States eats every couple hours. Started drinking one Ensure per day as of yesterday.   Denies any fever or infection. No diarrhea, No N/V, mouth sores,   Little bit of numbness/tingling affecting hands. Noticed since starting treatment. Comes and goes.   Tolerable, but linger more days with eath treatment. No associated pain or dropping items.   Denies night sweats,  infection, cough, SOB, abdominal pain, N/V, mouth sores, diarrhea, rashes. No bleeding or bruising.   No appetite. 8 pound weight loss past 4 weeks.   She is taking potassium, one BID. Has one week left. Potassium returned to normal level(4.1), magnesium is normal     Medications:  Current Outpatient Medications on File Prior to Visit   Medication Sig Dispense Refill    (Magic mouthwash) 1:1:1 diphenhydrAMINE(Benadryl) 12.5mg/5ml liq, aluminum & magnesium hydroxide-simethicone (Maalox), LIDOcaine viscous 2% Swish and spit 5 mLs every 4 (four) hours as needed (Mouth sores). for mouth sores 90 mL 0    ALPRAZolam (XANAX) 0.5 MG tablet Take 0.5 mg by mouth 2 (two) times daily as needed.      busPIRone (BUSPAR) 15 MG tablet Take 15 mg by mouth 2 (two) times daily.      dexAMETHasone (DECADRON) 4 MG Tab Take 2 tablets(8mg) by mouth daily on days 2 and 3 of each chemotherapy cycle. 32 tablet 0    dexAMETHasone (DECADRON) 4 MG Tab Take 2 tablets (8 mg total) by mouth once daily. Take as directed on days 2 and 3 of your chemotherapy cycle. 24 tablet 5    dexAMETHasone (DECADRON) 4 MG Tab Take 2 tablets by mouth every morning.      dicyclomine (BENTYL) 20 mg tablet Take 1 tablet (20 mg total) by mouth every 6 (six) hours. 120 tablet 1    dicyclomine (BENTYL) 20 mg tablet Take 1 tablet by mouth every 6 (six) hours.       DULoxetine (CYMBALTA) 20 MG capsule Take 20 mg by mouth.      DULoxetine (CYMBALTA) 30 MG capsule Take 1 capsule (30 mg total) by mouth once daily. 30 capsule 2    famotidine (PEPCID) 40 MG tablet Take 1 tablet (40 mg total) by mouth nightly as needed for Heartburn. 30 tablet 3    hyoscyamine (ANASPAZ,LEVSIN) 0.125 mg Tab Take 125 mcg by mouth every 6 (six) hours as needed.      hyoscyamine (ANASPAZ,LEVSIN) 0.125 mg Tab Take 1 tablet by mouth every 6 (six) hours as needed.      lisinopriL 10 MG tablet Take 10 mg by mouth every morning.      lisinopriL 10 MG tablet Take 10 mg by mouth.      LORazepam (ATIVAN) 0.5 MG tablet Take 1 tablet (0.5 mg total) by mouth every 12 (twelve) hours as needed for Anxiety. 28 tablet 0    MIRALAX 17 gram/dose powder Take 17 g by mouth.      ondansetron (ZOFRAN) 8 MG tablet Take 1 tablet (8 mg total) by mouth every 8 (eight) hours as needed for Nausea. 30 tablet 2    ondansetron (ZOFRAN-ODT) 8 MG TbDL Take 1 tablet (8 mg total) by mouth every 8 (eight) hours as needed (nausea/vomiting). (Patient not taking: Reported on 8/15/2023) 60 tablet 5    oxyCODONE-acetaminophen (PERCOCET) 5-325 mg per tablet Take 1 tablet by mouth every 4 (four) hours as needed for Pain. 56 tablet 0    pantoprazole (PROTONIX) 40 MG tablet Take 40 mg by mouth every morning.      pantoprazole (PROTONIX) 40 MG tablet Take 1 tablet by mouth every morning.      PANTOPRAZOLE 40 MG/100 ML 0.9 % NS IVPB       potassium chloride (K-TAB) 20 mEq Take 2 tablets (40 mEq total) by mouth once daily. for 14 days 28 tablet 0    prochlorperazine (COMPAZINE) 10 MG tablet Take 1 tablet (10 mg total) by mouth 4 (four) times daily. 56 tablet 3    sodium,potassium,mag sulfates (SUPREP BOWEL PREP KIT) 17.5-3.13-1.6 gram SolR TAKE 1 KIT BY MOUTH SINGLE DOSE AS DIRECTED FOR 1 DAY       No current facility-administered medications on file prior to visit.       Review of Systems:   All systems reviewed and found to be negative except for  the symptoms detailed above    Physical Examination:   VITAL SIGNS:   Vitals:    11/20/23 0831   BP: (!) 151/87   Pulse: (!) 120   Resp: 20   Temp: 97.9 °F (36.6 °C)     GENERAL:  Neatly groomed in no apparent distress.    HEAD:  No signs of head trauma.  EYES:  Pupils are equal.  Extraocular motions intact.  Sclera anicteric.   EARS:  Hearing grossly intact.  MOUTH:  Oropharynx is moist and clear, no exudate, oral ulcers.    NECK:  No adenopathy.   CHEST:  Chest with clear breath sounds bilaterally.  No wheezes, rales, rhonchi.    CARDIAC:  Regular rate and rhythm.  S1 and S2, without murmurs, gallops, rubs.  VASCULAR:  No Edema.  Peripheral pulses normal and equal in all extremities.  ABDOMEN:  Soft, without detectable tenderness.  No sign of distention.  No   rebound or guarding, and no masses palpated.   Bowel Sounds normal.  MUSCULOSKELETAL:  Good range of motion of all major joints. Extremities without clubbing, cyanosis or edema.    NEUROLOGIC EXAM:  Alert and oriented x 3.  No focal sensory or strength deficits.   Speech normal.  Follows commands.  PSYCHIATRIC: Flat affect.     11/20/20203. Stable CBC, CMP    Assessment:    Adenocarcinoma of rectum, moderately differentiated:   -presentation:  05/2023:  Change in bowel habits, hematochezia, lower abdominal pain  -colonoscopy 06/19/2023:    9 mm tubular adenoma transverse colon, removed;   invasive moderately differentiated adenocarcinoma of rectum presenting as ulcerated necrotic infiltrative 90% circumferential, bleeding, partially obstructing rectal mass, 5 cm long, 4 cm from anus, scope traversed the lesion  -MMR proficient; low probability of MSI-H  -CT abdomen pelvis with contrast 06/21/2023:  No metastasis; large rectal mass and some regional lymphadenopathy on CT abdomen pelvis with contrast 06/21/2023, largest perirectal lymph node 1 cm x 8 mm  -CEA level < 1.73, normal (07/05/2023)  -no lung metastases on CT chest 07/18/2023  -MRI pelvis 07/18/2023:  Large mid to distal rectal mass (T3b); at least 10 perirectal lymph nodes with increased signal on DWI imaging, largest 9 mm (T2b)  >> radiologically, T3b N2b (stage IIIC)  -07/27/2023:  developed acute DVT left subclavian/axillary/brachial veins secondary to PICC line; started on Eliquis; PICC line removed  -neoadjuvant FOLFOX started 07/19/2023  -cycle 4 of neoadjuvant FOLFOX started 09/18/2023  -cycle 5 of neoadjuvant FOLFOX started 10/02/2023  -positive response on restaging CTs C/A/P 10 09/2023, S/P neoadjuvant FOLFOX x5 cycles (marked improvement in rectal mass; improved perirectal lymphadenopathy)  -restaging pelvic MRI 10/24/2020: Improved rectal mass and perirectal lymph nodes   -cycle 6 of neoadjuvant FOLFOX started 10/25/2023  -10/30/2023: Cymbalta discontinued because she experienced increased anxiety with Cymbalta      Left upper extremity DVT secondary to PICC line 07/27/2023:  -developed acute DVT left subclavian/axillary/brachial veins secondary to PICC line; started on Eliquis; PICC line removed      Chemotherapy-induced peripheral neuropathy:  -10/17/2023: Chemotherapy induced peripheral neuropathy in the form of tingling in hands; no neuropathy in feet; no numbness or pain  -10/17/2020: Started on Cymbalta 30 mg p.o. q.h.s.  -Cymbalta discontinued 10/30/2023 because of increased anxiety with Cymbalta reported by her      Family history of colon cancer:  -grandfather experienced colon cancer; father experienced colon polyp      Plan:   Continue neoadjuvant FOLFOX for a total of 8 cycles- last cycle today.   Continue potassium as directed.   Continue Ensure daily, increasing to 1 BID or TID.   Re-stage with contrast enhanced CT scans of chest and abdomen, and contrast-enhanced MRI pelvis after completion of 8 cycles of FOLFOX (in approximately 6 weeks) - scheduled for 12/15/2023  Genetic testing and counseling because of family history of colon cancer and colon polyp - Scheduled for  12/4/2023(virtual visit)   Recommended a 2 week post treatment visit with NP, patient declined. States will call if she has any acute concerns or problems.    Return to [facility] with MD on 12/19/2023. CBC, CMP done prior     -10/17/2023: Chemotherapy induced peripheral neuropathy in the form of tingling in hands; no neuropathy in feet; no numbness or pain  -10/17/2020: Started on Cymbalta 30 mg p.o. q.h.s.  -Cymbalta discontinued 10/30/2023 because of increased anxiety with Cymbalta reported by her    -10/17/2023:  Referred to behavioral health for management of anxiety.    -adenocarcinoma rectum, moderately differentiated  -presentation:  05/2023: Change in bowel habits, hematochezia, lower abdominal pain  -colonoscopy 06/19/2023:  Ulcerated necrotic infiltrative 90% circumferential bleeding partially obstructing rectal mass, 5 cm long, 4 cm from anus  -MMR proficient, low probability of MSI-H  -no metastasis   -baseline CEA level normal (07/05/2023)   -MRI pelvis 07/18/2023: Large mid to distal rectal mass (T3b); at least 10 perirectal lymph nodes, suspicious for metastasis (T2b)  -radiologically, T3b N2b, stage IIIC  -neoadjuvant FOLFOX started 07/19/2023  -cycle 4 of neoadjuvant FOLFOX started 09/18/2023  -cycle 5 of neoadjuvant FOLFOX started 10/02/2023  -positive response on restaging CTs C/A/P 10 09/2023, S/P neoadjuvant FOLFOX x5 cycles (marked improvement in rectal mass; improved perirectal lymphadenopathy)  -restaging pelvic MRI 10/24/2020: Improved rectal mass and perirectal lymph nodes   -cycle 6 of neoadjuvant FOLFOX started 10/25/2023  -10/30/2023: Cymbalta discontinued because she experienced increased anxiety with Cymbalta  >>>  Continue neoadjuvant FOLFOX every 2 weeks x8 cycles  Check CBC and CMP every couple of weeks with each cycle of chemotherapy   Re-stage with contrast enhanced CT scans of chest/abdomen, and MRI pelvis with and without contrast after completion of 8 cycles of neoadjuvant FOLFOX  (in approximately 6 weeks)  Subsequently, neoadjuvant chemoradiation therapy    Plan of treatment:  Neoadjuvant therapy:  Perioperative treatment is recommended for up to a total of 6 months  MMR proficient.  Low probability of MSI-H.  Regimen:  Total neoadjuvant therapy:  1. Modified FOLFOX every 2 weeks X 16 weeks (8 cycles); followed by  2. Restaging with contrast-enhanced CT scans of chest/abdomen, and MRI pelvis with contrast after 4 cycles, then after 8 cycles; followed by   3. Long course chemoradiation therapy with capecitabine; followed by  4. Restaging with contrast-enhanced CT scans of chest/abdomen, and MRI pelvis with contrast; followed by   5. Transabdominal resection; or, if complete clinical response, then consider surveillance      -developed PICC line related left upper extremity DVT 07/27/2023   PICC line removed   Started on Eliquis  >>>  Continue anticoagulation for at least 3 months (until the end of October)   -11/07/2023: Tells me that she stopped anticoagulation Halloween day (10/31/2023)    Fertility risk discussion/counseling if premenopausal   (she has been postmenopausal for last 2 years and indicated that she had no desire to go for oocyte preservation)    Family history of colon cancer & colon polyp   Have already requested genetic testing and counseling.      Above discussed with her.  All questions answered.    Discussed labs.  Plan of management discussed in detail once again.    She understands and agrees with this plan.

## 2023-11-22 ENCOUNTER — INFUSION (OUTPATIENT)
Dept: INFUSION THERAPY | Facility: HOSPITAL | Age: 53
End: 2023-11-22
Attending: INTERNAL MEDICINE
Payer: MEDICAID

## 2023-11-22 VITALS
SYSTOLIC BLOOD PRESSURE: 110 MMHG | RESPIRATION RATE: 20 BRPM | DIASTOLIC BLOOD PRESSURE: 80 MMHG | TEMPERATURE: 98 F | HEART RATE: 104 BPM

## 2023-11-22 DIAGNOSIS — R11.0 NAUSEA: ICD-10-CM

## 2023-11-22 DIAGNOSIS — C20 ADENOCARCINOMA OF RECTUM, STAGE 3: Primary | ICD-10-CM

## 2023-11-22 PROCEDURE — 25000003 PHARM REV CODE 250: Performed by: INTERNAL MEDICINE

## 2023-11-22 RX ORDER — SODIUM CHLORIDE 0.9 % (FLUSH) 0.9 %
10 SYRINGE (ML) INJECTION
Status: DISCONTINUED | OUTPATIENT
Start: 2023-11-22 | End: 2023-11-22 | Stop reason: HOSPADM

## 2023-11-22 RX ORDER — HEPARIN 100 UNIT/ML
500 SYRINGE INTRAVENOUS
Status: DISCONTINUED | OUTPATIENT
Start: 2023-11-22 | End: 2023-11-22 | Stop reason: HOSPADM

## 2023-11-22 RX ADMIN — SODIUM CHLORIDE 1000 ML: 9 INJECTION, SOLUTION INTRAVENOUS at 01:11

## 2023-12-04 ENCOUNTER — OFFICE VISIT (OUTPATIENT)
Dept: HEMATOLOGY/ONCOLOGY | Facility: CLINIC | Age: 53
End: 2023-12-04
Payer: MEDICAID

## 2023-12-04 DIAGNOSIS — D12.3 ADENOMATOUS POLYP OF TRANSVERSE COLON: ICD-10-CM

## 2023-12-04 DIAGNOSIS — Z83.719 FAMILY HX COLONIC POLYPS: ICD-10-CM

## 2023-12-04 DIAGNOSIS — C20 ADENOCARCINOMA OF RECTUM: Primary | ICD-10-CM

## 2023-12-04 DIAGNOSIS — Z80.0 FAMILY HISTORY OF COLON CANCER: ICD-10-CM

## 2023-12-04 PROCEDURE — 99215 PR OFFICE/OUTPT VISIT, EST, LEVL V, 40-54 MIN: ICD-10-PCS | Mod: 95,,, | Performed by: NURSE PRACTITIONER

## 2023-12-04 PROCEDURE — 4010F ACE/ARB THERAPY RXD/TAKEN: CPT | Mod: CPTII,95,, | Performed by: NURSE PRACTITIONER

## 2023-12-04 PROCEDURE — 99215 OFFICE O/P EST HI 40 MIN: CPT | Mod: 95,,, | Performed by: NURSE PRACTITIONER

## 2023-12-04 PROCEDURE — 4010F PR ACE/ARB THEARPY RXD/TAKEN: ICD-10-PCS | Mod: CPTII,95,, | Performed by: NURSE PRACTITIONER

## 2023-12-04 NOTE — PROGRESS NOTES
REFERRING PROVIDER: Dr. John Bazan     Patient ID: Nikkie Sharpe is a 53 y.o. female.    Chief Complaint: Personal history of rectal cancer (Stage III adenocarcinoma) and a family history of cancer. Patient presented via Telemedicine Visit (audio and video) today for risk assessment, genetic counseling, and consideration for genetic testing.    HPI  Past Medical History:   Diagnosis Date    Anxiety disorder, unspecified     GERD (gastroesophageal reflux disease)     Rectal cancer         Past Surgical History:   Procedure Laterality Date    APPENDECTOMY      BACK SURGERY       SECTION  2004    cyst coccyx          ALLERGIES:  Codeine and Morphine     REVIEW OF SYSTEMS:  Review of Systems   Respiratory:  Negative for cough and shortness of breath.    Cardiovascular:  Negative for chest pain.   Gastrointestinal:  Negative for abdominal pain and diarrhea.   Genitourinary:  Negative for frequency.   Musculoskeletal:  Negative for back pain.   Skin:  Negative for rash.   Neurological:  Negative for headaches.   Psychiatric/Behavioral:  The patient is nervous/anxious.           Oncology History   Adenocarcinoma of rectum, stage 3        Family History   Problem Relation Age of Onset    Colon polyps Father     Colon cancer Maternal Grandfather     Colon cancer Paternal Grandfather     Cancer Maternal Aunt         Assessment:     Risk Assessment:  This patient is at increased risk of having an inherited genetic mutation that increases the risk for cancer. She meets criteria for genetic testing based on the National Comprehensive Cancer Network (NCCN) criteria due to a personal history of colorectal cancer and a family history that includes colon cancer (paternal grandfather) and colon polyps (father) (see family history and pedigree). Based on her likelihood of having a mutation, [employer] Hancock Syndrome Analysis with CancerNext-Expanded+RNAinsight panel testing was described in detail.    Education  and Counseling:  [facility] CancerNext-Expanded evaluates a broad number of hereditary cancer syndromes to help define patients' cancer risk. This cancer panel tests (77 total): AIP, ALK, APC*, PATIENCE*, AXIN2, BAP1, BARD1, BLM, BMPR1A, BRCA1*, BRCA2*, BRIP1*, CDC73, CDH1*, CDK4, CDKN1B, CDKN2A, CHEK2*, CTNNA1, DICER1, FANCC, FH, FLCN, GALNT12, KIF1B, LZTR1, MAX, MEN1, MET, MLH1*, MSH2*, MSH3, MSH6*, MUTYH*, NBN, NF1*, NF2, NTHL1, PALB2*, PHOX2B, PMS2*, POT1, SMICI8W, PTCH1, PTEN*, RAD51C*, RAD51D*, RB1, RECQL, RET, SDHA, SDHAF2, SDHB, SDHC, SDHD, SMAD4, SMARCA4, SMARCB1, SMARCE1, STK11, SUFU, RCTT123, TP53*, TSC1, TSC2, VHL and XRCC2 (sequencing and deletion/duplication); EGFR, EGLN1, HOXB13, KIT, MITF, PDGFRA, POLD1 and POLE (sequencing only); EPCAM and GREM1 (deletion/duplication only). DNA and RNA analyses performed for * genes.    Risks of cancer associated with inherited cancer predisposition mutations were discussed in detail.  If a mutation were found, this patient would have a significantly increased risk for cancer.  It has been shown that individuals with Hancock Syndrome face a 80% lifetime risk of colorectal cancer. Women with Hancock Syndrome face up to a 60% lifetime risk of endometrial cancer. Hancock Syndrome also increases the risks of other cancers by age 70: ovarian cancer (12%), gastric cancer (13%), urinary tract (4%), small intestine (5%), biliary tract (2%), and brain cancer (~4%). Other inherited cancer syndromes that are also included in the myRisk test, may have different, but still significant risk for cancer. Inherited cancer syndromes included in this test, may have different, but still significant risk for cancer.  Risk of cancer with any particular gene mutation will be discussed at the time of results disclosure and based on the results.    The availability of clinical management options for inherited cancer predisposition mutation carriers was discussed, including increased surveillance,  chemoprevention, and prophylactic surgery. Details of the testing process, including benefits and limitations of genetic analysis as well as the implications of possible test results, were discussed.  Because this patient is the first member of her family to be tested comprehensive panel testing was presented.  Related insurance issues were discussed.      Summary:  This patient was evaluated for hereditary risk of cancer and was found to be at an increased risk of having an inherited cancer predisposition gene mutation.  The option of genetic testing was explained in detail, including the possible impact of this information on family members.  Since this patient wishes to proceed with testing an order will be placed online with [facility]. Informed consent will be obtained, lab drawn and sent to [facility].  Results will be expected 4 weeks from lab draw.  A follow-up appointment will be scheduled for results disclosure.       The patient location is: home.     Visit type: audiovisual    Time with patient: 30 minutes  >40 minutes of total time spent on the encounter, which includes face to face time and non-face to face time preparing to see the patient (eg, review of tests), Obtaining and/or reviewing separately obtained history, Documenting clinical information in the electronic or other health record, Independently interpreting results (not separately reported) and communicating results to the patient/family/caregiver, or Care coordination (not separately reported).       Each patient to whom he or she provides medical services by telemedicine is:  (1) informed of the relationship between the physician and patient and the respective role of any other health care provider with respect to management of the patient; and (2) notified that he or she may decline to receive medical services by telemedicine and may withdraw from such care at any time.    JAYLIN ESPINO, PhD    Answers submitted by the patient  for this visit:  Review of Systems Questionnaire (Submitted on 12/1/2023)  appetite change : No  unexpected weight change: No  mouth sores: No  visual disturbance: No  adenopathy: No

## 2023-12-15 ENCOUNTER — HOSPITAL ENCOUNTER (OUTPATIENT)
Dept: RADIOLOGY | Facility: HOSPITAL | Age: 53
Discharge: HOME | End: 2023-12-15
Attending: INTERNAL MEDICINE
Payer: MEDICAID

## 2023-12-15 DIAGNOSIS — C20 ADENOCARCINOMA OF RECTUM: ICD-10-CM

## 2023-12-15 DIAGNOSIS — C20 ADENOCARCINOMA OF RECTUM, STAGE 3: ICD-10-CM

## 2023-12-15 PROCEDURE — 74160 CT ABDOMEN W/CONTRAST: CPT | Mod: TC

## 2023-12-15 PROCEDURE — 72197 MRI PELVIS W/O & W/DYE: CPT | Mod: TC

## 2023-12-15 PROCEDURE — A9577 INJ MULTIHANCE: HCPCS

## 2023-12-15 PROCEDURE — 25500020 PHARM REV CODE 255

## 2023-12-15 RX ADMIN — GADOBENATE DIMEGLUMINE 12 ML: 529 INJECTION, SOLUTION INTRAVENOUS at 08:12

## 2023-12-15 RX ADMIN — IOHEXOL 100 ML: 350 INJECTION, SOLUTION INTRAVENOUS at 08:12

## 2023-12-17 NOTE — PROGRESS NOTES
History:  Past Medical History:   Diagnosis Date    Anxiety disorder, unspecified     GERD (gastroesophageal reflux disease)     Rectal cancer        Past Surgical History:   Procedure Laterality Date    APPENDECTOMY      BACK SURGERY       SECTION  2004    cyst coccyx        Past medical history:  Anxiety.    Procedure/surgical history:  Appendectomy.  Back surgery.  Social history:  Single.  Lives in [place], [place].  Has 2 children.  Works as a .  No history of tobacco, alcohol, or illicit drug abuse.  Family history:  Paternal grandfather experience colon cancer in his 70s.  Father experienced multiple colon polyps.  Health maintenance:  No screening colonoscopy prior to most recent colonoscopy which led to the diagnosis of rectal cancer.  -2019:  Bilateral screening mammogram pelvic comparison:  2018 mammogram):  BI-RADS 0 (indeterminate)  -2019:  Diagnostic left mammogram, limited ultrasound left breast (comparison:  2019 mammogram):  Overall study BI-RADS 2: Benign     Family History   Problem Relation Age of Onset    Colon polyps Father     Colon cancer Maternal Grandfather     Colon cancer Paternal Grandfather     Cancer Maternal Aunt       Reason for Follow-up:  -adenocarcinoma rectum, moderately differentiated, partially obstructing mass, S/P colonoscopy 2023   -tubular adenoma transverse colon   -regional lymphadenopathy on CT abdomen pelvis with contrast 2023  -MMR proficient  -acute left upper extremity DVT related to PICC line  -grandfather experienced colon cancer; father experienced colon polyp  -chemotherapy-induced peripheral neuropathy    History of Present Illness:   Follow-up (Review labs and scan )       Oncologic/Hematologic History:  Oncology History   Adenocarcinoma of rectum, stage 3   2023 Initial Diagnosis    Adenocarcinoma of rectum, stage 3     2023 -  Chemotherapy    Treatment Summary   Plan Name: OP mFOLFOX 6  Q2W  Treatment Goal: Curative  Status: Active  Start Date: 7/19/2023  End Date: 11/22/2023  Provider: John Bazan MD  Chemotherapy: fluorouraciL injection 650 mg, 400 mg/m2 = 650 mg, Intravenous, Clinic/HOD 1 time, 3 of 3 cycles  Administration: 650 mg (7/19/2023), 650 mg (8/1/2023), 650 mg (8/22/2023)  oxaliplatin (ELOXATIN) 85 mg/m2 = 139 mg in dextrose 5 % (D5W) 592.8 mL chemo infusion, 85 mg/m2 = 139 mg, Intravenous, Clinic/HOD 1 time, 8 of 8 cycles  Administration: 139 mg (7/19/2023), 139 mg (8/1/2023), 140 mg (9/18/2023), 135 mg (10/2/2023), 135 mg (11/7/2023), 135 mg (8/22/2023), 130 mg (11/20/2023), 135 mg (10/25/2023)     53-year-old lady, referred by Migel Ortiz MD, GI, with rectal cancer.    Family history pos for colon cancer in grandfather, colon polyp in father  Evaluated by Migel Ortiz MD, GI, 05/22/2023 for nervous stomach; change in bowel habits/pattern; change in bowel function started several months ago; currently, 1 bowel movement daily.  Blood in stool.  Lower abdominal pain.  Heartburn.  Epigastric pain.  MiraLax resulted in improvement.  Bright red blood in stool.    06/13/2023: EGD:  1. Esophagus: Erythema of mucosa in the lower 3rd of esophagus, compatible esophagitis  2. Stomach: Erythema of mucosa in the antrum, compatible with gastritis  3. Duodenum:  Normal mucosa in the whole duodenum      06/13/2023:  EGD:  1. Gastric antrum, biopsy:  Mild chronic inactive gastritis; negative for intestinal metaplasia; negative for H pylori organisms  2. Gastric body, biopsy:  Mild chronic inactive gastritis; negative for intestinal metaplasia; negative for H pylori organisms    06/19/2023:  Colonoscopy (screening colonoscopy):  -single 9 mm polyp transverse colon, polypectomy, completely removed  -ulcerated necrotic infiltrative 90% circumferential, bleeding, 5 cm in length, mass of malignant appearance in the rectum at 4 cm from the anus, causing partial obstruction; scope traversed the  lesion  Pathology:  1. Transverse colon polyp, polypectomy:  Tubular adenoma  2. Rectal mass, biopsy:  Invasive moderately differentiated adenocarcinoma; no LVI    MMR proficient  Low probability of MSI-H    06/21/2023: CT abdomen pelvis with and without contrast:  -large rectal mass consistent with malignancy; some lymphadenopathy is seen adjacent to the mass  (large rectal mass with circumferential wall thickening in rectum; associated inflammatory changes; no bowel obstruction; some lymph nodes are seen in the perirectal region on the right and left side; representative lymph node on right side of rectum 1 cm x 8 mm; representative lymph node on the left side of rectum 8 mm x 8 mm)    Labs reviewed:  05/24/2023:  WBC 8.0.  Hemoglobin 12.5.  MCV 88.  Platelets 306 K.  Differential count normal.    07/05/2023:   Pleasant lady who presents for initial medical oncology consultation, accompanied by her brother.    Her symptoms started 2 years ago, in the form of intermittent small amount hematochezia, initially on toilet wife's, and for last few weeks, in toilet bowel as well.  She brought the symptoms to the attention of her gyn several months ago and does not remember the recommendation.  Regardless, she never got a screening colonoscopy done.  Around [place] time 2022, she started feeling bloated.  She started having constant uncomfortable feeling because of bloating, as well as in the anorectal area.  Hematochezia continued.  She brought this to the attention of her PCP in [place] who suggested taking MiraLax.  The PCP also arranged for colonoscopy.  On today's visit, she reports that she has been constipated her entire life.  She has been constipated for at least 10 years.  Hematochezia for 2 years.  No anorectal pain.  Does have constant feeling of incomplete evacuation.  Appetite is down and she has lost 30 lb in last 3-4 months.  Appetite is more less preserved but she is afraid to eat because eating  causes bloating and worsening of uncomfortable feeling in rectum.    Interval History:  PICC DOUBLE LUMEN LINE FLUSH   OP mFOLFOX 6 Q2W     12/19/2023:   -Neoadjuvant FOLFOX:  Cycle 7 on 11/07/2023; cycle 8 on 11/20/2023  -12/04/2023: Genetics consultation  -12/15/2023: Restaging MRI pelvis with and without contrast:  Pending  -12/15/2023: Restaging CT chest and abdomen with IV contrast:   1. The pelvis was not included on this exam and the rectum is not visualized.  2. Unchanged indeterminate 6 mm hypodensity in segment 6 of the liver.  3. No evidence of new metastatic disease in the chest or abdomen.   -12/15/2023:  Restaging MRI pelvis with and without contrast (comparison:  MRI 10/24/2023):  Little overall change in rectal mass since 10/24/2023  Labs reviewed.    Presents for a follow-up visit, accompanied by a male family member.  Somewhat depressed.  No suicidal or homicidal ideation.  On Lexapro.  Generalized weakness and fatigue.  No appetite.  At this time, she does not wish to start any appetite stimulant.  Labs are within acceptable limits.  Weakness and fatigue is secondary to lingering side effects of chemotherapy.  No neuropathic.  Bowel movements are more less.  MiraLax occasionally.  Small amount of blood with bowel movements.  No abdominal pain, nausea, vomiting, or any urinary problems.  No chest pain, cough, or dyspnea.  ECOG 1.    Medications:  Current Outpatient Medications on File Prior to Visit   Medication Sig Dispense Refill    ALPRAZolam (XANAX) 0.5 MG tablet Take 0.5 mg by mouth 2 (two) times daily as needed.      busPIRone (BUSPAR) 15 MG tablet Take 15 mg by mouth 2 (two) times daily.      dexAMETHasone (DECADRON) 4 MG Tab Take 2 tablets (8 mg total) by mouth once daily. Take as directed on days 2 and 3 of your chemotherapy cycle. 24 tablet 5    dexAMETHasone (DECADRON) 4 MG Tab Take 2 tablets by mouth every morning.      DULoxetine (CYMBALTA) 20 MG capsule Take 20 mg by mouth.       famotidine (PEPCID) 40 MG tablet Take 1 tablet (40 mg total) by mouth nightly as needed for Heartburn. 30 tablet 3    hyoscyamine (ANASPAZ,LEVSIN) 0.125 mg Tab Take 125 mcg by mouth every 6 (six) hours as needed.      hyoscyamine (ANASPAZ,LEVSIN) 0.125 mg Tab Take 1 tablet by mouth every 6 (six) hours as needed.      lisinopriL 10 MG tablet Take 10 mg by mouth every morning.      lisinopriL 10 MG tablet Take 10 mg by mouth.      MIRALAX 17 gram/dose powder Take 17 g by mouth.      ondansetron (ZOFRAN) 8 MG tablet Take 1 tablet (8 mg total) by mouth every 8 (eight) hours as needed for Nausea. 30 tablet 2    ondansetron (ZOFRAN-ODT) 8 MG TbDL Take 1 tablet (8 mg total) by mouth every 8 (eight) hours as needed (nausea/vomiting). 60 tablet 5    oxyCODONE-acetaminophen (PERCOCET) 5-325 mg per tablet Take 1 tablet by mouth every 4 (four) hours as needed for Pain. 56 tablet 0    pantoprazole (PROTONIX) 40 MG tablet Take 40 mg by mouth every morning.      pantoprazole (PROTONIX) 40 MG tablet Take 1 tablet by mouth every morning.      PANTOPRAZOLE 40 MG/100 ML 0.9 % NS IVPB       LORazepam (ATIVAN) 0.5 MG tablet Take 1 tablet (0.5 mg total) by mouth every 12 (twelve) hours as needed for Anxiety. 28 tablet 0     No current facility-administered medications on file prior to visit.       Review of Systems:   All systems reviewed and found to be negative except for the symptoms detailed above    Physical Examination:   VITAL SIGNS:   Vitals:    12/19/23 0844   BP: 125/87   Pulse: 88   Temp: 97.6 °F (36.4 °C)         GENERAL:  In no apparent distress.    HEAD:  No signs of head trauma.  EYES:  Pupils are equal.  Extraocular motions intact.    EARS:  Hearing grossly intact.  MOUTH:  Oropharynx is normal.   NECK:  No adenopathy, no JVD.     CHEST:  Chest with clear breath sounds bilaterally.  No wheezes, rales, rhonchi.    CARDIAC:  Regular rate and rhythm.  S1 and S2, without murmurs, gallops, rubs.  VASCULAR:  No Edema.   "Peripheral pulses normal and equal in all extremities.  ABDOMEN:  Soft, without detectable tenderness.  No sign of distention.  No   rebound or guarding, and no masses palpated.   Bowel Sounds normal.  MUSCULOSKELETAL:  Good range of motion of all major joints. Extremities without clubbing, cyanosis or edema.    NEUROLOGIC EXAM:  Alert and oriented x 3.  No focal sensory or strength deficits.   Speech normal.  Follows commands.  PSYCHIATRIC:  Mood normal.    No results for input(s): "CBC" in the last 72 hours.   No results for input(s): "CMP" in the last 72 hours.     Assessment:  Problem List Items Addressed This Visit          Neuro    Chemotherapy-induced neuropathy - Primary       Psychiatric    Anxiety disorder, unspecified       Cardiac/Vascular    Thrombosis in peripherally inserted central catheter (PICC)       Oncology    Adenocarcinoma of rectum, stage 3    Family history of colon cancer    Chemotherapy management, encounter for       GI    Family hx colonic polyps    Hematochezia    Polyp of transverse colon       Other    Pelvic lymphadenopathy       Adenocarcinoma of rectum, moderately differentiated:   -presentation:  05/2023:  Change in bowel habits, hematochezia, lower abdominal pain  -colonoscopy 06/19/2023:    9 mm tubular adenoma transverse colon, removed;   invasive moderately differentiated adenocarcinoma of rectum presenting as ulcerated necrotic infiltrative 90% circumferential, bleeding, partially obstructing rectal mass, 5 cm long, 4 cm from anus, scope traversed the lesion  -MMR proficient; low probability of MSI-H  -CT abdomen pelvis with contrast 06/21/2023:  No metastasis; large rectal mass and some regional lymphadenopathy on CT abdomen pelvis with contrast 06/21/2023, largest perirectal lymph node 1 cm x 8 mm  -CEA level < 1.73, normal (07/05/2023)  -no lung metastases on CT chest 07/18/2023  -MRI pelvis 07/18/2023: Large mid to distal rectal mass (T3b); at least 10 perirectal lymph " nodes with increased signal on DW[facility] imaging, largest 9 mm (T2b)  >> radiologically, T3b N2b (stage IIIC)  -07/27/2023:  developed acute DVT left subclavian/axillary/brachial veins secondary to PICC line; started on Eliquis; PICC line removed  -neoadjuvant FOLFOX started 07/19/2023  -cycle 4 of neoadjuvant FOLFOX started 09/18/2023  -cycle 5 of neoadjuvant FOLFOX started 10/02/2023  -positive response on restaging CTs C/A/P 10 09/2023, S/P neoadjuvant FOLFOX x5 cycles (marked improvement in rectal mass; improved perirectal lymphadenopathy)  -restaging pelvic MRI 10/24/2020: Improved rectal mass and perirectal lymph nodes   -cycle 6 of neoadjuvant FOLFOX started 10/25/2023  -10/30/2023: Cymbalta discontinued because she experienced increased anxiety with Cymbalta  -Neoadjuvant FOLFOX:  Cycle 7 on 11/07/2023; cycle 8 on 11/20/2023  -12/04/2023: Genetics consultation  -12/15/2023: Restaging CTs chest and abdomen with contrast:  No metastases  -12/15/2023:  Restaging MRI pelvis with and without contrast (comparison:  MRI 10/24/2023):  Little overall change in rectal mass since 10/24/2023  -restaging MRI pelvis 12/15/2023: No progression      Left upper extremity DVT secondary to PICC line 07/27/2023:  -developed acute DVT left subclavian/axillary/brachial veins secondary to PICC line; started on Eliquis; PICC line removed      Chemotherapy-induced peripheral neuropathy:  -10/17/2023: Chemotherapy induced peripheral neuropathy in the form of tingling in hands; no neuropathy in feet; no numbness or pain  -10/17/2020: Started on Cymbalta 30 mg p.o. q.h.s.  -Cymbalta discontinued 10/30/2023 because of increased anxiety with Cymbalta reported by her      Family history of colon cancer:  -grandfather experienced colon cancer; father experienced colon polyp      Plan:  Proceed with neoadjuvant chemoradiation therapy (with capecitabine)  Capecitabine:  825 mg per m2 body surface area p.o. twice daily on EGD that radiotherapy is  given through the duration of radiotherapy (28-30 treatment days)   During chemoradiation therapy, check CBC and CMP weekly  About 3-4 weeks after completion of chemoradiation therapy, re-stage with contrast-enhanced CT scans of chest and abdomen, and MRI pelvis with and without contrast  Need the report of genetic testing performed 12/04/2023   Follow-up with NP within a week for chemo teaching (capecitabine)  -----------------------    -10/17/2023: Chemotherapy induced peripheral neuropathy in the form of tingling in hands; no neuropathy in feet; no numbness or pain  -10/17/2020: Started on Cymbalta 30 mg p.o. q.h.s.  -Cymbalta discontinued 10/30/2023 because of increased anxiety with Cymbalta reported by her  -10/17/2023:  Referred to behavioral health for management of anxiety.    -adenocarcinoma rectum, moderately differentiated  -presentation:  05/2023: Change in bowel habits, hematochezia, lower abdominal pain  -colonoscopy 06/19/2023:  Ulcerated necrotic infiltrative 90% circumferential bleeding partially obstructing rectal mass, 5 cm long, 4 cm from anus  -MMR proficient, low probability of MSI-H  -no metastasis   -baseline CEA level normal (07/05/2023)   -MRI pelvis 07/18/2023: Large mid to distal rectal mass (T3b); at least 10 perirectal lymph nodes, suspicious for metastasis (T2b)  -radiologically, T3b N2b, stage IIIC  -S/P neoadjuvant FOLFOX every 2 weeks x8 cycles (07/19/2023-11/20/2023  -10/09/2023:  Positive response on restaging CTs chest and abdomen post 5 cycles of chemotherapy  -10/24/2023: Positive response on restaging MRI pelvis post 5 cycles of chemotherapy  -10/30/2023: Cymbalta discontinued because she experienced increased anxiety with Cymbalta  -12/04/2023: Genetics consultation  -12/15/2023:  No metastases on restaging CTs with chest and abdomen post 8 cycles of chemotherapy  -12/15/2023:  Restaging MRI pelvis with and without contrast (comparison:  MRI 10/24/2023):  Little overall  change in rectal mass since 10/24/2023  -restaging MRI pelvis 12/15/2023: No progression  (Has responded positively to neoadjuvant chemotherapy x8 cycles) >>>  -has experienced positive response to neoadjuvant FOLFOX x8 cycles  -we will plan neoadjuvant chemoradiation therapy now (radiotherapy +capecitabine)  -capecitabine, concurrent with radiotherapy, 825 mg per m2 body surface area p.o. twice daily, on each day that radiotherapy is given through the duration of radiotherapy (typically 28-30 treatment days)  -during chemoradiation therapy, check CBC and CMP weekly  -approximately 3-4 weeks post completion of chemoradiation therapy, re-stage with contrast enhanced CT scans of chest and abdomen, and MRI pelvis with and without contrast    Plan of treatment:  Neoadjuvant therapy:  Perioperative treatment is recommended for up to a total of 6 months  MMR proficient.  Low probability of MSI-H.  Regimen:  Total neoadjuvant therapy:  1. Modified FOLFOX every 2 weeks X 16 weeks (8 cycles); followed by  2. Restaging with contrast-enhanced CT scans of chest/abdomen, and MRI pelvis with contrast after 4 cycles, then after 8 cycles; followed by   3. Long course chemoradiation therapy with capecitabine; followed by  4. Restaging with contrast-enhanced CT scans of chest/abdomen, and MRI pelvis with contrast; followed by   5. Transabdominal resection; or, if complete clinical response, then consider surveillance      -developed PICC line related left upper extremity DVT 07/27/2023   PICC line removed   Started on Eliquis  >>>  -Continue anticoagulation for at least 3 months (until the end of October)   -11/07/2023: Tells me that she stopped anticoagulation Halloween day (10/31/2023)    Fertility risk discussion/counseling if premenopausal   (she had been postmenopausal for last 2 years and indicated that she had no desire to go for oocyte preservation)    Family history of colon cancer & colon polyp   -12/04/2023: Genetics  consultation: Results pending    Follow-up with NP within a week for chemo teaching (capecitabine)    Above discussed leg length with her.  All questions answered.    Discussed labs and scans and gave her copies of relevant reports.    Plan of management discussed in detail once again.    Potential side effects of capecitabine discussed; gave her educational materials from [employer].  She understands and agrees with this plan.    Follow-up:  No follow-ups on file.    Answers submitted by the patient for this visit:  Review of Systems Questionnaire (Submitted on 12/16/2023)  appetite change : No  unexpected weight change: No  mouth sores: No  visual disturbance: No  cough: No  shortness of breath: No  chest pain: No  abdominal pain: No  diarrhea: No  frequency: No  back pain: No  rash: No  headaches: No  adenopathy: No  nervous/ anxious: Yes

## 2023-12-19 ENCOUNTER — APPOINTMENT (OUTPATIENT)
Dept: HEMATOLOGY/ONCOLOGY | Facility: CLINIC | Age: 53
End: 2023-12-19
Payer: MEDICAID

## 2023-12-19 ENCOUNTER — OFFICE VISIT (OUTPATIENT)
Dept: HEMATOLOGY/ONCOLOGY | Facility: CLINIC | Age: 53
End: 2023-12-19
Attending: INTERNAL MEDICINE
Payer: MEDICAID

## 2023-12-19 VITALS
HEART RATE: 88 BPM | HEIGHT: 61 IN | SYSTOLIC BLOOD PRESSURE: 125 MMHG | WEIGHT: 118.38 LBS | DIASTOLIC BLOOD PRESSURE: 87 MMHG | BODY MASS INDEX: 22.35 KG/M2 | TEMPERATURE: 98 F | OXYGEN SATURATION: 99 %

## 2023-12-19 DIAGNOSIS — C20 ADENOCARCINOMA OF RECTUM, STAGE 3: ICD-10-CM

## 2023-12-19 DIAGNOSIS — C20 ADENOCARCINOMA OF RECTUM, STAGE 3: Primary | ICD-10-CM

## 2023-12-19 DIAGNOSIS — K92.1 HEMATOCHEZIA: ICD-10-CM

## 2023-12-19 DIAGNOSIS — T82.868A THROMBOSIS IN PERIPHERALLY INSERTED CENTRAL CATHETER (PICC): ICD-10-CM

## 2023-12-19 DIAGNOSIS — Z83.719 FAMILY HX COLONIC POLYPS: ICD-10-CM

## 2023-12-19 DIAGNOSIS — D12.3 ADENOMATOUS POLYP OF TRANSVERSE COLON: ICD-10-CM

## 2023-12-19 DIAGNOSIS — Z80.0 FAMILY HISTORY OF COLON CANCER: ICD-10-CM

## 2023-12-19 DIAGNOSIS — C20 ADENOCARCINOMA OF RECTUM: ICD-10-CM

## 2023-12-19 DIAGNOSIS — Z51.11 CHEMOTHERAPY MANAGEMENT, ENCOUNTER FOR: ICD-10-CM

## 2023-12-19 DIAGNOSIS — R59.0 PELVIC LYMPHADENOPATHY: ICD-10-CM

## 2023-12-19 DIAGNOSIS — T45.1X5A CHEMOTHERAPY-INDUCED NEUROPATHY: Primary | ICD-10-CM

## 2023-12-19 DIAGNOSIS — E87.6 HYPOKALEMIA: ICD-10-CM

## 2023-12-19 DIAGNOSIS — F41.9 ANXIETY DISORDER, UNSPECIFIED TYPE: ICD-10-CM

## 2023-12-19 DIAGNOSIS — C20 ADENOCARCINOMA OF RECTUM: Primary | ICD-10-CM

## 2023-12-19 DIAGNOSIS — G62.0 CHEMOTHERAPY-INDUCED NEUROPATHY: Primary | ICD-10-CM

## 2023-12-19 LAB
ALBUMIN SERPL-MCNC: 4.3 G/DL (ref 3.5–5)
ALBUMIN/GLOB SERPL: 1.4 RATIO (ref 1.1–2)
ALP SERPL-CCNC: 63 UNIT/L (ref 40–150)
ALT SERPL-CCNC: 28 UNIT/L (ref 0–55)
AST SERPL-CCNC: 30 UNIT/L (ref 5–34)
BASOPHILS # BLD AUTO: 0.04 X10(3)/MCL
BASOPHILS NFR BLD AUTO: 0.6 %
BILIRUB SERPL-MCNC: 1 MG/DL
BUN SERPL-MCNC: 11.3 MG/DL (ref 9.8–20.1)
CALCIUM SERPL-MCNC: 10.4 MG/DL (ref 8.4–10.2)
CHLORIDE SERPL-SCNC: 108 MMOL/L (ref 98–107)
CO2 SERPL-SCNC: 27 MMOL/L (ref 22–29)
CREAT SERPL-MCNC: 0.83 MG/DL (ref 0.55–1.02)
EOSINOPHIL # BLD AUTO: 0.11 X10(3)/MCL (ref 0–0.9)
EOSINOPHIL NFR BLD AUTO: 1.7 %
ERYTHROCYTE [DISTWIDTH] IN BLOOD BY AUTOMATED COUNT: 16 % (ref 11.5–17)
GFR SERPLBLD CREATININE-BSD FMLA CKD-EPI: >60 MLS/MIN/1.73/M2
GLOBULIN SER-MCNC: 3.1 GM/DL (ref 2.4–3.5)
GLUCOSE SERPL-MCNC: 103 MG/DL (ref 74–100)
HCT VFR BLD AUTO: 41.7 % (ref 37–47)
HGB BLD-MCNC: 13.9 G/DL (ref 12–16)
IMM GRANULOCYTES # BLD AUTO: 0.01 X10(3)/MCL (ref 0–0.04)
IMM GRANULOCYTES NFR BLD AUTO: 0.2 %
LYMPHOCYTES # BLD AUTO: 1.65 X10(3)/MCL (ref 0.6–4.6)
LYMPHOCYTES NFR BLD AUTO: 25.3 %
MAGNESIUM SERPL-MCNC: 2.4 MG/DL (ref 1.6–2.6)
MCH RBC QN AUTO: 30.7 PG (ref 27–31)
MCHC RBC AUTO-ENTMCNC: 33.3 G/DL (ref 33–36)
MCV RBC AUTO: 92.1 FL (ref 80–94)
MONOCYTES # BLD AUTO: 0.69 X10(3)/MCL (ref 0.1–1.3)
MONOCYTES NFR BLD AUTO: 10.6 %
NEUTROPHILS # BLD AUTO: 4.01 X10(3)/MCL (ref 2.1–9.2)
NEUTROPHILS NFR BLD AUTO: 61.6 %
NRBC BLD AUTO-RTO: 0 %
PLATELET # BLD AUTO: 148 X10(3)/MCL (ref 130–400)
PMV BLD AUTO: 10.4 FL (ref 7.4–10.4)
POTASSIUM SERPL-SCNC: 3.9 MMOL/L (ref 3.5–5.1)
PROT SERPL-MCNC: 7.4 GM/DL (ref 6.4–8.3)
RBC # BLD AUTO: 4.53 X10(6)/MCL (ref 4.2–5.4)
SODIUM SERPL-SCNC: 144 MMOL/L (ref 136–145)
WBC # SPEC AUTO: 6.51 X10(3)/MCL (ref 4.5–11.5)

## 2023-12-19 PROCEDURE — 99214 OFFICE O/P EST MOD 30 MIN: CPT | Mod: PBBFAC | Performed by: INTERNAL MEDICINE

## 2023-12-19 PROCEDURE — 3074F SYST BP LT 130 MM HG: CPT | Mod: CPTII,,, | Performed by: INTERNAL MEDICINE

## 2023-12-19 PROCEDURE — 1160F RVW MEDS BY RX/DR IN RCRD: CPT | Mod: CPTII,,, | Performed by: INTERNAL MEDICINE

## 2023-12-19 PROCEDURE — 1159F PR MEDICATION LIST DOCUMENTED IN MEDICAL RECORD: ICD-10-PCS | Mod: CPTII,,, | Performed by: INTERNAL MEDICINE

## 2023-12-19 PROCEDURE — 4010F PR ACE/ARB THEARPY RXD/TAKEN: ICD-10-PCS | Mod: CPTII,,, | Performed by: INTERNAL MEDICINE

## 2023-12-19 PROCEDURE — 1159F MED LIST DOCD IN RCRD: CPT | Mod: CPTII,,, | Performed by: INTERNAL MEDICINE

## 2023-12-19 PROCEDURE — 3079F DIAST BP 80-89 MM HG: CPT | Mod: CPTII,,, | Performed by: INTERNAL MEDICINE

## 2023-12-19 PROCEDURE — 4010F ACE/ARB THERAPY RXD/TAKEN: CPT | Mod: CPTII,,, | Performed by: INTERNAL MEDICINE

## 2023-12-19 PROCEDURE — 3079F PR MOST RECENT DIASTOLIC BLOOD PRESSURE 80-89 MM HG: ICD-10-PCS | Mod: CPTII,,, | Performed by: INTERNAL MEDICINE

## 2023-12-19 PROCEDURE — 3074F PR MOST RECENT SYSTOLIC BLOOD PRESSURE < 130 MM HG: ICD-10-PCS | Mod: CPTII,,, | Performed by: INTERNAL MEDICINE

## 2023-12-19 PROCEDURE — 80053 COMPREHEN METABOLIC PANEL: CPT

## 2023-12-19 PROCEDURE — 99214 OFFICE O/P EST MOD 30 MIN: CPT | Mod: S$PBB,,, | Performed by: INTERNAL MEDICINE

## 2023-12-19 PROCEDURE — 83735 ASSAY OF MAGNESIUM: CPT

## 2023-12-19 PROCEDURE — 85025 COMPLETE CBC W/AUTO DIFF WBC: CPT

## 2023-12-19 PROCEDURE — 3008F PR BODY MASS INDEX (BMI) DOCUMENTED: ICD-10-PCS | Mod: CPTII,,, | Performed by: INTERNAL MEDICINE

## 2023-12-19 PROCEDURE — 3008F BODY MASS INDEX DOCD: CPT | Mod: CPTII,,, | Performed by: INTERNAL MEDICINE

## 2023-12-19 PROCEDURE — 1160F PR REVIEW ALL MEDS BY PRESCRIBER/CLIN PHARMACIST DOCUMENTED: ICD-10-PCS | Mod: CPTII,,, | Performed by: INTERNAL MEDICINE

## 2023-12-19 PROCEDURE — 36415 COLL VENOUS BLD VENIPUNCTURE: CPT

## 2023-12-19 PROCEDURE — 99214 PR OFFICE/OUTPT VISIT, EST, LEVL IV, 30-39 MIN: ICD-10-PCS | Mod: S$PBB,,, | Performed by: INTERNAL MEDICINE

## 2023-12-19 RX ORDER — CAPECITABINE 150 MG/1
825 TABLET, FILM COATED ORAL 2 TIMES DAILY
Qty: 480 TABLET | Refills: 0 | Status: SHIPPED | OUTPATIENT
Start: 2023-12-19 | End: 2023-12-19 | Stop reason: DRUGHIGH

## 2023-12-19 RX ORDER — CAPECITABINE 500 MG/1
825 TABLET, FILM COATED ORAL 2 TIMES DAILY
COMMUNITY
End: 2023-12-19 | Stop reason: SDUPTHER

## 2023-12-19 RX ORDER — CAPECITABINE 150 MG/1
TABLET, FILM COATED ORAL
Qty: 480 TABLET | Refills: 0 | Status: ACTIVE | OUTPATIENT
Start: 2023-12-19

## 2023-12-19 NOTE — Clinical Note
Proceed with neoadjuvant chemoradiation therapy (with capecitabine) Capecitabine:  825 mg per m2 body surface area p.o. twice daily on EGD that radiotherapy is given through the duration of radiotherapy (28-30 treatment days)  During chemoradiation therapy, check CBC and CMP weekly About 3-4 weeks after completion of chemoradiation therapy, re-stage with contrast-enhanced CT scans of chest and abdomen, and MRI pelvis with and without contrast Need the report of genetic testing performed 12/04/2023  Follow-up with NP within a week for chemo teaching (capecitabine)

## 2023-12-19 NOTE — PLAN OF CARE
START ON PATHWAY REGIMEN - Colorectal    [id number]        Capecitabine (Xeloda)           Additional Orders: Capecitabine is given concurrently with radiation   therapy.    **Always confirm dose/schedule in your pharmacy ordering system**    Patient Characteristics:  Preoperative or Nonsurgical Candidate, M0 (Clinical Staging), Rectal, cT0/1,   cN+; or cT4, Any cN; or Any cT, cN2  Tumor Location: Rectal  Therapeutic Status: Preoperative or Nonsurgical Candidate, M0 (Clinical Staging)  AJCC T Category: cT3  AJCC N Category: cN2b  AJCC M Category: cM0  AJCC 8 Stage Grouping: IIIC  Intent of Therapy:  Curative Intent, Discussed with Patient

## 2023-12-22 ENCOUNTER — TELEPHONE (OUTPATIENT)
Dept: HEMATOLOGY/ONCOLOGY | Facility: CLINIC | Age: 53
End: 2023-12-22
Payer: MEDICAID

## 2023-12-22 NOTE — TELEPHONE ENCOUNTER
Called patient to confirm appointment for 12/26/23. Visit with NP at 1:00. Patient did not answer, left message.

## 2023-12-26 ENCOUNTER — DOCUMENTATION ONLY (OUTPATIENT)
Dept: HEMATOLOGY/ONCOLOGY | Facility: CLINIC | Age: 53
End: 2023-12-26
Payer: MEDICAID

## 2023-12-26 ENCOUNTER — CLINICAL SUPPORT (OUTPATIENT)
Dept: RADIATION THERAPY | Facility: HOSPITAL | Age: 53
End: 2023-12-26
Attending: RADIOLOGY
Payer: MEDICAID

## 2023-12-26 DIAGNOSIS — C20 ADENOCARCINOMA OF RECTUM: ICD-10-CM

## 2023-12-26 DIAGNOSIS — C20 ADENOCARCINOMA OF RECTUM, STAGE 3: ICD-10-CM

## 2023-12-26 PROCEDURE — 77470 SPECIAL RADIATION TREATMENT: CPT | Performed by: RADIOLOGY

## 2023-12-26 PROCEDURE — 77332 RADIATION TREATMENT AID(S): CPT | Performed by: RADIOLOGY

## 2023-12-26 PROCEDURE — 77290 THER RAD SIMULAJ FIELD CPLX: CPT | Performed by: RADIOLOGY

## 2023-12-26 NOTE — PROGRESS NOTES
Called patient on 12/26/23. Patient stated they did not receive oral chemo medication.  Patient needs to be rescheduled for a chemo teaching and check the status of medication. Advised the patient to call clinic once she receives chemo medication. Patient verbalized understanding.

## 2023-12-27 ENCOUNTER — DOCUMENTATION ONLY (OUTPATIENT)
Dept: HEMATOLOGY/ONCOLOGY | Facility: CLINIC | Age: 53
End: 2023-12-27
Payer: MEDICAID

## 2023-12-27 NOTE — PROGRESS NOTES
Patient called clinic on 12/26/23. Patient stated she called pharmacy and will receive chemo medication sometime Thursday. Advised patient to call clinic and let us know once received. Patient verbalized understanding.Patient is scheduled for a Chemo Teaching on 12/29/23 at 11:30 am.

## 2023-12-29 ENCOUNTER — OFFICE VISIT (OUTPATIENT)
Dept: HEMATOLOGY/ONCOLOGY | Facility: CLINIC | Age: 53
End: 2023-12-29
Payer: MEDICAID

## 2023-12-29 VITALS
RESPIRATION RATE: 19 BRPM | SYSTOLIC BLOOD PRESSURE: 98 MMHG | OXYGEN SATURATION: 98 % | TEMPERATURE: 98 F | WEIGHT: 117.38 LBS | HEART RATE: 102 BPM | HEIGHT: 61 IN | BODY MASS INDEX: 22.16 KG/M2 | DIASTOLIC BLOOD PRESSURE: 64 MMHG

## 2023-12-29 DIAGNOSIS — C20 ADENOCARCINOMA OF RECTUM, STAGE 3: Primary | ICD-10-CM

## 2023-12-29 DIAGNOSIS — C20 ADENOCARCINOMA OF RECTUM: ICD-10-CM

## 2023-12-29 PROCEDURE — 1160F PR REVIEW ALL MEDS BY PRESCRIBER/CLIN PHARMACIST DOCUMENTED: ICD-10-PCS | Mod: CPTII,,, | Performed by: NURSE PRACTITIONER

## 2023-12-29 PROCEDURE — 3008F BODY MASS INDEX DOCD: CPT | Mod: CPTII,,, | Performed by: NURSE PRACTITIONER

## 2023-12-29 PROCEDURE — 99215 OFFICE O/P EST HI 40 MIN: CPT | Mod: PBBFAC | Performed by: NURSE PRACTITIONER

## 2023-12-29 PROCEDURE — 4010F ACE/ARB THERAPY RXD/TAKEN: CPT | Mod: CPTII,,, | Performed by: NURSE PRACTITIONER

## 2023-12-29 PROCEDURE — 3078F DIAST BP <80 MM HG: CPT | Mod: CPTII,,, | Performed by: NURSE PRACTITIONER

## 2023-12-29 PROCEDURE — 77300 RADIATION THERAPY DOSE PLAN: CPT | Performed by: RADIOLOGY

## 2023-12-29 PROCEDURE — 99215 PR OFFICE/OUTPT VISIT, EST, LEVL V, 40-54 MIN: ICD-10-PCS | Mod: S$PBB,,, | Performed by: NURSE PRACTITIONER

## 2023-12-29 PROCEDURE — 1159F PR MEDICATION LIST DOCUMENTED IN MEDICAL RECORD: ICD-10-PCS | Mod: CPTII,,, | Performed by: NURSE PRACTITIONER

## 2023-12-29 PROCEDURE — 99215 OFFICE O/P EST HI 40 MIN: CPT | Mod: S$PBB,,, | Performed by: NURSE PRACTITIONER

## 2023-12-29 PROCEDURE — 77295 3-D RADIOTHERAPY PLAN: CPT | Performed by: RADIOLOGY

## 2023-12-29 PROCEDURE — 1160F RVW MEDS BY RX/DR IN RCRD: CPT | Mod: CPTII,,, | Performed by: NURSE PRACTITIONER

## 2023-12-29 PROCEDURE — 3074F PR MOST RECENT SYSTOLIC BLOOD PRESSURE < 130 MM HG: ICD-10-PCS | Mod: CPTII,,, | Performed by: NURSE PRACTITIONER

## 2023-12-29 PROCEDURE — 3078F PR MOST RECENT DIASTOLIC BLOOD PRESSURE < 80 MM HG: ICD-10-PCS | Mod: CPTII,,, | Performed by: NURSE PRACTITIONER

## 2023-12-29 PROCEDURE — 4010F PR ACE/ARB THEARPY RXD/TAKEN: ICD-10-PCS | Mod: CPTII,,, | Performed by: NURSE PRACTITIONER

## 2023-12-29 PROCEDURE — 3008F PR BODY MASS INDEX (BMI) DOCUMENTED: ICD-10-PCS | Mod: CPTII,,, | Performed by: NURSE PRACTITIONER

## 2023-12-29 PROCEDURE — 77334 RADIATION TREATMENT AID(S): CPT | Performed by: RADIOLOGY

## 2023-12-29 PROCEDURE — 1159F MED LIST DOCD IN RCRD: CPT | Mod: CPTII,,, | Performed by: NURSE PRACTITIONER

## 2023-12-29 PROCEDURE — 3074F SYST BP LT 130 MM HG: CPT | Mod: CPTII,,, | Performed by: NURSE PRACTITIONER

## 2023-12-29 RX ORDER — ESCITALOPRAM OXALATE 10 MG/1
20 TABLET ORAL
COMMUNITY
Start: 2023-12-06 | End: 2024-03-05

## 2023-12-29 RX ORDER — ONDANSETRON HYDROCHLORIDE 8 MG/1
8 TABLET, FILM COATED ORAL EVERY 8 HOURS PRN
Qty: 60 TABLET | Refills: 2 | Status: SHIPPED | OUTPATIENT
Start: 2023-12-29

## 2023-12-29 NOTE — PROGRESS NOTES
Reason for Follow-up:  Reason for consultation:  -adenocarcinoma rectum, moderately differentiated, partially obstructing mass, S/P colonoscopy 06/19/2023   -tubular adenoma transverse colon   -regional lymphadenopathy on CT abdomen pelvis with contrast 06/21/2023  -MMR proficient  -grandfather experienced colon cancer; father experienced colon polyp     Current Treatment:  Capecitabine:  825 mg per m2 body surface area p.o. twice daily on days of radiotherapy (28-30 treatment days)     start date pending    Treatment History:  FOLFOX x 8 cycles 07/19/2023-11/20/23    Past medical history:  Anxiety.    Procedure/surgical history:  Appendectomy.  Back surgery.  Social history:  Single.  Lives in [place], [place].  Has 2 children.  Works as a .  No history of tobacco, alcohol, or illicit drug abuse.  Family history:  Paternal grandfather experience colon cancer in his 70s.  Father experienced multiple colon polyps.  Health maintenance:  No screening colonoscopy prior to most recent colonoscopy which led to the diagnosis of rectal cancer.  -09/13/2019:  Bilateral screening mammogram pelvic comparison:  09/13/2018 mammogram):  BI-RADS 0 (indeterminate)  -09/25/2019:  Diagnostic left mammogram, limited ultrasound left breast (comparison:  09/13/2019 mammogram):  Overall study BI-RADS 2: Benign   History of Present Illness:   Follow-up (Review labs and scan )        Oncologic/Hematologic History:      Oncology History   Adenocarcinoma of rectum, stage 3   7/1/2023 Initial Diagnosis     Adenocarcinoma of rectum, stage 3      7/19/2023 -  Chemotherapy     Treatment Summary   Plan Name: OP mFOLFOX 6 Q2W  Treatment Goal: Curative  Status: Active  Start Date: 7/19/2023  End Date: 11/22/2023  Provider: John Bazan MD  Chemotherapy: fluorouraciL injection 650 mg, 400 mg/m2 = 650 mg, Intravenous, Clinic/HOD 1 time, 3 of 3 cycles  Administration: 650 mg (7/19/2023), 650 mg (8/1/2023), 650 mg  (8/22/2023)  oxaliplatin (ELOXATIN) 85 mg/m2 = 139 mg in dextrose 5 % (D5W) 592.8 mL chemo infusion, 85 mg/m2 = 139 mg, Intravenous, Clinic/HOD 1 time, 8 of 8 cycles  Administration: 139 mg (7/19/2023), 139 mg (8/1/2023), 140 mg (9/18/2023), 135 mg (10/2/2023), 135 mg (11/7/2023), 135 mg (8/22/2023), 130 mg (11/20/2023), 135 mg (10/25/2023)      53-year-old lady, referred by Migel Ortiz MD, GI, with rectal cancer.     Family history pos for colon cancer in grandfather, colon polyp in father  Evaluated by Migel Ortiz MD, GI, 05/22/2023 for nervous stomach; change in bowel habits/pattern; change in bowel function started several months ago; currently, 1 bowel movement daily.  Blood in stool.  Lower abdominal pain.  Heartburn.  Epigastric pain.  MiraLax resulted in improvement.  Bright red blood in stool.    Interval History 12/29/2023 Patient presents to clinic along with friend for chemotherapy teaching on Capecitabine. We discussed tgreatment regimen along with potential side effects. We also discussed treatment options for any presenting symptoms. Patient awaiting to hear from Radiation for start date.      ROS:  Constitutional: No fever, chills, weight loss, weakness or fatigue  Eye: No visual disturbances or changes in vision, no double vision  ENMT: no decreased hearing, nasal congestion, nosebleeds, sore throat, taste disturbance, tinnitus, vertigo  Respiratory: No shortness of breath, cough, sputum production, hemoptysis or pleuritic type chest pain  Cardiovascular: No chest pain, palpitations, syncope, leg swelling  Gastrointestinal: No nausea, vomiting, diarrhea, constipation, heartburn, abdominal pain  Genitourinary: No CVA tenderness  Hematology/lymph: no abnormal bruising, hemorrhage, petechiae swollen lymph glands  Musculoskeletal: No back or neck pain, joint pain, muscle pain or decreased range of motion  Integumentary: No rash, pruritus, skin lesion or any other significant skin  complaints  Neurologic: alert and oriented x4, no abnormal balance, confusion, numbness, tingling, headache  Psychiatric: No anxiety, depression, suicidal or homicidal ideations  12 point ROS done in full with pertinent positives as described in interval history. Remainder of ROS are negative.     Patient Instructions  Discussed:  Goals of therapy to be:Curative  Premedication  Chemotherapy agents  Chemotherapy education:   Discussed common side effects to include nausea and vomiting, which is quite manageable, alopecia, taste changes, mouth ulcers, fatigue, fertility issues, low blood counts that may require transfusion, peripheral neuropathy 42% to 70%; grade 3/4: Less than 7% arthralgia and or myalgias, and rare side effects including cardiac toxicity.  Discussed chemotherapy (Capecitabine) to be given every day of radiation for about 3-4 weeks   Notify our office for any problems or concerns, specifically shortness of breath, swelling, chest pain or fast heartbeat, intractable pain or nausea and vomiting, fever greater than 100.4, extreme fatigue or weakness.  Self-care tips include good hygiene in frequent handwashing to avoid infection, anti-nausea medicines to reduce nausea, soft toothbrush and mouth rinses 3 times a day with 1 teaspoon of baking soda with 8 ounces of water to prevent and treat mouth sores, avoid contact sports, avoid sun exposure and apply sunblock with SPF 30 or higher, drink 2-3 quarts of water every 24 hours, and maintaining good nutrition.  Short term and long term effects of treatment  Signs and symptoms to call clinic--->Notify our office for any problems or concerns, specifically shortness of breath, swelling, chest pain or fast heartbeat, intractable pain or nausea and vomiting, fever greater than 100.4, extreme fatigue or weakness.  Written material given to patient   All questions answered; patient verbalized understanding.    VITAL SIGNS:   Vitals:    12/29/23 1134   BP: 98/64    Pulse: 102   Resp: 19   Temp: 97.6 °F (36.4 °C)     Assessment:  Adenocarcinoma of rectum, moderately differentiated:   -presentation:  05/2023:  Change in bowel habits, hematochezia, lower abdominal pain  -colonoscopy 06/19/2023:    9 mm tubular adenoma transverse colon, removed;   invasive moderately differentiated adenocarcinoma of rectum presenting as ulcerated necrotic infiltrative 90% circumferential, bleeding, partially obstructing rectal mass, 5 cm long, 4 cm from anus, scope traversed the lesion  -MMR proficient; low probability of MSI-H  -CT abdomen pelvis with contrast 06/21/2023:  No metastasis; large rectal mass and some regional lymphadenopathy on CT abdomen pelvis with contrast 06/21/2023, largest perirectal lymph node 1 cm x 8 mm  -CEA level < 1.73, normal (07/05/2023)  -no lung metastases on CT chest 07/18/2023  -MRI pelvis 07/18/2023: Large mid to distal rectal mass (T3b); at least 10 perirectal lymph nodes with increased signal on DWI imaging, largest 9 mm (T2b)  >> radiologically, T3b N2b (stage IIIC)  -07/27/2023:  developed acute DVT left subclavian/axillary/brachial veins secondary to PICC line; started on Eliquis; PICC line removed  -neoadjuvant FOLFOX started 07/19/2023  -cycle 4 of neoadjuvant FOLFOX started 09/18/2023  -cycle 5 of neoadjuvant FOLFOX started 10/02/2023  -positive response on restaging CTs C/A/P 10 09/2023, S/P neoadjuvant FOLFOX x5 cycles (marked improvement in rectal mass; improved perirectal lymphadenopathy)  -restaging pelvic MRI 10/24/2020: Improved rectal mass and perirectal lymph nodes   -cycle 6 of neoadjuvant FOLFOX started 10/25/2023  -10/30/2023: Cymbalta discontinued because she experienced increased anxiety with Cymbalta  -Neoadjuvant FOLFOX:  Cycle 7 on 11/07/2023; cycle 8 on 11/20/2023  -12/04/2023: Genetics consultation  -12/15/2023: Restaging CTs chest and abdomen with contrast:  No metastases  -12/15/2023:  Restaging MRI pelvis with and without contrast  (comparison:  MRI 10/24/2023):  Little overall change in rectal mass since 10/24/2023  -restaging MRI pelvis 12/15/2023: No progression  >>>  By virtue of regional lymphadenopathy, at least stage III    Left upper extremity DVT secondary to PICC line 07/27/2023:  -developed acute DVT left subclavian/axillary/brachial veins secondary to PICC line; started on Eliquis; PICC line removed        Chemotherapy-induced peripheral neuropathy:  -10/17/2023: Chemotherapy induced peripheral neuropathy in the form of tingling in hands; no neuropathy in feet; no numbness or pain  -10/17/2020: Started on Cymbalta 30 mg p.o. q.h.s.  -Cymbalta discontinued 10/30/2023 because of increased anxiety with Cymbalta reported by her      Family history of colon cancer:  -grandfather experienced colon cancer; father experienced colon polyp      Diagnostic studies  CT chest non contrast 7/18/23: No CT evidence for metastatic disease to the chest.  MRI Pelvis 7/20/23: Large mid to distal rectal mass with perirectal adenopathy as discussed.  Local staging is T3bN2b.    Plan:  Adenocarcinoma of rectum, moderately differentiated    After appropriate workup, will most likely need total neoadjuvant therapy including chemotherapy with FOLFOX or CAPOX or FOLFIRINOX X 12-16 weeks, followed by long course chemoradiation therapy, then restaging, followed by resection versus surveillance, etc.  Perioperative treatment is recommended for up to a total of 6 months.    At least based upon CT scans of A/P, has at least stage III adenocarcinoma of rectum.  MMR proficient.  Low probability of MSI-H.  Treatment will be as follows:    Total neoadjuvant therapy:   1. Modified FOLFOX every 2 weeks X 16 weeks (8 cycles); followed by   2. Long course chemoradiation therapy with capecitabine; followed by  3. Restaging; followed by   4. Transabdominal resection; or if complete clinical response, consider surveillance    Response to treatment will be assessed as  follows:  Contrast-enhanced CT scans of abdomen and chest, and MRI scan of rectum with and without contrast:  -after completion of 8 cycles of modified FOLFOX; aunt  -after completion of chemoradiation therapy    PET-CT scan is not indicated  Fertility risk discussion/counseling if premenopausal (she has been postmenopausal for last 2 years and that this time, has no desire to go for add preservation)    -has experienced positive response to neoadjuvant FOLFOX x8 cycles  -we will plan neoadjuvant chemoradiation therapy now (radiotherapy +capecitabine)  -capecitabine, concurrent with radiotherapy, 825 mg per m2 body surface area p.o. twice daily, on each day that radiotherapy is given through the duration of radiotherapy (typically 28-30 treatment days)  -during chemoradiation therapy, check CBC and CMP weekly  -approximately 3-4 weeks post completion of chemoradiation therapy, re-stage with contrast enhanced CT scans of chest and abdomen, and MRI pelvis with and without contrast      -Awaiting Radiation start date insurance pending per Radiation  Return to clinic the day of or prior to starting CRT for lab work cbc,cmp,mg  -Follow up with NP in 2 weeks with lab work cbc,cmp,mg toxicity check     -----------------------    Family history of colon cancer & colon polyp    genetic testing and counseling 12/4/2023-results pending    Above discussed at length with the patient.  All questions answered.    She understands and agrees with this plan.

## 2024-01-03 ENCOUNTER — CLINICAL SUPPORT (OUTPATIENT)
Dept: RADIATION THERAPY | Facility: HOSPITAL | Age: 54
End: 2024-01-03
Attending: RADIOLOGY
Payer: MEDICAID

## 2024-01-03 ENCOUNTER — LAB VISIT (OUTPATIENT)
Dept: HEMATOLOGY/ONCOLOGY | Facility: CLINIC | Age: 54
End: 2024-01-03
Payer: MEDICAID

## 2024-01-03 DIAGNOSIS — C20 ADENOCARCINOMA OF RECTUM: ICD-10-CM

## 2024-01-03 DIAGNOSIS — C20 ADENOCARCINOMA OF RECTUM, STAGE 3: ICD-10-CM

## 2024-01-03 LAB
ALBUMIN SERPL-MCNC: 3.9 G/DL (ref 3.5–5)
ALBUMIN/GLOB SERPL: 1.3 RATIO (ref 1.1–2)
ALP SERPL-CCNC: 68 UNIT/L (ref 40–150)
ALT SERPL-CCNC: 18 UNIT/L (ref 0–55)
AST SERPL-CCNC: 22 UNIT/L (ref 5–34)
BASOPHILS # BLD AUTO: 0.03 X10(3)/MCL
BASOPHILS NFR BLD AUTO: 0.6 %
BILIRUB SERPL-MCNC: 1.4 MG/DL
BUN SERPL-MCNC: 16.3 MG/DL (ref 9.8–20.1)
CALCIUM SERPL-MCNC: 9.7 MG/DL (ref 8.4–10.2)
CHLORIDE SERPL-SCNC: 110 MMOL/L (ref 98–107)
CO2 SERPL-SCNC: 26 MMOL/L (ref 22–29)
CREAT SERPL-MCNC: 0.82 MG/DL (ref 0.55–1.02)
EOSINOPHIL # BLD AUTO: 0.06 X10(3)/MCL (ref 0–0.9)
EOSINOPHIL NFR BLD AUTO: 1.2 %
ERYTHROCYTE [DISTWIDTH] IN BLOOD BY AUTOMATED COUNT: 14.6 % (ref 11.5–17)
GFR SERPLBLD CREATININE-BSD FMLA CKD-EPI: >60 MLS/MIN/1.73/M2
GLOBULIN SER-MCNC: 2.9 GM/DL (ref 2.4–3.5)
GLUCOSE SERPL-MCNC: 135 MG/DL (ref 74–100)
HCT VFR BLD AUTO: 39.1 % (ref 37–47)
HGB BLD-MCNC: 13.1 G/DL (ref 12–16)
IMM GRANULOCYTES # BLD AUTO: 0.01 X10(3)/MCL (ref 0–0.04)
IMM GRANULOCYTES NFR BLD AUTO: 0.2 %
LYMPHOCYTES # BLD AUTO: 0.99 X10(3)/MCL (ref 0.6–4.6)
LYMPHOCYTES NFR BLD AUTO: 20.3 %
MAGNESIUM SERPL-MCNC: 1.9 MG/DL (ref 1.6–2.6)
MCH RBC QN AUTO: 31 PG (ref 27–31)
MCHC RBC AUTO-ENTMCNC: 33.5 G/DL (ref 33–36)
MCV RBC AUTO: 92.4 FL (ref 80–94)
MONOCYTES # BLD AUTO: 0.43 X10(3)/MCL (ref 0.1–1.3)
MONOCYTES NFR BLD AUTO: 8.8 %
NEUTROPHILS # BLD AUTO: 3.36 X10(3)/MCL (ref 2.1–9.2)
NEUTROPHILS NFR BLD AUTO: 68.9 %
NRBC BLD AUTO-RTO: 0 %
PLATELET # BLD AUTO: 204 X10(3)/MCL (ref 130–400)
PMV BLD AUTO: 9.8 FL (ref 7.4–10.4)
POTASSIUM SERPL-SCNC: 3.9 MMOL/L (ref 3.5–5.1)
PROT SERPL-MCNC: 6.8 GM/DL (ref 6.4–8.3)
RBC # BLD AUTO: 4.23 X10(6)/MCL (ref 4.2–5.4)
SODIUM SERPL-SCNC: 144 MMOL/L (ref 136–145)
WBC # SPEC AUTO: 4.88 X10(3)/MCL (ref 4.5–11.5)

## 2024-01-03 PROCEDURE — 85025 COMPLETE CBC W/AUTO DIFF WBC: CPT

## 2024-01-03 PROCEDURE — 36415 COLL VENOUS BLD VENIPUNCTURE: CPT

## 2024-01-03 PROCEDURE — 80053 COMPREHEN METABOLIC PANEL: CPT

## 2024-01-03 PROCEDURE — 77280 THER RAD SIMULAJ FIELD SMPL: CPT | Performed by: RADIOLOGY

## 2024-01-03 PROCEDURE — 83735 ASSAY OF MAGNESIUM: CPT

## 2024-01-03 PROCEDURE — 77412 RADIATION TX DELIVERY LVL 3: CPT | Performed by: RADIOLOGY

## 2024-01-04 PROCEDURE — 77412 RADIATION TX DELIVERY LVL 3: CPT | Performed by: RADIOLOGY

## 2024-01-05 PROCEDURE — 77412 RADIATION TX DELIVERY LVL 3: CPT | Performed by: RADIOLOGY

## 2024-01-08 PROCEDURE — 77336 RADIATION PHYSICS CONSULT: CPT | Performed by: RADIOLOGY

## 2024-01-08 PROCEDURE — 77412 RADIATION TX DELIVERY LVL 3: CPT | Performed by: RADIOLOGY

## 2024-01-09 PROCEDURE — 77412 RADIATION TX DELIVERY LVL 3: CPT | Performed by: RADIOLOGY

## 2024-01-10 PROCEDURE — 77412 RADIATION TX DELIVERY LVL 3: CPT | Performed by: RADIOLOGY

## 2024-01-10 PROCEDURE — 77417 THER RADIOLOGY PORT IMAGE(S): CPT | Performed by: RADIOLOGY

## 2024-01-11 ENCOUNTER — OFFICE VISIT (OUTPATIENT)
Dept: HEMATOLOGY/ONCOLOGY | Facility: CLINIC | Age: 54
End: 2024-01-11
Payer: MEDICAID

## 2024-01-11 ENCOUNTER — APPOINTMENT (OUTPATIENT)
Dept: HEMATOLOGY/ONCOLOGY | Facility: CLINIC | Age: 54
End: 2024-01-11
Payer: MEDICAID

## 2024-01-11 DIAGNOSIS — R59.0 PELVIC LYMPHADENOPATHY: ICD-10-CM

## 2024-01-11 DIAGNOSIS — C20 ADENOCARCINOMA OF RECTUM, STAGE 3: ICD-10-CM

## 2024-01-11 DIAGNOSIS — K92.1 HEMATOCHEZIA: ICD-10-CM

## 2024-01-11 DIAGNOSIS — F41.9 ANXIETY: ICD-10-CM

## 2024-01-11 DIAGNOSIS — C20 ADENOCARCINOMA OF RECTUM, STAGE 3: Primary | ICD-10-CM

## 2024-01-11 DIAGNOSIS — C20 ADENOCARCINOMA OF RECTUM: ICD-10-CM

## 2024-01-11 LAB
ALBUMIN SERPL-MCNC: 3.8 G/DL (ref 3.5–5)
ALBUMIN/GLOB SERPL: 1.4 RATIO (ref 1.1–2)
ALP SERPL-CCNC: 64 UNIT/L (ref 40–150)
ALT SERPL-CCNC: 17 UNIT/L (ref 0–55)
AST SERPL-CCNC: 21 UNIT/L (ref 5–34)
BASOPHILS # BLD AUTO: 0.02 X10(3)/MCL
BASOPHILS NFR BLD AUTO: 0.6 %
BILIRUB SERPL-MCNC: 1.1 MG/DL
BUN SERPL-MCNC: 9.6 MG/DL (ref 9.8–20.1)
CALCIUM SERPL-MCNC: 9.8 MG/DL (ref 8.4–10.2)
CHLORIDE SERPL-SCNC: 109 MMOL/L (ref 98–107)
CO2 SERPL-SCNC: 29 MMOL/L (ref 22–29)
CREAT SERPL-MCNC: 0.78 MG/DL (ref 0.55–1.02)
EOSINOPHIL # BLD AUTO: 0.04 X10(3)/MCL (ref 0–0.9)
EOSINOPHIL NFR BLD AUTO: 1.3 %
ERYTHROCYTE [DISTWIDTH] IN BLOOD BY AUTOMATED COUNT: 14.2 % (ref 11.5–17)
GFR SERPLBLD CREATININE-BSD FMLA CKD-EPI: >60 MLS/MIN/1.73/M2
GLOBULIN SER-MCNC: 2.7 GM/DL (ref 2.4–3.5)
GLUCOSE SERPL-MCNC: 116 MG/DL (ref 74–100)
HCT VFR BLD AUTO: 34.8 % (ref 37–47)
HGB BLD-MCNC: 11.9 G/DL (ref 12–16)
IMM GRANULOCYTES # BLD AUTO: 0.02 X10(3)/MCL (ref 0–0.04)
IMM GRANULOCYTES NFR BLD AUTO: 0.6 %
LYMPHOCYTES # BLD AUTO: 0.69 X10(3)/MCL (ref 0.6–4.6)
LYMPHOCYTES NFR BLD AUTO: 22.1 %
MAGNESIUM SERPL-MCNC: 2 MG/DL (ref 1.6–2.6)
MCH RBC QN AUTO: 32 PG (ref 27–31)
MCHC RBC AUTO-ENTMCNC: 34.2 G/DL (ref 33–36)
MCV RBC AUTO: 93.5 FL (ref 80–94)
MONOCYTES # BLD AUTO: 0.15 X10(3)/MCL (ref 0.1–1.3)
MONOCYTES NFR BLD AUTO: 4.8 %
NEUTROPHILS # BLD AUTO: 2.2 X10(3)/MCL (ref 2.1–9.2)
NEUTROPHILS NFR BLD AUTO: 70.6 %
NRBC BLD AUTO-RTO: 0 %
PLATELET # BLD AUTO: 197 X10(3)/MCL (ref 130–400)
PMV BLD AUTO: 9.7 FL (ref 7.4–10.4)
POTASSIUM SERPL-SCNC: 3.9 MMOL/L (ref 3.5–5.1)
PROT SERPL-MCNC: 6.5 GM/DL (ref 6.4–8.3)
RBC # BLD AUTO: 3.72 X10(6)/MCL (ref 4.2–5.4)
SODIUM SERPL-SCNC: 144 MMOL/L (ref 136–145)
WBC # SPEC AUTO: 3.12 X10(3)/MCL (ref 4.5–11.5)

## 2024-01-11 PROCEDURE — 99213 OFFICE O/P EST LOW 20 MIN: CPT | Mod: S$PBB,,, | Performed by: NURSE PRACTITIONER

## 2024-01-11 PROCEDURE — 1160F RVW MEDS BY RX/DR IN RCRD: CPT | Mod: CPTII,,, | Performed by: NURSE PRACTITIONER

## 2024-01-11 PROCEDURE — 77412 RADIATION TX DELIVERY LVL 3: CPT | Performed by: RADIOLOGY

## 2024-01-11 PROCEDURE — 85025 COMPLETE CBC W/AUTO DIFF WBC: CPT

## 2024-01-11 PROCEDURE — 36415 COLL VENOUS BLD VENIPUNCTURE: CPT

## 2024-01-11 PROCEDURE — 80053 COMPREHEN METABOLIC PANEL: CPT

## 2024-01-11 PROCEDURE — 1159F MED LIST DOCD IN RCRD: CPT | Mod: CPTII,,, | Performed by: NURSE PRACTITIONER

## 2024-01-11 PROCEDURE — 83735 ASSAY OF MAGNESIUM: CPT

## 2024-01-11 PROCEDURE — 99213 OFFICE O/P EST LOW 20 MIN: CPT | Mod: PBBFAC,25 | Performed by: NURSE PRACTITIONER

## 2024-01-11 NOTE — PROGRESS NOTES
Reason for Follow-up:  Reason for consultation:  -adenocarcinoma rectum, moderately differentiated, partially obstructing mass, S/P colonoscopy 06/19/2023   -tubular adenoma transverse colon   -regional lymphadenopathy on CT abdomen pelvis with contrast 06/21/2023  -MMR proficient  -grandfather experienced colon cancer; father experienced colon polyp     Current Treatment:  Capecitabine:  825 mg per m2 body surface area p.o. twice daily on days of radiotherapy (28-30 treatment days)     start date 1/3/2024    Treatment History:  FOLFOX x 8 cycles 07/19/2023-11/20/23    Past medical history:  Anxiety.    Procedure/surgical history:  Appendectomy.  Back surgery.  Social history:  Single.  Lives in [place], [place].  Has 2 children.  Works as a .  No history of tobacco, alcohol, or illicit drug abuse.  Family history:  Paternal grandfather experience colon cancer in his 70s.  Father experienced multiple colon polyps.  Health maintenance:  No screening colonoscopy prior to most recent colonoscopy which led to the diagnosis of rectal cancer.  -09/13/2019:  Bilateral screening mammogram pelvic comparison:  09/13/2018 mammogram):  BI-RADS 0 (indeterminate)  -09/25/2019:  Diagnostic left mammogram, limited ultrasound left breast (comparison:  09/13/2019 mammogram):  Overall study BI-RADS 2: Benign   History of Present Illness:   Follow-up (Review labs and scan )        Oncologic/Hematologic History:      Oncology History   Adenocarcinoma of rectum, stage 3   7/1/2023 Initial Diagnosis     Adenocarcinoma of rectum, stage 3      7/19/2023 -  Chemotherapy     Treatment Summary   Plan Name: OP mFOLFOX 6 Q2W  Treatment Goal: Curative  Status: Active  Start Date: 7/19/2023  End Date: 11/22/2023  Provider: John Bazan MD  Chemotherapy: fluorouraciL injection 650 mg, 400 mg/m2 = 650 mg, Intravenous, Clinic/HOD 1 time, 3 of 3 cycles  Administration: 650 mg (7/19/2023), 650 mg (8/1/2023), 650 mg  (8/22/2023)  oxaliplatin (ELOXATIN) 85 mg/m2 = 139 mg in dextrose 5 % (D5W) 592.8 mL chemo infusion, 85 mg/m2 = 139 mg, Intravenous, Clinic/HOD 1 time, 8 of 8 cycles  Administration: 139 mg (7/19/2023), 139 mg (8/1/2023), 140 mg (9/18/2023), 135 mg (10/2/2023), 135 mg (11/7/2023), 135 mg (8/22/2023), 130 mg (11/20/2023), 135 mg (10/25/2023)      53-year-old lady, referred by Migel Ortiz MD, GI, with rectal cancer.     Family history pos for colon cancer in grandfather, colon polyp in father  Evaluated by Migel Ortiz MD, GI, 05/22/2023 for nervous stomach; change in bowel habits/pattern; change in bowel function started several months ago; currently, 1 bowel movement daily.  Blood in stool.  Lower abdominal pain.  Heartburn.  Epigastric pain.  MiraLax resulted in improvement.  Bright red blood in stool.    Interval History 1/11/2024: Patient presents to clinic alone for scheduled follow up/toxicity check. Patient reports starting CRT on 1/3/24. She has no complaints says she feels welll and symptoms have actually improved since starting back on treatment. She does report some rectal bleeding but not like it was. She reports regular bowel movements. Patient says PCP increased her Lexapro to 20mg which has helped with not only her behavioral status but also appetite. She also reports that blood pressure medication was discontinued due to hypotension. Lab work reviewed with patient, christian. Discussed plan of care and follow up.       Review of Systems   Constitutional:  Positive for malaise/fatigue.   Respiratory:  Negative for cough and shortness of breath.    Cardiovascular:  Negative for chest pain.   Gastrointestinal:  Positive for blood in stool. Negative for abdominal pain and diarrhea.   Genitourinary:  Negative for frequency.   Musculoskeletal:  Negative for back pain.   Skin:  Negative for rash.   Neurological:  Negative for headaches.   Psychiatric/Behavioral:  The patient is nervous/anxious.    All other  systems reviewed and are negative.    Physical Exam  Constitutional:       Appearance: Normal appearance.   HENT:      Head: Normocephalic.      Mouth/Throat:      Mouth: Mucous membranes are moist.   Eyes:      Pupils: Pupils are equal, round, and reactive to light.   Cardiovascular:      Rate and Rhythm: Normal rate and regular rhythm.      Pulses: Normal pulses.      Heart sounds: Normal heart sounds.   Pulmonary:      Effort: Pulmonary effort is normal.      Breath sounds: Normal breath sounds.   Abdominal:      General: Bowel sounds are normal.      Palpations: Abdomen is soft.   Musculoskeletal:         General: Normal range of motion.      Cervical back: Normal range of motion.   Skin:     General: Skin is warm and dry.      Capillary Refill: Capillary refill takes less than 2 seconds.   Neurological:      General: No focal deficit present.      Mental Status: She is alert and oriented to person, place, and time.   Psychiatric:         Attention and Perception: Attention normal.         Mood and Affect: Affect normal.         Speech: Speech normal.         Behavior: Behavior normal.         Thought Content: Thought content normal.       Assessment:  Adenocarcinoma of rectum, moderately differentiated:   -presentation:  05/2023:  Change in bowel habits, hematochezia, lower abdominal pain  -colonoscopy 06/19/2023:    9 mm tubular adenoma transverse colon, removed;   invasive moderately differentiated adenocarcinoma of rectum presenting as ulcerated necrotic infiltrative 90% circumferential, bleeding, partially obstructing rectal mass, 5 cm long, 4 cm from anus, scope traversed the lesion  -MMR proficient; low probability of MSI-H  -CT abdomen pelvis with contrast 06/21/2023:  No metastasis; large rectal mass and some regional lymphadenopathy on CT abdomen pelvis with contrast 06/21/2023, largest perirectal lymph node 1 cm x 8 mm  -CEA level < 1.73, normal (07/05/2023)  -no lung metastases on CT chest  07/18/2023  -MRI pelvis 07/18/2023: Large mid to distal rectal mass (T3b); at least 10 perirectal lymph nodes with increased signal on DWI imaging, largest 9 mm (T2b)  >> radiologically, T3b N2b (stage IIIC)  -07/27/2023:  developed acute DVT left subclavian/axillary/brachial veins secondary to PICC line; started on Eliquis; PICC line removed  -neoadjuvant FOLFOX started 07/19/2023  -cycle 4 of neoadjuvant FOLFOX started 09/18/2023  -cycle 5 of neoadjuvant FOLFOX started 10/02/2023  -positive response on restaging CTs C/A/P 10 09/2023, S/P neoadjuvant FOLFOX x5 cycles (marked improvement in rectal mass; improved perirectal lymphadenopathy)  -restaging pelvic MRI 10/24/2020: Improved rectal mass and perirectal lymph nodes   -cycle 6 of neoadjuvant FOLFOX started 10/25/2023  -10/30/2023: Cymbalta discontinued because she experienced increased anxiety with Cymbalta  -Neoadjuvant FOLFOX:  Cycle 7 on 11/07/2023; cycle 8 on 11/20/2023  -12/04/2023: Genetics consultation  -12/15/2023: Restaging CTs chest and abdomen with contrast:  No metastases  -12/15/2023:  Restaging MRI pelvis with and without contrast (comparison:  MRI 10/24/2023):  Little overall change in rectal mass since 10/24/2023  -restaging MRI pelvis 12/15/2023: No progression  >>>  By virtue of regional lymphadenopathy, at least stage III    Left upper extremity DVT secondary to PICC line 07/27/2023:  -developed acute DVT left subclavian/axillary/brachial veins secondary to PICC line; started on Eliquis; PICC line removed        Chemotherapy-induced peripheral neuropathy:  -10/17/2023: Chemotherapy induced peripheral neuropathy in the form of tingling in hands; no neuropathy in feet; no numbness or pain  -10/17/2020: Started on Cymbalta 30 mg p.o. q.h.s.  -Cymbalta discontinued 10/30/2023 because of increased anxiety with Cymbalta reported by her      Family history of colon cancer:  -grandfather experienced colon cancer; father experienced colon  polyp      Diagnostic studies  CT chest non contrast 7/18/23: No CT evidence for metastatic disease to the chest.  MRI Pelvis 7/20/23: Large mid to distal rectal mass with perirectal adenopathy as discussed.  Local staging is T3bN2b.    Plan:  Adenocarcinoma of rectum, moderately differentiated    After appropriate workup, will most likely need total neoadjuvant therapy including chemotherapy with FOLFOX or CAPOX or FOLFIRINOX X 12-16 weeks, followed by long course chemoradiation therapy, then restaging, followed by resection versus surveillance, etc.  Perioperative treatment is recommended for up to a total of 6 months.    At least based upon CT scans of A/P, has at least stage III adenocarcinoma of rectum.  MMR proficient.  Low probability of MSI-H.  Treatment will be as follows:    Total neoadjuvant therapy:   1. Modified FOLFOX every 2 weeks X 16 weeks (8 cycles); followed by   2. Long course chemoradiation therapy with capecitabine; followed by  3. Restaging; followed by   4. Transabdominal resection; or if complete clinical response, consider surveillance    Response to treatment will be assessed as follows:  Contrast-enhanced CT scans of abdomen and chest, and MRI scan of rectum with and without contrast:  -after completion of 8 cycles of modified FOLFOX; aunt  -after completion of chemoradiation therapy    PET-CT scan is not indicated  Fertility risk discussion/counseling if premenopausal (she has been postmenopausal for last 2 years and that this time, has no desire to go for add preservation)    -has experienced positive response to neoadjuvant FOLFOX x8 cycles  -we will plan neoadjuvant chemoradiation therapy now (radiotherapy +capecitabine)  -capecitabine, concurrent with radiotherapy, 825 mg per m2 body surface area p.o. twice daily, on each day that radiotherapy is given through the duration of radiotherapy (typically 28-30 treatment days)  -during chemoradiation therapy, check CBC and CMP  weekly  -approximately 3-4 weeks post completion of chemoradiation therapy, re-stage with contrast enhanced CT scans of chest and abdomen, and MRI pelvis with and without contrast      -Continue Chemoradiation   -Follow up with NP in 2 weeks with lab work cbc,cmp,mg     -----------------------    Family history of colon cancer & colon polyp    genetic testing and counseling 12/4/2023-results pending    Above discussed at length with the patient.  All questions answered.    She understands and agrees with this plan.

## 2024-01-12 PROCEDURE — 77412 RADIATION TX DELIVERY LVL 3: CPT | Performed by: RADIOLOGY

## 2024-01-15 PROCEDURE — 77336 RADIATION PHYSICS CONSULT: CPT | Performed by: RADIOLOGY

## 2024-01-15 PROCEDURE — 77412 RADIATION TX DELIVERY LVL 3: CPT | Performed by: RADIOLOGY

## 2024-01-17 PROCEDURE — 77412 RADIATION TX DELIVERY LVL 3: CPT | Performed by: RADIOLOGY

## 2024-01-18 PROCEDURE — 77412 RADIATION TX DELIVERY LVL 3: CPT | Performed by: RADIOLOGY

## 2024-01-18 PROCEDURE — 77417 THER RADIOLOGY PORT IMAGE(S): CPT | Performed by: RADIOLOGY

## 2024-01-19 PROCEDURE — 77412 RADIATION TX DELIVERY LVL 3: CPT | Performed by: RADIOLOGY

## 2024-01-22 PROCEDURE — 77412 RADIATION TX DELIVERY LVL 3: CPT | Performed by: RADIOLOGY

## 2024-01-22 PROCEDURE — 77336 RADIATION PHYSICS CONSULT: CPT | Performed by: RADIOLOGY

## 2024-01-23 PROCEDURE — 77412 RADIATION TX DELIVERY LVL 3: CPT | Performed by: RADIOLOGY

## 2024-01-24 PROCEDURE — 77412 RADIATION TX DELIVERY LVL 3: CPT | Performed by: RADIOLOGY

## 2024-01-25 PROCEDURE — 77417 THER RADIOLOGY PORT IMAGE(S): CPT | Performed by: RADIOLOGY

## 2024-01-25 PROCEDURE — 77412 RADIATION TX DELIVERY LVL 3: CPT | Performed by: RADIOLOGY

## 2024-01-26 ENCOUNTER — PATIENT MESSAGE (OUTPATIENT)
Dept: HEMATOLOGY/ONCOLOGY | Facility: CLINIC | Age: 54
End: 2024-01-26

## 2024-01-26 ENCOUNTER — APPOINTMENT (OUTPATIENT)
Dept: HEMATOLOGY/ONCOLOGY | Facility: CLINIC | Age: 54
End: 2024-01-26
Payer: MEDICAID

## 2024-01-26 ENCOUNTER — OFFICE VISIT (OUTPATIENT)
Dept: HEMATOLOGY/ONCOLOGY | Facility: CLINIC | Age: 54
End: 2024-01-26
Payer: MEDICAID

## 2024-01-26 DIAGNOSIS — R10.9 ABDOMINAL CRAMPS: ICD-10-CM

## 2024-01-26 DIAGNOSIS — C20 ADENOCARCINOMA OF RECTUM: ICD-10-CM

## 2024-01-26 DIAGNOSIS — T45.1X5A CHEMOTHERAPY-INDUCED NEUROPATHY: Primary | ICD-10-CM

## 2024-01-26 DIAGNOSIS — C20 ADENOCARCINOMA OF RECTUM, STAGE 3: ICD-10-CM

## 2024-01-26 DIAGNOSIS — G62.0 CHEMOTHERAPY-INDUCED NEUROPATHY: Primary | ICD-10-CM

## 2024-01-26 LAB
ALBUMIN SERPL-MCNC: 3.8 G/DL (ref 3.5–5)
ALBUMIN/GLOB SERPL: 1.6 RATIO (ref 1.1–2)
ALP SERPL-CCNC: 71 UNIT/L (ref 40–150)
ALT SERPL-CCNC: 18 UNIT/L (ref 0–55)
AST SERPL-CCNC: 22 UNIT/L (ref 5–34)
BASOPHILS # BLD AUTO: 0.02 X10(3)/MCL
BASOPHILS NFR BLD AUTO: 0.7 %
BILIRUB SERPL-MCNC: 1.6 MG/DL
BUN SERPL-MCNC: 12.7 MG/DL (ref 9.8–20.1)
CALCIUM SERPL-MCNC: 9.5 MG/DL (ref 8.4–10.2)
CHLORIDE SERPL-SCNC: 110 MMOL/L (ref 98–107)
CO2 SERPL-SCNC: 27 MMOL/L (ref 22–29)
CREAT SERPL-MCNC: 0.81 MG/DL (ref 0.55–1.02)
EOSINOPHIL # BLD AUTO: 0.12 X10(3)/MCL (ref 0–0.9)
EOSINOPHIL NFR BLD AUTO: 3.9 %
ERYTHROCYTE [DISTWIDTH] IN BLOOD BY AUTOMATED COUNT: 15.7 % (ref 11.5–17)
GFR SERPLBLD CREATININE-BSD FMLA CKD-EPI: >60 MLS/MIN/1.73/M2
GLOBULIN SER-MCNC: 2.4 GM/DL (ref 2.4–3.5)
GLUCOSE SERPL-MCNC: 103 MG/DL (ref 74–100)
HCT VFR BLD AUTO: 31.4 % (ref 37–47)
HGB BLD-MCNC: 11 G/DL (ref 12–16)
IMM GRANULOCYTES # BLD AUTO: 0.01 X10(3)/MCL (ref 0–0.04)
IMM GRANULOCYTES NFR BLD AUTO: 0.3 %
LYMPHOCYTES # BLD AUTO: 0.61 X10(3)/MCL (ref 0.6–4.6)
LYMPHOCYTES NFR BLD AUTO: 20 %
MAGNESIUM SERPL-MCNC: 2 MG/DL (ref 1.6–2.6)
MCH RBC QN AUTO: 33.1 PG (ref 27–31)
MCHC RBC AUTO-ENTMCNC: 35 G/DL (ref 33–36)
MCV RBC AUTO: 94.6 FL (ref 80–94)
MONOCYTES # BLD AUTO: 0.42 X10(3)/MCL (ref 0.1–1.3)
MONOCYTES NFR BLD AUTO: 13.8 %
NEUTROPHILS # BLD AUTO: 1.87 X10(3)/MCL (ref 2.1–9.2)
NEUTROPHILS NFR BLD AUTO: 61.3 %
NRBC BLD AUTO-RTO: 0 %
PLATELET # BLD AUTO: 147 X10(3)/MCL (ref 130–400)
PMV BLD AUTO: 9.2 FL (ref 7.4–10.4)
POTASSIUM SERPL-SCNC: 4.1 MMOL/L (ref 3.5–5.1)
PROT SERPL-MCNC: 6.2 GM/DL (ref 6.4–8.3)
RBC # BLD AUTO: 3.32 X10(6)/MCL (ref 4.2–5.4)
SODIUM SERPL-SCNC: 144 MMOL/L (ref 136–145)
WBC # SPEC AUTO: 3.05 X10(3)/MCL (ref 4.5–11.5)

## 2024-01-26 PROCEDURE — 80053 COMPREHEN METABOLIC PANEL: CPT

## 2024-01-26 PROCEDURE — 99214 OFFICE O/P EST MOD 30 MIN: CPT | Mod: S$PBB,,, | Performed by: NURSE PRACTITIONER

## 2024-01-26 PROCEDURE — 77412 RADIATION TX DELIVERY LVL 3: CPT | Performed by: RADIOLOGY

## 2024-01-26 PROCEDURE — 1160F RVW MEDS BY RX/DR IN RCRD: CPT | Mod: CPTII,,, | Performed by: NURSE PRACTITIONER

## 2024-01-26 PROCEDURE — 83735 ASSAY OF MAGNESIUM: CPT

## 2024-01-26 PROCEDURE — 85025 COMPLETE CBC W/AUTO DIFF WBC: CPT

## 2024-01-26 PROCEDURE — 99213 OFFICE O/P EST LOW 20 MIN: CPT | Mod: PBBFAC,25 | Performed by: NURSE PRACTITIONER

## 2024-01-26 PROCEDURE — 36415 COLL VENOUS BLD VENIPUNCTURE: CPT

## 2024-01-26 PROCEDURE — 1159F MED LIST DOCD IN RCRD: CPT | Mod: CPTII,,, | Performed by: NURSE PRACTITIONER

## 2024-01-26 RX ORDER — FAMOTIDINE 40 MG/1
40 TABLET, FILM COATED ORAL NIGHTLY PRN
Qty: 30 TABLET | Refills: 1 | Status: SHIPPED | OUTPATIENT
Start: 2024-01-26 | End: 2024-05-09

## 2024-01-26 RX ORDER — DICYCLOMINE HYDROCHLORIDE 20 MG/1
20 TABLET ORAL EVERY 6 HOURS
Qty: 60 TABLET | Refills: 1 | Status: SHIPPED | OUTPATIENT
Start: 2024-01-26 | End: 2024-02-25

## 2024-01-26 RX ORDER — PANTOPRAZOLE SODIUM 40 MG/1
40 TABLET, DELAYED RELEASE ORAL EVERY MORNING
Qty: 30 TABLET | Refills: 1 | Status: SHIPPED | OUTPATIENT
Start: 2024-01-26

## 2024-01-26 NOTE — PROGRESS NOTES
Reason for Follow-up:  Reason for consultation:  -adenocarcinoma rectum, moderately differentiated, partially obstructing mass, S/P colonoscopy 06/19/2023   -tubular adenoma transverse colon   -regional lymphadenopathy on CT abdomen pelvis with contrast 06/21/2023  -MMR proficient  -grandfather experienced colon cancer; father experienced colon polyp     Current Treatment:  Capecitabine:  825 mg per m2 body surface area p.o. twice daily on days of radiotherapy (28-30 treatment days)     start date 1/3/2024    Treatment History:  FOLFOX x 8 cycles 07/19/2023-11/20/23    Past medical history:  Anxiety.    Procedure/surgical history:  Appendectomy.  Back surgery.  Social history:  Single.  Lives in [place], [place].  Has 2 children.  Works as a .  No history of tobacco, alcohol, or illicit drug abuse.  Family history:  Paternal grandfather experience colon cancer in his 70s.  Father experienced multiple colon polyps.  Health maintenance:  No screening colonoscopy prior to most recent colonoscopy which led to the diagnosis of rectal cancer.  -09/13/2019:  Bilateral screening mammogram pelvic comparison:  09/13/2018 mammogram):  BI-RADS 0 (indeterminate)  -09/25/2019:  Diagnostic left mammogram, limited ultrasound left breast (comparison:  09/13/2019 mammogram):  Overall study BI-RADS 2: Benign   History of Present Illness:   Follow-up (Review labs and scan )        Oncologic/Hematologic History:      Oncology History   Adenocarcinoma of rectum, stage 3   7/1/2023 Initial Diagnosis     Adenocarcinoma of rectum, stage 3      7/19/2023 -  Chemotherapy     Treatment Summary   Plan Name: OP mFOLFOX 6 Q2W  Treatment Goal: Curative  Status: Active  Start Date: 7/19/2023  End Date: 11/22/2023  Provider: John Bazan MD  Chemotherapy: fluorouraciL injection 650 mg, 400 mg/m2 = 650 mg, Intravenous, Clinic/HOD 1 time, 3 of 3 cycles  Administration: 650 mg (7/19/2023), 650 mg (8/1/2023), 650 mg  (8/22/2023)  oxaliplatin (ELOXATIN) 85 mg/m2 = 139 mg in dextrose 5 % (D5W) 592.8 mL chemo infusion, 85 mg/m2 = 139 mg, Intravenous, Clinic/HOD 1 time, 8 of 8 cycles  Administration: 139 mg (7/19/2023), 139 mg (8/1/2023), 140 mg (9/18/2023), 135 mg (10/2/2023), 135 mg (11/7/2023), 135 mg (8/22/2023), 130 mg (11/20/2023), 135 mg (10/25/2023)      53-year-old lady, referred by Migel Ortiz MD, GI, with rectal cancer.     Family history pos for colon cancer in grandfather, colon polyp in father  Evaluated by Migel Ortiz MD, GI, 05/22/2023 for nervous stomach; change in bowel habits/pattern; change in bowel function started several months ago; currently, 1 bowel movement daily.  Blood in stool.  Lower abdominal pain.  Heartburn.  Epigastric pain.  MiraLax resulted in improvement.  Bright red blood in stool.    Interval History 1/25/2024: Patient presents to clinic alone for scheduled follow up.  Undergoing chemoradiation while taking Xeloda.  Patient reports tolerating treatment well.  She denies any nausea, diarrhea, blisters on the palms of her hands or the soles of her feet.  Patient says she has also not seeing as much blood as she was previously seeing when having a bowel movement.  She also does not feel the pressure on her bottom area as she was before.  Patient reports improved appetite.  Lab work reviewed with patient stable.  Discussed plan of care following chemoradiation completion.  We also discussed follow-up appointments.      Review of Systems   Constitutional:  Positive for malaise/fatigue and weight loss.   Respiratory:  Negative for cough and shortness of breath.    Cardiovascular:  Negative for chest pain.   Gastrointestinal:  Positive for blood in stool. Negative for abdominal pain and diarrhea.   Genitourinary:  Negative for frequency.   Musculoskeletal:  Negative for back pain.   Skin:  Negative for rash.   Neurological:  Negative for headaches.   Psychiatric/Behavioral:  The patient is  nervous/anxious.    All other systems reviewed and are negative.    Physical Exam  Constitutional:       Appearance: Normal appearance.   HENT:      Head: Normocephalic.      Mouth/Throat:      Mouth: Mucous membranes are moist.   Eyes:      Pupils: Pupils are equal, round, and reactive to light.   Cardiovascular:      Rate and Rhythm: Normal rate and regular rhythm.      Pulses: Normal pulses.      Heart sounds: Normal heart sounds.   Pulmonary:      Effort: Pulmonary effort is normal.      Breath sounds: Normal breath sounds.   Abdominal:      General: Bowel sounds are normal.      Palpations: Abdomen is soft.   Musculoskeletal:         General: Normal range of motion.      Cervical back: Normal range of motion.   Skin:     General: Skin is warm and dry.      Capillary Refill: Capillary refill takes less than 2 seconds.   Neurological:      General: No focal deficit present.      Mental Status: She is alert and oriented to person, place, and time.   Psychiatric:         Attention and Perception: Attention normal.         Mood and Affect: Affect normal.         Speech: Speech normal.         Behavior: Behavior normal.         Thought Content: Thought content normal.       Assessment:  Adenocarcinoma of rectum, moderately differentiated:   -presentation:  05/2023:  Change in bowel habits, hematochezia, lower abdominal pain  -colonoscopy 06/19/2023:    9 mm tubular adenoma transverse colon, removed;   invasive moderately differentiated adenocarcinoma of rectum presenting as ulcerated necrotic infiltrative 90% circumferential, bleeding, partially obstructing rectal mass, 5 cm long, 4 cm from anus, scope traversed the lesion  -MMR proficient; low probability of MSI-H  -CT abdomen pelvis with contrast 06/21/2023:  No metastasis; large rectal mass and some regional lymphadenopathy on CT abdomen pelvis with contrast 06/21/2023, largest perirectal lymph node 1 cm x 8 mm  -CEA level < 1.73, normal (07/05/2023)  -no lung  metastases on CT chest 07/18/2023  -MRI pelvis 07/18/2023: Large mid to distal rectal mass (T3b); at least 10 perirectal lymph nodes with increased signal on DWI imaging, largest 9 mm (T2b)  >> radiologically, T3b N2b (stage IIIC)  -07/27/2023:  developed acute DVT left subclavian/axillary/brachial veins secondary to PICC line; started on Eliquis; PICC line removed  -neoadjuvant FOLFOX started 07/19/2023  -cycle 4 of neoadjuvant FOLFOX started 09/18/2023  -cycle 5 of neoadjuvant FOLFOX started 10/02/2023  -positive response on restaging CTs C/A/P 10 09/2023, S/P neoadjuvant FOLFOX x5 cycles (marked improvement in rectal mass; improved perirectal lymphadenopathy)  -restaging pelvic MRI 10/24/2020: Improved rectal mass and perirectal lymph nodes   -cycle 6 of neoadjuvant FOLFOX started 10/25/2023  -10/30/2023: Cymbalta discontinued because she experienced increased anxiety with Cymbalta  -Neoadjuvant FOLFOX:  Cycle 7 on 11/07/2023; cycle 8 on 11/20/2023  -12/04/2023: Genetics consultation  -12/15/2023: Restaging CTs chest and abdomen with contrast:  No metastases  -12/15/2023:  Restaging MRI pelvis with and without contrast (comparison:  MRI 10/24/2023):  Little overall change in rectal mass since 10/24/2023  -restaging MRI pelvis 12/15/2023: No progression  >>>  By virtue of regional lymphadenopathy, at least stage III    Left upper extremity DVT secondary to PICC line 07/27/2023:  -developed acute DVT left subclavian/axillary/brachial veins secondary to PICC line; started on Eliquis; PICC line removed        Chemotherapy-induced peripheral neuropathy:  -10/17/2023: Chemotherapy induced peripheral neuropathy in the form of tingling in hands; no neuropathy in feet; no numbness or pain  -10/17/2020: Started on Cymbalta 30 mg p.o. q.h.s.  -Cymbalta discontinued 10/30/2023 because of increased anxiety with Cymbalta reported by her      Family history of colon cancer:  -grandfather experienced colon cancer; father  experienced colon polyp      Diagnostic studies  CT chest non contrast 7/18/23: No CT evidence for metastatic disease to the chest.  MRI Pelvis 7/20/23: Large mid to distal rectal mass with perirectal adenopathy as discussed.  Local staging is T3bN2b.    Plan:  Adenocarcinoma of rectum, moderately differentiated    After appropriate workup, will most likely need total neoadjuvant therapy including chemotherapy with FOLFOX or CAPOX or FOLFIRINOX X 12-16 weeks, followed by long course chemoradiation therapy, then restaging, followed by resection versus surveillance, etc.  Perioperative treatment is recommended for up to a total of 6 months.    At least based upon CT scans of A/P, has at least stage III adenocarcinoma of rectum.  MMR proficient.  Low probability of MSI-H.  Treatment will be as follows:    Total neoadjuvant therapy:   1. Modified FOLFOX every 2 weeks X 16 weeks (8 cycles); followed by   2. Long course chemoradiation therapy with capecitabine; followed by  3. Restaging; followed by   4. Transabdominal resection; or if complete clinical response, consider surveillance    Response to treatment will be assessed as follows:  Contrast-enhanced CT scans of abdomen and chest, and MRI scan of rectum with and without contrast:  -after completion of 8 cycles of modified FOLFOX; aunt  -after completion of chemoradiation therapy    PET-CT scan is not indicated  Fertility risk discussion/counseling if premenopausal (she has been postmenopausal for last 2 years and that this time, has no desire to go for add preservation)    -has experienced positive response to neoadjuvant FOLFOX x8 cycles  -we will plan neoadjuvant chemoradiation therapy now (radiotherapy +capecitabine)  -capecitabine, concurrent with radiotherapy, 825 mg per m2 body surface area p.o. twice daily, on each day that radiotherapy is given through the duration of radiotherapy (typically 28-30 treatment days)  -during chemoradiation therapy, check CBC  and CMP weekly  -approximately 3-4 weeks post completion of chemoradiation therapy, re-stage with contrast enhanced CT scans of chest and abdomen, and MRI pelvis with and without contrast      -Continue Chemoradiation (patient last day of XRT on   -Follow up with NP in 3 weeks with lab work cbc,cmp,mg   -Restage w/contrast enhanced CT scan of chest and abdomen and MRI pelvis with and without contrast -Ordered today  -----------------------    Family history of colon cancer & colon polyp    genetic testing and counseling 12/4/2023-results pending    Above discussed at length with the patient.  All questions answered.    She understands and agrees with this plan.

## 2024-01-29 ENCOUNTER — OFFICE VISIT (OUTPATIENT)
Dept: HEMATOLOGY/ONCOLOGY | Facility: CLINIC | Age: 54
End: 2024-01-29
Payer: MEDICAID

## 2024-01-29 DIAGNOSIS — C20 ADENOCARCINOMA OF RECTUM, STAGE 3: Primary | ICD-10-CM

## 2024-01-29 PROCEDURE — 99213 OFFICE O/P EST LOW 20 MIN: CPT | Mod: 95,,, | Performed by: NURSE PRACTITIONER

## 2024-01-29 PROCEDURE — 77412 RADIATION TX DELIVERY LVL 3: CPT | Performed by: RADIOLOGY

## 2024-01-29 PROCEDURE — 77336 RADIATION PHYSICS CONSULT: CPT | Performed by: RADIOLOGY

## 2024-01-29 PROCEDURE — 1159F MED LIST DOCD IN RCRD: CPT | Mod: CPTII,95,, | Performed by: NURSE PRACTITIONER

## 2024-01-29 NOTE — PROGRESS NOTES
REFERRING PROVIDER: Dr. John Bazan    Subjective:       Patient ID: Nikkie Sharpe is a 53 y.o. female.    Chief Complaint: Personal history of rectal cancer (Stage III adenocarcinoma) and a family history of cancer. Patient presented for genetic counseling on 2023 and was found appropriate for genetic testing based on the National Comprehensive Cancer Network (NCCN) criteria due to a personal history of colorectal cancer and a family history that includes colon cancer (paternal grandfather) and colon polyps (father) (see family history and pedigree). Patient signed consent, lab was drawn and sent to [facility] for [facility] with CancerNext-Expanded+RNAinsight panel testing. Patient presented via Telemedicine Visit (audio only) today for results disclosure.     HPI  Past Medical History:   Diagnosis Date    Anxiety disorder, unspecified     GERD (gastroesophageal reflux disease)     Rectal cancer         Past Surgical History:   Procedure Laterality Date    APPENDECTOMY      BACK SURGERY       SECTION  2004    cyst coccyx          Review of patient's allergies indicates:   Allergen Reactions    Codeine Itching    Morphine Itching        Review of Systems   Constitutional:  Negative for appetite change and unexpected weight change.   HENT:  Negative for mouth sores.    Eyes:  Negative for visual disturbance.   Respiratory:  Negative for cough and shortness of breath.    Cardiovascular:  Negative for chest pain.   Gastrointestinal:  Negative for abdominal pain and diarrhea.   Genitourinary:  Negative for frequency.   Musculoskeletal:  Negative for back pain.   Integumentary:  Negative for rash.   Neurological:  Negative for headaches.   Hematological:  Negative for adenopathy.   Psychiatric/Behavioral:  The patient is nervous/anxious.              Problem List Items Addressed This Visit    None    Oncology History   Adenocarcinoma of rectum, stage 3   2023 Initial  Diagnosis    Adenocarcinoma of rectum, stage 3     7/19/2023 - 11/22/2023 Chemotherapy    Treatment Summary   Plan Name: OP mFOLFOX 6 Q2W  Treatment Goal: Curative  Status: Inactive  Start Date: 7/19/2023  End Date: 11/22/2023  Provider: John Bazan MD  Chemotherapy: fluorouraciL injection 650 mg, 400 mg/m2 = 650 mg, Intravenous, Clinic/HOD 1 time, 3 of 3 cycles  Administration: 650 mg (7/19/2023), 650 mg (8/1/2023), 650 mg (8/22/2023)  oxaliplatin (ELOXATIN) 85 mg/m2 = 139 mg in dextrose 5 % (D5W) 592.8 mL chemo infusion, 85 mg/m2 = 139 mg, Intravenous, Clinic/HOD 1 time, 8 of 8 cycles  Administration: 139 mg (7/19/2023), 139 mg (8/1/2023), 140 mg (9/18/2023), 135 mg (10/2/2023), 135 mg (11/7/2023), 135 mg (8/22/2023), 130 mg (11/20/2023), 135 mg (10/25/2023)     12/26/2023 -  Chemotherapy    Treatment Summary   Plan Name: OP CAPECITABINE 5 DAYS + RADIOTHERAPY  Treatment Goal: Curative  Status: Active  Start Date: 12/26/2023 (Planned)  End Date: 12/26/2023 (Planned)  Provider: John Bazan MD  Chemotherapy: capecitabine (XELODA) tablet 1,250 mg, 825 mg/m2 = 1,250 mg, Oral, 2 times daily, 0 of 1 cycle, Start date: --, End date: --              Family History   Problem Relation Age of Onset    Colon polyps Father     Colon cancer Maternal Grandfather     Colon cancer Paternal Grandfather     Cancer Maternal Aunt         Assessment:   Genetic testing was appropriate for this patient because of personal and family history. This comprehensive [facility] CancerNext-Expanded analysis indicated that there was no deleterious mutation and no variants of uncertain significance found in (77 total): AIP, ALK, APC*, PATIENCE*, AXIN2, BAP1, BARD1, BLM, BMPR1A, BRCA1*, BRCA2*, BRIP1*, CDC73, CDH1*, CDK4, CDKN1B, CDKN2A, CHEK2*, CTNNA1, DICER1, FANCC, FH, FLCN, GALNT12, KIF1B, LZTR1, MAX, MEN1, MET, MLH1*, MSH2*, MSH3, MSH6*, MUTYH*, NBN, NF1*, NF2, NTHL1, PALB2*, PHOX2B, PMS2*, POT1, XNGIK0Y, PTCH1, PTEN*, RAD51C*,  RAD51D*, RB1, RECQL, RET, SDHA, SDHAF2, SDHB, SDHC, SDHD, SMAD4, SMARCA4, SMARCB1, SMARCE1, STK11, SUFU, MGYH671, TP53*, TSC1, TSC2, VHL and XRCC2 (sequencing and deletion/duplication); EGFR, EGLN1, HOXB13, KIT, MITF, PDGFRA, POLD1 and POLE (sequencing only); EPCAM and GREM1 (deletion/duplication only). DNA and RNA analyses performed for * genes.    It was explained to the patient, that, although this analysis indicated a negative result, there are other, rare genetic abnormalities that this test will not detect. This result, however, rules out the majority of abnormalities believed to be responsible for hereditary susceptibility to cancer.    A copy of the result will be emailed to the patient: [email or web address]     The patient location is: home    Visit type: audio only    Time with patient: 10 minutes  20 minutes of total time spent on the encounter, which includes face to face time and non-face to face time preparing to see the patient (eg, review of tests), Obtaining and/or reviewing separately obtained history, Documenting clinical information in the electronic or other health record, Independently interpreting results (not separately reported) and communicating results to the patient/family/caregiver, or Care coordination (not separately reported).         Each patient to whom he or she provides medical services by telemedicine is:  (1) informed of the relationship between the physician and patient and the respective role of any other health care provider with respect to management of the patient; and (2) notified that he or she may decline to receive medical services by telemedicine and may withdraw from such care at any time.    JAYLIN ESPINO, PhD

## 2024-01-30 PROCEDURE — 77412 RADIATION TX DELIVERY LVL 3: CPT | Performed by: RADIOLOGY

## 2024-01-31 PROCEDURE — 77412 RADIATION TX DELIVERY LVL 3: CPT | Performed by: RADIOLOGY

## 2024-02-01 ENCOUNTER — CLINICAL SUPPORT (OUTPATIENT)
Dept: RADIATION THERAPY | Facility: HOSPITAL | Age: 54
End: 2024-02-01
Attending: RADIOLOGY
Payer: MEDICAID

## 2024-02-01 PROCEDURE — 77412 RADIATION TX DELIVERY LVL 3: CPT | Performed by: RADIOLOGY

## 2024-02-01 PROCEDURE — 77417 THER RADIOLOGY PORT IMAGE(S): CPT | Performed by: RADIOLOGY

## 2024-02-02 PROCEDURE — 77412 RADIATION TX DELIVERY LVL 3: CPT | Performed by: RADIOLOGY

## 2024-02-05 ENCOUNTER — PATIENT MESSAGE (OUTPATIENT)
Dept: HEMATOLOGY/ONCOLOGY | Facility: CLINIC | Age: 54
End: 2024-02-05
Payer: MEDICAID

## 2024-02-05 PROCEDURE — 77412 RADIATION TX DELIVERY LVL 3: CPT | Performed by: RADIOLOGY

## 2024-02-05 PROCEDURE — 77336 RADIATION PHYSICS CONSULT: CPT | Performed by: RADIOLOGY

## 2024-02-06 PROCEDURE — 77412 RADIATION TX DELIVERY LVL 3: CPT | Performed by: RADIOLOGY

## 2024-02-07 PROCEDURE — 77412 RADIATION TX DELIVERY LVL 3: CPT | Performed by: RADIOLOGY

## 2024-02-08 PROCEDURE — 77280 THER RAD SIMULAJ FIELD SMPL: CPT | Performed by: RADIOLOGY

## 2024-02-08 PROCEDURE — 77412 RADIATION TX DELIVERY LVL 3: CPT | Performed by: RADIOLOGY

## 2024-02-09 PROCEDURE — 77412 RADIATION TX DELIVERY LVL 3: CPT | Performed by: RADIOLOGY

## 2024-02-12 PROCEDURE — 77412 RADIATION TX DELIVERY LVL 3: CPT | Performed by: RADIOLOGY

## 2024-02-12 PROCEDURE — 77336 RADIATION PHYSICS CONSULT: CPT | Performed by: RADIOLOGY

## 2024-02-15 ENCOUNTER — LAB VISIT (OUTPATIENT)
Dept: HEMATOLOGY/ONCOLOGY | Facility: CLINIC | Age: 54
End: 2024-02-15
Payer: MEDICAID

## 2024-02-15 ENCOUNTER — OFFICE VISIT (OUTPATIENT)
Dept: HEMATOLOGY/ONCOLOGY | Facility: CLINIC | Age: 54
End: 2024-02-15
Payer: MEDICAID

## 2024-02-15 VITALS
HEART RATE: 78 BPM | SYSTOLIC BLOOD PRESSURE: 103 MMHG | RESPIRATION RATE: 17 BRPM | OXYGEN SATURATION: 97 % | HEIGHT: 62 IN | TEMPERATURE: 97 F | WEIGHT: 116.63 LBS | DIASTOLIC BLOOD PRESSURE: 69 MMHG | BODY MASS INDEX: 21.46 KG/M2

## 2024-02-15 DIAGNOSIS — E87.6 HYPOKALEMIA: ICD-10-CM

## 2024-02-15 DIAGNOSIS — R10.9 ABDOMINAL CRAMPS: ICD-10-CM

## 2024-02-15 DIAGNOSIS — R19.7 DIARRHEA, UNSPECIFIED TYPE: Primary | ICD-10-CM

## 2024-02-15 DIAGNOSIS — C20 ADENOCARCINOMA OF RECTUM, STAGE 3: ICD-10-CM

## 2024-02-15 DIAGNOSIS — K92.1 HEMATOCHEZIA: ICD-10-CM

## 2024-02-15 DIAGNOSIS — C20 ADENOCARCINOMA OF RECTUM: ICD-10-CM

## 2024-02-15 LAB
ALBUMIN SERPL-MCNC: 3.6 G/DL (ref 3.5–5)
ALBUMIN/GLOB SERPL: 1.5 RATIO (ref 1.1–2)
ALP SERPL-CCNC: 71 UNIT/L (ref 40–150)
ALT SERPL-CCNC: 14 UNIT/L (ref 0–55)
AST SERPL-CCNC: 22 UNIT/L (ref 5–34)
BASOPHILS # BLD AUTO: 0.02 X10(3)/MCL
BASOPHILS NFR BLD AUTO: 0.7 %
BILIRUB SERPL-MCNC: 1.5 MG/DL
BUN SERPL-MCNC: 9.2 MG/DL (ref 9.8–20.1)
CALCIUM SERPL-MCNC: 9 MG/DL (ref 8.4–10.2)
CHLORIDE SERPL-SCNC: 111 MMOL/L (ref 98–107)
CO2 SERPL-SCNC: 29 MMOL/L (ref 22–29)
CREAT SERPL-MCNC: 0.71 MG/DL (ref 0.55–1.02)
EOSINOPHIL # BLD AUTO: 0.31 X10(3)/MCL (ref 0–0.9)
EOSINOPHIL NFR BLD AUTO: 10.3 %
ERYTHROCYTE [DISTWIDTH] IN BLOOD BY AUTOMATED COUNT: 18 % (ref 11.5–17)
GFR SERPLBLD CREATININE-BSD FMLA CKD-EPI: >60 MLS/MIN/1.73/M2
GLOBULIN SER-MCNC: 2.4 GM/DL (ref 2.4–3.5)
GLUCOSE SERPL-MCNC: 93 MG/DL (ref 74–100)
HCT VFR BLD AUTO: 28.6 % (ref 37–47)
HGB BLD-MCNC: 10 G/DL (ref 12–16)
IMM GRANULOCYTES # BLD AUTO: 0.01 X10(3)/MCL (ref 0–0.04)
IMM GRANULOCYTES NFR BLD AUTO: 0.3 %
LYMPHOCYTES # BLD AUTO: 0.44 X10(3)/MCL (ref 0.6–4.6)
LYMPHOCYTES NFR BLD AUTO: 14.6 %
MAGNESIUM SERPL-MCNC: 2 MG/DL (ref 1.6–2.6)
MCH RBC QN AUTO: 33.8 PG (ref 27–31)
MCHC RBC AUTO-ENTMCNC: 35 G/DL (ref 33–36)
MCV RBC AUTO: 96.6 FL (ref 80–94)
MONOCYTES # BLD AUTO: 0.33 X10(3)/MCL (ref 0.1–1.3)
MONOCYTES NFR BLD AUTO: 11 %
NEUTROPHILS # BLD AUTO: 1.9 X10(3)/MCL (ref 2.1–9.2)
NEUTROPHILS NFR BLD AUTO: 63.1 %
NRBC BLD AUTO-RTO: 0 %
PLATELET # BLD AUTO: 171 X10(3)/MCL (ref 130–400)
PMV BLD AUTO: 9.4 FL (ref 7.4–10.4)
POTASSIUM SERPL-SCNC: 3.4 MMOL/L (ref 3.5–5.1)
PROT SERPL-MCNC: 6 GM/DL (ref 6.4–8.3)
RBC # BLD AUTO: 2.96 X10(6)/MCL (ref 4.2–5.4)
SODIUM SERPL-SCNC: 145 MMOL/L (ref 136–145)
WBC # SPEC AUTO: 3.01 X10(3)/MCL (ref 4.5–11.5)

## 2024-02-15 PROCEDURE — 80053 COMPREHEN METABOLIC PANEL: CPT

## 2024-02-15 PROCEDURE — 99214 OFFICE O/P EST MOD 30 MIN: CPT | Mod: S$PBB,,, | Performed by: NURSE PRACTITIONER

## 2024-02-15 PROCEDURE — 3008F BODY MASS INDEX DOCD: CPT | Mod: CPTII,,, | Performed by: NURSE PRACTITIONER

## 2024-02-15 PROCEDURE — 36415 COLL VENOUS BLD VENIPUNCTURE: CPT

## 2024-02-15 PROCEDURE — 3074F SYST BP LT 130 MM HG: CPT | Mod: CPTII,,, | Performed by: NURSE PRACTITIONER

## 2024-02-15 PROCEDURE — 99215 OFFICE O/P EST HI 40 MIN: CPT | Mod: PBBFAC | Performed by: NURSE PRACTITIONER

## 2024-02-15 PROCEDURE — 1160F RVW MEDS BY RX/DR IN RCRD: CPT | Mod: CPTII,,, | Performed by: NURSE PRACTITIONER

## 2024-02-15 PROCEDURE — 3078F DIAST BP <80 MM HG: CPT | Mod: CPTII,,, | Performed by: NURSE PRACTITIONER

## 2024-02-15 PROCEDURE — 1159F MED LIST DOCD IN RCRD: CPT | Mod: CPTII,,, | Performed by: NURSE PRACTITIONER

## 2024-02-15 PROCEDURE — 85025 COMPLETE CBC W/AUTO DIFF WBC: CPT

## 2024-02-15 PROCEDURE — 83735 ASSAY OF MAGNESIUM: CPT

## 2024-02-15 RX ORDER — DIPHENOXYLATE HYDROCHLORIDE AND ATROPINE SULFATE 2.5; .025 MG/1; MG/1
1 TABLET ORAL 4 TIMES DAILY PRN
Qty: 40 TABLET | Refills: 1 | Status: SHIPPED | OUTPATIENT
Start: 2024-02-15 | End: 2024-03-06

## 2024-02-15 NOTE — PROGRESS NOTES
Reason for Follow-up:  Reason for consultation:  -adenocarcinoma rectum, moderately differentiated, partially obstructing mass, S/P colonoscopy 06/19/2023   -tubular adenoma transverse colon   -regional lymphadenopathy on CT abdomen pelvis with contrast 06/21/2023  -MMR proficient  -grandfather experienced colon cancer; father experienced colon polyp     Current Treatment:  Surveillance    Treatment History:  FOLFOX x 8 cycles 07/19/2023-11/20/23  Chemoradiation (Capcitabine) (1/3/24-2/12/24)    Past medical history:  Anxiety.    Procedure/surgical history:  Appendectomy.  Back surgery.  Social history:  Single.  Lives in [place], [place].  Has 2 children.  Works as a .  No history of tobacco, alcohol, or illicit drug abuse.  Family history:  Paternal grandfather experience colon cancer in his 70s.  Father experienced multiple colon polyps.  Health maintenance:  No screening colonoscopy prior to most recent colonoscopy which led to the diagnosis of rectal cancer.  -09/13/2019:  Bilateral screening mammogram pelvic comparison:  09/13/2018 mammogram):  BI-RADS 0 (indeterminate)  -09/25/2019:  Diagnostic left mammogram, limited ultrasound left breast (comparison:  09/13/2019 mammogram):  Overall study BI-RADS 2: Benign   History of Present Illness:   Follow-up (Review labs and scan )        Oncologic/Hematologic History:      Oncology History   Adenocarcinoma of rectum, stage 3   7/1/2023 Initial Diagnosis     Adenocarcinoma of rectum, stage 3      7/19/2023 -  Chemotherapy     Treatment Summary   Plan Name: OP mFOLFOX 6 Q2W  Treatment Goal: Curative  Status: Active  Start Date: 7/19/2023  End Date: 11/22/2023  Provider: John Bazan MD  Chemotherapy: fluorouraciL injection 650 mg, 400 mg/m2 = 650 mg, Intravenous, Clinic/HOD 1 time, 3 of 3 cycles  Administration: 650 mg (7/19/2023), 650 mg (8/1/2023), 650 mg (8/22/2023)  oxaliplatin (ELOXATIN) 85 mg/m2 = 139 mg in dextrose 5 % (D5W) 592.8 mL chemo  infusion, 85 mg/m2 = 139 mg, Intravenous, Clinic/HOD 1 time, 8 of 8 cycles  Administration: 139 mg (7/19/2023), 139 mg (8/1/2023), 140 mg (9/18/2023), 135 mg (10/2/2023), 135 mg (11/7/2023), 135 mg (8/22/2023), 130 mg (11/20/2023), 135 mg (10/25/2023)      53-year-old lady, referred by Migel Ortiz MD, GI, with rectal cancer.     Family history pos for colon cancer in grandfather, colon polyp in father  Evaluated by Migel Ortiz MD, GI, 05/22/2023 for nervous stomach; change in bowel habits/pattern; change in bowel function started several months ago; currently, 1 bowel movement daily.  Blood in stool.  Lower abdominal pain.  Heartburn.  Epigastric pain.  MiraLax resulted in improvement.  Bright red blood in stool.    Interval History 2/15/2024: Patient presents to clinic alone for scheduled follow up.  Patient reports that she has completed chemo radiation as of  2/12/24. Patient says that this apst week symptoms of diarrhea increased. She admits that she still sees a slight amount of blood in stool. Patient reports using Immodium AD for diarrhea of which she says works very well with managing symptoms. Patient says she has a good appetite however does eat much during the day unless she is home due to the diarrhea. Lab work reviewed with patient, potassium level slightly low. She is aware of upcoming scans. Discussed plan of care and follow up.     Review of Systems   Constitutional:  Positive for malaise/fatigue and weight loss.   Respiratory:  Negative for cough and shortness of breath.    Cardiovascular:  Negative for chest pain.   Gastrointestinal:  Positive for abdominal pain, blood in stool and diarrhea.   Genitourinary:  Negative for frequency.   Musculoskeletal:  Negative for back pain.   Skin:  Negative for rash.   Neurological:  Negative for headaches.   Psychiatric/Behavioral:  The patient is nervous/anxious.    All other systems reviewed and are negative.    Physical Exam  Constitutional:        Appearance: Normal appearance.   HENT:      Head: Normocephalic.      Mouth/Throat:      Mouth: Mucous membranes are moist.   Eyes:      Pupils: Pupils are equal, round, and reactive to light.   Cardiovascular:      Rate and Rhythm: Normal rate and regular rhythm.      Pulses: Normal pulses.      Heart sounds: Normal heart sounds.   Pulmonary:      Effort: Pulmonary effort is normal.      Breath sounds: Normal breath sounds.   Abdominal:      General: Bowel sounds are normal.      Palpations: Abdomen is soft.   Musculoskeletal:         General: Normal range of motion.      Cervical back: Normal range of motion.   Skin:     General: Skin is warm and dry.      Capillary Refill: Capillary refill takes less than 2 seconds.   Neurological:      General: No focal deficit present.      Mental Status: She is alert and oriented to person, place, and time.   Psychiatric:         Attention and Perception: Attention normal.         Mood and Affect: Affect normal.         Speech: Speech normal.         Behavior: Behavior normal.         Thought Content: Thought content normal.       Vitals:    02/15/24 1116   BP: 103/69   Pulse: 78   Resp: 17   Temp: 97.4 °F (36.3 °C)     Lab Results   Component Value Date    WBC 3.01 (L) 02/15/2024    RBC 2.96 (L) 02/15/2024    HGB 10.0 (L) 02/15/2024    HCT 28.6 (L) 02/15/2024    MCV 96.6 (H) 02/15/2024    MCH 33.8 (H) 02/15/2024    MCHC 35.0 02/15/2024    RDW 18.0 (H) 02/15/2024     02/15/2024    MPV 9.4 02/15/2024    EOS 0.3 03/03/2023    BASO 0.1 03/03/2023    EOSINOPHIL 6 03/03/2023     CMP  Sodium Level   Date Value Ref Range Status   02/15/2024 145 136 - 145 mmol/L Final     Potassium Level   Date Value Ref Range Status   02/15/2024 3.4 (L) 3.5 - 5.1 mmol/L Final     Carbon Dioxide   Date Value Ref Range Status   02/15/2024 29 22 - 29 mmol/L Final     Blood Urea Nitrogen   Date Value Ref Range Status   02/15/2024 9.2 (L) 9.8 - 20.1 mg/dL Final     Creatinine   Date Value Ref Range  Status   02/15/2024 0.71 0.55 - 1.02 mg/dL Final     Calcium Level Total   Date Value Ref Range Status   02/15/2024 9.0 8.4 - 10.2 mg/dL Final     Albumin Level   Date Value Ref Range Status   02/15/2024 3.6 3.5 - 5.0 g/dL Final     Bilirubin Total   Date Value Ref Range Status   02/15/2024 1.5 <=1.5 mg/dL Final     Alkaline Phosphatase   Date Value Ref Range Status   02/15/2024 71 40 - 150 unit/L Final     Aspartate Aminotransferase   Date Value Ref Range Status   02/15/2024 22 5 - 34 unit/L Final     Alanine Aminotransferase   Date Value Ref Range Status   02/15/2024 14 0 - 55 unit/L Final     eGFR   Date Value Ref Range Status   02/15/2024 >60 mls/min/1.73/m2 Final       Assessment:  Adenocarcinoma of rectum, moderately differentiated:   -presentation:  05/2023:  Change in bowel habits, hematochezia, lower abdominal pain  -colonoscopy 06/19/2023:    9 mm tubular adenoma transverse colon, removed;   invasive moderately differentiated adenocarcinoma of rectum presenting as ulcerated necrotic infiltrative 90% circumferential, bleeding, partially obstructing rectal mass, 5 cm long, 4 cm from anus, scope traversed the lesion  -MMR proficient; low probability of MSI-H  -CT abdomen pelvis with contrast 06/21/2023:  No metastasis; large rectal mass and some regional lymphadenopathy on CT abdomen pelvis with contrast 06/21/2023, largest perirectal lymph node 1 cm x 8 mm  -CEA level < 1.73, normal (07/05/2023)  -no lung metastases on CT chest 07/18/2023  -MRI pelvis 07/18/2023: Large mid to distal rectal mass (T3b); at least 10 perirectal lymph nodes with increased signal on DWI imaging, largest 9 mm (T2b)  >> radiologically, T3b N2b (stage IIIC)  -07/27/2023:  developed acute DVT left subclavian/axillary/brachial veins secondary to PICC line; started on Eliquis; PICC line removed  -neoadjuvant FOLFOX started 07/19/2023  -cycle 4 of neoadjuvant FOLFOX started 09/18/2023  -cycle 5 of neoadjuvant FOLFOX started  10/02/2023  -positive response on restaging CTs C/A/P 10 09/2023, S/P neoadjuvant FOLFOX x5 cycles (marked improvement in rectal mass; improved perirectal lymphadenopathy)  -restaging pelvic MRI 10/24/2020: Improved rectal mass and perirectal lymph nodes   -cycle 6 of neoadjuvant FOLFOX started 10/25/2023  -10/30/2023: Cymbalta discontinued because she experienced increased anxiety with Cymbalta  -Neoadjuvant FOLFOX:  Cycle 7 on 11/07/2023; cycle 8 on 11/20/2023  -12/04/2023: Genetics consultation  -12/15/2023: Restaging CTs chest and abdomen with contrast:  No metastases  -12/15/2023:  Restaging MRI pelvis with and without contrast (comparison:  MRI 10/24/2023):  Little overall change in rectal mass since 10/24/2023  -restaging MRI pelvis 12/15/2023: No progression  -CRT w/Capecitabine (1/3/24-2/12/24)  >>>  By virtue of regional lymphadenopathy, at least stage III    Left upper extremity DVT secondary to PICC line 07/27/2023:  -developed acute DVT left subclavian/axillary/brachial veins secondary to PICC line; started on Eliquis; PICC line removed        Chemotherapy-induced peripheral neuropathy:  -10/17/2023: Chemotherapy induced peripheral neuropathy in the form of tingling in hands; no neuropathy in feet; no numbness or pain  -10/17/2020: Started on Cymbalta 30 mg p.o. q.h.s.  -Cymbalta discontinued 10/30/2023 because of increased anxiety with Cymbalta reported by her      Family history of colon cancer:  -grandfather experienced colon cancer; father experienced colon polyp      Diagnostic studies  CT chest non contrast 7/18/23: No CT evidence for metastatic disease to the chest.  MRI Pelvis 7/20/23: Large mid to distal rectal mass with perirectal adenopathy as discussed.  Local staging is T3bN2b.    Plan:  Adenocarcinoma of rectum, moderately differentiated    After appropriate workup, will most likely need total neoadjuvant therapy including chemotherapy with FOLFOX or CAPOX or FOLFIRINOX X 12-16 weeks,  followed by long course chemoradiation therapy, then restaging, followed by resection versus surveillance, etc.  Perioperative treatment is recommended for up to a total of 6 months.    At least based upon CT scans of A/P, has at least stage III adenocarcinoma of rectum.  MMR proficient.  Low probability of MSI-H.  Treatment will be as follows:    Total neoadjuvant therapy:   1. Modified FOLFOX every 2 weeks X 16 weeks (8 cycles); followed by   2. Long course chemoradiation therapy with capecitabine; followed by  3. Restaging; followed by   4. Transabdominal resection; or if complete clinical response, consider surveillance    Response to treatment will be assessed as follows:  Contrast-enhanced CT scans of abdomen and chest, and MRI scan of rectum with and without contrast:  -after completion of 8 cycles of modified FOLFOX; aunt  -after completion of chemoradiation therapy    PET-CT scan is not indicated  Fertility risk discussion/counseling if premenopausal (she has been postmenopausal for last 2 years and that this time, has no desire to go for add preservation)    -has experienced positive response to neoadjuvant FOLFOX x8 cycles  -we will plan neoadjuvant chemoradiation therapy now (radiotherapy +capecitabine)  -capecitabine, concurrent with radiotherapy, 825 mg per m2 body surface area p.o. twice daily, on each day that radiotherapy is given through the duration of radiotherapy (typically 28-30 treatment days)  -during chemoradiation therapy, check CBC and CMP weekly  -approximately 3-4 weeks post completion of chemoradiation therapy, re-stage with contrast enhanced CT scans of chest and abdomen, and MRI pelvis with and without contrast      -In 3/4 weeks post completion of chemoradiation Restage w/contrast enhanced CT scan of chest and abdomen and MRI pelvis with and without contrast -Scheduled for 3/5/2024  -Follow up with  in 3 weeks with lab work (cbc,cmp,mg) with CT and MRI results       Family  history of colon cancer & colon polyp   -genetic testing and counseling 12/4/2023 1/29/24: No deleterious mutation and no variants of uncertain significance found     Above discussed at length with the patient.  All questions answered.    She understands and agrees with this plan

## 2024-02-16 ENCOUNTER — PATIENT MESSAGE (OUTPATIENT)
Dept: HEMATOLOGY/ONCOLOGY | Facility: CLINIC | Age: 54
End: 2024-02-16
Payer: MEDICAID

## 2024-03-05 ENCOUNTER — HOSPITAL ENCOUNTER (OUTPATIENT)
Dept: RADIOLOGY | Facility: HOSPITAL | Age: 54
Discharge: HOME | End: 2024-03-05
Attending: NURSE PRACTITIONER
Payer: MEDICAID

## 2024-03-05 DIAGNOSIS — C20 ADENOCARCINOMA OF RECTUM, STAGE 3: ICD-10-CM

## 2024-03-05 PROCEDURE — 25500020 PHARM REV CODE 255

## 2024-03-05 PROCEDURE — 72197 MRI PELVIS W/O & W/DYE: CPT | Mod: TC

## 2024-03-05 PROCEDURE — A9577 INJ MULTIHANCE: HCPCS

## 2024-03-05 PROCEDURE — 74160 CT ABDOMEN W/CONTRAST: CPT | Mod: TC

## 2024-03-05 PROCEDURE — 71260 CT THORAX DX C+: CPT | Mod: TC

## 2024-03-05 RX ADMIN — GADOBENATE DIMEGLUMINE 11 ML: 529 INJECTION, SOLUTION INTRAVENOUS at 07:03

## 2024-03-05 RX ADMIN — IOHEXOL 100 ML: 350 INJECTION, SOLUTION INTRAVENOUS at 07:03

## 2024-03-12 ENCOUNTER — DOCUMENTATION ONLY (OUTPATIENT)
Dept: HEMATOLOGY/ONCOLOGY | Facility: CLINIC | Age: 54
End: 2024-03-12
Payer: MEDICAID

## 2024-03-12 DIAGNOSIS — C20 ADENOCARCINOMA OF RECTUM, STAGE 3: Primary | ICD-10-CM

## 2024-03-12 DIAGNOSIS — C20 ADENOCARCINOMA OF RECTUM: ICD-10-CM

## 2024-03-14 DIAGNOSIS — C20 ADENOCARCINOMA OF RECTUM, STAGE 3: Primary | ICD-10-CM

## 2024-03-14 PROBLEM — R93.5 ABNORMAL MRI, PELVIS: Status: ACTIVE | Noted: 2024-03-14

## 2024-03-14 PROBLEM — M53.3 SACRAL LESION: Status: ACTIVE | Noted: 2024-03-14

## 2024-03-14 PROBLEM — I82.4Y2: Status: RESOLVED | Noted: 2023-08-13 | Resolved: 2024-03-14

## 2024-03-15 ENCOUNTER — OFFICE VISIT (OUTPATIENT)
Dept: HEMATOLOGY/ONCOLOGY | Facility: CLINIC | Age: 54
End: 2024-03-15
Attending: INTERNAL MEDICINE
Payer: MEDICAID

## 2024-03-15 VITALS
SYSTOLIC BLOOD PRESSURE: 114 MMHG | DIASTOLIC BLOOD PRESSURE: 72 MMHG | HEART RATE: 70 BPM | BODY MASS INDEX: 22.26 KG/M2 | RESPIRATION RATE: 18 BRPM | WEIGHT: 121 LBS | OXYGEN SATURATION: 100 % | HEIGHT: 62 IN | TEMPERATURE: 98 F

## 2024-03-15 DIAGNOSIS — R93.5 ABNORMAL MRI, PELVIS: ICD-10-CM

## 2024-03-15 DIAGNOSIS — T82.868A THROMBOSIS IN PERIPHERALLY INSERTED CENTRAL CATHETER (PICC): ICD-10-CM

## 2024-03-15 DIAGNOSIS — Z80.0 FAMILY HISTORY OF COLON CANCER: ICD-10-CM

## 2024-03-15 DIAGNOSIS — G62.0 CHEMOTHERAPY-INDUCED NEUROPATHY: Primary | ICD-10-CM

## 2024-03-15 DIAGNOSIS — C20 ADENOCARCINOMA OF RECTUM: ICD-10-CM

## 2024-03-15 DIAGNOSIS — M53.3 SACRAL LESION: ICD-10-CM

## 2024-03-15 DIAGNOSIS — C20 ADENOCARCINOMA OF RECTUM, STAGE 3: Primary | ICD-10-CM

## 2024-03-15 DIAGNOSIS — R59.0 PELVIC LYMPHADENOPATHY: ICD-10-CM

## 2024-03-15 DIAGNOSIS — I82.4Y2: ICD-10-CM

## 2024-03-15 DIAGNOSIS — C20 ADENOCARCINOMA OF RECTUM, STAGE 3: ICD-10-CM

## 2024-03-15 DIAGNOSIS — T45.1X5A CHEMOTHERAPY-INDUCED NEUROPATHY: Primary | ICD-10-CM

## 2024-03-15 PROCEDURE — 3008F BODY MASS INDEX DOCD: CPT | Mod: CPTII,,, | Performed by: INTERNAL MEDICINE

## 2024-03-15 PROCEDURE — 3074F SYST BP LT 130 MM HG: CPT | Mod: CPTII,,, | Performed by: INTERNAL MEDICINE

## 2024-03-15 PROCEDURE — 99214 OFFICE O/P EST MOD 30 MIN: CPT | Mod: S$PBB,,, | Performed by: INTERNAL MEDICINE

## 2024-03-15 PROCEDURE — 1160F RVW MEDS BY RX/DR IN RCRD: CPT | Mod: CPTII,,, | Performed by: INTERNAL MEDICINE

## 2024-03-15 PROCEDURE — 99215 OFFICE O/P EST HI 40 MIN: CPT | Mod: PBBFAC | Performed by: INTERNAL MEDICINE

## 2024-03-15 PROCEDURE — 1159F MED LIST DOCD IN RCRD: CPT | Mod: CPTII,,, | Performed by: INTERNAL MEDICINE

## 2024-03-15 PROCEDURE — 3078F DIAST BP <80 MM HG: CPT | Mod: CPTII,,, | Performed by: INTERNAL MEDICINE

## 2024-03-15 NOTE — PROGRESS NOTES
History:  Past Medical History:   Diagnosis Date    Anxiety disorder, unspecified     GERD (gastroesophageal reflux disease)     Rectal cancer        Past Surgical History:   Procedure Laterality Date    APPENDECTOMY      BACK SURGERY       SECTION  2004    cyst coccyx     Past medical history:  Anxiety.    Procedure/surgical history:  Appendectomy.  Back surgery.  Social history:  Single.  Lives in [place], [place].  Has 2 children.  Works as a .  No history of tobacco, alcohol, or illicit drug abuse.  Family history:  Paternal grandfather experience colon cancer in his 70s.  Father experienced multiple colon polyps.  Health maintenance:  No screening colonoscopy prior to most recent colonoscopy which led to the diagnosis of rectal cancer.  -2019:  Bilateral screening mammogram pelvic comparison:  2018 mammogram):  BI-RADS 0 (indeterminate)  -2019:  Diagnostic left mammogram, limited ultrasound left breast (comparison:  2019 mammogram):  Overall study BI-RADS 2: Benign     Family History   Problem Relation Age of Onset    Colon polyps Father     Colon cancer Maternal Grandfather     Colon cancer Paternal Grandfather     Cancer Maternal Aunt       Reason for Follow-up:  -adenocarcinoma rectum, moderately differentiated, partially obstructing mass, S/P colonoscopy 2023   -tubular adenoma transverse colon   -regional lymphadenopathy on CT abdomen pelvis with contrast 2023  -MMR proficient  -acute left upper extremity DVT related to PICC line  -grandfather experienced colon cancer; father experienced colon polyp  -chemotherapy-induced peripheral neuropathy    History of Present Illness:   Adenocarcinoma of rectum, stage 3       Oncologic/Hematologic History:  Oncology History   Adenocarcinoma of rectum, stage 3   2023 Initial Diagnosis    Adenocarcinoma of rectum, stage 3     2023 - 2023 Chemotherapy    Treatment Summary   Plan Name: OP  mFOLFOX 6 Q2W  Treatment Goal: Curative  Status: Inactive  Start Date: 7/19/2023  End Date: 11/22/2023  Provider: John Bazan MD  Chemotherapy: fluorouraciL injection 650 mg, 400 mg/m2 = 650 mg, Intravenous, Clinic/HOD 1 time, 3 of 3 cycles  Administration: 650 mg (7/19/2023), 650 mg (8/1/2023), 650 mg (8/22/2023)  oxaliplatin (ELOXATIN) 85 mg/m2 = 139 mg in dextrose 5 % (D5W) 592.8 mL chemo infusion, 85 mg/m2 = 139 mg, Intravenous, Clinic/HOD 1 time, 8 of 8 cycles  Administration: 139 mg (7/19/2023), 139 mg (8/1/2023), 140 mg (9/18/2023), 135 mg (10/2/2023), 135 mg (11/7/2023), 135 mg (8/22/2023), 130 mg (11/20/2023), 135 mg (10/25/2023)     12/26/2023 -  Chemotherapy    Treatment Summary   Plan Name: OP CAPECITABINE 5 DAYS + RADIOTHERAPY  Treatment Goal: Curative  Status: Active  Start Date: 12/26/2023 (Planned)  End Date: 12/26/2023 (Planned)  Provider: John Bazan MD  Chemotherapy: capecitabine (XELODA) tablet 1,250 mg, 825 mg/m2 = 1,250 mg, Oral, 2 times daily, 0 of 1 cycle, Start date: --, End date: --     53-year-old lady, referred by Migel Ortiz MD, GI, with rectal cancer.    Family history pos for colon cancer in grandfather, colon polyp in father  Evaluated by Migel Ortiz MD, GI, 05/22/2023 for nervous stomach; change in bowel habits/pattern; change in bowel function started several months ago; currently, 1 bowel movement daily.  Blood in stool.  Lower abdominal pain.  Heartburn.  Epigastric pain.  MiraLax resulted in improvement.  Bright red blood in stool.    06/13/2023: EGD:  1. Esophagus: Erythema of mucosa in the lower 3rd of esophagus, compatible esophagitis  2. Stomach: Erythema of mucosa in the antrum, compatible with gastritis  3. Duodenum:  Normal mucosa in the whole duodenum      06/13/2023:  EGD:  1. Gastric antrum, biopsy:  Mild chronic inactive gastritis; negative for intestinal metaplasia; negative for H pylori organisms  2. Gastric body, biopsy:  Mild chronic inactive  gastritis; negative for intestinal metaplasia; negative for H pylori organisms    06/19/2023:  Colonoscopy (screening colonoscopy):  -single 9 mm polyp transverse colon, polypectomy, completely removed  -ulcerated necrotic infiltrative 90% circumferential, bleeding, 5 cm in length, mass of malignant appearance in the rectum at 4 cm from the anus, causing partial obstruction; scope traversed the lesion  Pathology:  1. Transverse colon polyp, polypectomy:  Tubular adenoma  2. Rectal mass, biopsy:  Invasive moderately differentiated adenocarcinoma; no LVI    MMR proficient  Low probability of MSI-H    06/21/2023: CT abdomen pelvis with and without contrast:  -large rectal mass consistent with malignancy; some lymphadenopathy is seen adjacent to the mass  (large rectal mass with circumferential wall thickening in rectum; associated inflammatory changes; no bowel obstruction; some lymph nodes are seen in the perirectal region on the right and left side; representative lymph node on right side of rectum 1 cm x 8 mm; representative lymph node on the left side of rectum 8 mm x 8 mm)    Labs reviewed:  05/24/2023:  WBC 8.0.  Hemoglobin 12.5.  MCV 88.  Platelets 306 K.  Differential count normal.    07/05/2023:   Pleasant lady who presents for initial medical oncology consultation, accompanied by her brother.    Her symptoms started 2 years ago, in the form of intermittent small amount hematochezia, initially on toilet wife's, and for last few weeks, in toilet bowel as well.  She brought the symptoms to the attention of her gyn several months ago and does not remember the recommendation.  Regardless, she never got a screening colonoscopy done.  Around [place] time 2022, she started feeling bloated.  She started having constant uncomfortable feeling because of bloating, as well as in the anorectal area.  Hematochezia continued.  She brought this to the attention of her PCP in [place] who suggested taking MiraLax.  The PCP  also arranged for colonoscopy.  On today's visit, she reports that she has been constipated her entire life.  She has been constipated for at least 10 years.  Hematochezia for 2 years.  No anorectal pain.  Does have constant feeling of incomplete evacuation.  Appetite is down and she has lost 30 lb in last 3-4 months.  Appetite is more less preserved but she is afraid to eat because eating causes bloating and worsening of uncomfortable feeling in rectum.    Interval History:  PICC DOUBLE LUMEN LINE FLUSH   OP CAPECITABINE 5 DAYS + RADIOTHERAPY     03/15/2024:   -12/19/2023:  Hancock syndrome analysis with [employer]-expanded +RNA insight:  Negative:  No clinically significant variants detected  -S/P pelvic radiotherapy, concurrent with capecitabine, 01/03/2024-02/12/2024  -02/16/2024:  Bilateral screening mammogram (comparison:  09/13/2019 mammogram, etc.):  BI-RADS category 2: Benign  -03/05/2024: Restaging MRI pelvis with and without contrast, post neoadjuvant concurrent chemoradiation therapy (comparison: MRI 12/15/2023):  Rectal mass smaller since 12/15/2023 (mid rectal mass shows decreased wall thickening and overall length since December 2023; 3 cm craniocaudal, previously 4 cm; abnormal signal is also less circumferential than before; previously described area of mesorectal extension in the 10 o'clock position is also less discrete on this exam).   New indeterminate 8 mm enhancing lesion in the right sacral ala.  Of note, this site was excluded from the field of view for the same day CT abdomen.  Bone scan can be pursued for further evaluation.   Otherwise, no pelvic metastatic disease appreciated.  -03/05/2024: Restaging CT chest and abdomen with contrast, post completion of neoadjuvant chemoradiation therapy:  No metastases in chest or abdomen  Presents for a follow-up visit.  Doing very well.  Says that now, her bowel movements have almost returned to normal habits.  Hematochezia has resolved.  She is eating  better.  Appetite is improved.  She is sleeping better.  Overall, feels a whole lot better.  Denies any pain over the sacral area.  Made her aware of new spot in the sacral ala, noted on MRI pelvis.  Explained the plan of getting a bone scan done.  She is appointment with Surgical Oncology early next month.    Medications:  Current Outpatient Medications on File Prior to Visit   Medication Sig Dispense Refill    ALPRAZolam (XANAX) 0.5 MG tablet Take 0.5 mg by mouth 2 (two) times daily as needed.      busPIRone (BUSPAR) 15 MG tablet Take 15 mg by mouth 2 (two) times daily.      capecitabine (XELODA) 150 MG tablet Take 8 tablets (1,200 mg) by mouth twice a day on radiation days.. 480 tablet 0    dexAMETHasone (DECADRON) 4 MG Tab Take 2 tablets by mouth every morning.      DULoxetine (CYMBALTA) 20 MG capsule Take 20 mg by mouth.      famotidine (PEPCID) 40 MG tablet Take 1 tablet (40 mg total) by mouth nightly as needed for Heartburn. 30 tablet 1    hyoscyamine (ANASPAZ,LEVSIN) 0.125 mg Tab Take 125 mcg by mouth every 6 (six) hours as needed.      hyoscyamine (ANASPAZ,LEVSIN) 0.125 mg Tab Take 1 tablet by mouth every 6 (six) hours as needed.      lisinopriL 10 MG tablet Take 10 mg by mouth every morning.      lisinopriL 10 MG tablet Take 20 mg by mouth once daily.      MIRALAX 17 gram/dose powder Take 17 g by mouth.      ondansetron (ZOFRAN) 8 MG tablet Take 1 tablet (8 mg total) by mouth every 8 (eight) hours as needed for Nausea. 60 tablet 2    oxyCODONE-acetaminophen (PERCOCET) 5-325 mg per tablet Take 1 tablet by mouth every 4 (four) hours as needed for Pain. 56 tablet 0    pantoprazole (PROTONIX) 40 MG tablet Take 1 tablet by mouth every morning.      pantoprazole (PROTONIX) 40 MG tablet Take 1 tablet (40 mg total) by mouth every morning. 30 tablet 1    PANTOPRAZOLE 40 MG/100 ML 0.9 % NS IVPB       EScitalopram oxalate (LEXAPRO) 10 MG tablet Take 20 mg by mouth.      LORazepam (ATIVAN) 0.5 MG tablet Take 1 tablet  "(0.5 mg total) by mouth every 12 (twelve) hours as needed for Anxiety. 28 tablet 0     No current facility-administered medications on file prior to visit.       Review of Systems:   All systems reviewed and found to be negative except for the symptoms detailed above    Physical Examination:   VITAL SIGNS:   Vitals:    03/15/24 1025   BP: 114/72   Pulse: 70   Resp: 18   Temp: 97.9 °F (36.6 °C)       GENERAL:  In no apparent distress.    HEAD:  No signs of head trauma.  EYES:  Pupils are equal.  Extraocular motions intact.    EARS:  Hearing grossly intact.  MOUTH:  Oropharynx is normal.   NECK:  No adenopathy, no JVD.     CHEST:  Chest with clear breath sounds bilaterally.  No wheezes, rales, rhonchi.    CARDIAC:  Regular rate and rhythm.  S1 and S2, without murmurs, gallops, rubs.  VASCULAR:  No Edema.  Peripheral pulses normal and equal in all extremities.  ABDOMEN:  Soft, without detectable tenderness.  No sign of distention.  No   rebound or guarding, and no masses palpated.   Bowel Sounds normal.  MUSCULOSKELETAL:  Good range of motion of all major joints. Extremities without clubbing, cyanosis or edema.    NEUROLOGIC EXAM:  Alert and oriented x 3.  No focal sensory or strength deficits.   Speech normal.  Follows commands.  PSYCHIATRIC:  Mood normal.    No results for input(s): "CBC" in the last 72 hours.   No results for input(s): "CMP" in the last 72 hours.     Assessment:  Problem List Items Addressed This Visit          Neuro    Chemotherapy-induced neuropathy - Primary       Cardiac/Vascular    Thrombosis in peripherally inserted central catheter (PICC)       Hematology    RESOLVED: Upper leg DVT (deep venous thromboembolism), acute, left       Oncology    Adenocarcinoma of rectum    Adenocarcinoma of rectum, stage 3    Family history of colon cancer       GI    Abnormal MRI, pelvis       Orthopedic    Sacral lesion       Other    Pelvic lymphadenopathy     Adenocarcinoma of rectum, moderately " differentiated:   -presentation:  05/2023:  Change in bowel habits, hematochezia, lower abdominal pain  -colonoscopy 06/19/2023:    9 mm tubular adenoma transverse colon, removed;   invasive moderately differentiated adenocarcinoma of rectum presenting as ulcerated necrotic infiltrative 90% circumferential, bleeding, partially obstructing rectal mass, 5 cm long, 4 cm from anus, scope traversed the lesion  -MMR proficient; low probability of MSI-H  -CT abdomen pelvis with contrast 06/21/2023:  No metastasis; large rectal mass and some regional lymphadenopathy on CT abdomen pelvis with contrast 06/21/2023, largest perirectal lymph node 1 cm x 8 mm  -CEA level < 1.73, normal (07/05/2023)  -no lung metastases on CT chest 07/18/2023  -MRI pelvis 07/18/2023: Large mid to distal rectal mass (T3b); at least 10 perirectal lymph nodes with increased signal on DWI imaging, largest 9 mm (T2b)  >> radiologically, T3b N2b (stage IIIC)  -07/27/2023:  developed acute DVT left subclavian/axillary/brachial veins secondary to PICC line; started on Eliquis; PICC line removed  -neoadjuvant FOLFOX started 07/19/2023  -cycle 4 of neoadjuvant FOLFOX started 09/18/2023  -cycle 5 of neoadjuvant FOLFOX started 10/02/2023  -positive response on restaging CTs C/A/P 10 09/2023, S/P neoadjuvant FOLFOX x5 cycles (marked improvement in rectal mass; improved perirectal lymphadenopathy)  -restaging pelvic MRI 10/24/2020: Improved rectal mass and perirectal lymph nodes   -cycle 6 of neoadjuvant FOLFOX started 10/25/2023  -10/30/2023: Cymbalta discontinued because she experienced increased anxiety with Cymbalta  -Neoadjuvant FOLFOX:  Cycle 7 on 11/07/2023; cycle 8 on 11/20/2023  -12/04/2023: Genetics consultation  -12/15/2023: Restaging CTs chest and abdomen with contrast:  No metastases  -12/15/2023:  Restaging MRI pelvis with and without contrast (comparison:  MRI 10/24/2023):  Little overall change in rectal mass since 10/24/2023  -restaging MRI  pelvis 12/15/2023: No progression  -genetic testing 12/19/2023: Negative  -S/P concurrent chemoradiation therapy to pelvis 01/03/2024-02/12/2024 (concurrent with capecitabine):  -screening mammogram 02/16/2024: BI-RADS 2  -restaging MRI pelvis 03/05/2024, post completion of neoadjuvant therapy:  Rectal mass smaller and improved; new indeterminate 8 mm enhancing lesion right sacral ala  -restaging CT chest and abdomen 03/05/2024, post completion of neoadjuvant therapy: No metastases      Left upper extremity DVT secondary to PICC line 07/27/2023:  -developed acute DVT left subclavian/axillary/brachial veins secondary to PICC line; started on Eliquis; PICC line removed      Chemotherapy-induced peripheral neuropathy:  -10/17/2023: Chemotherapy induced peripheral neuropathy in the form of tingling in hands; no neuropathy in feet; no numbness or pain  -10/17/2020: Started on Cymbalta 30 mg p.o. q.h.s.  -Cymbalta discontinued 10/30/2023 because of increased anxiety with Cymbalta reported by her      Family history of colon cancer:  -grandfather experienced colon cancer; father experienced colon polyp      Plan:  Whole-body nuclear medicine bone scan for evaluation of new lesion in the right sacral ala, noted on pelvic MRI   Refer to Surgical Oncology, Dr. Lee Rosenthal, for transabdominal resection of rectal tumor  Follow-up with NP in 2 weeks.  -------------------------------------      -10/17/2023: Chemotherapy induced peripheral neuropathy in the form of tingling in hands; no neuropathy in feet; no numbness or pain  -10/17/2020: Started on Cymbalta 30 mg p.o. q.h.s.  -Cymbalta discontinued 10/30/2023 because of increased anxiety with Cymbalta reported by her  -10/17/2023:  Referred to behavioral health for management of anxiety.    -adenocarcinoma rectum, moderately differentiated  -presentation:  05/2023: Change in bowel habits, hematochezia, lower abdominal pain  -colonoscopy 06/19/2023:  Ulcerated necrotic  infiltrative 90% circumferential bleeding partially obstructing rectal mass, 5 cm long, 4 cm from anus  -MMR proficient, low probability of MSI-H  -no metastasis   -baseline CEA level normal (07/05/2023)   -MRI pelvis 07/18/2023: Large mid to distal rectal mass (T3b); at least 10 perirectal lymph nodes, suspicious for metastasis (T2b)  -radiologically, T3b N2b, stage IIIC  -S/P neoadjuvant FOLFOX every 2 weeks x8 cycles (07/19/2023-11/20/2023  -10/09/2023:  Positive response on restaging CTs chest and abdomen post 5 cycles of chemotherapy  -10/24/2023: Positive response on restaging MRI pelvis post 5 cycles of chemotherapy  -10/30/2023: Cymbalta discontinued because she experienced increased anxiety with Cymbalta  -12/04/2023: Genetics consultation  -12/15/2023:  No metastases on restaging CTs with chest and abdomen post 8 cycles of chemotherapy  -restaging MRI pelvis 12/15/2023, post 8 cycles of neoadjuvant chemotherapy: No progression  (Has responded positively to neoadjuvant chemotherapy x8 cycles)   -genetic testing 12/19/2023: Negative  -S/P concurrent chemoradiation therapy to pelvis 01/03/2024-02/12/2024 (concurrent with capecitabine):  -screening mammogram 02/16/2024: BI-RADS 2  -restaging MRI pelvis 03/05/2024, post completion of neoadjuvant therapy:  Rectal mass smaller and improved; new indeterminate 8 mm enhancing lesion right sacral ala  -restaging CT chest and abdomen 03/05/2024, post completion of neoadjuvant therapy: No metastases  >>>  -has responded well to neoadjuvant chemotherapy followed by neoadjuvant chemoradiation therapy  -new indeterminate 8 mm enhancing lesion right sacral ala on restaging MRI pelvis 03/05/2024   -will order whole-body nuclear medicine bone scan for further evaluation of new lesion in right sacral ala, noted on pelvic MRI  -Surgical Oncology for transabdominal resection of the tumor  -subsequently, surveillance    This was our plan of treatment:  Neoadjuvant  therapy:  Perioperative treatment is recommended for up to a total of 6 months  MMR proficient.  Low probability of MSI-H.  Regimen:  Total neoadjuvant therapy:  1. Modified FOLFOX every 2 weeks X 16 weeks (8 cycles); followed by  2. Restaging with contrast-enhanced CT scans of chest/abdomen, and MRI pelvis with contrast after 4 cycles, then after 8 cycles; followed by   3. Long course chemoradiation therapy with capecitabine; followed by  4. Restaging with contrast-enhanced CT scans of chest/abdomen, and MRI pelvis with contrast; followed by   5. Transabdominal resection; or, if complete clinical response, then consider surveillance      -developed PICC line related left upper extremity DVT 07/27/2023   PICC line removed   Started on Eliquis  >>>  -anticoagulation was planned to continue for at least 3 months, i.e., until the end of October 2023   -11/07/2023:  Told me that she stopped anticoagulation Halloween day (10/31/2023), appropriately    Fertility risk discussion/counseling if premenopausal   (she had been postmenopausal for last 2 years and indicated that she had no desire to preserve fertility)    Family history of colon cancer & colon polyp   -genetic testing 12/19/2023: Negative    Follow-up with NP in 2 weeks.    Above discussed leg length with her.  All questions answered.    Discussed labs and scans and gave her copies of relevant reports.    Plan of management discussed in detail.   She understands and agrees with this plan.    Follow-up:  No follow-ups on file.  Answers submitted by the patient for this visit:  Review of Systems Questionnaire (Submitted on 3/11/2024)  appetite change : No  unexpected weight change: No  mouth sores: No  visual disturbance: No  cough: No  shortness of breath: No  chest pain: No  abdominal pain: No  diarrhea: No  frequency: No  back pain: No  rash: No  headaches: No  adenopathy: No  nervous/ anxious: Yes  Window cystoscopy

## 2024-03-15 NOTE — Clinical Note
Whole-body nuclear medicine bone scan for evaluation of new lesion in the right sacral ala, noted on pelvic MRI  Refer to Surgical Oncology, Dr. Lee Rosenthal, for transabdominal resection of rectal tumor Follow-up with NP in 2 weeks.

## 2024-03-22 ENCOUNTER — HOSPITAL ENCOUNTER (OUTPATIENT)
Dept: RADIOLOGY | Facility: HOSPITAL | Age: 54
Discharge: HOME | End: 2024-03-22
Attending: INTERNAL MEDICINE
Payer: MEDICAID

## 2024-03-22 DIAGNOSIS — C20 ADENOCARCINOMA OF RECTUM, STAGE 3: ICD-10-CM

## 2024-03-22 PROCEDURE — A9503 TC99M MEDRONATE: HCPCS | Performed by: INTERNAL MEDICINE

## 2024-03-22 PROCEDURE — 78306 BONE IMAGING WHOLE BODY: CPT | Mod: TC

## 2024-03-22 RX ORDER — TC 99M MEDRONATE 20 MG/10ML
30 INJECTION, POWDER, LYOPHILIZED, FOR SOLUTION INTRAVENOUS
Status: COMPLETED | OUTPATIENT
Start: 2024-03-22 | End: 2024-03-22

## 2024-03-22 RX ADMIN — TECHNETIUM TC 99M MEDRONATE 32.2 MILLICURIE: 20 INJECTION, POWDER, LYOPHILIZED, FOR SOLUTION INTRAVENOUS at 07:03

## 2024-03-27 ENCOUNTER — PATIENT MESSAGE (OUTPATIENT)
Dept: HEMATOLOGY/ONCOLOGY | Facility: CLINIC | Age: 54
End: 2024-03-27
Payer: MEDICAID

## 2024-04-01 DIAGNOSIS — C20 ADENOCARCINOMA OF RECTUM, STAGE 3: Primary | ICD-10-CM

## 2024-04-01 NOTE — PROGRESS NOTES
History & Physical    CHIEF COMPLAINT:  RECTAL CANCER    History of Present Illness:  53 year-old-female referred by Dr. Bazan.  Patient presented to Dr. Ortiz in June 2023 with complaints of rectal bleeding and constipation.  Colonoscopy revealed a 5 cm mass in the rectum, 4 cm from the anal verge.  Biopsy of the mass revealed invasive moderately differentiated adenocarcinoma.  CT abd/pel showed a large rectal mass with some lymphadenopathy seen adjacent to the mass.  MRI pelvis showed a large mid to distal rectal mass with perirectal adenopathy the distal end was read as approximately 15 mm from the anal verge, on my view there appears to be adequate distal margin below the mass before the sphincter complex; at least 10 positive perirectal lymph nodes, no mention of suspicious iliac nodes staged T3bN2b.  She began chemotherapy in July 2023-November 2023 and then began chemoradiation from January-February 2024.  MRI pelvis done in March showed mid-rectal mass smaller compared to December 2023; new indeterminate 8 mm enhancing lesion in the right sacral ala.  CT chest/abd showed no metastatic disease.  NM bone scan showed no appreciable scintigraphic correlate for the lesion at the sacrum seen on MRI.      Greater than 60 minutes was required for complete chart review, imaging review including interpretation of imaging, coordination with referring/other physicians, patient counseling regarding diagnosis/treatment plan, answering questions, medical decision making, and documentation.      Review of patient's allergies indicates:   Allergen Reactions    Codeine Itching    Morphine Itching       Current Outpatient Medications   Medication Sig Dispense Refill    ALPRAZolam (XANAX) 0.5 MG tablet Take 0.5 mg by mouth 2 (two) times daily as needed.      busPIRone (BUSPAR) 15 MG tablet Take 15 mg by mouth 2 (two) times daily.      capecitabine (XELODA) 150 MG tablet Take 8 tablets (1,200 mg) by mouth twice a day on  radiation days.. 480 tablet 0    dexAMETHasone (DECADRON) 4 MG Tab Take 2 tablets by mouth every morning.      DULoxetine (CYMBALTA) 20 MG capsule Take 20 mg by mouth.      EScitalopram oxalate (LEXAPRO) 10 MG tablet Take 20 mg by mouth.      famotidine (PEPCID) 40 MG tablet Take 1 tablet (40 mg total) by mouth nightly as needed for Heartburn. 30 tablet 1    hyoscyamine (ANASPAZ,LEVSIN) 0.125 mg Tab Take 125 mcg by mouth every 6 (six) hours as needed.      hyoscyamine (ANASPAZ,LEVSIN) 0.125 mg Tab Take 1 tablet by mouth every 6 (six) hours as needed.      lisinopriL 10 MG tablet Take 10 mg by mouth every morning.      lisinopriL 10 MG tablet Take 20 mg by mouth once daily.      LORazepam (ATIVAN) 0.5 MG tablet Take 1 tablet (0.5 mg total) by mouth every 12 (twelve) hours as needed for Anxiety. 28 tablet 0    MIRALAX 17 gram/dose powder Take 17 g by mouth.      ondansetron (ZOFRAN) 8 MG tablet Take 1 tablet (8 mg total) by mouth every 8 (eight) hours as needed for Nausea. 60 tablet 2    oxyCODONE-acetaminophen (PERCOCET) 5-325 mg per tablet Take 1 tablet by mouth every 4 (four) hours as needed for Pain. 56 tablet 0    pantoprazole (PROTONIX) 40 MG tablet Take 1 tablet by mouth every morning.      pantoprazole (PROTONIX) 40 MG tablet Take 1 tablet (40 mg total) by mouth every morning. 30 tablet 1    PANTOPRAZOLE 40 MG/100 ML 0.9 % NS IVPB        No current facility-administered medications for this visit.       Past Medical History:   Diagnosis Date    Anxiety disorder, unspecified     GERD (gastroesophageal reflux disease)     Rectal cancer      Past Surgical History:   Procedure Laterality Date    APPENDECTOMY      BACK SURGERY       SECTION  2004    cyst coccyx       Family History   Problem Relation Age of Onset    Colon polyps Father     Colon cancer Maternal Grandfather     Colon cancer Paternal Grandfather     Cancer Maternal Aunt      Social History     Tobacco Use    Smoking status: Never     Smokeless tobacco: Never   Substance Use Topics    Alcohol use: Not Currently     Alcohol/week: 1.0 standard drink of alcohol     Types: 1 Glasses of wine per week    Drug use: Not Currently     Types: Marijuana     Comment: medical marijuana        Review of Systems:  Review of Systems   Constitutional:  Negative for appetite change, chills, diaphoresis and fever.   HENT:  Negative for congestion, drooling, ear discharge, ear pain and hearing loss.    Eyes:  Negative for discharge.   Respiratory:  Negative for apnea, cough, choking, chest tightness, shortness of breath and stridor.    Cardiovascular:  Negative for chest pain, palpitations and leg swelling.   Endocrine: Negative for cold intolerance and heat intolerance.   Genitourinary:  Negative for difficulty urinating, dyspareunia, dysuria and hematuria.   Musculoskeletal:  Negative for arthralgias, gait problem and joint swelling.   Skin:  Negative for color change and rash.   Neurological:  Negative for dizziness, tremors, seizures, syncope, facial asymmetry, speech difficulty, light-headedness, numbness and headaches.   Psychiatric/Behavioral:  Negative for agitation and confusion.           Objective     Vital Signs (Most Recent)              Physical Exam:  Physical Exam  Constitutional:       General: She is not in acute distress.     Appearance: Normal appearance. She is not toxic-appearing.   HENT:      Head: Normocephalic and atraumatic.      Right Ear: External ear normal.      Left Ear: External ear normal.      Mouth/Throat:      Mouth: Mucous membranes are moist.   Eyes:      General: No scleral icterus.     Conjunctiva/sclera: Conjunctivae normal.      Pupils: Pupils are equal, round, and reactive to light.   Cardiovascular:      Rate and Rhythm: Normal rate and regular rhythm.      Pulses: Normal pulses.   Pulmonary:      Effort: Pulmonary effort is normal. No respiratory distress.      Breath sounds: Normal breath sounds. No stridor. No wheezing  or rhonchi.   Musculoskeletal:         General: No swelling or tenderness. Normal range of motion.      Cervical back: Normal range of motion and neck supple.      Right lower leg: No edema.      Left lower leg: No edema.   Skin:     General: Skin is warm.      Capillary Refill: Capillary refill takes less than 2 seconds.      Coloration: Skin is not jaundiced or pale.      Findings: No erythema or rash.   Neurological:      General: No focal deficit present.      Mental Status: She is alert and oriented to person, place, and time.      Gait: Gait normal.   Psychiatric:         Mood and Affect: Mood normal.         Behavior: Behavior normal.         Judgment: Judgment normal.            Assessment and Plan   Distal rectal adenocarcinoma, T3b N2 by MRI staging.  Status post total neoadjuvant therapy, with evidence of significant response and no evidence of metastatic disease.    Diagnosis, staging, prognosis and treatment guidelines for stage III rectal cancer were discussed with the patient in detail, all questions were addressed.Using visual aids and drawings, I described the anatomy and potential surgical procedures.    Schedule laparoscopic/robotic very low anterior resection with diverting loop ileostomy    The risks and benefits of the procedure were explained in detail using layman terms, including the pros and cons of any implant that may be used, all questions were addressed, the patient gives consent to proceed    Lee Rosenthal MD  Surgical Oncology  [facility], Gastrointestinal and Hepatobiliary Surgery

## 2024-04-02 ENCOUNTER — OFFICE VISIT (OUTPATIENT)
Dept: HEMATOLOGY/ONCOLOGY | Facility: CLINIC | Age: 54
End: 2024-04-02
Payer: MEDICAID

## 2024-04-02 ENCOUNTER — APPOINTMENT (OUTPATIENT)
Dept: HEMATOLOGY/ONCOLOGY | Facility: CLINIC | Age: 54
End: 2024-04-02
Payer: MEDICAID

## 2024-04-02 VITALS
SYSTOLIC BLOOD PRESSURE: 109 MMHG | HEART RATE: 81 BPM | WEIGHT: 121 LBS | TEMPERATURE: 98 F | RESPIRATION RATE: 19 BRPM | DIASTOLIC BLOOD PRESSURE: 74 MMHG | BODY MASS INDEX: 22.26 KG/M2 | HEIGHT: 62 IN | OXYGEN SATURATION: 99 %

## 2024-04-02 DIAGNOSIS — C20 ADENOCARCINOMA OF RECTUM, STAGE 3: ICD-10-CM

## 2024-04-02 DIAGNOSIS — C20 ADENOCARCINOMA OF RECTUM, STAGE 3: Primary | ICD-10-CM

## 2024-04-02 LAB
ALBUMIN SERPL-MCNC: 4 G/DL (ref 3.5–5)
ALBUMIN/GLOB SERPL: 1.4 RATIO (ref 1.1–2)
ALP SERPL-CCNC: 102 UNIT/L (ref 40–150)
ALT SERPL-CCNC: 21 UNIT/L (ref 0–55)
AST SERPL-CCNC: 23 UNIT/L (ref 5–34)
BASOPHILS # BLD AUTO: 0.04 X10(3)/MCL
BASOPHILS NFR BLD AUTO: 1.3 %
BILIRUB SERPL-MCNC: 1 MG/DL
BUN SERPL-MCNC: 11.3 MG/DL (ref 9.8–20.1)
CALCIUM SERPL-MCNC: 9.9 MG/DL (ref 8.4–10.2)
CHLORIDE SERPL-SCNC: 109 MMOL/L (ref 98–107)
CO2 SERPL-SCNC: 29 MMOL/L (ref 22–29)
CREAT SERPL-MCNC: 0.85 MG/DL (ref 0.55–1.02)
EOSINOPHIL # BLD AUTO: 0.3 X10(3)/MCL (ref 0–0.9)
EOSINOPHIL NFR BLD AUTO: 9.5 %
ERYTHROCYTE [DISTWIDTH] IN BLOOD BY AUTOMATED COUNT: 12.1 % (ref 11.5–17)
GFR SERPLBLD CREATININE-BSD FMLA CKD-EPI: >60 MLS/MIN/1.73/M2
GLOBULIN SER-MCNC: 2.8 GM/DL (ref 2.4–3.5)
GLUCOSE SERPL-MCNC: 99 MG/DL (ref 74–100)
HCT VFR BLD AUTO: 37.5 % (ref 37–47)
HGB BLD-MCNC: 12.6 G/DL (ref 12–16)
IMM GRANULOCYTES # BLD AUTO: 0.01 X10(3)/MCL (ref 0–0.04)
IMM GRANULOCYTES NFR BLD AUTO: 0.3 %
LYMPHOCYTES # BLD AUTO: 0.92 X10(3)/MCL (ref 0.6–4.6)
LYMPHOCYTES NFR BLD AUTO: 29.1 %
MAGNESIUM SERPL-MCNC: 2 MG/DL (ref 1.6–2.6)
MCH RBC QN AUTO: 31.7 PG (ref 27–31)
MCHC RBC AUTO-ENTMCNC: 33.6 G/DL (ref 33–36)
MCV RBC AUTO: 94.2 FL (ref 80–94)
MONOCYTES # BLD AUTO: 0.31 X10(3)/MCL (ref 0.1–1.3)
MONOCYTES NFR BLD AUTO: 9.8 %
NEUTROPHILS # BLD AUTO: 1.58 X10(3)/MCL (ref 2.1–9.2)
NEUTROPHILS NFR BLD AUTO: 50 %
NRBC BLD AUTO-RTO: 0 %
PLATELET # BLD AUTO: 173 X10(3)/MCL (ref 130–400)
PMV BLD AUTO: 9.5 FL (ref 7.4–10.4)
POTASSIUM SERPL-SCNC: 3.9 MMOL/L (ref 3.5–5.1)
PROT SERPL-MCNC: 6.8 GM/DL (ref 6.4–8.3)
RBC # BLD AUTO: 3.98 X10(6)/MCL (ref 4.2–5.4)
SODIUM SERPL-SCNC: 143 MMOL/L (ref 136–145)
WBC # SPEC AUTO: 3.16 X10(3)/MCL (ref 4.5–11.5)

## 2024-04-02 PROCEDURE — 1159F MED LIST DOCD IN RCRD: CPT | Mod: CPTII,,, | Performed by: NURSE PRACTITIONER

## 2024-04-02 PROCEDURE — 36415 COLL VENOUS BLD VENIPUNCTURE: CPT

## 2024-04-02 PROCEDURE — 3078F DIAST BP <80 MM HG: CPT | Mod: CPTII,,, | Performed by: NURSE PRACTITIONER

## 2024-04-02 PROCEDURE — 3074F SYST BP LT 130 MM HG: CPT | Mod: CPTII,,, | Performed by: NURSE PRACTITIONER

## 2024-04-02 PROCEDURE — 99215 OFFICE O/P EST HI 40 MIN: CPT | Mod: PBBFAC | Performed by: NURSE PRACTITIONER

## 2024-04-02 PROCEDURE — 80053 COMPREHEN METABOLIC PANEL: CPT

## 2024-04-02 PROCEDURE — 85025 COMPLETE CBC W/AUTO DIFF WBC: CPT

## 2024-04-02 PROCEDURE — 3008F BODY MASS INDEX DOCD: CPT | Mod: CPTII,,, | Performed by: NURSE PRACTITIONER

## 2024-04-02 PROCEDURE — 1160F RVW MEDS BY RX/DR IN RCRD: CPT | Mod: CPTII,,, | Performed by: NURSE PRACTITIONER

## 2024-04-02 PROCEDURE — 99214 OFFICE O/P EST MOD 30 MIN: CPT | Mod: S$PBB,,, | Performed by: NURSE PRACTITIONER

## 2024-04-02 PROCEDURE — 83735 ASSAY OF MAGNESIUM: CPT

## 2024-04-02 RX ORDER — HYOSCYAMINE SULFATE 0.125 MG
125 TABLET ORAL EVERY 6 HOURS PRN
Qty: 60 TABLET | Refills: 2 | Status: SHIPPED | OUTPATIENT
Start: 2024-04-02

## 2024-04-02 NOTE — PROGRESS NOTES
Reason for Follow-up:  Reason for consultation:  -adenocarcinoma rectum, moderately differentiated, partially obstructing mass, S/P colonoscopy 06/19/2023   -tubular adenoma transverse colon   -regional lymphadenopathy on CT abdomen pelvis with contrast 06/21/2023  -MMR proficient  -grandfather experienced colon cancer; father experienced colon polyp     Current Treatment:  Surveillance    Treatment History:  FOLFOX x 8 cycles 07/19/2023-11/20/23  Chemoradiation (Capcitabine) (1/3/24-2/12/24)    Past medical history:  Anxiety.    Procedure/surgical history:  Appendectomy.  Back surgery.  Social history:  Single.  Lives in [place], [place].  Has 2 children.  Works as a .  No history of tobacco, alcohol, or illicit drug abuse.  Family history:  Paternal grandfather experience colon cancer in his 70s.  Father experienced multiple colon polyps.  Health maintenance:  No screening colonoscopy prior to most recent colonoscopy which led to the diagnosis of rectal cancer.  -09/13/2019:  Bilateral screening mammogram pelvic comparison:  09/13/2018 mammogram):  BI-RADS 0 (indeterminate)  -09/25/2019:  Diagnostic left mammogram, limited ultrasound left breast (comparison:  09/13/2019 mammogram):  Overall study BI-RADS 2: Benign   History of Present Illness:   Follow-up (Review labs and scan )        Oncologic/Hematologic History:      Oncology History   Adenocarcinoma of rectum, stage 3   7/1/2023 Initial Diagnosis     Adenocarcinoma of rectum, stage 3      7/19/2023 -  Chemotherapy     Treatment Summary   Plan Name: OP mFOLFOX 6 Q2W  Treatment Goal: Curative  Status: Active  Start Date: 7/19/2023  End Date: 11/22/2023  Provider: John Bazan MD  Chemotherapy: fluorouraciL injection 650 mg, 400 mg/m2 = 650 mg, Intravenous, Clinic/HOD 1 time, 3 of 3 cycles  Administration: 650 mg (7/19/2023), 650 mg (8/1/2023), 650 mg (8/22/2023)  oxaliplatin (ELOXATIN) 85 mg/m2 = 139 mg in dextrose 5 % (D5W) 592.8 mL chemo  infusion, 85 mg/m2 = 139 mg, Intravenous, Clinic/HOD 1 time, 8 of 8 cycles  Administration: 139 mg (7/19/2023), 139 mg (8/1/2023), 140 mg (9/18/2023), 135 mg (10/2/2023), 135 mg (11/7/2023), 135 mg (8/22/2023), 130 mg (11/20/2023), 135 mg (10/25/2023)      53-year-old lady, referred by Migel Ortiz MD, GI, with rectal cancer.     Family history pos for colon cancer in grandfather, colon polyp in father  Evaluated by Migel Ortiz MD, GI, 05/22/2023 for nervous stomach; change in bowel habits/pattern; change in bowel function started several months ago; currently, 1 bowel movement daily.  Blood in stool.  Lower abdominal pain.  Heartburn.  Epigastric pain.  MiraLax resulted in improvement.  Bright red blood in stool.    Interval History 4/2/2024: Patient presents to clinic alone for scheduled follow up.  Patient reports doing well without any new or significant symptoms to report.  Patient reports that she will follow up with Dr. Rosenthal for surgical consultation on 04/03/2024.  Consultation with Dr. Rosenthal to discuss transabdominal resection of rectal tumor.  A bone scan was completed to evaluate a new lesion in the right sacral which was noted on pelvic MRI.  Bone scan was negative for any concern for metastatic disease.  Results of bone scan discussed with the patient.  Patient reports improved appetite and energy.  Discussed plan of care and follow-up appointments with the patient      Review of Systems   Respiratory:  Negative for cough and shortness of breath.    Cardiovascular:  Negative for chest pain.   Genitourinary:  Negative for frequency.   Musculoskeletal:  Negative for back pain.   Skin:  Negative for rash.   Neurological:  Negative for headaches.   All other systems reviewed and are negative.    Physical Exam  Constitutional:       Appearance: Normal appearance.   HENT:      Head: Normocephalic.      Mouth/Throat:      Mouth: Mucous membranes are moist.   Eyes:      Pupils: Pupils are equal, round,  and reactive to light.   Cardiovascular:      Rate and Rhythm: Normal rate and regular rhythm.      Pulses: Normal pulses.      Heart sounds: Normal heart sounds.   Pulmonary:      Effort: Pulmonary effort is normal.      Breath sounds: Normal breath sounds.   Abdominal:      General: Bowel sounds are normal.      Palpations: Abdomen is soft.   Musculoskeletal:         General: Normal range of motion.      Cervical back: Normal range of motion.   Skin:     General: Skin is warm and dry.      Capillary Refill: Capillary refill takes less than 2 seconds.   Neurological:      General: No focal deficit present.      Mental Status: She is alert and oriented to person, place, and time.   Psychiatric:         Attention and Perception: Attention normal.         Mood and Affect: Affect normal.         Speech: Speech normal.         Behavior: Behavior normal.         Thought Content: Thought content normal.       Vitals:    04/02/24 1037   BP: 109/74   Pulse: 81   Resp: 19   Temp: 97.8 °F (36.6 °C)     Lab Results   Component Value Date    WBC 3.16 (L) 04/02/2024    RBC 3.98 (L) 04/02/2024    HGB 12.6 04/02/2024    HCT 37.5 04/02/2024    MCV 94.2 (H) 04/02/2024    MCH 31.7 (H) 04/02/2024    MCHC 33.6 04/02/2024    RDW 12.1 04/02/2024     04/02/2024    MPV 9.5 04/02/2024    EOS 0.4 02/27/2024    BASO 0.1 02/27/2024    EOSINOPHIL 12 02/27/2024     CMP  Sodium Level   Date Value Ref Range Status   04/02/2024 143 136 - 145 mmol/L Final     Potassium Level   Date Value Ref Range Status   04/02/2024 3.9 3.5 - 5.1 mmol/L Final     Carbon Dioxide   Date Value Ref Range Status   04/02/2024 29 22 - 29 mmol/L Final     Blood Urea Nitrogen   Date Value Ref Range Status   04/02/2024 11.3 9.8 - 20.1 mg/dL Final     Creatinine   Date Value Ref Range Status   04/02/2024 0.85 0.55 - 1.02 mg/dL Final     Calcium Level Total   Date Value Ref Range Status   04/02/2024 9.9 8.4 - 10.2 mg/dL Final     Albumin Level   Date Value Ref Range  Status   04/02/2024 4.0 3.5 - 5.0 g/dL Final     Bilirubin Total   Date Value Ref Range Status   04/02/2024 1.0 <=1.5 mg/dL Final     Alkaline Phosphatase   Date Value Ref Range Status   04/02/2024 102 40 - 150 unit/L Final     Aspartate Aminotransferase   Date Value Ref Range Status   04/02/2024 23 5 - 34 unit/L Final     Alanine Aminotransferase   Date Value Ref Range Status   04/02/2024 21 0 - 55 unit/L Final     eGFR   Date Value Ref Range Status   04/02/2024 >60 mls/min/1.73/m2 Final       Assessment:  Adenocarcinoma of rectum, moderately differentiated:   -presentation:  05/2023:  Change in bowel habits, hematochezia, lower abdominal pain  -colonoscopy 06/19/2023:    9 mm tubular adenoma transverse colon, removed;   invasive moderately differentiated adenocarcinoma of rectum presenting as ulcerated necrotic infiltrative 90% circumferential, bleeding, partially obstructing rectal mass, 5 cm long, 4 cm from anus, scope traversed the lesion  -MMR proficient; low probability of MSI-H  -CT abdomen pelvis with contrast 06/21/2023:  No metastasis; large rectal mass and some regional lymphadenopathy on CT abdomen pelvis with contrast 06/21/2023, largest perirectal lymph node 1 cm x 8 mm  -CEA level < 1.73, normal (07/05/2023)  -no lung metastases on CT chest 07/18/2023  -MRI pelvis 07/18/2023: Large mid to distal rectal mass (T3b); at least 10 perirectal lymph nodes with increased signal on DW[facility] imaging, largest 9 mm (T2b)  >> radiologically, T3b N2b (stage IIIC)  -07/27/2023:  developed acute DVT left subclavian/axillary/brachial veins secondary to PICC line; started on Eliquis; PICC line removed  -neoadjuvant FOLFOX started 07/19/2023  -cycle 4 of neoadjuvant FOLFOX started 09/18/2023  -cycle 5 of neoadjuvant FOLFOX started 10/02/2023  -positive response on restaging CTs C/A/P 10 09/2023, S/P neoadjuvant FOLFOX x5 cycles (marked improvement in rectal mass; improved perirectal lymphadenopathy)  -restaging pelvic MRI  10/24/2020: Improved rectal mass and perirectal lymph nodes   -cycle 6 of neoadjuvant FOLFOX started 10/25/2023  -10/30/2023: Cymbalta discontinued because she experienced increased anxiety with Cymbalta  -Neoadjuvant FOLFOX:  Cycle 7 on 11/07/2023; cycle 8 on 11/20/2023  -12/04/2023: Genetics consultation  -12/15/2023: Restaging CTs chest and abdomen with contrast:  No metastases  -12/15/2023:  Restaging MRI pelvis with and without contrast (comparison:  MRI 10/24/2023):  Little overall change in rectal mass since 10/24/2023  -restaging MRI pelvis 12/15/2023: No progression  -CRT w/Capecitabine (1/3/24-2/12/24)  >>>  By virtue of regional lymphadenopathy, at least stage III    Left upper extremity DVT secondary to PICC line 07/27/2023:  -developed acute DVT left subclavian/axillary/brachial veins secondary to PICC line; started on Eliquis; PICC line removed        Chemotherapy-induced peripheral neuropathy:  -10/17/2023: Chemotherapy induced peripheral neuropathy in the form of tingling in hands; no neuropathy in feet; no numbness or pain  -10/17/2020: Started on Cymbalta 30 mg p.o. q.h.s.  -Cymbalta discontinued 10/30/2023 because of increased anxiety with Cymbalta reported by her      Family history of colon cancer:  -grandfather experienced colon cancer; father experienced colon polyp      Diagnostic studies  CT chest non contrast 7/18/23: No CT evidence for metastatic disease to the chest.  MRI Pelvis 7/20/23: Large mid to distal rectal mass with perirectal adenopathy as discussed.  Local staging is T3bN2b.    Plan:  Adenocarcinoma of rectum, moderately differentiated    After appropriate workup, will most likely need total neoadjuvant therapy including chemotherapy with FOLFOX or CAPOX or FOLFIRINOX X 12-16 weeks, followed by long course chemoradiation therapy, then restaging, followed by resection versus surveillance, etc.  Perioperative treatment is recommended for up to a total of 6 months.    At least  based upon CT scans of A/P, has at least stage III adenocarcinoma of rectum.  MMR proficient.  Low probability of MSI-H.  Treatment will be as follows:    Total neoadjuvant therapy:   1. Modified FOLFOX every 2 weeks X 16 weeks (8 cycles); followed by   2. Long course chemoradiation therapy with capecitabine; followed by  3. Restaging; followed by   4. Transabdominal resection; or if complete clinical response, consider surveillance    Response to treatment will be assessed as follows:  Contrast-enhanced CT scans of abdomen and chest, and MRI scan of rectum with and without contrast:  -after completion of 8 cycles of modified FOLFOX; aunt  -after completion of chemoradiation therapy    PET-CT scan is not indicated  Fertility risk discussion/counseling if premenopausal (she has been postmenopausal for last 2 years and that this time, has no desire to go for add preservation)    -has experienced positive response to neoadjuvant FOLFOX x8 cycles  -we will plan neoadjuvant chemoradiation therapy now (radiotherapy +capecitabine)  -capecitabine, concurrent with radiotherapy, 825 mg per m2 body surface area p.o. twice daily, on each day that radiotherapy is given through the duration of radiotherapy (typically 28-30 treatment days)  -during chemoradiation therapy, check CBC and CMP weekly  -approximately 3-4 weeks post completion of chemoradiation therapy, re-stage with contrast enhanced CT scans of chest and abdomen, and MRI pelvis with and without contrast      -Follow up with NP in 1 week via telemedicine to discuss surgical plans status post visit with Dr. Rosenthal  -Refer to Surgical Oncology, Dr. Lee Rosenthal, for transabdominal resection of rectal tumor-scheduled for 4/3/24      Family history of colon cancer & colon polyp   -genetic testing and counseling 12/4/2023 1/29/24: No deleterious mutation and no variants of uncertain significance found     Above discussed at length with the patient.  All questions answered.     She understands and agrees with this plan

## 2024-04-03 ENCOUNTER — OFFICE VISIT (OUTPATIENT)
Dept: SURGICAL ONCOLOGY | Facility: CLINIC | Age: 54
End: 2024-04-03
Payer: MEDICAID

## 2024-04-03 VITALS
BODY MASS INDEX: 23.49 KG/M2 | HEIGHT: 62 IN | WEIGHT: 127.63 LBS | SYSTOLIC BLOOD PRESSURE: 105 MMHG | DIASTOLIC BLOOD PRESSURE: 62 MMHG | HEART RATE: 94 BPM

## 2024-04-03 DIAGNOSIS — C20 ADENOCARCINOMA OF RECTUM: ICD-10-CM

## 2024-04-03 DIAGNOSIS — C20 ADENOCARCINOMA OF RECTUM, STAGE 3: ICD-10-CM

## 2024-04-03 PROCEDURE — 3008F BODY MASS INDEX DOCD: CPT | Mod: CPTII,,, | Performed by: SURGERY

## 2024-04-03 PROCEDURE — 3074F SYST BP LT 130 MM HG: CPT | Mod: CPTII,,, | Performed by: SURGERY

## 2024-04-03 PROCEDURE — 99999 PR PBB SHADOW E&M-EST. PATIENT-LVL IV: CPT | Mod: PBBFAC,,, | Performed by: SURGERY

## 2024-04-03 PROCEDURE — 1159F MED LIST DOCD IN RCRD: CPT | Mod: CPTII,,, | Performed by: SURGERY

## 2024-04-03 PROCEDURE — 99214 OFFICE O/P EST MOD 30 MIN: CPT | Mod: PBBFAC | Performed by: SURGERY

## 2024-04-03 PROCEDURE — 99205 OFFICE O/P NEW HI 60 MIN: CPT | Mod: S$PBB,,, | Performed by: SURGERY

## 2024-04-03 PROCEDURE — 3078F DIAST BP <80 MM HG: CPT | Mod: CPTII,,, | Performed by: SURGERY

## 2024-04-08 DIAGNOSIS — C20 RECTAL CANCER: ICD-10-CM

## 2024-04-08 DIAGNOSIS — C20 ADENOCARCINOMA OF RECTUM, STAGE 3: Primary | ICD-10-CM

## 2024-04-08 DIAGNOSIS — Z01.818 PREOP TESTING: ICD-10-CM

## 2024-04-08 RX ORDER — ENOXAPARIN SODIUM 300 MG/3ML
30 INJECTION INTRAVENOUS; SUBCUTANEOUS
Status: CANCELLED | OUTPATIENT
Start: 2024-05-20 | End: 2024-05-20

## 2024-04-08 RX ORDER — ALVIMOPAN 12 MG/1
12 CAPSULE ORAL ONCE
Status: CANCELLED | OUTPATIENT
Start: 2024-05-20 | End: 2024-05-26

## 2024-04-08 RX ORDER — METRONIDAZOLE 500 MG/100ML
500 INJECTION, SOLUTION INTRAVENOUS ONCE
Status: CANCELLED | OUTPATIENT
Start: 2024-05-20

## 2024-04-10 ENCOUNTER — OFFICE VISIT (OUTPATIENT)
Dept: HEMATOLOGY/ONCOLOGY | Facility: CLINIC | Age: 54
End: 2024-04-10
Payer: MEDICAID

## 2024-04-10 DIAGNOSIS — G62.0 CHEMOTHERAPY-INDUCED NEUROPATHY: ICD-10-CM

## 2024-04-10 DIAGNOSIS — T45.1X5A CHEMOTHERAPY-INDUCED NEUROPATHY: ICD-10-CM

## 2024-04-10 DIAGNOSIS — C20 ADENOCARCINOMA OF RECTUM, STAGE 3: Primary | ICD-10-CM

## 2024-04-10 DIAGNOSIS — R19.7 DIARRHEA, UNSPECIFIED TYPE: ICD-10-CM

## 2024-04-10 DIAGNOSIS — R10.9 ABDOMINAL CRAMPS: ICD-10-CM

## 2024-04-10 PROCEDURE — 1160F RVW MEDS BY RX/DR IN RCRD: CPT | Mod: CPTII,95,, | Performed by: NURSE PRACTITIONER

## 2024-04-10 PROCEDURE — 1159F MED LIST DOCD IN RCRD: CPT | Mod: CPTII,95,, | Performed by: NURSE PRACTITIONER

## 2024-04-10 PROCEDURE — 99214 OFFICE O/P EST MOD 30 MIN: CPT | Mod: 95,,, | Performed by: NURSE PRACTITIONER

## 2024-04-10 NOTE — PROGRESS NOTES
Reason for Follow-up:  Reason for consultation:  -adenocarcinoma rectum, moderately differentiated, partially obstructing mass, S/P colonoscopy 06/19/2023   -tubular adenoma transverse colon   -regional lymphadenopathy on CT abdomen pelvis with contrast 06/21/2023  -MMR proficient  -grandfather experienced colon cancer; father experienced colon polyp     Current Treatment:  Surveillance    Treatment History:  FOLFOX x 8 cycles 07/19/2023-11/20/23  Chemoradiation (Capcitabine) (1/3/24-2/12/24)    Past medical history:  Anxiety.    Procedure/surgical history:  Appendectomy.  Back surgery.  Social history:  Single.  Lives in [place], [place].  Has 2 children.  Works as a .  No history of tobacco, alcohol, or illicit drug abuse.  Family history:  Paternal grandfather experience colon cancer in his 70s.  Father experienced multiple colon polyps.  Health maintenance:  No screening colonoscopy prior to most recent colonoscopy which led to the diagnosis of rectal cancer.  -09/13/2019:  Bilateral screening mammogram pelvic comparison:  09/13/2018 mammogram):  BI-RADS 0 (indeterminate)  -09/25/2019:  Diagnostic left mammogram, limited ultrasound left breast (comparison:  09/13/2019 mammogram):  Overall study BI-RADS 2: Benign   History of Present Illness:   Follow-up (Review labs and scan )        Oncologic/Hematologic History:      Oncology History   Adenocarcinoma of rectum, stage 3   7/1/2023 Initial Diagnosis     Adenocarcinoma of rectum, stage 3      7/19/2023 -  Chemotherapy     Treatment Summary   Plan Name: OP mFOLFOX 6 Q2W  Treatment Goal: Curative  Status: Active  Start Date: 7/19/2023  End Date: 11/22/2023  Provider: John Bazan MD  Chemotherapy: fluorouraciL injection 650 mg, 400 mg/m2 = 650 mg, Intravenous, Clinic/HOD 1 time, 3 of 3 cycles  Administration: 650 mg (7/19/2023), 650 mg (8/1/2023), 650 mg (8/22/2023)  oxaliplatin (ELOXATIN) 85 mg/m2 = 139 mg in dextrose 5 % (D5W) 592.8 mL chemo  infusion, 85 mg/m2 = 139 mg, Intravenous, Clinic/HOD 1 time, 8 of 8 cycles  Administration: 139 mg (7/19/2023), 139 mg (8/1/2023), 140 mg (9/18/2023), 135 mg (10/2/2023), 135 mg (11/7/2023), 135 mg (8/22/2023), 130 mg (11/20/2023), 135 mg (10/25/2023)      53-year-old lady, referred by Migel Ortiz MD, GI, with rectal cancer.     Family history pos for colon cancer in grandfather, colon polyp in father  Evaluated by Migel Ortiz MD, GI, 05/22/2023 for nervous stomach; change in bowel habits/pattern; change in bowel function started several months ago; currently, 1 bowel movement daily.  Blood in stool.  Lower abdominal pain.  Heartburn.  Epigastric pain.  MiraLax resulted in improvement.  Bright red blood in stool.    Interval History 4/10/2024: Patient presents to clinic alone for scheduled follow up.  Patient reports doing well without any new or significant symptoms to report.  Patient reports that she will follow up with Dr. Rosenthal for surgical consultation on 04/03/2024.  Consultation with Dr. Rosenthal to discuss transabdominal resection of rectal tumor.  A bone scan was completed to evaluate a new lesion in the right sacral which was noted on pelvic MRI.  Bone scan was negative for any concern for metastatic disease.  Results of bone scan discussed with the patient.  Patient reports improved appetite and energy.  Discussed plan of care and follow-up appointments with the patient      Review of Systems   Respiratory:  Negative for cough and shortness of breath.    Cardiovascular:  Negative for chest pain.   Gastrointestinal:  Negative for abdominal pain and diarrhea.   Genitourinary:  Negative for frequency.   Musculoskeletal:  Negative for back pain.   Skin:  Negative for rash.   Neurological:  Negative for headaches.   Psychiatric/Behavioral:  The patient is nervous/anxious.    All other systems reviewed and are negative.    Physical Exam  Constitutional:       Appearance: Normal appearance.   HENT:       Head: Normocephalic.      Mouth/Throat:      Mouth: Mucous membranes are moist.   Eyes:      Pupils: Pupils are equal, round, and reactive to light.   Cardiovascular:      Rate and Rhythm: Normal rate and regular rhythm.      Pulses: Normal pulses.      Heart sounds: Normal heart sounds.   Pulmonary:      Effort: Pulmonary effort is normal.      Breath sounds: Normal breath sounds.   Abdominal:      General: Bowel sounds are normal.      Palpations: Abdomen is soft.   Musculoskeletal:         General: Normal range of motion.      Cervical back: Normal range of motion.   Skin:     General: Skin is warm and dry.      Capillary Refill: Capillary refill takes less than 2 seconds.   Neurological:      General: No focal deficit present.      Mental Status: She is alert and oriented to person, place, and time.   Psychiatric:         Attention and Perception: Attention normal.         Mood and Affect: Affect normal.         Speech: Speech normal.         Behavior: Behavior normal.         Thought Content: Thought content normal.     There were no vitals filed for this visit.    Lab Results   Component Value Date    WBC 3.16 (L) 04/02/2024    RBC 3.98 (L) 04/02/2024    HGB 12.6 04/02/2024    HCT 37.5 04/02/2024    MCV 94.2 (H) 04/02/2024    MCH 31.7 (H) 04/02/2024    MCHC 33.6 04/02/2024    RDW 12.1 04/02/2024     04/02/2024    MPV 9.5 04/02/2024    EOS 0.4 02/27/2024    BASO 0.1 02/27/2024    EOSINOPHIL 12 02/27/2024     CMP  Sodium Level   Date Value Ref Range Status   04/02/2024 143 136 - 145 mmol/L Final     Potassium Level   Date Value Ref Range Status   04/02/2024 3.9 3.5 - 5.1 mmol/L Final     Carbon Dioxide   Date Value Ref Range Status   04/02/2024 29 22 - 29 mmol/L Final     Blood Urea Nitrogen   Date Value Ref Range Status   04/02/2024 11.3 9.8 - 20.1 mg/dL Final     Creatinine   Date Value Ref Range Status   04/02/2024 0.85 0.55 - 1.02 mg/dL Final     Calcium Level Total   Date Value Ref Range Status    04/02/2024 9.9 8.4 - 10.2 mg/dL Final     Albumin Level   Date Value Ref Range Status   04/02/2024 4.0 3.5 - 5.0 g/dL Final     Bilirubin Total   Date Value Ref Range Status   04/02/2024 1.0 <=1.5 mg/dL Final     Alkaline Phosphatase   Date Value Ref Range Status   04/02/2024 102 40 - 150 unit/L Final     Aspartate Aminotransferase   Date Value Ref Range Status   04/02/2024 23 5 - 34 unit/L Final     Alanine Aminotransferase   Date Value Ref Range Status   04/02/2024 21 0 - 55 unit/L Final     eGFR   Date Value Ref Range Status   04/02/2024 >60 mls/min/1.73/m2 Final       Assessment:  Adenocarcinoma of rectum, moderately differentiated:   -presentation:  05/2023:  Change in bowel habits, hematochezia, lower abdominal pain  -colonoscopy 06/19/2023:    9 mm tubular adenoma transverse colon, removed;   invasive moderately differentiated adenocarcinoma of rectum presenting as ulcerated necrotic infiltrative 90% circumferential, bleeding, partially obstructing rectal mass, 5 cm long, 4 cm from anus, scope traversed the lesion  -MMR proficient; low probability of MSI-H  -CT abdomen pelvis with contrast 06/21/2023:  No metastasis; large rectal mass and some regional lymphadenopathy on CT abdomen pelvis with contrast 06/21/2023, largest perirectal lymph node 1 cm x 8 mm  -CEA level < 1.73, normal (07/05/2023)  -no lung metastases on CT chest 07/18/2023  -MRI pelvis 07/18/2023: Large mid to distal rectal mass (T3b); at least 10 perirectal lymph nodes with increased signal on DW[facility] imaging, largest 9 mm (T2b)  >> radiologically, T3b N2b (stage IIIC)  -07/27/2023:  developed acute DVT left subclavian/axillary/brachial veins secondary to PICC line; started on Eliquis; PICC line removed  -neoadjuvant FOLFOX started 07/19/2023  -cycle 4 of neoadjuvant FOLFOX started 09/18/2023  -cycle 5 of neoadjuvant FOLFOX started 10/02/2023  -positive response on restaging CTs C/A/P 10 09/2023, S/P neoadjuvant FOLFOX x5 cycles (marked  improvement in rectal mass; improved perirectal lymphadenopathy)  -restaging pelvic MRI 10/24/2020: Improved rectal mass and perirectal lymph nodes   -cycle 6 of neoadjuvant FOLFOX started 10/25/2023  -10/30/2023: Cymbalta discontinued because she experienced increased anxiety with Cymbalta  -Neoadjuvant FOLFOX:  Cycle 7 on 11/07/2023; cycle 8 on 11/20/2023  -12/04/2023: Genetics consultation  -12/15/2023: Restaging CTs chest and abdomen with contrast:  No metastases  -12/15/2023:  Restaging MRI pelvis with and without contrast (comparison:  MRI 10/24/2023):  Little overall change in rectal mass since 10/24/2023  -restaging MRI pelvis 12/15/2023: No progression  -CRT w/Capecitabine (1/3/24-2/12/24)  >>>  By virtue of regional lymphadenopathy, at least stage III    Left upper extremity DVT secondary to PICC line 07/27/2023:  -developed acute DVT left subclavian/axillary/brachial veins secondary to PICC line; started on Eliquis; PICC line removed        Chemotherapy-induced peripheral neuropathy:  -10/17/2023: Chemotherapy induced peripheral neuropathy in the form of tingling in hands; no neuropathy in feet; no numbness or pain  -10/17/2020: Started on Cymbalta 30 mg p.o. q.h.s.  -Cymbalta discontinued 10/30/2023 because of increased anxiety with Cymbalta reported by her      Family history of colon cancer:  -grandfather experienced colon cancer; father experienced colon polyp      Diagnostic studies  CT chest non contrast 7/18/23: No CT evidence for metastatic disease to the chest.  MRI Pelvis 7/20/23: Large mid to distal rectal mass with perirectal adenopathy as discussed.  Local staging is T3bN2b.    Plan:  Adenocarcinoma of rectum, moderately differentiated    After appropriate workup, will most likely need total neoadjuvant therapy including chemotherapy with FOLFOX or CAPOX or FOLFIRINOX X 12-16 weeks, followed by long course chemoradiation therapy, then restaging, followed by resection versus surveillance,  etc.  Perioperative treatment is recommended for up to a total of 6 months.    At least based upon CT scans of A/P, has at least stage III adenocarcinoma of rectum.  MMR proficient.  Low probability of MSI-H.  Treatment will be as follows:    Total neoadjuvant therapy:   1. Modified FOLFOX every 2 weeks X 16 weeks (8 cycles); followed by   2. Long course chemoradiation therapy with capecitabine; followed by  3. Restaging; followed by   4. Transabdominal resection; or if complete clinical response, consider surveillance    Response to treatment will be assessed as follows:  Contrast-enhanced CT scans of abdomen and chest, and MRI scan of rectum with and without contrast:  -after completion of 8 cycles of modified FOLFOX; aunt  -after completion of chemoradiation therapy    PET-CT scan is not indicated  Fertility risk discussion/counseling if premenopausal (she has been postmenopausal for last 2 years and that this time, has no desire to go for add preservation)    -has experienced positive response to neoadjuvant FOLFOX x8 cycles  -we will plan neoadjuvant chemoradiation therapy now (radiotherapy +capecitabine)  -capecitabine, concurrent with radiotherapy, 825 mg per m2 body surface area p.o. twice daily, on each day that radiotherapy is given through the duration of radiotherapy (typically 28-30 treatment days)  -during chemoradiation therapy, check CBC and CMP weekly  -approximately 3-4 weeks post completion of chemoradiation therapy, re-stage with contrast enhanced CT scans of chest and abdomen, and MRI pelvis with and without contrast      -Follow up with  in 4 weeks with lab work to discussed Dr. Rosenthal's proposed plan of care for resection  -Laparoscopic/robotic very low anterior resection with diverting loop ileostomy-scheduled for 05/20/2024  Continue hyoscyamine for stomach cramps check on status of refill      Family history of colon cancer & colon polyp   -genetic testing and counseling  12/4/2023 1/29/24: No deleterious mutation and no variants of uncertain significance found     Above discussed at length with the patient.  All questions answered.    She understands and agrees with this plan

## 2024-05-07 ENCOUNTER — PATIENT MESSAGE (OUTPATIENT)
Dept: HEMATOLOGY/ONCOLOGY | Facility: CLINIC | Age: 54
End: 2024-05-07
Payer: MEDICAID

## 2024-05-07 ENCOUNTER — HOSPITAL ENCOUNTER (OUTPATIENT)
Dept: RADIOLOGY | Facility: HOSPITAL | Age: 54
Discharge: HOME | End: 2024-05-07
Attending: SURGERY
Payer: MEDICAID

## 2024-05-07 ENCOUNTER — OFFICE VISIT (OUTPATIENT)
Dept: SURGICAL ONCOLOGY | Facility: CLINIC | Age: 54
End: 2024-05-07
Payer: MEDICAID

## 2024-05-07 VITALS
HEIGHT: 63 IN | SYSTOLIC BLOOD PRESSURE: 116 MMHG | DIASTOLIC BLOOD PRESSURE: 96 MMHG | BODY MASS INDEX: 23.5 KG/M2 | WEIGHT: 132.63 LBS | HEART RATE: 92 BPM

## 2024-05-07 DIAGNOSIS — C20 ADENOCARCINOMA OF RECTUM, STAGE 3: ICD-10-CM

## 2024-05-07 DIAGNOSIS — C20 ADENOCARCINOMA OF RECTUM, STAGE 3: Primary | ICD-10-CM

## 2024-05-07 DIAGNOSIS — Z01.818 PREOP TESTING: ICD-10-CM

## 2024-05-07 PROCEDURE — 3074F SYST BP LT 130 MM HG: CPT | Mod: CPTII,,, | Performed by: SURGERY

## 2024-05-07 PROCEDURE — 1159F MED LIST DOCD IN RCRD: CPT | Mod: CPTII,,, | Performed by: SURGERY

## 2024-05-07 PROCEDURE — G2211 COMPLEX E/M VISIT ADD ON: HCPCS | Mod: S$PBB,,, | Performed by: SURGERY

## 2024-05-07 PROCEDURE — 3008F BODY MASS INDEX DOCD: CPT | Mod: CPTII,,, | Performed by: SURGERY

## 2024-05-07 PROCEDURE — 71045 X-RAY EXAM CHEST 1 VIEW: CPT | Mod: TC

## 2024-05-07 PROCEDURE — 99999 PR PBB SHADOW E&M-EST. PATIENT-LVL III: CPT | Mod: PBBFAC,,, | Performed by: SURGERY

## 2024-05-07 PROCEDURE — 99213 OFFICE O/P EST LOW 20 MIN: CPT | Mod: PBBFAC | Performed by: SURGERY

## 2024-05-07 PROCEDURE — 99214 OFFICE O/P EST MOD 30 MIN: CPT | Mod: S$PBB,,, | Performed by: SURGERY

## 2024-05-07 PROCEDURE — 3080F DIAST BP >= 90 MM HG: CPT | Mod: CPTII,,, | Performed by: SURGERY

## 2024-05-07 RX ORDER — ESCITALOPRAM OXALATE 20 MG/1
1 TABLET ORAL NIGHTLY
COMMUNITY
Start: 2024-04-01

## 2024-05-07 RX ORDER — DICYCLOMINE HYDROCHLORIDE 10 MG/1
10 CAPSULE ORAL EVERY 6 HOURS PRN
COMMUNITY
Start: 2023-12-18

## 2024-05-07 NOTE — H&P (VIEW-ONLY)
History & Physical    CHIEF COMPLAINT:  RECTAL CANCER    History of Present Illness:  53 year-old-female referred by Dr. Bazan.  Patient presented to Dr. Ortiz in June 2023 with complaints of rectal bleeding and constipation.  Colonoscopy revealed a 5 cm mass in the rectum, 4 cm from the anal verge.  Biopsy of the mass revealed invasive moderately differentiated adenocarcinoma.  CT abd/pel showed a large rectal mass with some lymphadenopathy seen adjacent to the mass.  MRI pelvis showed a large mid to distal rectal mass with perirectal adenopathy the distal end was read as approximately 15 mm from the anal verge, on my view there appears to be adequate distal margin below the mass before the sphincter complex; at least 10 positive perirectal lymph nodes, no mention of suspicious iliac nodes staged T3bN2b.  She began chemotherapy in July 2023-November 2023 and then began chemoradiation from January-February 2024.  MRI pelvis done in March showed mid-rectal mass smaller compared to December 2023; new indeterminate 8 mm enhancing lesion in the right sacral ala.  CT chest/abd showed no metastatic disease.  NM bone scan showed no appreciable scintigraphic correlate for the lesion at the sacrum seen on MRI.      Greater than 30 minutes was required for complete chart review, imaging review including interpretation of imaging, coordination with referring/other physicians, patient counseling regarding diagnosis/treatment plan, answering questions, medical decision making, and documentation.    Visit today included increased complexity associated with the care of the episodic problem rectal cancer addressed and managing the longitudinal care of the patient due to the serious and/or complex managed problem(s) rectal cancer.    Review of patient's allergies indicates:   Allergen Reactions    Codeine Itching    Morphine Itching       Current Outpatient Medications   Medication Sig Dispense Refill    dicyclomine (BENTYL) 10  MG capsule Take 10 mg by mouth every 6 (six) hours as needed.      EScitalopram oxalate (LEXAPRO) 20 MG tablet Take 1 tablet by mouth every morning.      ALPRAZolam (XANAX) 0.5 MG tablet Take 0.5 mg by mouth 2 (two) times daily as needed.      busPIRone (BUSPAR) 15 MG tablet Take 15 mg by mouth 2 (two) times daily.      capecitabine (XELODA) 150 MG tablet Take 8 tablets (1,200 mg) by mouth twice a day on radiation days.. 480 tablet 0    dexAMETHasone (DECADRON) 4 MG Tab Take 2 tablets by mouth every morning.      DULoxetine (CYMBALTA) 20 MG capsule Take 20 mg by mouth.      famotidine (PEPCID) 40 MG tablet Take 1 tablet (40 mg total) by mouth nightly as needed for Heartburn. 30 tablet 1    hyoscyamine (ANASPAZ,LEVSIN) 0.125 mg Tab Take 1 tablet (125 mcg total) by mouth every 6 (six) hours as needed (abdomen pain). 60 tablet 2    lisinopriL 10 MG tablet Take 10 mg by mouth every morning.      LORazepam (ATIVAN) 0.5 MG tablet Take 1 tablet (0.5 mg total) by mouth every 12 (twelve) hours as needed for Anxiety. 28 tablet 0    MIRALAX 17 gram/dose powder Take 17 g by mouth.      ondansetron (ZOFRAN) 8 MG tablet Take 1 tablet (8 mg total) by mouth every 8 (eight) hours as needed for Nausea. 60 tablet 2    oxyCODONE-acetaminophen (PERCOCET) 5-325 mg per tablet Take 1 tablet by mouth every 4 (four) hours as needed for Pain. 56 tablet 0    pantoprazole (PROTONIX) 40 MG tablet Take 1 tablet by mouth every morning.      pantoprazole (PROTONIX) 40 MG tablet Take 1 tablet (40 mg total) by mouth every morning. 30 tablet 1    PANTOPRAZOLE 40 MG/100 ML 0.9 % NS IVPB        No current facility-administered medications for this visit.       Past Medical History:   Diagnosis Date    Anxiety disorder, unspecified     GERD (gastroesophageal reflux disease)     Rectal cancer      Past Surgical History:   Procedure Laterality Date    APPENDECTOMY      BACK SURGERY       SECTION  2004    cyst coccyx      EYE SURGERY  1987     "RK corrective surgery     Family History   Problem Relation Name Age of Onset    Colon polyps Father Delvin Sharpe     Alcohol abuse Father Delvin Sharpe     Colon cancer Maternal Grandfather      Colon cancer Paternal Grandfather WJ Dell     Cancer Paternal Grandfather WJ Dell     Cancer Maternal Aunt      Diabetes Maternal Grandmother Yasmine rose      Social History     Tobacco Use    Smoking status: Never    Smokeless tobacco: Never   Substance Use Topics    Alcohol use: Not Currently     Alcohol/week: 1.0 standard drink of alcohol    Drug use: Never     Types: Marijuana     Comment: medical marijuana        Review of Systems:  Review of Systems   Constitutional:  Negative for appetite change, chills, diaphoresis and fever.   HENT:  Negative for congestion, drooling, ear discharge, ear pain and hearing loss.    Eyes:  Negative for discharge.   Respiratory:  Negative for apnea, cough, choking, chest tightness, shortness of breath and stridor.    Cardiovascular:  Negative for chest pain, palpitations and leg swelling.   Endocrine: Negative for cold intolerance and heat intolerance.   Genitourinary:  Negative for difficulty urinating, dyspareunia, dysuria and hematuria.   Musculoskeletal:  Negative for arthralgias, gait problem and joint swelling.   Skin:  Negative for color change and rash.   Neurological:  Negative for dizziness, tremors, seizures, syncope, facial asymmetry, speech difficulty, light-headedness, numbness and headaches.   Psychiatric/Behavioral:  Negative for agitation and confusion.           Objective     Vital Signs (Most Recent)  Pulse: 92 (05/07/24 1125)  BP: (!) 116/96 (05/07/24 1125)  5' 3" (1.6 m)  60.1 kg (132 lb 9.6 oz)     Physical Exam:  Physical Exam  Constitutional:       General: She is not in acute distress.     Appearance: Normal appearance. She is not toxic-appearing.   HENT:      Head: Normocephalic and atraumatic.      Right Ear: External ear normal.      Left Ear: " External ear normal.      Mouth/Throat:      Mouth: Mucous membranes are moist.   Eyes:      General: No scleral icterus.     Conjunctiva/sclera: Conjunctivae normal.      Pupils: Pupils are equal, round, and reactive to light.   Cardiovascular:      Rate and Rhythm: Normal rate and regular rhythm.      Pulses: Normal pulses.   Pulmonary:      Effort: Pulmonary effort is normal. No respiratory distress.      Breath sounds: Normal breath sounds. No stridor. No wheezing or rhonchi.   Musculoskeletal:         General: No swelling or tenderness. Normal range of motion.      Cervical back: Normal range of motion and neck supple.      Right lower leg: No edema.      Left lower leg: No edema.   Skin:     General: Skin is warm.      Capillary Refill: Capillary refill takes less than 2 seconds.      Coloration: Skin is not jaundiced or pale.      Findings: No erythema or rash.   Neurological:      General: No focal deficit present.      Mental Status: She is alert and oriented to person, place, and time.      Gait: Gait normal.   Psychiatric:         Mood and Affect: Mood normal.         Behavior: Behavior normal.         Judgment: Judgment normal.            Assessment and Plan   Distal rectal adenocarcinoma, T3b N2 by MRI staging.  Status post total neoadjuvant therapy, with evidence of significant response and no evidence of metastatic disease.    Diagnosis, staging, prognosis and treatment guidelines for stage III rectal cancer were discussed with the patient in detail, all questions were addressed.Using visual aids and drawings, I described the anatomy and potential surgical procedures.    Schedule laparoscopic/robotic very low anterior resection with diverting loop ileostomy    The risks and benefits of the procedure were explained in detail using layman terms, including the pros and cons of any implant that may be used, all questions were addressed, the patient gives consent to proceed    Lee Rosenthal MD  Surgical  Oncology  Complex General, Gastrointestinal and Hepatobiliary Surgery

## 2024-05-08 ENCOUNTER — OFFICE VISIT (OUTPATIENT)
Dept: HEMATOLOGY/ONCOLOGY | Facility: CLINIC | Age: 54
End: 2024-05-08
Attending: INTERNAL MEDICINE
Payer: MEDICAID

## 2024-05-08 ENCOUNTER — ANESTHESIA EVENT (OUTPATIENT)
Dept: SURGERY | Facility: HOSPITAL | Age: 54
DRG: 331 | End: 2024-05-08
Payer: MEDICAID

## 2024-05-08 VITALS
WEIGHT: 132.63 LBS | HEART RATE: 94 BPM | TEMPERATURE: 98 F | OXYGEN SATURATION: 98 % | BODY MASS INDEX: 23.5 KG/M2 | SYSTOLIC BLOOD PRESSURE: 107 MMHG | RESPIRATION RATE: 18 BRPM | DIASTOLIC BLOOD PRESSURE: 75 MMHG | HEIGHT: 63 IN

## 2024-05-08 DIAGNOSIS — T45.1X5A CHEMOTHERAPY-INDUCED NEUROPATHY: Primary | ICD-10-CM

## 2024-05-08 DIAGNOSIS — M53.3 SACRAL LESION: ICD-10-CM

## 2024-05-08 DIAGNOSIS — R93.5 ABNORMAL MRI, PELVIS: ICD-10-CM

## 2024-05-08 DIAGNOSIS — F41.9 ANXIETY DISORDER, UNSPECIFIED TYPE: ICD-10-CM

## 2024-05-08 DIAGNOSIS — G62.0 CHEMOTHERAPY-INDUCED NEUROPATHY: Primary | ICD-10-CM

## 2024-05-08 DIAGNOSIS — R59.0 PELVIC LYMPHADENOPATHY: ICD-10-CM

## 2024-05-08 DIAGNOSIS — C20 ADENOCARCINOMA OF RECTUM, STAGE 3: ICD-10-CM

## 2024-05-08 DIAGNOSIS — Z83.719 FAMILY HX COLONIC POLYPS: ICD-10-CM

## 2024-05-08 DIAGNOSIS — T82.868A THROMBOSIS IN PERIPHERALLY INSERTED CENTRAL CATHETER (PICC): ICD-10-CM

## 2024-05-08 PROCEDURE — 99214 OFFICE O/P EST MOD 30 MIN: CPT | Mod: S$PBB,,, | Performed by: INTERNAL MEDICINE

## 2024-05-08 PROCEDURE — 1160F RVW MEDS BY RX/DR IN RCRD: CPT | Mod: CPTII,,, | Performed by: INTERNAL MEDICINE

## 2024-05-08 PROCEDURE — 3074F SYST BP LT 130 MM HG: CPT | Mod: CPTII,,, | Performed by: INTERNAL MEDICINE

## 2024-05-08 PROCEDURE — 99214 OFFICE O/P EST MOD 30 MIN: CPT | Mod: PBBFAC | Performed by: INTERNAL MEDICINE

## 2024-05-08 PROCEDURE — 3008F BODY MASS INDEX DOCD: CPT | Mod: CPTII,,, | Performed by: INTERNAL MEDICINE

## 2024-05-08 PROCEDURE — 3078F DIAST BP <80 MM HG: CPT | Mod: CPTII,,, | Performed by: INTERNAL MEDICINE

## 2024-05-08 PROCEDURE — 1159F MED LIST DOCD IN RCRD: CPT | Mod: CPTII,,, | Performed by: INTERNAL MEDICINE

## 2024-05-08 NOTE — PROGRESS NOTES
History:  Past Medical History:   Diagnosis Date    Anxiety disorder, unspecified     GERD (gastroesophageal reflux disease)     Rectal cancer        Past Surgical History:   Procedure Laterality Date    APPENDECTOMY      BACK SURGERY       SECTION  2004    cyst coccyx      1985 x3 with cyst and had cystectomy on tailbone in     EYE SURGERY      RK corrective surgery   Past medical history:  Anxiety.    Procedure/surgical history:  Appendectomy.  Back surgery.  Social history:  Single.  Lives in [place], [place].  Has 2 children.  Works as a .  No history of tobacco, alcohol, or illicit drug abuse.  Family history:  Paternal grandfather experience colon cancer in his 70s.  Father experienced multiple colon polyps.  Health maintenance:  No screening colonoscopy prior to most recent colonoscopy which led to the diagnosis of rectal cancer.  -2019:  Bilateral screening mammogram pelvic comparison:  2018 mammogram):  BI-RADS 0 (indeterminate)  -2019:  Diagnostic left mammogram, limited ultrasound left breast (comparison:  2019 mammogram):  Overall study BI-RADS 2: Benign     Family History   Problem Relation Name Age of Onset    Colon polyps Father Delvin Sharpe     Alcohol abuse Father Delvin Sharpe     Colon cancer Maternal Grandfather      Colon cancer Paternal Grandfather WJ Dell     Cancer Paternal Grandfather WJ Dell     Cancer Maternal Aunt      Diabetes Maternal Grandmother Yasmine rose       Reason for Follow-up:  -adenocarcinoma rectum, moderately differentiated, partially obstructing mass, S/P colonoscopy 2023   -tubular adenoma transverse colon   -regional lymphadenopathy on CT abdomen pelvis with contrast 2023  -MMR proficient  -acute left upper extremity DVT related to PICC line  -grandfather experienced colon cancer; father experienced colon polyp  -chemotherapy-induced peripheral neuropathy    History of Present Illness:    Follow-up (Pt reports she's having trouble falling asleep.)     Oncologic/Hematologic History:  Oncology History   Adenocarcinoma of rectum, stage 3   7/1/2023 Initial Diagnosis    Adenocarcinoma of rectum, stage 3     7/19/2023 - 11/22/2023 Chemotherapy    Treatment Summary   Plan Name: OP mFOLFOX 6 Q2W  Treatment Goal: Curative  Status: Inactive  Start Date: 7/19/2023  End Date: 11/22/2023  Provider: John Bazan MD  Chemotherapy: fluorouraciL injection 650 mg, 400 mg/m2 = 650 mg, Intravenous, Clinic/HOD 1 time, 3 of 3 cycles  Administration: 650 mg (7/19/2023), 650 mg (8/1/2023), 650 mg (8/22/2023)  oxaliplatin (ELOXATIN) 85 mg/m2 = 139 mg in dextrose 5 % (D5W) 592.8 mL chemo infusion, 85 mg/m2 = 139 mg, Intravenous, Clinic/HOD 1 time, 8 of 8 cycles  Administration: 139 mg (7/19/2023), 139 mg (8/1/2023), 140 mg (9/18/2023), 135 mg (10/2/2023), 135 mg (11/7/2023), 135 mg (8/22/2023), 130 mg (11/20/2023), 135 mg (10/25/2023)     12/26/2023 - 12/26/2023 Chemotherapy    Treatment Summary   Plan Name: OP CAPECITABINE 5 DAYS + RADIOTHERAPY  Treatment Goal: Curative  Status: Inactive  Start Date:   End Date:   Provider: John Bazan MD  Chemotherapy: capecitabine (XELODA) tablet 1,250 mg, 825 mg/m2 = 1,250 mg, Oral, 2 times daily, 0 of 1 cycle, Start date: --, End date: --     53-year-old lady, referred by Migel Ortiz MD, GI, with rectal cancer.    Family history pos for colon cancer in grandfather, colon polyp in father  Evaluated by Migel Ortiz MD, GI, 05/22/2023 for nervous stomach; change in bowel habits/pattern; change in bowel function started several months ago; currently, 1 bowel movement daily.  Blood in stool.  Lower abdominal pain.  Heartburn.  Epigastric pain.  MiraLax resulted in improvement.  Bright red blood in stool.    06/13/2023: EGD:  1. Esophagus: Erythema of mucosa in the lower 3rd of esophagus, compatible esophagitis  2. Stomach: Erythema of mucosa in the antrum, compatible with  gastritis  3. Duodenum:  Normal mucosa in the whole duodenum      06/13/2023:  EGD:  1. Gastric antrum, biopsy:  Mild chronic inactive gastritis; negative for intestinal metaplasia; negative for H pylori organisms  2. Gastric body, biopsy:  Mild chronic inactive gastritis; negative for intestinal metaplasia; negative for H pylori organisms    06/19/2023:  Colonoscopy (screening colonoscopy):  -single 9 mm polyp transverse colon, polypectomy, completely removed  -ulcerated necrotic infiltrative 90% circumferential, bleeding, 5 cm in length, mass of malignant appearance in the rectum at 4 cm from the anus, causing partial obstruction; scope traversed the lesion  Pathology:  1. Transverse colon polyp, polypectomy:  Tubular adenoma  2. Rectal mass, biopsy:  Invasive moderately differentiated adenocarcinoma; no LVI    MMR proficient  Low probability of MSI-H    06/21/2023: CT abdomen pelvis with and without contrast:  -large rectal mass consistent with malignancy; some lymphadenopathy is seen adjacent to the mass  (large rectal mass with circumferential wall thickening in rectum; associated inflammatory changes; no bowel obstruction; some lymph nodes are seen in the perirectal region on the right and left side; representative lymph node on right side of rectum 1 cm x 8 mm; representative lymph node on the left side of rectum 8 mm x 8 mm)    Labs reviewed:  05/24/2023:  WBC 8.0.  Hemoglobin 12.5.  MCV 88.  Platelets 306 K.  Differential count normal.    07/05/2023:   Pleasant lady who presents for initial medical oncology consultation, accompanied by her brother.    Her symptoms started 2 years ago, in the form of intermittent small amount hematochezia, initially on toilet wife's, and for last few weeks, in toilet bowel as well.  She brought the symptoms to the attention of her gyn several months ago and does not remember the recommendation.  Regardless, she never got a screening colonoscopy done.  Around [place] time  2022, she started feeling bloated.  She started having constant uncomfortable feeling because of bloating, as well as in the anorectal area.  Hematochezia continued.  She brought this to the attention of her PCP in [place] who suggested taking MiraLax.  The PCP also arranged for colonoscopy.  On today's visit, she reports that she has been constipated her entire life.  She has been constipated for at least 10 years.  Hematochezia for 2 years.  No anorectal pain.  Does have constant feeling of incomplete evacuation.  Appetite is down and she has lost 30 lb in last 3-4 months.  Appetite is more less preserved but she is afraid to eat because eating causes bloating and worsening of uncomfortable feeling in rectum.    Interval History:  PICC DOUBLE LUMEN LINE FLUSH   [No matching plan found]     05/08/2024:   -03/22/2024:  Whole-body nuclear medicine bone scan:  No appreciable scintigraphic correlate for the subcentimeter lesion at the sacrum seen on MRI  -low anterior resection by Dr. Lee Rosenthal, scheduled for 05/20/2024  Presents for a follow-up visit.  Doing very well.  Bowels moving normally.  No anorectal pain.  No tenesmus.  No blood in stool.  No constipation or diarrhea.  No abdominal pain, nausea, vomiting, distention, anorexia, or unintentional weight loss.  ECOG 0.  She is looking forward surgery.  She is very much thankful for all the care provided.      Medications:  Current Outpatient Medications on File Prior to Visit   Medication Sig Dispense Refill    ALPRAZolam (XANAX) 0.5 MG tablet Take 0.5 mg by mouth 2 (two) times daily as needed.      busPIRone (BUSPAR) 15 MG tablet Take 15 mg by mouth 2 (two) times daily.      dicyclomine (BENTYL) 10 MG capsule Take 10 mg by mouth every 6 (six) hours as needed.      DULoxetine (CYMBALTA) 20 MG capsule Take 20 mg by mouth.      EScitalopram oxalate (LEXAPRO) 20 MG tablet Take 1 tablet by mouth every morning.      hyoscyamine (ANASPAZ,LEVSIN) 0.125 mg Tab Take 1  "tablet (125 mcg total) by mouth every 6 (six) hours as needed (abdomen pain). 60 tablet 2    MIRALAX 17 gram/dose powder Take 17 g by mouth.      ondansetron (ZOFRAN) 8 MG tablet Take 1 tablet (8 mg total) by mouth every 8 (eight) hours as needed for Nausea. 60 tablet 2    oxyCODONE-acetaminophen (PERCOCET) 5-325 mg per tablet Take 1 tablet by mouth every 4 (four) hours as needed for Pain. 56 tablet 0    pantoprazole (PROTONIX) 40 MG tablet Take 1 tablet (40 mg total) by mouth every morning. 30 tablet 1    PANTOPRAZOLE 40 MG/100 ML 0.9 % NS IVPB       famotidine (PEPCID) 40 MG tablet Take 1 tablet (40 mg total) by mouth nightly as needed for Heartburn. 30 tablet 1     No current facility-administered medications on file prior to visit.       Review of Systems:   All systems reviewed and found to be negative except for the symptoms detailed above    Physical Examination:   VITAL SIGNS:   Vitals:    05/08/24 1133   BP: 107/75   Pulse: 94   Resp: 18   Temp: 97.9 °F (36.6 °C)       GENERAL:  In no apparent distress.    HEAD:  No signs of head trauma.  EYES:  Pupils are equal.  Extraocular motions intact.    EARS:  Hearing grossly intact.  MOUTH:  Oropharynx is normal.   NECK:  No adenopathy, no JVD.     CHEST:  Chest with clear breath sounds bilaterally.  No wheezes, rales, rhonchi.    CARDIAC:  Regular rate and rhythm.  S1 and S2, without murmurs, gallops, rubs.  VASCULAR:  No Edema.  Peripheral pulses normal and equal in all extremities.  ABDOMEN:  Soft, without detectable tenderness.  No sign of distention.  No   rebound or guarding, and no masses palpated.   Bowel Sounds normal.  MUSCULOSKELETAL:  Good range of motion of all major joints. Extremities without clubbing, cyanosis or edema.    NEUROLOGIC EXAM:  Alert and oriented x 3.  No focal sensory or strength deficits.   Speech normal.  Follows commands.  PSYCHIATRIC:  Mood normal.    No results for input(s): "CBC" in the last 72 hours.   No results for input(s): " ""CMP" in the last 72 hours.     Assessment:  Problem List Items Addressed This Visit          Neuro    Chemotherapy-induced neuropathy - Primary       Psychiatric    Anxiety disorder, unspecified       Cardiac/Vascular    Thrombosis in peripherally inserted central catheter (PICC)       Oncology    Adenocarcinoma of rectum, stage 3       GI    Family hx colonic polyps    Abnormal MRI, pelvis       Orthopedic    Sacral lesion       Other    Pelvic lymphadenopathy     Adenocarcinoma of rectum, moderately differentiated:   -presentation:  05/2023:  Change in bowel habits, hematochezia, lower abdominal pain  -colonoscopy 06/19/2023:    9 mm tubular adenoma transverse colon, removed;   invasive moderately differentiated adenocarcinoma of rectum presenting as ulcerated necrotic infiltrative 90% circumferential, bleeding, partially obstructing rectal mass, 5 cm long, 4 cm from anus, scope traversed the lesion  -MMR proficient; low probability of MSI-H  -CT abdomen pelvis with contrast 06/21/2023:  No metastasis; large rectal mass and some regional lymphadenopathy on CT abdomen pelvis with contrast 06/21/2023, largest perirectal lymph node 1 cm x 8 mm  -CEA level < 1.73, normal (07/05/2023)  -no lung metastases on CT chest 07/18/2023  -MRI pelvis 07/18/2023: Large mid to distal rectal mass (T3b); at least 10 perirectal lymph nodes with increased signal on DWI imaging, largest 9 mm (T2b)  >> radiologically, T3b N2b (stage IIIC)  -07/27/2023:  developed acute DVT left subclavian/axillary/brachial veins secondary to PICC line; started on Eliquis; PICC line removed  -neoadjuvant FOLFOX started 07/19/2023  -cycle 4 of neoadjuvant FOLFOX started 09/18/2023  -cycle 5 of neoadjuvant FOLFOX started 10/02/2023  -positive response on restaging CTs C/A/P 10 09/2023, S/P neoadjuvant FOLFOX x5 cycles (marked improvement in rectal mass; improved perirectal lymphadenopathy)  -restaging pelvic MRI 10/24/2020: Improved rectal mass and " perirectal lymph nodes   -cycle 6 of neoadjuvant FOLFOX started 10/25/2023  -10/30/2023: Cymbalta discontinued because she experienced increased anxiety with Cymbalta  -Neoadjuvant FOLFOX:  Cycle 7 on 11/07/2023; cycle 8 on 11/20/2023  -12/04/2023: Genetics consultation  -12/15/2023: Restaging CTs chest and abdomen with contrast:  No metastases  -12/15/2023:  Restaging MRI pelvis with and without contrast (comparison:  MRI 10/24/2023):  Little overall change in rectal mass since 10/24/2023  -restaging MRI pelvis 12/15/2023: No progression  -genetic testing 12/19/2023: Negative  -S/P concurrent chemoradiation therapy to pelvis 01/03/2024-02/12/2024 (concurrent with capecitabine):  -screening mammogram 02/16/2024: BI-RADS 2  -restaging MRI pelvis 03/05/2024, post completion of neoadjuvant therapy:  Rectal mass smaller and improved; new indeterminate 8 mm enhancing lesion right sacral ala  -restaging CT chest and abdomen 03/05/2024, post completion of neoadjuvant therapy: No metastases  -bone scan 03/20/2024:  No bone metastases; no scintigraphic correlate for the subcentimeter lesion at the sacrum seen on MRI      Left upper extremity DVT secondary to PICC line 07/27/2023:  -developed acute DVT left subclavian/axillary/brachial veins secondary to PICC line; started on Eliquis; PICC line removed      Chemotherapy-induced peripheral neuropathy:  -10/17/2023: Chemotherapy induced peripheral neuropathy in the form of tingling in hands; no neuropathy in feet; no numbness or pain  -10/17/2020: Started on Cymbalta 30 mg p.o. q.h.s.  -Cymbalta discontinued 10/30/2023 because of increased anxiety with Cymbalta reported by her      Family history of colon cancer:  -grandfather experienced colon cancer; father experienced colon polyp      Plan:  Apparently, she is scheduled to undergo surgery by Dr. Rosenthal on 05/20/2024  I will see her back for follow-up mid June, to plan  surveillance  --------------------------------------      -10/17/2023: Chemotherapy induced peripheral neuropathy in the form of tingling in hands; no neuropathy in feet; no numbness or pain  -10/17/2020: Started on Cymbalta 30 mg p.o. q.h.s.  -Cymbalta discontinued 10/30/2023 because of increased anxiety with Cymbalta reported by her  -10/17/2023:  Referred to behavioral health for management of anxiety.    -adenocarcinoma rectum, moderately differentiated  -presentation:  05/2023: Change in bowel habits, hematochezia, lower abdominal pain  -colonoscopy 06/19/2023:  Ulcerated necrotic infiltrative 90% circumferential bleeding partially obstructing rectal mass, 5 cm long, 4 cm from anus  -MMR proficient, low probability of MSI-H  -no metastasis   -baseline CEA level normal (07/05/2023)   -MRI pelvis 07/18/2023: Large mid to distal rectal mass (T3b); at least 10 perirectal lymph nodes, suspicious for metastasis (T2b)  -radiologically, T3b N2b, stage IIIC  -S/P neoadjuvant FOLFOX every 2 weeks x8 cycles (07/19/2023-11/20/2023  -10/09/2023:  Positive response on restaging CTs chest and abdomen post 5 cycles of chemotherapy  -10/24/2023: Positive response on restaging MRI pelvis post 5 cycles of chemotherapy  -10/30/2023: Cymbalta discontinued because she experienced increased anxiety with Cymbalta  -12/04/2023: Genetics consultation  -12/15/2023:  No metastases on restaging CTs with chest and abdomen post 8 cycles of chemotherapy  -restaging MRI pelvis 12/15/2023, post 8 cycles of neoadjuvant chemotherapy: No progression  (Has responded positively to neoadjuvant chemotherapy x8 cycles)   -genetic testing 12/19/2023: Negative  -S/P concurrent chemoradiation therapy to pelvis 01/03/2024-02/12/2024 (concurrent with capecitabine):  -screening mammogram 02/16/2024: BI-RADS 2  -restaging MRI pelvis 03/05/2024, post completion of neoadjuvant therapy:  Rectal mass smaller and improved; new indeterminate 8 mm enhancing lesion  right sacral ala  -restaging CT chest and abdomen 03/05/2024, post completion of neoadjuvant therapy: No metastases  -bone scan 03/20/2024:  No bone metastases; no scintigraphic correlate for the subcentimeter lesion at the sacrum seen on MRI  >>>  -has responded well to neoadjuvant chemotherapy followed by neoadjuvant chemoradiation therapy  -new indeterminate 8 mm enhancing lesion right sacral ala on restaging MRI pelvis 03/05/2024   -Surgical Oncology for transabdominal resection of the tumor  -subsequently, surveillance    This was our plan of treatment:  Neoadjuvant therapy:  Perioperative treatment is recommended for up to a total of 6 months  MMR proficient.  Low probability of MSI-H.  Regimen:  Total neoadjuvant therapy:  1. Modified FOLFOX every 2 weeks X 16 weeks (8 cycles); followed by  2. Restaging with contrast-enhanced CT scans of chest/abdomen, and MRI pelvis with contrast after 4 cycles, then after 8 cycles; followed by   3. Long course chemoradiation therapy with capecitabine; followed by  4. Restaging with contrast-enhanced CT scans of chest/abdomen, and MRI pelvis with contrast; followed by   5. Transabdominal resection; or, if complete clinical response, then consider surveillance      -developed PICC line related left upper extremity DVT 07/27/2023   PICC line removed   Started on Eliquis  >>>  -anticoagulation was planned to continue for at least 3 months, i.e., until the end of October 2023   -11/07/2023:  Told me that she stopped anticoagulation Halloween day (10/31/2023), appropriately    Fertility risk discussion/counseling if premenopausal   (she had been postmenopausal for last 2 years and indicated that she had no desire to preserve fertility)    Family history of colon cancer & colon polyp   -genetic testing 12/19/2023: Negative    I will see her back for follow-up mid June, to plan surveillance    Above discussed leg length with her.  All questions answered.    Discussed labs and scans  and gave her copies of relevant reports.    Plan of management discussed in detail.   She understands and agrees with this plan.    Follow-up:  No follow-ups on file.  Answers submitted by the patient for this visit:  Review of Systems Questionnaire (Submitted on 5/6/2024)  appetite change : No  unexpected weight change: No  mouth sores: No  visual disturbance: No  cough: No  shortness of breath: No  chest pain: No  abdominal pain: No  diarrhea: No  frequency: No  back pain: No  rash: No  headaches: No  adenopathy: No  nervous/ anxious: Yes    Answers submitted by the patient for this visit:  Review of Systems Questionnaire (Submitted on 5/6/2024)  appetite change : No  unexpected weight change: No  mouth sores: No  visual disturbance: No  cough: No  shortness of breath: No  chest pain: No  abdominal pain: No  diarrhea: No  frequency: No  back pain: No  rash: No  headaches: No  adenopathy: No  nervous/ anxious: Yes

## 2024-05-08 NOTE — Clinical Note
Apparently, she is scheduled to undergo surgery by Dr. Rosenthal on 05/20/2024 I will see her back for follow-up mid June, to plan surveillance

## 2024-05-09 RX ORDER — MULTIVITAMIN
1 TABLET ORAL DAILY
COMMUNITY

## 2024-05-09 RX ORDER — VITAMIN B COMPLEX
1 CAPSULE ORAL DAILY
COMMUNITY

## 2024-05-10 RX ORDER — HYDROXYZINE PAMOATE 25 MG/1
25 CAPSULE ORAL 4 TIMES DAILY
COMMUNITY

## 2024-05-14 DIAGNOSIS — C20 RECTAL CANCER: Primary | ICD-10-CM

## 2024-05-14 RX ORDER — METRONIDAZOLE 250 MG/1
TABLET ORAL
Qty: 6 TABLET | Refills: 0 | Status: SHIPPED | OUTPATIENT
Start: 2024-05-14

## 2024-05-14 RX ORDER — NEOMYCIN SULFATE 500 MG/1
TABLET ORAL
Qty: 6 TABLET | Refills: 0 | Status: SHIPPED | OUTPATIENT
Start: 2024-05-14

## 2024-05-17 NOTE — PRE-PROCEDURE INSTRUCTIONS
"[facility]: Outpatient Surgery  Preprocedure Instructions    Expectations: "Because of inconsistent procedure completion times, an unexpected wait may occur. The physicians would like you to be here to prepare in the event they run ahead of time. We will make you as comfortable as possible and keep you informed. We apologize in advance if this happens."     Your arrival time for your surgery or procedure is __5:00am____.  We ask patients to arrive about 2 hours before surgery to allow for enough time to review your health history & medications, start your IV, complete any outstanding labwork or tests, and meet your Anesthesiologist.    We are located at [facility], [address] [place] [address].    Enter through the [facility] entrance next to the Emergency Room, and come to the 6th floor to the Outpatient Surgery Department.    If you are in need of a wheelchair the morning of surgery please call [phone] about 15 minutes before you arrive. Parking is available in our parking garage located off [facility], between the hospital and [facility].      Visitory Policy:  You are allowed 2 adult visitors to be with you in the hospital. Please, no switching visitors in pre-op area. All hospital visitors should be in good current health.  No small children.  We will update you and your family hourly on the progression of surgery and any unexpected delays.      What to Bring:  Please have your ID, insurance cards, and all home medication bottles with you at check in.  Bring your CPAP machine if one is used at home.     Fasting:  Nothing to eat or drink after midnight the night before your procedure. This includes no ice, gum, hard candies, and/or tobacco products.    Medications:  Follow your doctor's instructions for taking any medications on the morning of your procedure.  If no instructions for taking medications were given, do not take any medications but bring your " medications in their bottles to your procedure check in.     Follow your doctor's preoperative instructions regarding skin prep, bowel prep, bathing, or showering prior to your procedure.  If any special soaps were provided to you, please use according to your doctor's instructions. If no instructions were given from your doctor, take a good bath or shower with antibacterial soap the night before and the morning of your procedure.  On the morning of procedure, wear loose, comfortable clothing.  No lotions, makeup, perfumes, colognes, deodorant, or jewelry to your procedure.  Removable items (glasses, contact lenses, dentures, retainers, hearing aids) need to be removed for your procedure.  Bring your storage containers for these items if you wear them.     Artificial nails, body jewelry, eyelash extensions, and/or hair extensions with metal clips are not allowed during your surgery.  If you currently wear any of these items, please arrange for them to be removed prior to your arrival to the hospital.     Outpatient or Same Day Surgeries:  Any patients receiving sedation/anesthesia are advised not to drive for 24 hours after their procedure.  We do not allow patients to drive themselves home after discharge.  If you are going home after your procedure, please have someone available to drive you home from the hospital.     You may call the Outpatient Surgery Department at [phone] with any questions or concerns.  We are looking forward to meeting you and taking great care of you for your procedure.  Thank you for choosing [facility] for your surgical needs.

## 2024-05-20 ENCOUNTER — ANESTHESIA (OUTPATIENT)
Dept: SURGERY | Facility: HOSPITAL | Age: 54
DRG: 331 | End: 2024-05-20
Payer: MEDICAID

## 2024-05-20 ENCOUNTER — HOSPITAL ENCOUNTER (INPATIENT)
Facility: HOSPITAL | Age: 54
LOS: 3 days | Discharge: HOME | DRG: 331 | End: 2024-05-23
Attending: SURGERY | Admitting: SURGERY
Payer: MEDICAID

## 2024-05-20 DIAGNOSIS — C20 RECTAL CANCER: ICD-10-CM

## 2024-05-20 DIAGNOSIS — C20 ADENOCARCINOMA OF RECTUM, STAGE 3: ICD-10-CM

## 2024-05-20 LAB
ABORH RETYPE: NORMAL
GROUP & RH: NORMAL
INDIRECT COOMBS: NORMAL
SPECIMEN OUTDATE: NORMAL

## 2024-05-20 PROCEDURE — 49329 UNLSTD LAPS PX ABD PERTM&OMN: CPT | Mod: ,,, | Performed by: SURGERY

## 2024-05-20 PROCEDURE — D9220A PRA ANESTHESIA: Mod: QK,ANES,, | Performed by: ANESTHESIOLOGY

## 2024-05-20 PROCEDURE — 36415 COLL VENOUS BLD VENIPUNCTURE: CPT | Performed by: SURGERY

## 2024-05-20 PROCEDURE — 63600175 PHARM REV CODE 636 W HCPCS: Performed by: NURSE PRACTITIONER

## 2024-05-20 PROCEDURE — 25000003 PHARM REV CODE 250: Performed by: NURSE PRACTITIONER

## 2024-05-20 PROCEDURE — C9290 INJ, BUPIVACAINE LIPOSOME: HCPCS | Performed by: SURGERY

## 2024-05-20 PROCEDURE — 36000713 HC OR TIME LEV V EA ADD 15 MIN: Performed by: SURGERY

## 2024-05-20 PROCEDURE — 71000015 HC POSTOP RECOV 1ST HR: Performed by: SURGERY

## 2024-05-20 PROCEDURE — 27201423 OPTIME MED/SURG SUP & DEVICES STERILE SUPPLY: Performed by: SURGERY

## 2024-05-20 PROCEDURE — 11000001 HC ACUTE MED/SURG PRIVATE ROOM

## 2024-05-20 PROCEDURE — 25000003 PHARM REV CODE 250: Performed by: NURSE ANESTHETIST, CERTIFIED REGISTERED

## 2024-05-20 PROCEDURE — 86850 RBC ANTIBODY SCREEN: CPT | Performed by: SURGERY

## 2024-05-20 PROCEDURE — 63600175 PHARM REV CODE 636 W HCPCS: Performed by: NURSE ANESTHETIST, CERTIFIED REGISTERED

## 2024-05-20 PROCEDURE — 37000008 HC ANESTHESIA 1ST 15 MINUTES: Performed by: SURGERY

## 2024-05-20 PROCEDURE — 8E0W0CZ ROBOTIC ASSISTED PROCEDURE OF TRUNK REGION, OPEN APPROACH: ICD-10-PCS | Performed by: SURGERY

## 2024-05-20 PROCEDURE — 99900035 HC TECH TIME PER 15 MIN (STAT)

## 2024-05-20 PROCEDURE — 44213 LAP MOBIL SPLENIC FL ADD-ON: CPT | Mod: AS,,, | Performed by: NURSE PRACTITIONER

## 2024-05-20 PROCEDURE — 49329 UNLSTD LAPS PX ABD PERTM&OMN: CPT | Mod: AS,,, | Performed by: NURSE PRACTITIONER

## 2024-05-20 PROCEDURE — 0DJD8ZZ INSPECTION OF LOWER INTESTINAL TRACT, VIA NATURAL OR ARTIFICIAL OPENING ENDOSCOPIC: ICD-10-PCS | Performed by: SURGERY

## 2024-05-20 PROCEDURE — D9220A PRA ANESTHESIA: Mod: CRNA,,, | Performed by: NURSE ANESTHETIST, CERTIFIED REGISTERED

## 2024-05-20 PROCEDURE — 44208 L COLECTOMY/COLOPROCTOSTOMY: CPT | Mod: ,,, | Performed by: SURGERY

## 2024-05-20 PROCEDURE — 0DBP0ZZ EXCISION OF RECTUM, OPEN APPROACH: ICD-10-PCS | Performed by: SURGERY

## 2024-05-20 PROCEDURE — 63600175 PHARM REV CODE 636 W HCPCS: Mod: JZ,JG | Performed by: SURGERY

## 2024-05-20 PROCEDURE — 71000039 HC RECOVERY, EACH ADD'L HOUR: Performed by: SURGERY

## 2024-05-20 PROCEDURE — 44208 L COLECTOMY/COLOPROCTOSTOMY: CPT | Mod: AS,,, | Performed by: NURSE PRACTITIONER

## 2024-05-20 PROCEDURE — 0D1B0Z4 BYPASS ILEUM TO CUTANEOUS, OPEN APPROACH: ICD-10-PCS | Performed by: SURGERY

## 2024-05-20 PROCEDURE — 88305 TISSUE EXAM BY PATHOLOGIST: CPT | Performed by: SURGERY

## 2024-05-20 PROCEDURE — 63600175 PHARM REV CODE 636 W HCPCS: Performed by: ANESTHESIOLOGY

## 2024-05-20 PROCEDURE — 25000003 PHARM REV CODE 250: Performed by: SURGERY

## 2024-05-20 PROCEDURE — 71000016 HC POSTOP RECOV ADDL HR: Performed by: SURGERY

## 2024-05-20 PROCEDURE — 63600175 PHARM REV CODE 636 W HCPCS: Performed by: SURGERY

## 2024-05-20 PROCEDURE — 88309 TISSUE EXAM BY PATHOLOGIST: CPT

## 2024-05-20 PROCEDURE — 37000009 HC ANESTHESIA EA ADD 15 MINS: Performed by: SURGERY

## 2024-05-20 PROCEDURE — 36000712 HC OR TIME LEV V 1ST 15 MIN: Performed by: SURGERY

## 2024-05-20 PROCEDURE — 44213 LAP MOBIL SPLENIC FL ADD-ON: CPT | Mod: ,,, | Performed by: SURGERY

## 2024-05-20 PROCEDURE — 71000033 HC RECOVERY, INTIAL HOUR: Performed by: SURGERY

## 2024-05-20 RX ORDER — MEPERIDINE HYDROCHLORIDE 25 MG/ML
12.5 INJECTION INTRAMUSCULAR; INTRAVENOUS; SUBCUTANEOUS ONCE
Status: DISCONTINUED | OUTPATIENT
Start: 2024-05-20 | End: 2024-05-20 | Stop reason: HOSPADM

## 2024-05-20 RX ORDER — ALVIMOPAN 12 MG/1
12 CAPSULE ORAL 2 TIMES DAILY
Status: DISCONTINUED | OUTPATIENT
Start: 2024-05-20 | End: 2024-05-23 | Stop reason: HOSPADM

## 2024-05-20 RX ORDER — SODIUM CHLORIDE, SODIUM LACTATE, POTASSIUM CHLORIDE, CALCIUM CHLORIDE 600; 310; 30; 20 MG/100ML; MG/100ML; MG/100ML; MG/100ML
INJECTION, SOLUTION INTRAVENOUS CONTINUOUS
Status: DISCONTINUED | OUTPATIENT
Start: 2024-05-20 | End: 2024-05-21

## 2024-05-20 RX ORDER — METRONIDAZOLE 500 MG/100ML
500 INJECTION, SOLUTION INTRAVENOUS ONCE
Status: COMPLETED | OUTPATIENT
Start: 2024-05-21 | End: 2024-05-21

## 2024-05-20 RX ORDER — HYDROMORPHONE HYDROCHLORIDE 2 MG/ML
0.2 INJECTION, SOLUTION INTRAMUSCULAR; INTRAVENOUS; SUBCUTANEOUS EVERY 5 MIN PRN
Status: DISCONTINUED | OUTPATIENT
Start: 2024-05-20 | End: 2024-05-20 | Stop reason: HOSPADM

## 2024-05-20 RX ORDER — ENOXAPARIN SODIUM 100 MG/ML
40 INJECTION SUBCUTANEOUS EVERY 24 HOURS
Status: DISCONTINUED | OUTPATIENT
Start: 2024-05-21 | End: 2024-05-23 | Stop reason: HOSPADM

## 2024-05-20 RX ORDER — ROCURONIUM BROMIDE 10 MG/ML
INJECTION, SOLUTION INTRAVENOUS
Status: DISCONTINUED | OUTPATIENT
Start: 2024-05-20 | End: 2024-05-20

## 2024-05-20 RX ORDER — HYDROMORPHONE HYDROCHLORIDE 2 MG/ML
1 INJECTION, SOLUTION INTRAMUSCULAR; INTRAVENOUS; SUBCUTANEOUS EVERY 4 HOURS PRN
Status: DISCONTINUED | OUTPATIENT
Start: 2024-05-20 | End: 2024-05-23 | Stop reason: HOSPADM

## 2024-05-20 RX ORDER — METHOCARBAMOL 100 MG/ML
1000 INJECTION, SOLUTION INTRAMUSCULAR; INTRAVENOUS ONCE
Status: COMPLETED | OUTPATIENT
Start: 2024-05-20 | End: 2024-05-20

## 2024-05-20 RX ORDER — PHENYLEPHRINE HYDROCHLORIDE 10 MG/ML
INJECTION INTRAVENOUS
Status: DISCONTINUED | OUTPATIENT
Start: 2024-05-20 | End: 2024-05-20

## 2024-05-20 RX ORDER — LIDOCAINE HYDROCHLORIDE 20 MG/ML
INJECTION, SOLUTION EPIDURAL; INFILTRATION; INTRACAUDAL; PERINEURAL
Status: DISCONTINUED | OUTPATIENT
Start: 2024-05-20 | End: 2024-05-20

## 2024-05-20 RX ORDER — HYDROMORPHONE HYDROCHLORIDE 2 MG/ML
0.5 INJECTION, SOLUTION INTRAMUSCULAR; INTRAVENOUS; SUBCUTANEOUS EVERY 5 MIN PRN
Status: DISCONTINUED | OUTPATIENT
Start: 2024-05-20 | End: 2024-05-20 | Stop reason: HOSPADM

## 2024-05-20 RX ORDER — INDOCYANINE GREEN AND WATER 25 MG
KIT INJECTION
Status: DISCONTINUED | OUTPATIENT
Start: 2024-05-20 | End: 2024-05-20

## 2024-05-20 RX ORDER — ONDANSETRON HYDROCHLORIDE 2 MG/ML
INJECTION, SOLUTION INTRAVENOUS
Status: DISCONTINUED | OUTPATIENT
Start: 2024-05-20 | End: 2024-05-20

## 2024-05-20 RX ORDER — PROMETHAZINE HYDROCHLORIDE 25 MG/1
25 TABLET ORAL EVERY 6 HOURS PRN
Status: DISCONTINUED | OUTPATIENT
Start: 2024-05-20 | End: 2024-05-23 | Stop reason: HOSPADM

## 2024-05-20 RX ORDER — GLYCOPYRROLATE 0.2 MG/ML
INJECTION INTRAMUSCULAR; INTRAVENOUS
Status: DISCONTINUED | OUTPATIENT
Start: 2024-05-20 | End: 2024-05-20

## 2024-05-20 RX ORDER — EPHEDRINE SULFATE 50 MG/ML
INJECTION, SOLUTION INTRAVENOUS
Status: DISCONTINUED | OUTPATIENT
Start: 2024-05-20 | End: 2024-05-20

## 2024-05-20 RX ORDER — METRONIDAZOLE 500 MG/100ML
500 INJECTION, SOLUTION INTRAVENOUS ONCE
Status: COMPLETED | OUTPATIENT
Start: 2024-05-20 | End: 2024-05-20

## 2024-05-20 RX ORDER — ACETAMINOPHEN 10 MG/ML
1000 INJECTION, SOLUTION INTRAVENOUS EVERY 8 HOURS
Status: DISPENSED | OUTPATIENT
Start: 2024-05-20 | End: 2024-05-21

## 2024-05-20 RX ORDER — KETOROLAC TROMETHAMINE 30 MG/ML
INJECTION, SOLUTION INTRAMUSCULAR; INTRAVENOUS
Status: DISCONTINUED | OUTPATIENT
Start: 2024-05-20 | End: 2024-05-20

## 2024-05-20 RX ORDER — DIPHENHYDRAMINE HYDROCHLORIDE 50 MG/ML
12.5 INJECTION INTRAMUSCULAR; INTRAVENOUS EVERY 6 HOURS PRN
Status: DISCONTINUED | OUTPATIENT
Start: 2024-05-20 | End: 2024-05-23 | Stop reason: HOSPADM

## 2024-05-20 RX ORDER — ENOXAPARIN SODIUM 100 MG/ML
30 INJECTION SUBCUTANEOUS
Status: COMPLETED | OUTPATIENT
Start: 2024-05-20 | End: 2024-05-20

## 2024-05-20 RX ORDER — MIDAZOLAM HYDROCHLORIDE 1 MG/ML
INJECTION INTRAMUSCULAR; INTRAVENOUS
Status: DISCONTINUED | OUTPATIENT
Start: 2024-05-20 | End: 2024-05-20

## 2024-05-20 RX ORDER — OXYCODONE AND ACETAMINOPHEN 5; 325 MG/1; MG/1
1 TABLET ORAL EVERY 4 HOURS PRN
Status: DISCONTINUED | OUTPATIENT
Start: 2024-05-20 | End: 2024-05-21

## 2024-05-20 RX ORDER — DIPHENHYDRAMINE HYDROCHLORIDE 50 MG/ML
25 INJECTION INTRAMUSCULAR; INTRAVENOUS EVERY 6 HOURS PRN
Status: DISCONTINUED | OUTPATIENT
Start: 2024-05-20 | End: 2024-05-20 | Stop reason: HOSPADM

## 2024-05-20 RX ORDER — BUPIVACAINE HYDROCHLORIDE 5 MG/ML
INJECTION, SOLUTION EPIDURAL; INTRACAUDAL
Status: DISCONTINUED | OUTPATIENT
Start: 2024-05-20 | End: 2024-05-20 | Stop reason: HOSPADM

## 2024-05-20 RX ORDER — ONDANSETRON HYDROCHLORIDE 2 MG/ML
8 INJECTION, SOLUTION INTRAVENOUS EVERY 8 HOURS PRN
Status: DISCONTINUED | OUTPATIENT
Start: 2024-05-20 | End: 2024-05-23 | Stop reason: HOSPADM

## 2024-05-20 RX ORDER — ONDANSETRON HYDROCHLORIDE 2 MG/ML
4 INJECTION, SOLUTION INTRAVENOUS ONCE
Status: DISCONTINUED | OUTPATIENT
Start: 2024-05-20 | End: 2024-05-20 | Stop reason: HOSPADM

## 2024-05-20 RX ORDER — HYDROMORPHONE HYDROCHLORIDE 2 MG/ML
1 INJECTION, SOLUTION INTRAMUSCULAR; INTRAVENOUS; SUBCUTANEOUS
Status: DISCONTINUED | OUTPATIENT
Start: 2024-05-20 | End: 2024-05-23 | Stop reason: HOSPADM

## 2024-05-20 RX ORDER — ALVIMOPAN 12 MG/1
12 CAPSULE ORAL ONCE
Status: COMPLETED | OUTPATIENT
Start: 2024-05-20 | End: 2024-05-20

## 2024-05-20 RX ORDER — ACETAMINOPHEN 10 MG/ML
INJECTION, SOLUTION INTRAVENOUS
Status: DISCONTINUED | OUTPATIENT
Start: 2024-05-20 | End: 2024-05-20

## 2024-05-20 RX ORDER — PROPOFOL 10 MG/ML
VIAL (ML) INTRAVENOUS
Status: DISCONTINUED | OUTPATIENT
Start: 2024-05-20 | End: 2024-05-20

## 2024-05-20 RX ORDER — DIPHENHYDRAMINE HYDROCHLORIDE 50 MG/ML
12.5 INJECTION INTRAMUSCULAR; INTRAVENOUS EVERY 4 HOURS PRN
Status: DISCONTINUED | OUTPATIENT
Start: 2024-05-20 | End: 2024-05-20

## 2024-05-20 RX ORDER — NALOXONE HCL 0.4 MG/ML
0.02 VIAL (ML) INJECTION
Status: DISCONTINUED | OUTPATIENT
Start: 2024-05-20 | End: 2024-05-23 | Stop reason: HOSPADM

## 2024-05-20 RX ORDER — DEXAMETHASONE SODIUM PHOSPHATE 4 MG/ML
INJECTION, SOLUTION INTRA-ARTICULAR; INTRALESIONAL; INTRAMUSCULAR; INTRAVENOUS; SOFT TISSUE
Status: DISCONTINUED | OUTPATIENT
Start: 2024-05-20 | End: 2024-05-20

## 2024-05-20 RX ORDER — FENTANYL CITRATE 50 UG/ML
INJECTION, SOLUTION INTRAMUSCULAR; INTRAVENOUS
Status: DISCONTINUED | OUTPATIENT
Start: 2024-05-20 | End: 2024-05-20

## 2024-05-20 RX ADMIN — HYDROMORPHONE HYDROCHLORIDE 0.5 MG: 2 INJECTION, SOLUTION INTRAMUSCULAR; INTRAVENOUS; SUBCUTANEOUS at 10:05

## 2024-05-20 RX ADMIN — ALVIMOPAN 12 MG: 12 CAPSULE ORAL at 09:05

## 2024-05-20 RX ADMIN — PHENYLEPHRINE HYDROCHLORIDE 100 MCG: 10 INJECTION INTRAVENOUS at 10:05

## 2024-05-20 RX ADMIN — PHENYLEPHRINE HYDROCHLORIDE 100 MCG: 10 INJECTION INTRAVENOUS at 09:05

## 2024-05-20 RX ADMIN — ROCURONIUM BROMIDE 50 MG: 10 SOLUTION INTRAVENOUS at 07:05

## 2024-05-20 RX ADMIN — FENTANYL CITRATE 100 MCG: 50 INJECTION, SOLUTION INTRAMUSCULAR; INTRAVENOUS at 08:05

## 2024-05-20 RX ADMIN — FENTANYL CITRATE 50 MCG: 50 INJECTION, SOLUTION INTRAMUSCULAR; INTRAVENOUS at 07:05

## 2024-05-20 RX ADMIN — ALVIMOPAN 12 MG: 12 CAPSULE ORAL at 05:05

## 2024-05-20 RX ADMIN — DEXAMETHASONE SODIUM PHOSPHATE 8 MG: 4 INJECTION, SOLUTION INTRA-ARTICULAR; INTRALESIONAL; INTRAMUSCULAR; INTRAVENOUS; SOFT TISSUE at 07:05

## 2024-05-20 RX ADMIN — ACETAMINOPHEN 1000 MG: 10 INJECTION, SOLUTION INTRAVENOUS at 02:05

## 2024-05-20 RX ADMIN — KETOROLAC TROMETHAMINE 30 MG: 30 INJECTION, SOLUTION INTRAMUSCULAR; INTRAVENOUS at 09:05

## 2024-05-20 RX ADMIN — EPHEDRINE SULFATE 10 MG: 50 INJECTION INTRAVENOUS at 08:05

## 2024-05-20 RX ADMIN — ONDANSETRON 8 MG: 2 INJECTION INTRAMUSCULAR; INTRAVENOUS at 06:05

## 2024-05-20 RX ADMIN — HYDROMORPHONE HYDROCHLORIDE 0.5 MG: 2 INJECTION, SOLUTION INTRAMUSCULAR; INTRAVENOUS; SUBCUTANEOUS at 11:05

## 2024-05-20 RX ADMIN — HYDROMORPHONE HYDROCHLORIDE 1 MG: 2 INJECTION INTRAMUSCULAR; INTRAVENOUS; SUBCUTANEOUS at 12:05

## 2024-05-20 RX ADMIN — ROCURONIUM BROMIDE 50 MG: 10 SOLUTION INTRAVENOUS at 08:05

## 2024-05-20 RX ADMIN — INDOCYANINE GREEN AND WATER 8 MG: KIT at 09:05

## 2024-05-20 RX ADMIN — FENTANYL CITRATE 50 MCG: 50 INJECTION, SOLUTION INTRAMUSCULAR; INTRAVENOUS at 10:05

## 2024-05-20 RX ADMIN — SODIUM CHLORIDE, POTASSIUM CHLORIDE, SODIUM LACTATE AND CALCIUM CHLORIDE: 600; 310; 30; 20 INJECTION, SOLUTION INTRAVENOUS at 12:05

## 2024-05-20 RX ADMIN — SODIUM CHLORIDE, SODIUM GLUCONATE, SODIUM ACETATE, POTASSIUM CHLORIDE AND MAGNESIUM CHLORIDE: 526; 502; 368; 37; 30 INJECTION, SOLUTION INTRAVENOUS at 07:05

## 2024-05-20 RX ADMIN — PROMETHAZINE HYDROCHLORIDE 25 MG: 25 TABLET ORAL at 07:05

## 2024-05-20 RX ADMIN — ONDANSETRON 4 MG: 2 INJECTION INTRAMUSCULAR; INTRAVENOUS at 09:05

## 2024-05-20 RX ADMIN — METRONIDAZOLE 500 MG: 5 INJECTION, SOLUTION INTRAVENOUS at 07:05

## 2024-05-20 RX ADMIN — CEFTRIAXONE SODIUM 1 G: 1 INJECTION, POWDER, FOR SOLUTION INTRAMUSCULAR; INTRAVENOUS at 07:05

## 2024-05-20 RX ADMIN — ACETAMINOPHEN 1000 MG: 10 INJECTION, SOLUTION INTRAVENOUS at 08:05

## 2024-05-20 RX ADMIN — MIDAZOLAM HYDROCHLORIDE 2 MG: 1 INJECTION, SOLUTION INTRAMUSCULAR; INTRAVENOUS at 07:05

## 2024-05-20 RX ADMIN — PROPOFOL 130 MG: 10 INJECTION, EMULSION INTRAVENOUS at 07:05

## 2024-05-20 RX ADMIN — HYDROMORPHONE HYDROCHLORIDE 1 MG: 2 INJECTION INTRAMUSCULAR; INTRAVENOUS; SUBCUTANEOUS at 09:05

## 2024-05-20 RX ADMIN — METHOCARBAMOL 1000 MG: 100 INJECTION INTRAMUSCULAR; INTRAVENOUS at 10:05

## 2024-05-20 RX ADMIN — PHENYLEPHRINE HYDROCHLORIDE 100 MCG: 10 INJECTION INTRAVENOUS at 08:05

## 2024-05-20 RX ADMIN — ENOXAPARIN SODIUM 30 MG: 30 INJECTION SUBCUTANEOUS at 06:05

## 2024-05-20 RX ADMIN — PHENYLEPHRINE HYDROCHLORIDE 100 MCG: 10 INJECTION INTRAVENOUS at 07:05

## 2024-05-20 RX ADMIN — SODIUM CHLORIDE, SODIUM GLUCONATE, SODIUM ACETATE, POTASSIUM CHLORIDE AND MAGNESIUM CHLORIDE: 526; 502; 368; 37; 30 INJECTION, SOLUTION INTRAVENOUS at 08:05

## 2024-05-20 RX ADMIN — SUGAMMADEX 200 MG: 100 INJECTION, SOLUTION INTRAVENOUS at 09:05

## 2024-05-20 RX ADMIN — LIDOCAINE HYDROCHLORIDE 4 ML: 20 INJECTION, SOLUTION INTRAVENOUS at 07:05

## 2024-05-20 RX ADMIN — HYDROMORPHONE HYDROCHLORIDE 1 MG: 2 INJECTION INTRAMUSCULAR; INTRAVENOUS; SUBCUTANEOUS at 04:05

## 2024-05-20 RX ADMIN — GLYCOPYRROLATE 0.2 MG: 0.2 INJECTION INTRAMUSCULAR; INTRAVENOUS at 08:05

## 2024-05-20 RX ADMIN — OXYCODONE HYDROCHLORIDE AND ACETAMINOPHEN 1 TABLET: 5; 325 TABLET ORAL at 07:05

## 2024-05-20 NOTE — OP NOTE
Date of Surgery:  05/20/2024    Surgeon:  Lee Rosenthal MD    Assistant:  Elayne MYERS                                        Pre-op Diagnosis:  Rectal adenocarcinoma, status post neoadjuvant therapy    Post-op Diagnosis:  Rectal adenocarcinoma, status post neoadjuvant therapy    Procedure:    Laparoscopic/robotic low anterior resection with en bloc total mesorectal excision and low-pelvic anastamosis   Complete mobilization of the splenic flexure   rigid proctoscopy   omental pedicled flap to the pelvis   Creation of diverting loop ileostomy    Anesthesia:  GETA    EBL:  Less than 50 cc    Specimens:  Low anterior resection with en bloc total mesorectal excision    Findings:  Low anterior resection was performed down to the pelvic floor with en bloc total mesorectal excision, the specimen was examined with pathology to confirm intact mesorectum and adequate distal gross margin.  29 mm EEA stapled anastomosis was performed without difficulty, negative airleak test on completion proctoscopy.  Diverting loop ileostomy was created due to the very low anastomosis and previous neoadjuvant therapy    Procedure in detail: After informed consent was obtained the patient was brought to the operating room and placed in low lithotomy position.  General endotracheal anesthesia was administered without difficulty.  The patient's abdomen was prepped and draped in sterile fashion.  Under ultrasound guidance bilateral transversus abdominis plain nerve blocks were performed using liposomal bupivacaine.  Incision was made just to the right of the umbilicus and an optical trocar was used to enter the peritoneal cavity under direct vision.  Pneumoperitoneum was achieved without difficulty.  Additional robotic ports were placed from the right lower quadrant to the left upper quadrant under direct vision.  An assistant port was placed.  The patient was placed in steep Trendelenburg and tilted towards the right.  The camera was  inserted and the abdomen was explored laparoscopically.  There was no evidence of metastatic disease. The small bowel was retracted cephalad.  The sigmoid and left colon were mobilized incising the white line of Toldt laterally developing the plane between the mesocolon and the retroperitoneum using gentle blunt dissection.  The left ureter was clearly identified and preserved.  The splenic flexure was carefully mobilized completely and taken down with the vessel sealer, with care to avoid injuring the spleen. The left side of the omentum was mobilized off the distal transverse colon in the avascular plane and the left sided omental vessels divided using the vessel sealer. The right sided omental vessels were meticulously preserved to provide perfusion to an omental pedicled flap. The sigmoid was retracted anteriorly tenting the mesentery and exposing the EVELYN pedicle.  The right side of the mesocolon was incised at the sacral promontory in the avascular plane was developed between the presacral plexus and the superior hemorrhoidal vessels.  This dissection was carried proximally.  The EVELYN pedicle was dissected circumferentially and divided using a vascular load stapler.  The IMV was divided using the vessel sealer.  Dissection was then carried up the left paracolic gutter and the splenic flexure was completely mobilized providing adequate length for a tension-free anastomosis.  The posterior plane was then developed between the mesorectum and the presacral plexus with care to avoid neural structures using sharp dissection down to the pelvic floor.  Lateral stalks were taken down using the vessel sealer.  The anterior peritoneal reflection was incised and the anterior rectum was dissected distally.  Rigid proctoscopy was performed and the lesion was localized and adequate distal margin was ensured.  The rectum was then divided distally using a blue load stapler.  The distal descending colon at its junction with the  sigmoid was dissected circumferentially and divided using a blue load stapler.  The mesentery divided perpendicularly using the vessel sealer preserving the vascular arcade.  Indocyanine green was injected intravenously and using firefly visualization excellent perfusion was noted at the proposed area of anastomosis.  The stapled end of the descending colon easily reached the deep pelvis without tension. The anastomosis was then created by exteriorizing the stapled end of the colon through a low Pfannenstiel incision, pursestring suture was placed and 29 mm EEA anvil inserted.  The rectal stump was irrigated with dilute Betadine.  The stapler was inserted and the spike brought out through the midportion of the staple line it was mated with the anvil and a 29 mm EEA stapled anastomosis was performed without difficulty.  The pelvis was filled with saline and with gentle insufflation no evidence of air leak was seen.  The anastomosis was carefully examined there is no evidence of tension or ischemia.  The pelvis was irrigated and inspected for hemostasis which appeared to be excellent.  The omental pedicled flap was then advanced into the pelvis to fill the defect created by the mesorectal excision and act as a spacer to exclude small bowel from the pelvis. The terminal ileum was then identified and traced back from the ileocecal valve the 1st mobile loop of terminal ileum was identified and grasped with a grasper.  A circular incision was then made at the area of marking by the enterostomal nurse in the right abdomen and carried down through the subcutaneous tissues to the anterior fascia which was then divided transversely.  The muscle was dissected bluntly and the posterior fascia was incised.  The loop of ileum was then brought up carefully and an ostomy larry was placed fashioned from a red rubber catheter which was sutured to the skin with 0 silk suture.  After subsequent skin closure the ostomy was matured and an  ostomy appliance was placed.Ports were removed, pneumoperitoneum was relieved, port sites were closed with absorbable suture and sterile dressings were applied.  The patient tolerated the procedure well.    Significant surgical tasks conducted by the assistant:  The skilled assistance of the nurse practitioner LOUIS Dotson was necessary for the successful completion of this case.  She was essential for the proper positioning of the patient, manipulation of instruments, proper exposure, manipulation of tissue, and wound closure        Lee Rosenthal MD  Surgical Oncology  Complex General, Gastrointestinal and Hepatobiliary Surgery

## 2024-05-20 NOTE — ANESTHESIA PROCEDURE NOTES
Intubation    Date/Time: 5/20/2024 7:22 AM    Performed by: Rick Gray CRNA  Authorized by: Vanessa Elliott MD    Intubation:     Induction:  Intravenous    Mask Ventilation:  Easy mask    Attempts:  1    Attempted By:  CRNA    Method of Intubation:  Direct    Blade:  Sharpe 2    Laryngeal View Grade: Grade I - full view of cords      Difficult Airway Encountered?: No      Complications:  None    Airway Device:  Oral endotracheal tube    Airway Device Size:  7.5    Style/Cuff Inflation:  Cuffed    Tube secured:  21    Secured at:  The lips    Placement Verified By:  Capnometry    Complicating Factors:  None    Findings Post-Intubation:  BS equal bilateral       Render In Strict Bullet Format?: No Detail Level: Zone Initiate Treatment: ketoconazole 2 % shampoo \\n120.0 ml  Days Supply: 30\\nWash scalp and let shampoo sit for 5-10 minutes then rinse, 3 to 5 times weekly Continue Regimen: triamcinolone acetonide 0.1 % topical cream BID\\n454.0 g  Days Supply: 30\\n Apply twice daily to eczema for two weeks then only as needed with flares

## 2024-05-20 NOTE — ANESTHESIA POSTPROCEDURE EVALUATION
Anesthesia Post Evaluation    Patient: Nikkie Sharpe    Procedure(s) Performed: Procedure(s) (LRB):  ROBOTIC RESECTION, COLON, LOW ANTERIOR (N/A)    Final Anesthesia Type: general      Patient location during evaluation: PACU  Patient participation: Yes- Able to Participate  Level of consciousness: awake and alert  Post-procedure vital signs: reviewed and stable  Pain management: adequate  Airway patency: patent      Anesthetic complications: no      Cardiovascular status: hemodynamically stable  Respiratory status: unassisted  Hydration status: euvolemic  Follow-up not needed.              Vitals Value Taken Time   BP 93/64 05/20/24 1331   Temp 36.7 °C (98 °F) 05/20/24 1110   Pulse 91 05/20/24 1401   Resp 8 05/20/24 1401   SpO2 98 % 05/20/24 1401   Vitals shown include unfiled device data.      No case tracking events are documented in the log.      Pain/Roni Score: Pain Rating Prior to Med Admin: 6 (5/20/2024 12:50 PM)  Pain Rating Post Med Admin: 3 (5/20/2024 11:00 AM)  Roni Score: 8 (5/20/2024 12:30 PM)

## 2024-05-20 NOTE — TRANSFER OF CARE
"Anesthesia Transfer of Care Note    Patient: Nikkie Sharpe    Procedure(s) Performed: Procedure(s) (LRB):  ROBOTIC RESECTION, COLON, LOW ANTERIOR (N/A)    Patient location: PACU    Anesthesia Type: general    Transport from OR: Transported from OR on room air with adequate spontaneous ventilation    Post pain: adequate analgesia    Post assessment: no apparent anesthetic complications    Post vital signs: stable    Level of consciousness: awake and sedated    Nausea/Vomiting: no nausea/vomiting    Complications: none    Transfer of care protocol was followed      Last vitals: Visit Vitals  /83   Pulse 87   Temp 35.9 °C (96.7 °F) (Temporal)   Resp 16   Ht 5' 2" (1.575 m)   Wt 61 kg (134 lb 7.7 oz)   LMP  (LMP Unknown)   SpO2 (!) 94%   Breastfeeding No   BMI 24.60 kg/m²     "

## 2024-05-20 NOTE — NURSING
"Subjective:      Patient ID: Nikkie Sharpe is a 53 y.o. female.    Chief Complaint: No chief complaint on file.    HPI  Review of Systems   Objective:     Physical Exam   Assessment:     1. Adenocarcinoma of rectum, stage 3    2. Rectal cancer           Plan:          54 y/o female in with rectal cancer.  Awake alert oriented and mobil with her mother with her.    Consulted for marking for ileostomy procedure.  Introduced self and explained reason for visit.  She and her mother were agreable.  Assessed her abd in lying sitting and standing positions to eval for appropriated  site for ileostomy in her right mid to upper quad about 4-5" out with consideration for her clothing and her noted pannus.  Selected site marked with marker then covered with tegaderm.  Explained that the marking is a guide for the doctor but final selection determined by md in surgery.  .  We will follow up post surgery.                "

## 2024-05-20 NOTE — ANESTHESIA PREPROCEDURE EVALUATION
05/19/2024  Nikkie Sharpe is a 53 y.o., female.  Patient presented to Dr. Ortiz in June 2023 with complaints of rectal bleeding and constipation. Colonoscopy revealed a 5 cm mass in the rectum, 4 cm from the anal verge. Biopsy of the mass revealed invasive moderately differentiated adenocarcinoma. CT abd/pel showed a large rectal mass with some lymphadenopathy seen adjacent to the mass. MRI pelvis showed a large mid to distal rectal mass with perirectal adenopathy the distal end was read as approximately 15 mm from the anal verge, on my view there appears to be adequate distal margin below the mass before the sphincter complex; at least 10 positive perirectal lymph nodes, no mention of suspicious iliac nodes staged T3bN2b. She began chemotherapy in July 2023-November 2023 and then began chemoradiation from January-February 2024. MRI pelvis done in March showed mid-rectal mass smaller compared to December 2023; new indeterminate 8 mm enhancing lesion in the right sacral ala. CT chest/abd showed no metastatic disease. NM bone scan showed no appreciable scintigraphic correlate for the lesion at the sacrum seen on MRI.     No cardiac hx  HGB 12.6    Pre-op Assessment    I have reviewed the Patient Summary Reports.     I have reviewed the Nursing Notes. I have reviewed the NPO Status.   I have reviewed the Medications.     Review of Systems  Hepatic/GI:     GERD      Gerd          Neurological:    Neuromuscular Disease,                                 Neuromuscular Disease   Psych:  Psychiatric History                  Physical Exam  General: Well nourished, Cooperative, Alert and Oriented    Airway:  Mallampati: II   Mouth Opening: Normal  TM Distance: Normal  Tongue: Normal  Neck ROM: Normal ROM    Dental:  Intact        Anesthesia Plan  Type of Anesthesia, risks & benefits discussed:    Anesthesia Type: Gen  ETT  Intra-op Monitoring Plan: Standard ASA Monitors  Post Op Pain Control Plan: multimodal analgesia  Induction:  IV  Airway Plan: Direct, Post-Induction  Informed Consent: Informed consent signed with the Patient and all parties understand the risks and agree with anesthesia plan.  All questions answered. Patient consented to blood products? Yes  ASA Score: 3  Day of Surgery Review of History & Physical: H&P Update referred to the surgeon/provider.  Anesthesia Plan Notes: PIV x 2    Ready For Surgery From Anesthesia Perspective.     .

## 2024-05-21 LAB
ALBUMIN SERPL-MCNC: 3.5 G/DL (ref 3.5–5)
ALBUMIN/GLOB SERPL: 1.8 RATIO (ref 1.1–2)
ALP SERPL-CCNC: 70 UNIT/L (ref 40–150)
ALT SERPL-CCNC: 18 UNIT/L (ref 0–55)
ANION GAP SERPL CALC-SCNC: 6 MEQ/L
APTT PPP: 28.2 SECONDS (ref 23.2–33.7)
AST SERPL-CCNC: 25 UNIT/L (ref 5–34)
BASOPHILS # BLD AUTO: 0.01 X10(3)/MCL
BASOPHILS NFR BLD AUTO: 0.2 %
BILIRUB SERPL-MCNC: 1.3 MG/DL
BUN SERPL-MCNC: 9.5 MG/DL (ref 9.8–20.1)
CALCIUM SERPL-MCNC: 8.7 MG/DL (ref 8.4–10.2)
CHLORIDE SERPL-SCNC: 109 MMOL/L (ref 98–107)
CO2 SERPL-SCNC: 23 MMOL/L (ref 22–29)
CREAT SERPL-MCNC: 0.69 MG/DL (ref 0.55–1.02)
CREAT/UREA NIT SERPL: 14
EOSINOPHIL # BLD AUTO: 0 X10(3)/MCL (ref 0–0.9)
EOSINOPHIL NFR BLD AUTO: 0 %
ERYTHROCYTE [DISTWIDTH] IN BLOOD BY AUTOMATED COUNT: 12.4 % (ref 11.5–17)
GFR SERPLBLD CREATININE-BSD FMLA CKD-EPI: >60 ML/MIN/1.73/M2
GLOBULIN SER-MCNC: 2 GM/DL (ref 2.4–3.5)
GLUCOSE SERPL-MCNC: 108 MG/DL (ref 74–100)
HCT VFR BLD AUTO: 32.2 % (ref 37–47)
HGB BLD-MCNC: 10.8 G/DL (ref 12–16)
IMM GRANULOCYTES # BLD AUTO: 0.01 X10(3)/MCL (ref 0–0.04)
IMM GRANULOCYTES NFR BLD AUTO: 0.2 %
INR PPP: 1.1
LYMPHOCYTES # BLD AUTO: 0.55 X10(3)/MCL (ref 0.6–4.6)
LYMPHOCYTES NFR BLD AUTO: 8.5 %
MAGNESIUM SERPL-MCNC: 1.8 MG/DL (ref 1.6–2.6)
MCH RBC QN AUTO: 30.3 PG (ref 27–31)
MCHC RBC AUTO-ENTMCNC: 33.5 G/DL (ref 33–36)
MCV RBC AUTO: 90.2 FL (ref 80–94)
MONOCYTES # BLD AUTO: 0.56 X10(3)/MCL (ref 0.1–1.3)
MONOCYTES NFR BLD AUTO: 8.7 %
NEUTROPHILS # BLD AUTO: 5.33 X10(3)/MCL (ref 2.1–9.2)
NEUTROPHILS NFR BLD AUTO: 82.4 %
NRBC BLD AUTO-RTO: 0 %
PLATELET # BLD AUTO: 179 X10(3)/MCL (ref 130–400)
PMV BLD AUTO: 9.3 FL (ref 7.4–10.4)
POTASSIUM SERPL-SCNC: 4 MMOL/L (ref 3.5–5.1)
PROT SERPL-MCNC: 5.5 GM/DL (ref 6.4–8.3)
PROTHROMBIN TIME: 14.2 SECONDS (ref 12.5–14.5)
RBC # BLD AUTO: 3.57 X10(6)/MCL (ref 4.2–5.4)
SODIUM SERPL-SCNC: 138 MMOL/L (ref 136–145)
WBC # SPEC AUTO: 6.46 X10(3)/MCL (ref 4.5–11.5)

## 2024-05-21 PROCEDURE — 85730 THROMBOPLASTIN TIME PARTIAL: CPT | Performed by: NURSE PRACTITIONER

## 2024-05-21 PROCEDURE — 25000003 PHARM REV CODE 250: Performed by: NURSE PRACTITIONER

## 2024-05-21 PROCEDURE — 36415 COLL VENOUS BLD VENIPUNCTURE: CPT | Performed by: NURSE PRACTITIONER

## 2024-05-21 PROCEDURE — 11000001 HC ACUTE MED/SURG PRIVATE ROOM

## 2024-05-21 PROCEDURE — 83735 ASSAY OF MAGNESIUM: CPT | Performed by: NURSE PRACTITIONER

## 2024-05-21 PROCEDURE — 94799 UNLISTED PULMONARY SVC/PX: CPT

## 2024-05-21 PROCEDURE — 63600175 PHARM REV CODE 636 W HCPCS: Mod: JZ,JG | Performed by: NURSE PRACTITIONER

## 2024-05-21 PROCEDURE — 85610 PROTHROMBIN TIME: CPT | Performed by: NURSE PRACTITIONER

## 2024-05-21 PROCEDURE — 85025 COMPLETE CBC W/AUTO DIFF WBC: CPT | Performed by: NURSE PRACTITIONER

## 2024-05-21 PROCEDURE — 80053 COMPREHEN METABOLIC PANEL: CPT | Performed by: NURSE PRACTITIONER

## 2024-05-21 RX ORDER — HYDROCODONE BITARTRATE AND ACETAMINOPHEN 5; 325 MG/1; MG/1
1 TABLET ORAL EVERY 4 HOURS PRN
Status: DISCONTINUED | OUTPATIENT
Start: 2024-05-21 | End: 2024-05-23 | Stop reason: HOSPADM

## 2024-05-21 RX ADMIN — HYDROCODONE BITARTRATE AND ACETAMINOPHEN 1 TABLET: 5; 325 TABLET ORAL at 04:05

## 2024-05-21 RX ADMIN — ENOXAPARIN SODIUM 40 MG: 40 INJECTION SUBCUTANEOUS at 03:05

## 2024-05-21 RX ADMIN — CEFTRIAXONE SODIUM 1 G: 1 INJECTION, POWDER, FOR SOLUTION INTRAMUSCULAR; INTRAVENOUS at 06:05

## 2024-05-21 RX ADMIN — ONDANSETRON 8 MG: 2 INJECTION INTRAMUSCULAR; INTRAVENOUS at 12:05

## 2024-05-21 RX ADMIN — HYDROMORPHONE HYDROCHLORIDE 1 MG: 2 INJECTION INTRAMUSCULAR; INTRAVENOUS; SUBCUTANEOUS at 12:05

## 2024-05-21 RX ADMIN — ONDANSETRON 8 MG: 2 INJECTION INTRAMUSCULAR; INTRAVENOUS at 09:05

## 2024-05-21 RX ADMIN — HYDROCODONE BITARTRATE AND ACETAMINOPHEN 1 TABLET: 5; 325 TABLET ORAL at 09:05

## 2024-05-21 RX ADMIN — ALVIMOPAN 12 MG: 12 CAPSULE ORAL at 08:05

## 2024-05-21 RX ADMIN — ALVIMOPAN 12 MG: 12 CAPSULE ORAL at 09:05

## 2024-05-21 RX ADMIN — METRONIDAZOLE 500 MG: 500 INJECTION, SOLUTION INTRAVENOUS at 06:05

## 2024-05-21 RX ADMIN — HYDROMORPHONE HYDROCHLORIDE 1 MG: 2 INJECTION INTRAMUSCULAR; INTRAVENOUS; SUBCUTANEOUS at 03:05

## 2024-05-21 RX ADMIN — OXYCODONE HYDROCHLORIDE AND ACETAMINOPHEN 1 TABLET: 5; 325 TABLET ORAL at 10:05

## 2024-05-21 NOTE — PLAN OF CARE
05/21/24 1420   Discharge Assessment   Assessment Type Discharge Planning Assessment   Confirmed/corrected address, phone number and insurance Yes   Confirmed Demographics Correct on Facesheet   Source of Information patient   Communicated NANCY with patient/caregiver Date not available/Unable to determine   Reason For Admission adenocarcinoma of rectum, stage 3   People in Home alone  (Pt lives alone in a single story home with 3 steps to enter and no rails.)   Do you expect to return to your current living situation? Yes   Do you have help at home or someone to help you manage your care at home? Yes   Who are your caregiver(s) and their phone number(s)? pt reports she will have famly that will assist her   Prior to hospitilization cognitive status: Unable to Assess   Current cognitive status: Alert/Oriented   Walking or Climbing Stairs Difficulty no   Dressing/Bathing Difficulty no   Home Layout Able to live on 1st floor   Equipment Currently Used at Home none   Readmission within 30 days? No   Patient currently being followed by outpatient case management? No   Do you currently have service(s) that help you manage your care at home? No   Do you take prescription medications? Yes   Do you have prescription coverage? Yes   Coverage [employer] medicaid   Who is going to help you get home at discharge? family   How do you get to doctors appointments? car, drives self   Are you on dialysis? No   Discharge Plan A Home Health   Discharge Plan B Home Health   DME Needed Upon Discharge  none   Discharge Plan discussed with: Patient;Sibling;Parent(s)   Name(s) and Number(s) brotherFaivan ([phone]) and mother, Aron ([phone])   Transition of Care Barriers None   Housing Stability   In the last 12 months, was there a time when you were not able to pay the mortgage or rent on time? N   Transportation Needs   Has the lack of transportation kept you from medical appointments, meetings, work or from getting things needed for  daily living? No   Food Insecurity   Within the past 12 months, you worried that your food would run out before you got the money to buy more. Never true     Pt's PCP is Catherine Jacobo NP. Pt's  is her brother, Favian ([phone]) and mother, Aron ([phone]). Pt has never had HH services. Pt uses [facility] off of [address]. Pt does drive and was laid off of work once she was dx with cancer.   Rec consult for HH services. FOC obtained. April [facility] will send referral to [facility] thru care port. CM to follow

## 2024-05-21 NOTE — PLAN OF CARE
[facility] sent new referral and clinical to [facility] via [facility]. Confirmed with [facility] that patient has someone in the home to help her in the home.

## 2024-05-21 NOTE — NURSING
Nurses Note -- 4 Eyes      5/20/24 2120      Skin assessed during: Admit      [x] No Altered Skin Integrity Present    [x]Prevention Measures Documented      [] Yes- Altered Skin Integrity Present or Discovered   [] LDA Added if Not in Epic (Describe Wound)   [] New Altered Skin Integrity was Present on Admit and Documented in LDA   [] Wound Image Taken    Wound Care Consulted? No    Attending Nurse:  Dunia Wade RN/Staff Member:   January LOGAN

## 2024-05-21 NOTE — PROGRESS NOTES
SURG ONC POD 1    PT DOING WELL  TOLERATED CLEARS   PAIN CONTROLLED  VOIDING  AMBULATED SINCE SURGERY    ABD SOFT  INCISIONS HEALING WELL  OSTOMY PINK  LIQUID OUTPUT    VSS; NO FEVER  WBC 6000   10/32  LYTES GOOD    PLAN  CASE MANAGEMENT FOR HOME HEALTH  OSTOMY  NURSE  HL IV  FULLS

## 2024-05-22 LAB
ALBUMIN SERPL-MCNC: 3.4 G/DL (ref 3.5–5)
ALBUMIN/GLOB SERPL: 1.6 RATIO (ref 1.1–2)
ALP SERPL-CCNC: 65 UNIT/L (ref 40–150)
ALT SERPL-CCNC: 22 UNIT/L (ref 0–55)
ANION GAP SERPL CALC-SCNC: 6 MEQ/L
APTT PPP: 29.5 SECONDS (ref 23.2–33.7)
AST SERPL-CCNC: 28 UNIT/L (ref 5–34)
BASOPHILS # BLD AUTO: 0.03 X10(3)/MCL
BASOPHILS NFR BLD AUTO: 0.5 %
BILIRUB SERPL-MCNC: 1.3 MG/DL
BUN SERPL-MCNC: 9.5 MG/DL (ref 9.8–20.1)
CALCIUM SERPL-MCNC: 8.6 MG/DL (ref 8.4–10.2)
CHLORIDE SERPL-SCNC: 111 MMOL/L (ref 98–107)
CO2 SERPL-SCNC: 25 MMOL/L (ref 22–29)
CREAT SERPL-MCNC: 0.78 MG/DL (ref 0.55–1.02)
CREAT/UREA NIT SERPL: 12
EOSINOPHIL # BLD AUTO: 0.02 X10(3)/MCL (ref 0–0.9)
EOSINOPHIL NFR BLD AUTO: 0.4 %
ERYTHROCYTE [DISTWIDTH] IN BLOOD BY AUTOMATED COUNT: 12.4 % (ref 11.5–17)
GFR SERPLBLD CREATININE-BSD FMLA CKD-EPI: >60 ML/MIN/1.73/M2
GLOBULIN SER-MCNC: 2.1 GM/DL (ref 2.4–3.5)
GLUCOSE SERPL-MCNC: 89 MG/DL (ref 74–100)
HCT VFR BLD AUTO: 32.5 % (ref 37–47)
HGB BLD-MCNC: 11.1 G/DL (ref 12–16)
IMM GRANULOCYTES # BLD AUTO: 0.03 X10(3)/MCL (ref 0–0.04)
IMM GRANULOCYTES NFR BLD AUTO: 0.5 %
INR PPP: 1.1
LYMPHOCYTES # BLD AUTO: 0.79 X10(3)/MCL (ref 0.6–4.6)
LYMPHOCYTES NFR BLD AUTO: 14.4 %
MAGNESIUM SERPL-MCNC: 1.9 MG/DL (ref 1.6–2.6)
MCH RBC QN AUTO: 30.4 PG (ref 27–31)
MCHC RBC AUTO-ENTMCNC: 34.2 G/DL (ref 33–36)
MCV RBC AUTO: 89 FL (ref 80–94)
MONOCYTES # BLD AUTO: 0.42 X10(3)/MCL (ref 0.1–1.3)
MONOCYTES NFR BLD AUTO: 7.6 %
NEUTROPHILS # BLD AUTO: 4.21 X10(3)/MCL (ref 2.1–9.2)
NEUTROPHILS NFR BLD AUTO: 76.6 %
NRBC BLD AUTO-RTO: 0 %
PLATELET # BLD AUTO: 149 X10(3)/MCL (ref 130–400)
PMV BLD AUTO: 9 FL (ref 7.4–10.4)
POTASSIUM SERPL-SCNC: 3.7 MMOL/L (ref 3.5–5.1)
PROT SERPL-MCNC: 5.5 GM/DL (ref 6.4–8.3)
PROTHROMBIN TIME: 14.2 SECONDS (ref 12.5–14.5)
RBC # BLD AUTO: 3.65 X10(6)/MCL (ref 4.2–5.4)
SODIUM SERPL-SCNC: 142 MMOL/L (ref 136–145)
WBC # SPEC AUTO: 5.5 X10(3)/MCL (ref 4.5–11.5)

## 2024-05-22 PROCEDURE — 85610 PROTHROMBIN TIME: CPT | Performed by: NURSE PRACTITIONER

## 2024-05-22 PROCEDURE — 85025 COMPLETE CBC W/AUTO DIFF WBC: CPT | Performed by: NURSE PRACTITIONER

## 2024-05-22 PROCEDURE — 63600175 PHARM REV CODE 636 W HCPCS: Performed by: NURSE PRACTITIONER

## 2024-05-22 PROCEDURE — 25000003 PHARM REV CODE 250: Performed by: NURSE PRACTITIONER

## 2024-05-22 PROCEDURE — 94799 UNLISTED PULMONARY SVC/PX: CPT

## 2024-05-22 PROCEDURE — 80053 COMPREHEN METABOLIC PANEL: CPT | Performed by: NURSE PRACTITIONER

## 2024-05-22 PROCEDURE — 36415 COLL VENOUS BLD VENIPUNCTURE: CPT | Performed by: NURSE PRACTITIONER

## 2024-05-22 PROCEDURE — 94760 N-INVAS EAR/PLS OXIMETRY 1: CPT

## 2024-05-22 PROCEDURE — 83735 ASSAY OF MAGNESIUM: CPT | Performed by: NURSE PRACTITIONER

## 2024-05-22 PROCEDURE — 99900031 HC PATIENT EDUCATION (STAT)

## 2024-05-22 PROCEDURE — 99900035 HC TECH TIME PER 15 MIN (STAT)

## 2024-05-22 PROCEDURE — 11000001 HC ACUTE MED/SURG PRIVATE ROOM

## 2024-05-22 PROCEDURE — 85730 THROMBOPLASTIN TIME PARTIAL: CPT | Performed by: NURSE PRACTITIONER

## 2024-05-22 RX ADMIN — HYDROCODONE BITARTRATE AND ACETAMINOPHEN 1 TABLET: 5; 325 TABLET ORAL at 11:05

## 2024-05-22 RX ADMIN — ALVIMOPAN 12 MG: 12 CAPSULE ORAL at 08:05

## 2024-05-22 RX ADMIN — HYDROCODONE BITARTRATE AND ACETAMINOPHEN 1 TABLET: 5; 325 TABLET ORAL at 06:05

## 2024-05-22 RX ADMIN — ALVIMOPAN 12 MG: 12 CAPSULE ORAL at 09:05

## 2024-05-22 RX ADMIN — CEFTRIAXONE SODIUM 1 G: 1 INJECTION, POWDER, FOR SOLUTION INTRAMUSCULAR; INTRAVENOUS at 06:05

## 2024-05-22 NOTE — PROGRESS NOTES
Inpatient Nutrition Evaluation    Admit Date: 5/20/2024   Total duration of encounter: 2 days   Patient Age: 53 y.o.    Nutrition Recommendation/Prescription     -Continue Low Residue Diet as tolerated.   -Monitor wt, labs, and intake.     Nutrition Assessment     Chart Review    Reason Seen: continuous nutrition monitoring    Malnutrition Screening Tool Results   Have you recently lost weight without trying?: No  Have you been eating poorly because of a decreased appetite?: No   MST Score: 0   Diagnosis:  Rectal adenocarcinoma s/p LAR    Relevant Medical History: GERD, Anxiety, Rectal Cancer    Scheduled Medications:  alvimopan, 12 mg, BID  cefTRIAXone (Rocephin) IV (PEDS and ADULTS), 1 g, Q24H  enoxparin, 40 mg, Q24H (prophylaxis, 1700)    Continuous Infusions:   PRN Medications:   Current Facility-Administered Medications:     diphenhydrAMINE, 12.5 mg, Intravenous, Q6H PRN    HYDROcodone-acetaminophen, 1 tablet, Oral, Q4H PRN    HYDROmorphone, 1 mg, Intravenous, Q3H PRN    HYDROmorphone, 1 mg, Intravenous, Q4H PRN    naloxone, 0.02 mg, Intravenous, PRN    ondansetron, 8 mg, Intravenous, Q8H PRN    promethazine, 25 mg, Oral, Q6H PRN    Recent Labs   Lab 05/21/24  0423 05/22/24  0505    142   K 4.0 3.7   CALCIUM 8.7 8.6   MG 1.80 1.90   CHLORIDE 109* 111*   CO2 23 25   BUN 9.5* 9.5*   CREATININE 0.69 0.78   EGFRNORACEVR >60 >60   GLUCOSE 108* 89   BILITOT 1.3 1.3   ALKPHOS 70 65   ALT 18 22   AST 25 28   ALBUMIN 3.5 3.4*   WBC 6.46 5.50   HGB 10.8* 11.1*   HCT 32.2* 32.5*     Nutrition Orders:  Diet Low Fiber/Residue      Appetite/Oral Intake: fair/25-50% of meals  Factors Affecting Nutritional Intake:  liquid diet at the time of rounds  Food/Mandaeism/Cultural Preferences: none reported  Food Allergies: none reported  Last Bowel Movement: 05/20/24  Wound(s):  laparoscopic punctures    Comments    5/22/24: Pt's diet just advanced to solids- tolerance pending. Pt mostly taking in water and juice because she  "does not care for the other items being sent; reports usual good appetite/intake at home; states that her wt is usually around 120 lbs.     Anthropometrics    Height: 5' 2" (157.5 cm), Height Method: Stated  Last Weight: 61 kg (134 lb 7.7 oz) (05/21/24 0500), Weight Method: Bed Scale  BMI (Calculated): 24.6  BMI Classification: normal (BMI 18.5-24.9)     Ideal Body Weight (IBW), Female: 110 lb     % Ideal Body Weight, Female (lb): 122.25 %                             Usual Weight Provided By: patient    Wt Readings from Last 5 Encounters:   05/21/24 61 kg (134 lb 7.7 oz)   05/08/24 60.1 kg (132 lb 9.6 oz)   05/07/24 60.1 kg (132 lb 9.6 oz)   04/03/24 57.9 kg (127 lb 9.6 oz)   04/02/24 54.9 kg (121 lb)     Weight Change(s) Since Admission:   Wt Readings from Last 1 Encounters:   05/21/24 0500 61 kg (134 lb 7.7 oz)   05/20/24 0609 61 kg (134 lb 7.7 oz)   05/09/24 1305 59 kg (130 lb)   Admit Weight: 59 kg (130 lb) (05/09/24 1305), Weight Method: Stated    Patient Education     Not applicable.    Nutrition Goals & Monitoring     Dietitian will monitor: energy intake and weight    Nutrition Risk/Follow-Up: low (follow-up in 5-7 days)  Patients assigned 'low nutrition risk' status do not qualify for a full nutritional assessment but will be monitored and re-evaluated in a 5-7 day time period. Please consult if re-evaluation needed sooner.   "

## 2024-05-22 NOTE — PROGRESS NOTES
[facility] - 8th Floor Med Surg  Wound Care    Patient Name:  iNkkie Sharpe   MRN:  [id number]  Date: 2024  Diagnosis: <principal problem not specified>    History:     Past Medical History:   Diagnosis Date    Abdominal cramping     Anesthesia complication     difficulty breathing during     Anxiety disorder, unspecified     GERD (gastroesophageal reflux disease)     Insomnia     Rectal cancer        Social History     Socioeconomic History    Marital status: Single    Number of children: 2   Occupational History    Occupation: self employed   Tobacco Use    Smoking status: Never    Smokeless tobacco: Never   Substance and Sexual Activity    Alcohol use: Yes     Alcohol/week: 1.0 standard drink of alcohol     Comment: occassionally    Drug use: Yes     Types: Marijuana     Comment: medical marijuana-gummy yesterday 10 mg    Sexual activity: Yes     Partners: Male     Birth control/protection: Post-menopausal     Social Determinants of Health     Financial Resource Strain: High Risk (3/2/2024)    [facility], [facility]    Overall Financial Resource Strain (CARDIA)     Difficulty of Paying Living Expenses: Very hard   Food Insecurity: Unknown (2024)    Hunger Vital Sign     Worried About Running Out of Food in the Last Year: Never true   Transportation Needs: No Transportation Needs (2024)    TRANSPORTATION NEEDS     Transportation : No   Physical Activity: Patient Declined (3/2/2024)    Received from [facility], [facility]    Exercise Vital Sign     Days of Exercise per Week: Patient declined     Minutes of Exercise per Session: Patient declined   Stress: Stress Concern  Present (3/2/2024)    Received from [facility] and Its Subsidiaries and Affiliates, [facility] and Its Subsidiaries and Affiliates    [place] [employer] - Occupational Stress Questionnaire     Feeling of Stress : To some extent   Housing Stability: Unknown (5/21/2024)    Housing Stability Vital Sign     Unable to Pay for Housing in the Last Year: No       Precautions:     Allergies as of 04/08/2024 - Reviewed 04/08/2024   Allergen Reaction Noted    Codeine Itching 04/03/2023    Morphine Itching 04/03/2023       WOC Assessment Details/Treatment      05/22/24 1404   WOCN Assessment   Visit Date 05/22/24   Visit Time 1404   Consult Type Follow Up   WOCN Speciality Ostomy   WOCN List ileostomy   Wound surgical   Procedure ostomy pouch   Intervention chart review;assessed;applied   Teaching on-going        Ileostomy 05/20/24 1014 RLQ   Placement Date/Time: 05/20/24 1014   Present Prior to Hospital Arrival?: No  Inserted by: MD  Location: RLQ   Wound Image    Stoma Appearance rosebud appearance;round;red;moist   Appliance 2-piece;intact;no leakage   Accessories/Skin Care cleansed w/ soap and water   Treatment Site care;Other (Comment)  (Emptied pouch)   Stoma Function stool;flatus;thin liquid   Peristomal Assessment Clean;Intact   Tolerance no signs/symptoms of discomfort   Output (mL) 50 mL     WOCN follow up for new ileostomy. Discussed plan of care with nurse Soo prior to visiting the patient. Mother at bedside. Today patient viewed demo videos. Walkthrough of cleaning and pouch changing process done with patient. She states she doesn't know enough at this time to have any questions but mother had questions. Answered all questions to the satisfaction of the patient and family. Secure start set up for patient today. Possible discharge tomorrow per nurse and patient. Will follow up.  05/22/2024

## 2024-05-22 NOTE — PROGRESS NOTES
SURG ONC POD 2    PT DOING WELL  HAVING TROUBLE SLEEPING AT NIGHT  OTHERWISE NO ISSUES  PAIN CONTROLLED  AMBULATING  OSTOMY NURSE MET WITH PT    ABD SOFT  INCISIONS HEALING WELL  OSTOMY PINK, LIQUID OUTPUT    VSS; NO FEVER  WBC 5000   11/32  LYTES GOOD  UO MORE THAN ADEQUATE    PLAN  CASE MANAGEMENT WORKING ON   ADV TO LOW RESIDUE  OSTOMY NURSE TO SEE PT AGAIN TODAY  HOME TOMORROW IF ALL OK

## 2024-05-23 VITALS
RESPIRATION RATE: 20 BRPM | TEMPERATURE: 98 F | WEIGHT: 134.5 LBS | SYSTOLIC BLOOD PRESSURE: 103 MMHG | BODY MASS INDEX: 24.75 KG/M2 | HEIGHT: 62 IN | HEART RATE: 74 BPM | DIASTOLIC BLOOD PRESSURE: 68 MMHG | OXYGEN SATURATION: 97 %

## 2024-05-23 LAB
ALBUMIN SERPL-MCNC: 3.4 G/DL (ref 3.5–5)
ALBUMIN/GLOB SERPL: 1.5 RATIO (ref 1.1–2)
ALP SERPL-CCNC: 65 UNIT/L (ref 40–150)
ALT SERPL-CCNC: 25 UNIT/L (ref 0–55)
ANION GAP SERPL CALC-SCNC: 5 MEQ/L
APTT PPP: 27.4 SECONDS (ref 23.2–33.7)
AST SERPL-CCNC: 28 UNIT/L (ref 5–34)
BASOPHILS # BLD AUTO: 0.03 X10(3)/MCL
BASOPHILS NFR BLD AUTO: 0.7 %
BILIRUB SERPL-MCNC: 1.1 MG/DL
BUN SERPL-MCNC: 10.3 MG/DL (ref 9.8–20.1)
CALCIUM SERPL-MCNC: 8.5 MG/DL (ref 8.4–10.2)
CHLORIDE SERPL-SCNC: 110 MMOL/L (ref 98–107)
CO2 SERPL-SCNC: 27 MMOL/L (ref 22–29)
CREAT SERPL-MCNC: 0.78 MG/DL (ref 0.55–1.02)
CREAT/UREA NIT SERPL: 13
EOSINOPHIL # BLD AUTO: 0.15 X10(3)/MCL (ref 0–0.9)
EOSINOPHIL NFR BLD AUTO: 3.3 %
ERYTHROCYTE [DISTWIDTH] IN BLOOD BY AUTOMATED COUNT: 12.4 % (ref 11.5–17)
GFR SERPLBLD CREATININE-BSD FMLA CKD-EPI: >60 ML/MIN/1.73/M2
GLOBULIN SER-MCNC: 2.3 GM/DL (ref 2.4–3.5)
GLUCOSE SERPL-MCNC: 100 MG/DL (ref 74–100)
HCT VFR BLD AUTO: 34.3 % (ref 37–47)
HGB BLD-MCNC: 11.9 G/DL (ref 12–16)
IMM GRANULOCYTES # BLD AUTO: 0.01 X10(3)/MCL (ref 0–0.04)
IMM GRANULOCYTES NFR BLD AUTO: 0.2 %
INR PPP: 1
LYMPHOCYTES # BLD AUTO: 0.73 X10(3)/MCL (ref 0.6–4.6)
LYMPHOCYTES NFR BLD AUTO: 16.2 %
MAGNESIUM SERPL-MCNC: 1.8 MG/DL (ref 1.6–2.6)
MCH RBC QN AUTO: 30.7 PG (ref 27–31)
MCHC RBC AUTO-ENTMCNC: 34.7 G/DL (ref 33–36)
MCV RBC AUTO: 88.4 FL (ref 80–94)
MONOCYTES # BLD AUTO: 0.53 X10(3)/MCL (ref 0.1–1.3)
MONOCYTES NFR BLD AUTO: 11.7 %
NEUTROPHILS # BLD AUTO: 3.07 X10(3)/MCL (ref 2.1–9.2)
NEUTROPHILS NFR BLD AUTO: 67.9 %
NRBC BLD AUTO-RTO: 0 %
PLATELET # BLD AUTO: 175 X10(3)/MCL (ref 130–400)
PMV BLD AUTO: 9.2 FL (ref 7.4–10.4)
POTASSIUM SERPL-SCNC: 3.4 MMOL/L (ref 3.5–5.1)
PROT SERPL-MCNC: 5.7 GM/DL (ref 6.4–8.3)
PROTHROMBIN TIME: 13.3 SECONDS (ref 12.5–14.5)
PSYCHE PATHOLOGY RESULT: NORMAL
RBC # BLD AUTO: 3.88 X10(6)/MCL (ref 4.2–5.4)
SODIUM SERPL-SCNC: 142 MMOL/L (ref 136–145)
WBC # SPEC AUTO: 4.52 X10(3)/MCL (ref 4.5–11.5)

## 2024-05-23 PROCEDURE — 36415 COLL VENOUS BLD VENIPUNCTURE: CPT | Performed by: NURSE PRACTITIONER

## 2024-05-23 PROCEDURE — 85025 COMPLETE CBC W/AUTO DIFF WBC: CPT | Performed by: NURSE PRACTITIONER

## 2024-05-23 PROCEDURE — 63600175 PHARM REV CODE 636 W HCPCS: Performed by: NURSE PRACTITIONER

## 2024-05-23 PROCEDURE — 80053 COMPREHEN METABOLIC PANEL: CPT | Performed by: NURSE PRACTITIONER

## 2024-05-23 PROCEDURE — 85730 THROMBOPLASTIN TIME PARTIAL: CPT | Performed by: NURSE PRACTITIONER

## 2024-05-23 PROCEDURE — 25000003 PHARM REV CODE 250: Performed by: NURSE PRACTITIONER

## 2024-05-23 PROCEDURE — 83735 ASSAY OF MAGNESIUM: CPT | Performed by: NURSE PRACTITIONER

## 2024-05-23 PROCEDURE — 85610 PROTHROMBIN TIME: CPT | Performed by: NURSE PRACTITIONER

## 2024-05-23 PROCEDURE — 94799 UNLISTED PULMONARY SVC/PX: CPT

## 2024-05-23 RX ORDER — HYDROCODONE BITARTRATE AND ACETAMINOPHEN 7.5; 325 MG/1; MG/1
1 TABLET ORAL EVERY 6 HOURS PRN
Qty: 10 TABLET | Refills: 0 | Status: SHIPPED | OUTPATIENT
Start: 2024-05-23

## 2024-05-23 RX ADMIN — CEFTRIAXONE SODIUM 1 G: 1 INJECTION, POWDER, FOR SOLUTION INTRAMUSCULAR; INTRAVENOUS at 06:05

## 2024-05-23 RX ADMIN — ALVIMOPAN 12 MG: 12 CAPSULE ORAL at 09:05

## 2024-05-23 NOTE — DISCHARGE SUMMARY
[facility] - 8th Floor Med Surg  General Surgery  Discharge Summary      Patient Name: Nikkie Sharpe  MRN: [id number]  Admission Date: 5/20/2024  Hospital Length of Stay: 3 days  Discharge Date and Time:  05/23/2024 9:03 AM  Attending Physician: Lee Rosenthal MD   Discharging Provider: Lee Rosenthal MD  Primary Care Provider: Catherine Jacobo NP    HPI:   No notes on file    Procedure(s) (LRB):  ROBOTIC RESECTION, COLON, LOW ANTERIOR (N/A)      Indwelling Lines/Drains at time of discharge:   Lines/Drains/Airways       Drain  Duration                  Ileostomy 05/20/24 1014 RLQ 2 days                  Hospital Course: The patient was admitted for rectal cancer underwent LAR with ileostomy their hospital course was unremarkable, see progress notes for full details.  When they were afebrile, tolerating a diet and having bowel function they were ready for discharge.  Detailed instructions were given.      Goals of Care Treatment Preferences:         Consults:   Consults (From admission, onward)          Status Ordering Provider     Inpatient consult to Social Work/Case Management  Once        Provider:  (Not yet assigned)    Acknowledged SUDARSHAN DASILVA            Significant Diagnostic Studies: Labs: CMP   Recent Labs   Lab 05/22/24  0505 05/23/24  0451    142   K 3.7 3.4*   CO2 25 27   BUN 9.5* 10.3   CREATININE 0.78 0.78   CALCIUM 8.6 8.5   ALBUMIN 3.4* 3.4*   BILITOT 1.3 1.1   ALKPHOS 65 65   AST 28 28   ALT 22 25       Pending Diagnostic Studies:       Procedure Component Value Units Date/Time    Specimen to Pathology [[id number]] Collected: 05/20/24 0917    Order Status: Sent Lab Status: In process Updated: 05/20/24 1553    Specimen: Tissue from Colon, Tissue from Colon           Final Active Diagnoses:    Diagnosis Date Noted POA    PRINCIPAL PROBLEM:  Adenocarcinoma of rectum [C20] 07/01/2023 Yes      Problems Resolved During this Admission:      Discharged Condition:  "{condition:43855}    Disposition: {IP DISCHARGE DISPOSITION:706150734::"Home or Self Care"}    Follow Up:   Follow-up Information       [facility] Follow up.    Specialties: Home Health Services, Home Therapy Services, Home Living Aide Services  Why: Your home health will contact you and schedule an appt for you  Contact information:  [address] [facility] [address]  [phone]             Lee Rosenthal MD Follow up in 1 week(s).    Specialty: Surgical Oncology  Contact information:  [address] [address]  [phone]                           Patient Instructions:   No discharge procedures on file.  Medications:  {DISCHARGE MEDS OHS:52635}  Time spent on the discharge of patient: *** minutes    Lee Rosenthal MD  General Surgery  [facility] - 8th Floor Med Surg  "

## 2024-05-23 NOTE — PLAN OF CARE
05/23/24 1139   Final Note   Assessment Type Discharge Planning Assessment   Anticipated Discharge Disposition Home-Health   Hospital Resources/Appts/Education Provided Post-Acute resouces added to AVS   Post-Acute Status   Post-Acute Authorization Home Health   Home Health Status Set-up Complete/Auth obtained   Discharge Delays None known at this time     Pt being dc today with [facility]

## 2024-05-23 NOTE — PLAN OF CARE
[facility] sent DC orders/AVS and Op-report and clinicals to [facility] via [employer].

## 2024-05-23 NOTE — HOSPITAL COURSE
The patient was admitted for rectal cancer underwent LAR with ileostomy their hospital course was unremarkable, see progress notes for full details.  When they were afebrile, tolerating a diet and having bowel function they were ready for discharge.  Detailed instructions were given.

## 2024-05-24 PROCEDURE — G0180 MD CERTIFICATION HHA PATIENT: HCPCS | Mod: ,,, | Performed by: SURGERY

## 2024-05-27 ENCOUNTER — PATIENT MESSAGE (OUTPATIENT)
Dept: ADMINISTRATIVE | Facility: OTHER | Age: 54
End: 2024-05-27
Payer: MEDICAID

## 2024-05-28 ENCOUNTER — OFFICE VISIT (OUTPATIENT)
Dept: SURGICAL ONCOLOGY | Facility: CLINIC | Age: 54
End: 2024-05-28
Payer: MEDICAID

## 2024-05-28 VITALS
BODY MASS INDEX: 23.62 KG/M2 | HEART RATE: 82 BPM | SYSTOLIC BLOOD PRESSURE: 101 MMHG | DIASTOLIC BLOOD PRESSURE: 73 MMHG | HEIGHT: 62 IN | WEIGHT: 128.38 LBS

## 2024-05-28 DIAGNOSIS — C20 RECTAL CANCER: Primary | ICD-10-CM

## 2024-05-28 PROCEDURE — 99999 PR PBB SHADOW E&M-EST. PATIENT-LVL I: CPT | Mod: PBBFAC,,, | Performed by: NURSE PRACTITIONER

## 2024-05-28 PROCEDURE — 99211 OFF/OP EST MAY X REQ PHY/QHP: CPT | Mod: PBBFAC | Performed by: NURSE PRACTITIONER

## 2024-05-28 PROCEDURE — 99024 POSTOP FOLLOW-UP VISIT: CPT | Mod: ,,, | Performed by: NURSE PRACTITIONER

## 2024-05-28 RX ORDER — HYDROCODONE BITARTRATE AND ACETAMINOPHEN 7.5; 325 MG/1; MG/1
1 TABLET ORAL EVERY 6 HOURS PRN
Qty: 15 TABLET | Refills: 0 | Status: SHIPPED | OUTPATIENT
Start: 2024-05-28

## 2024-05-28 NOTE — PROGRESS NOTES
POST OP LAP/JOEL LAR, LOOP ILEOSTOMY  DX RECTAL CANCER S/P NEOADJ THERAPY    PT LOOKS TO BE DOING WELL  TELLS ME SHE IS FEELING OVERWHELMED  HOME HEALTH IS ASSISTING WITH HER MANAGING OSTOMY AT HOME  TAKING PAIN MEDICATION AS NEEDED  EATING  NO FEVER OR CHILLS  BROTHER PRESENT    ABD SOFT  INCISIONS HEALING WELL  OSTOMY PINK, GOOD OUTPUT  RED RUBBER CATH REMOVED, FRESH APPLIANCE APPLIED    PATH: 1.2CM ADENO, MARGINS CLEAR, 2/13 NODES POSITIVE   DR NGUYEN REVIEWS PATH  PATH DISCUSSED WITH PT AND QUESTIONS ANSWERED  SHE IS ESTABLISHED WITH MED ONC DR VASQUEZ  SHE HAS FU APPT WITH HIM IN 2 WEEKS    SHE REQUEST REFILL ON NORCO, WILL SEND TO PHARMACY    PLAN  WILL CONTINUE WITH HOME HEALTH  RTC 2 WEEKS  PENCILED IN OSTOMY REVERSAL IN EARLY JULY

## 2024-05-29 NOTE — DISCHARGE SUMMARY
[facility] - 8th Floor Med Surg  General Surgery  Discharge Summary      Patient Name: Nikkie Sharpe  MRN: [id number]  Admission Date: 5/20/2024  Hospital Length of Stay: 3 days  Discharge Date and Time: 5/23/2024  1:07 PM  Attending Physician: No att. providers found   Discharging Provider: LOUIS Vargas  Primary Care Provider: Catherine Jacobo NP    HPI:   No notes on file    Procedure(s) (LRB):  ROBOTIC RESECTION, COLON, LOW ANTERIOR (N/A)      Indwelling Lines/Drains at time of discharge:   Lines/Drains/Airways       Peripherally Inserted Central Catheter Line  Duration             PICC Double Lumen 01/06/23 1132 left basilic 26 days                  Hospital Course: The patient was admitted for rectal cancer underwent LAR with ileostomy their hospital course was unremarkable, see progress notes for full details.  When they were afebrile, tolerating a diet and having bowel function they were ready for discharge.  Detailed instructions were given.      Goals of Care Treatment Preferences:         Consults:       Significant Diagnostic Studies: N/A    Pending Diagnostic Studies:       None          Final Active Diagnoses:    Diagnosis Date Noted POA    PRINCIPAL PROBLEM:  Adenocarcinoma of rectum [C20] 07/01/2023 Yes      Problems Resolved During this Admission:      Discharged Condition: good    Disposition: Home or Self Care    Follow Up:   Follow-up Information       [facility] Follow up.    Specialties: Home Health Services, Home Therapy Services, Home Living Aide Services  Why: Your home health will contact you and schedule an appt for you  Contact information:  [address]  [facility] [address]  [phone]             Lee Rosenthal MD Follow up on 5/28/2024.    Specialty: Surgical Oncology  Why: @1:30pm  Contact information:  [address]  [address] [address]  [phone]                           Patient Instructions:   No discharge procedures on  file.  Medications:  Reconciled Home Medications:      Medication List        START taking these medications      HYDROcodone-acetaminophen 7.5-325 mg per tablet  Commonly known as: NORCO  Take 1 tablet by mouth every 6 (six) hours as needed for Pain.            CONTINUE taking these medications      ALPRAZolam 0.5 MG tablet  Commonly known as: XANAX  Take 0.5 mg by mouth 2 (two) times daily as needed.     b complex vitamins capsule  Take 1 capsule by mouth once daily.     busPIRone 15 MG tablet  Commonly known as: BUSPAR  Take 15 mg by mouth 2 (two) times daily.     dicyclomine 10 MG capsule  Commonly known as: BENTYL  Take 10 mg by mouth every 6 (six) hours as needed.     DULoxetine 20 MG capsule  Commonly known as: CYMBALTA  Take 20 mg by mouth.     EScitalopram oxalate 20 MG tablet  Commonly known as: LEXAPRO  Take 1 tablet by mouth every evening.     famotidine 40 MG tablet  Commonly known as: PEPCID  Take 1 tablet (40 mg total) by mouth nightly as needed for Heartburn.     hydrOXYzine pamoate 25 MG Cap  Commonly known as: VISTARIL  Take 25 mg by mouth 4 (four) times daily.     hyoscyamine 0.125 mg Tab  Commonly known as: ANASPAZ,LEVSIN  Take 1 tablet (125 mcg total) by mouth every 6 (six) hours as needed (abdomen pain).     metroNIDAZOLE 250 MG tablet  Commonly known as: FLAGYL  TAKE 2 TABLETS AT 1PM, 5PM AND 9PM THE EVENING PRIOR TO SURGERY     MIRALAX 17 gram/dose powder  Generic drug: polyethylene glycol  Take 17 g by mouth.     multivitamin per tablet  Commonly known as: THERAGRAN  Take 1 tablet by mouth once daily.     neomycin 500 mg Tab  Commonly known as: MYCIFRADIN  TAKE 2 TABLETS AT 1PM, 5PM AND 9PM THE EVENING PRIOR TO SURGERY     NON FORMULARY MEDICATION  10 mg Daily. Medical Marijuana for Insomnia     ondansetron 8 MG tablet  Commonly known as: ZOFRAN  Take 1 tablet (8 mg total) by mouth every 8 (eight) hours as needed for Nausea.     oxyCODONE-acetaminophen 5-325 mg per tablet  Commonly known as:  PERCOCET  Take 1 tablet by mouth every 4 (four) hours as needed for Pain.     pantoprazole 40 MG tablet  Commonly known as: PROTONIX  Take 1 tablet (40 mg total) by mouth every morning.     PANTOPRAZOLE 40 MG/100 ML 0.9 % NS IVPB            Time spent on the discharge of patient:  minutes    LOUIS Vargas  General Surgery  [facility] - 8th Floor Med Surg

## 2024-06-07 DIAGNOSIS — C20 RECTAL CANCER: Primary | ICD-10-CM

## 2024-06-07 RX ORDER — ENOXAPARIN SODIUM 300 MG/3ML
30 INJECTION INTRAVENOUS; SUBCUTANEOUS EVERY 24 HOURS
OUTPATIENT
Start: 2024-06-07

## 2024-06-07 RX ORDER — METRONIDAZOLE 500 MG/100ML
500 INJECTION, SOLUTION INTRAVENOUS
OUTPATIENT
Start: 2024-07-08

## 2024-06-07 RX ORDER — ALVIMOPAN 12 MG/1
12 CAPSULE ORAL ONCE
OUTPATIENT
Start: 2024-07-08 | End: 2024-07-14

## 2024-06-12 ENCOUNTER — OFFICE VISIT (OUTPATIENT)
Dept: SURGICAL ONCOLOGY | Facility: CLINIC | Age: 54
End: 2024-06-12
Payer: MEDICAID

## 2024-06-12 VITALS
WEIGHT: 132.81 LBS | SYSTOLIC BLOOD PRESSURE: 109 MMHG | HEART RATE: 95 BPM | DIASTOLIC BLOOD PRESSURE: 74 MMHG | BODY MASS INDEX: 24.44 KG/M2 | HEIGHT: 62 IN

## 2024-06-12 DIAGNOSIS — C20 RECTAL CANCER: Primary | ICD-10-CM

## 2024-06-12 PROCEDURE — 3074F SYST BP LT 130 MM HG: CPT | Mod: CPTII,,, | Performed by: NURSE PRACTITIONER

## 2024-06-12 PROCEDURE — 99024 POSTOP FOLLOW-UP VISIT: CPT | Mod: ,,, | Performed by: NURSE PRACTITIONER

## 2024-06-12 PROCEDURE — 99999 PR PBB SHADOW E&M-EST. PATIENT-LVL III: CPT | Mod: PBBFAC,,, | Performed by: NURSE PRACTITIONER

## 2024-06-12 PROCEDURE — 3078F DIAST BP <80 MM HG: CPT | Mod: CPTII,,, | Performed by: NURSE PRACTITIONER

## 2024-06-12 PROCEDURE — 99213 OFFICE O/P EST LOW 20 MIN: CPT | Mod: PBBFAC | Performed by: NURSE PRACTITIONER

## 2024-06-12 NOTE — PROGRESS NOTES
OFFICE FOLLOW OP  PRE OP ILEOSTOMY REVERSAL    PT REPORTS DOING WELL  BETTER ACCUSTOMED TO OSTOMY  NO ISSUES REPORTED  NO ACUTE PROBLEMS    ABD SOFT  OSTOMY PINK  GOOD OUTPUT    NO FEVER OR CHILLS    FOLLOWING WITH DR VASQUEZ    PLAN  WILL TENT SCHEDULE FOR OSTOMY REVERSAL 7/8/24  PROCEDURE, RISK AND BENEFITS DISCUSSED AND PT WISHES TO PROCEED

## 2024-06-13 ENCOUNTER — EXTERNAL HOME HEALTH (OUTPATIENT)
Dept: HOME HEALTH SERVICES | Facility: HOSPITAL | Age: 54
End: 2024-06-13
Payer: MEDICAID

## 2024-06-19 ENCOUNTER — TELEPHONE (OUTPATIENT)
Dept: HEMATOLOGY/ONCOLOGY | Facility: CLINIC | Age: 54
End: 2024-06-19
Payer: MEDICAID

## 2024-06-19 DIAGNOSIS — C20 ADENOCARCINOMA OF RECTUM, STAGE 3: Primary | ICD-10-CM

## 2024-06-20 ENCOUNTER — LAB VISIT (OUTPATIENT)
Dept: LAB | Facility: HOSPITAL | Age: 54
End: 2024-06-20
Attending: INTERNAL MEDICINE
Payer: MEDICAID

## 2024-06-20 ENCOUNTER — OFFICE VISIT (OUTPATIENT)
Dept: HEMATOLOGY/ONCOLOGY | Facility: CLINIC | Age: 54
End: 2024-06-20
Attending: INTERNAL MEDICINE
Payer: MEDICAID

## 2024-06-20 VITALS
BODY MASS INDEX: 24.96 KG/M2 | OXYGEN SATURATION: 99 % | HEIGHT: 62 IN | DIASTOLIC BLOOD PRESSURE: 74 MMHG | WEIGHT: 135.63 LBS | HEART RATE: 85 BPM | TEMPERATURE: 98 F | RESPIRATION RATE: 20 BRPM | SYSTOLIC BLOOD PRESSURE: 107 MMHG

## 2024-06-20 DIAGNOSIS — D12.3 ADENOMATOUS POLYP OF TRANSVERSE COLON: ICD-10-CM

## 2024-06-20 DIAGNOSIS — G62.0 CHEMOTHERAPY-INDUCED NEUROPATHY: Primary | ICD-10-CM

## 2024-06-20 DIAGNOSIS — T45.1X5A CHEMOTHERAPY-INDUCED NEUROPATHY: Primary | ICD-10-CM

## 2024-06-20 DIAGNOSIS — Z83.719 FAMILY HX COLONIC POLYPS: ICD-10-CM

## 2024-06-20 DIAGNOSIS — C20 ADENOCARCINOMA OF RECTUM, STAGE 3: ICD-10-CM

## 2024-06-20 DIAGNOSIS — Z01.818 PREOP TESTING: ICD-10-CM

## 2024-06-20 DIAGNOSIS — T82.868A THROMBOSIS IN PERIPHERALLY INSERTED CENTRAL CATHETER (PICC): ICD-10-CM

## 2024-06-20 DIAGNOSIS — C20 ADENOCARCINOMA OF RECTUM, STAGE 3: Primary | ICD-10-CM

## 2024-06-20 DIAGNOSIS — R93.5 ABNORMAL MRI, PELVIS: ICD-10-CM

## 2024-06-20 DIAGNOSIS — C20 ADENOCARCINOMA OF RECTUM: ICD-10-CM

## 2024-06-20 DIAGNOSIS — R59.0 PELVIC LYMPHADENOPATHY: ICD-10-CM

## 2024-06-20 DIAGNOSIS — Z80.0 FAMILY HISTORY OF COLON CANCER: ICD-10-CM

## 2024-06-20 LAB
ERYTHROCYTE [DISTWIDTH] IN BLOOD BY AUTOMATED COUNT: 13 % (ref 11.5–17)
HCT VFR BLD AUTO: 37.7 % (ref 37–47)
HGB BLD-MCNC: 12.5 G/DL (ref 12–16)
MCH RBC QN AUTO: 29.5 PG (ref 27–31)
MCHC RBC AUTO-ENTMCNC: 33.2 G/DL (ref 33–36)
MCV RBC AUTO: 88.9 FL (ref 80–94)
NRBC BLD AUTO-RTO: 0 %
PLATELET # BLD AUTO: 221 X10(3)/MCL (ref 130–400)
PMV BLD AUTO: 9.2 FL (ref 7.4–10.4)
RBC # BLD AUTO: 4.24 X10(6)/MCL (ref 4.2–5.4)
WBC # BLD AUTO: 3.82 X10(3)/MCL (ref 4.5–11.5)

## 2024-06-20 PROCEDURE — 3078F DIAST BP <80 MM HG: CPT | Mod: CPTII,,, | Performed by: INTERNAL MEDICINE

## 2024-06-20 PROCEDURE — 1160F RVW MEDS BY RX/DR IN RCRD: CPT | Mod: CPTII,,, | Performed by: INTERNAL MEDICINE

## 2024-06-20 PROCEDURE — 1159F MED LIST DOCD IN RCRD: CPT | Mod: CPTII,,, | Performed by: INTERNAL MEDICINE

## 2024-06-20 PROCEDURE — 3008F BODY MASS INDEX DOCD: CPT | Mod: CPTII,,, | Performed by: INTERNAL MEDICINE

## 2024-06-20 PROCEDURE — 99214 OFFICE O/P EST MOD 30 MIN: CPT | Mod: S$PBB,,, | Performed by: INTERNAL MEDICINE

## 2024-06-20 PROCEDURE — 3074F SYST BP LT 130 MM HG: CPT | Mod: CPTII,,, | Performed by: INTERNAL MEDICINE

## 2024-06-20 PROCEDURE — 99215 OFFICE O/P EST HI 40 MIN: CPT | Mod: PBBFAC | Performed by: INTERNAL MEDICINE

## 2024-06-20 PROCEDURE — 1111F DSCHRG MED/CURRENT MED MERGE: CPT | Mod: CPTII,,, | Performed by: INTERNAL MEDICINE

## 2024-06-20 PROCEDURE — 85027 COMPLETE CBC AUTOMATED: CPT

## 2024-06-20 PROCEDURE — 36415 COLL VENOUS BLD VENIPUNCTURE: CPT

## 2024-06-20 RX ORDER — ONDANSETRON HYDROCHLORIDE 8 MG/1
8 TABLET, FILM COATED ORAL EVERY 8 HOURS PRN
Qty: 60 TABLET | Refills: 2 | Status: SHIPPED | OUTPATIENT
Start: 2024-06-20

## 2024-06-20 NOTE — PROGRESS NOTES
History:  Past Medical History:   Diagnosis Date    Abdominal cramping     Anesthesia complication     difficulty breathing during     Anxiety disorder, unspecified     GERD (gastroesophageal reflux disease)     Insomnia     Rectal cancer        Past Surgical History:   Procedure Laterality Date    APPENDECTOMY      BACK SURGERY       SECTION  2004    COLONOSCOPY      cyst coccyx      1985 x3 with cyst and had cystectomy on tailbone in     ESOPHAGOGASTRODUODENOSCOPY      EYE SURGERY      RK corrective surgery    ROBOT-ASSISTED LOW ANTERIOR RESECTION OF COLON N/A 2024    Procedure: ROBOTIC RESECTION, COLON, LOW ANTERIOR;  Surgeon: Lee Rosenthal MD;  Location: [facility];  Service: General;  Laterality: N/A;  WITH ROBOT ILEOSTOMY    WISDOM TOOTH EXTRACTION     Past medical history:  Anxiety.    Procedure/surgical history:  Appendectomy.  Back surgery.  Social history:  Single.  Lives in [place], [place].  Has 2 children.  Works as a .  No history of tobacco, alcohol, or illicit drug abuse.  Family history:  Paternal grandfather experience colon cancer in his 70s.  Father experienced multiple colon polyps.  Health maintenance:  No screening colonoscopy prior to most recent colonoscopy which led to the diagnosis of rectal cancer.  -2019:  Bilateral screening mammogram pelvic comparison:  2018 mammogram):  BI-RADS 0 (indeterminate)  -2019:  Diagnostic left mammogram, limited ultrasound left breast (comparison:  2019 mammogram):  Overall study BI-RADS 2: Benign     Family History   Problem Relation Name Age of Onset    Colon polyps Father Delvin Sharpe     Alcohol abuse Father Delvin Sharpe     Colon cancer Maternal Grandfather      Colon cancer Paternal Grandfather WANJU Dell     Cancer Paternal Grandfather WJ Dell     Cancer Maternal Aunt      Diabetes Maternal Grandmother Yasmine smallyle       Reason for Follow-up:  -adenocarcinoma rectum,  moderately differentiated, partially obstructing mass, S/P colonoscopy 06/19/2023   -tubular adenoma transverse colon   -regional lymphadenopathy on CT abdomen pelvis with contrast 06/21/2023  -05/20/2024:  Laparoscopic/robotic low anterior resection with EN bloc total mesorectal excision and low pelvic anastomosis; creation of diverting loop ileostomy:  Pathology:  Moderately-differentiated adenocarcinoma, 1.2 cm, invading through the muscularis propria into the pericolonic or perirectal tissue into the pericolonic or perirectal tissue, incomplete response to neoadjuvant therapy, no LVI, no PNI, margins negative, 2/13 lymph nodes positive  >>> ypT3 ypN1b  -MMR proficient  -acute left upper extremity DVT related to PICC line  -grandfather experienced colon cancer; father experienced colon polyp  -chemotherapy-induced peripheral neuropathy    History of Present Illness:   No chief complaint on file.     Oncologic/Hematologic History:  Oncology History   Adenocarcinoma of rectum, stage 3   7/1/2023 Initial Diagnosis    Adenocarcinoma of rectum, stage 3     7/19/2023 - 11/22/2023 Chemotherapy    Treatment Summary   Plan Name: OP mFOLFOX 6 Q2W  Treatment Goal: Curative  Status: Inactive  Start Date: 7/19/2023  End Date: 11/22/2023  Provider: John Bazan MD  Chemotherapy: fluorouraciL injection 650 mg, 400 mg/m2 = 650 mg, Intravenous, Clinic/HOD 1 time, 3 of 3 cycles  Administration: 650 mg (7/19/2023), 650 mg (8/1/2023), 650 mg (8/22/2023)  oxaliplatin (ELOXATIN) 85 mg/m2 = 139 mg in dextrose 5 % (D5W) 592.8 mL chemo infusion, 85 mg/m2 = 139 mg, Intravenous, Clinic/HOD 1 time, 8 of 8 cycles  Administration: 139 mg (7/19/2023), 139 mg (8/1/2023), 140 mg (9/18/2023), 135 mg (10/2/2023), 135 mg (11/7/2023), 135 mg (8/22/2023), 130 mg (11/20/2023), 135 mg (10/25/2023)     12/26/2023 - 12/26/2023 Chemotherapy    Treatment Summary   Plan Name: OP CAPECITABINE 5 DAYS + RADIOTHERAPY  Treatment Goal: Curative  Status:  Inactive  Start Date:   End Date:   Provider: John Bazan MD  Chemotherapy: capecitabine (XELODA) tablet 1,250 mg, 825 mg/m2 = 1,250 mg, Oral, 2 times daily, 0 of 1 cycle, Start date: --, End date: --     5/20/2024 Cancer Staged    Staging form: Colon and Rectum, AJCC 8th Edition  - Pathologic stage from 5/20/2024: Stage IIIB (ypT3, pN1b, cM0)     53-year-old lady, referred by Migel Ortiz MD, GI, with rectal cancer.    Family history pos for colon cancer in grandfather, colon polyp in father  Evaluated by Migel Ortiz MD, GI, 05/22/2023 for nervous stomach; change in bowel habits/pattern; change in bowel function started several months ago; currently, 1 bowel movement daily.  Blood in stool.  Lower abdominal pain.  Heartburn.  Epigastric pain.  MiraLax resulted in improvement.  Bright red blood in stool.    06/13/2023: EGD:  1. Esophagus: Erythema of mucosa in the lower 3rd of esophagus, compatible esophagitis  2. Stomach: Erythema of mucosa in the antrum, compatible with gastritis  3. Duodenum:  Normal mucosa in the whole duodenum      06/13/2023:  EGD:  1. Gastric antrum, biopsy:  Mild chronic inactive gastritis; negative for intestinal metaplasia; negative for H pylori organisms  2. Gastric body, biopsy:  Mild chronic inactive gastritis; negative for intestinal metaplasia; negative for H pylori organisms    06/19/2023:  Colonoscopy (screening colonoscopy):  -single 9 mm polyp transverse colon, polypectomy, completely removed  -ulcerated necrotic infiltrative 90% circumferential, bleeding, 5 cm in length, mass of malignant appearance in the rectum at 4 cm from the anus, causing partial obstruction; scope traversed the lesion  Pathology:  1. Transverse colon polyp, polypectomy:  Tubular adenoma  2. Rectal mass, biopsy:  Invasive moderately differentiated adenocarcinoma; no LVI    MMR proficient  Low probability of MSI-H    06/21/2023: CT abdomen pelvis with and without contrast:  -large rectal mass  consistent with malignancy; some lymphadenopathy is seen adjacent to the mass  (large rectal mass with circumferential wall thickening in rectum; associated inflammatory changes; no bowel obstruction; some lymph nodes are seen in the perirectal region on the right and left side; representative lymph node on right side of rectum 1 cm x 8 mm; representative lymph node on the left side of rectum 8 mm x 8 mm)    Labs reviewed:  05/24/2023:  WBC 8.0.  Hemoglobin 12.5.  MCV 88.  Platelets 306 K.  Differential count normal.    07/05/2023:   Pleasant lady who presents for initial medical oncology consultation, accompanied by her brother.    Her symptoms started 2 years ago, in the form of intermittent small amount hematochezia, initially on toilet wife's, and for last few weeks, in toilet bowel as well.  She brought the symptoms to the attention of her gyn several months ago and does not remember the recommendation.  Regardless, she never got a screening colonoscopy done.  Around [place] time 2022, she started feeling bloated.  She started having constant uncomfortable feeling because of bloating, as well as in the anorectal area.  Hematochezia continued.  She brought this to the attention of her PCP in [place] who suggested taking MiraLax.  The PCP also arranged for colonoscopy.  On today's visit, she reports that she has been constipated her entire life.  She has been constipated for at least 10 years.  Hematochezia for 2 years.  No anorectal pain.  Does have constant feeling of incomplete evacuation.  Appetite is down and she has lost 30 lb in last 3-4 months.  Appetite is more less preserved but she is afraid to eat because eating causes bloating and worsening of uncomfortable feeling in rectum.    Interval History:  PICC DOUBLE LUMEN LINE FLUSH   [No matching plan found]     06/20/2024:   -05/20/2024:  Laparoscopic/robotic low anterior resection with EN bloc total mesorectal excision and low pelvic anastomosis;  creation of diverting loop ileostomy:  1. Rectal mass, low anterior resection:  -moderately differentiated adenocarcinoma, 1.2 cm, with incomplete response to neoadjuvant therapy, invading through the muscularis propria; side, rectum; no macroscopic tumor perforation; no LVI; no PNI; tumor effect present, with residual cancer showing evident tumor regression but more than single cells or rare small groups of cancer cells (partial response, score 2); all resection margins negative for invasive carcinoma; closest margin, radial/circumferential or mesenteric, 0.9 cm  -proximal and distal surgical margins of resection negative   -metastatic adenocarcinoma in 2/13 perirectal lymph nodes (2/13)  >>> ypT3 ypN1b  2. Tissue donuts:  Negative for malignancy  -ostomy reversal planned by Surgical Oncology on 07/08/2024  -06/20/2024:  WBC 3.82; hemoglobin 12.5; platelets 221 K. rest of CBC and CMP are pending.  Presents for a follow-up visit.  Doing well.  No abdominal pain, nausea, vomiting.  Ileostomy working well.  No blood in stool.  Scheduled to undergo ostomy reversal on 07/08/2024.  Appetite is okay.    Medications:  Current Outpatient Medications on File Prior to Visit   Medication Sig Dispense Refill    ALPRAZolam (XANAX) 0.5 MG tablet Take 0.5 mg by mouth 2 (two) times daily as needed.      b complex vitamins capsule Take 1 capsule by mouth once daily.      busPIRone (BUSPAR) 15 MG tablet Take 15 mg by mouth 2 (two) times daily.      DULoxetine (CYMBALTA) 20 MG capsule Take 20 mg by mouth.      EScitalopram oxalate (LEXAPRO) 20 MG tablet Take 1 tablet by mouth every evening.      hydrOXYzine pamoate (VISTARIL) 25 MG Cap Take 25 mg by mouth 4 (four) times daily.      multivitamin (THERAGRAN) per tablet Take 1 tablet by mouth once daily.      ondansetron (ZOFRAN) 8 MG tablet Take 1 tablet (8 mg total) by mouth every 8 (eight) hours as needed for Nausea. 60 tablet 2    oxyCODONE-acetaminophen (PERCOCET) 5-325 mg per  tablet Take 1 tablet by mouth every 4 (four) hours as needed for Pain. 56 tablet 0    pantoprazole (PROTONIX) 40 MG tablet Take 1 tablet (40 mg total) by mouth every morning. 30 tablet 1    dicyclomine (BENTYL) 10 MG capsule Take 10 mg by mouth every 6 (six) hours as needed. (Patient not taking: Reported on 6/20/2024)      famotidine (PEPCID) 40 MG tablet Take 1 tablet (40 mg total) by mouth nightly as needed for Heartburn. 30 tablet 1    HYDROcodone-acetaminophen (NORCO) 7.5-325 mg per tablet Take 1 tablet by mouth every 6 (six) hours as needed for Pain. (Patient not taking: Reported on 6/20/2024) 10 tablet 0    HYDROcodone-acetaminophen (NORCO) 7.5-325 mg per tablet Take 1 tablet by mouth every 6 (six) hours as needed for Pain. (Patient not taking: Reported on 6/20/2024) 15 tablet 0    hyoscyamine (ANASPAZ,LEVSIN) 0.125 mg Tab Take 1 tablet (125 mcg total) by mouth every 6 (six) hours as needed (abdomen pain). (Patient not taking: Reported on 6/20/2024) 60 tablet 2    metroNIDAZOLE (FLAGYL) 250 MG tablet TAKE 2 TABLETS AT 1PM, 5PM AND 9PM THE EVENING PRIOR TO SURGERY (Patient not taking: Reported on 6/20/2024) 6 tablet 0    MIRALAX 17 gram/dose powder Take 17 g by mouth. (Patient not taking: Reported on 6/20/2024)      neomycin (MYCIFRADIN) 500 mg Tab TAKE 2 TABLETS AT 1PM, 5PM AND 9PM THE EVENING PRIOR TO SURGERY (Patient not taking: Reported on 6/20/2024) 6 tablet 0    NON FORMULARY MEDICATION 10 mg Daily. Medical Marijuana for Insomnia (Patient not taking: Reported on 6/20/2024)      PANTOPRAZOLE 40 MG/100 ML 0.9 % NS IVPB  (Patient not taking: Reported on 6/20/2024)       No current facility-administered medications on file prior to visit.       Review of Systems:   All systems reviewed and found to be negative except for the symptoms detailed above    Physical Examination:   VITAL SIGNS:   Vitals:    06/20/24 1320   BP: 107/74   Pulse: 85   Resp: 20   Temp: 98 °F (36.7 °C)         GENERAL:  In no apparent  "distress.    HEAD:  No signs of head trauma.  EYES:  Pupils are equal.  Extraocular motions intact.    EARS:  Hearing grossly intact.  MOUTH:  Oropharynx is normal.   NECK:  No adenopathy, no JVD.     CHEST:  Chest with clear breath sounds bilaterally.  No wheezes, rales, rhonchi.    CARDIAC:  Regular rate and rhythm.  S1 and S2, without murmurs, gallops, rubs.  VASCULAR:  No Edema.  Peripheral pulses normal and equal in all extremities.  ABDOMEN:  Soft, without detectable tenderness.  No sign of distention.  No   rebound or guarding, and no masses palpated.   Bowel Sounds normal.  MUSCULOSKELETAL:  Good range of motion of all major joints. Extremities without clubbing, cyanosis or edema.    NEUROLOGIC EXAM:  Alert and oriented x 3.  No focal sensory or strength deficits.   Speech normal.  Follows commands.  PSYCHIATRIC:  Mood normal.    No results for input(s): "CBC" in the last 72 hours.   No results for input(s): "CMP" in the last 72 hours.     Assessment:  Problem List Items Addressed This Visit          Neuro    Chemotherapy-induced neuropathy - Primary       Cardiac/Vascular    Thrombosis in peripherally inserted central catheter (PICC)       Oncology    Adenocarcinoma of rectum, stage 3    Family history of colon cancer       GI    Family hx colonic polyps    Polyp of transverse colon    Abnormal MRI, pelvis       Other    Pelvic lymphadenopathy     Adenocarcinoma of rectum, moderately differentiated:   -presentation:  05/2023:  Change in bowel habits, hematochezia, lower abdominal pain  -colonoscopy 06/19/2023:    9 mm tubular adenoma transverse colon, removed;   invasive moderately differentiated adenocarcinoma of rectum presenting as ulcerated necrotic infiltrative 90% circumferential, bleeding, partially obstructing rectal mass, 5 cm long, 4 cm from anus, scope traversed the lesion  -MMR proficient; low probability of MSI-H  -CT abdomen pelvis with contrast 06/21/2023:  No metastasis; large rectal mass " and some regional lymphadenopathy on CT abdomen pelvis with contrast 06/21/2023, largest perirectal lymph node 1 cm x 8 mm  -CEA level < 1.73, normal (07/05/2023)  -no lung metastases on CT chest 07/18/2023  -MRI pelvis 07/18/2023: Large mid to distal rectal mass (T3b); at least 10 perirectal lymph nodes with increased signal on DWI imaging, largest 9 mm (T2b)  >> radiologically, T3b N2b (stage IIIC)  -07/27/2023:  developed acute DVT left subclavian/axillary/brachial veins secondary to PICC line; started on Eliquis; PICC line removed  -neoadjuvant FOLFOX started 07/19/2023  -cycle 4 of neoadjuvant FOLFOX started 09/18/2023  -cycle 5 of neoadjuvant FOLFOX started 10/02/2023  -positive response on restaging CTs C/A/P 10 09/2023, S/P neoadjuvant FOLFOX x5 cycles (marked improvement in rectal mass; improved perirectal lymphadenopathy)  -restaging pelvic MRI 10/24/2020: Improved rectal mass and perirectal lymph nodes   -cycle 6 of neoadjuvant FOLFOX started 10/25/2023  -10/30/2023: Cymbalta discontinued because she experienced increased anxiety with Cymbalta  -Neoadjuvant FOLFOX:  Cycle 7 on 11/07/2023; cycle 8 on 11/20/2023  -12/04/2023: Genetics consultation  -12/15/2023: Restaging CTs chest and abdomen with contrast:  No metastases  -12/15/2023:  Restaging MRI pelvis with and without contrast (comparison:  MRI 10/24/2023):  Little overall change in rectal mass since 10/24/2023  -restaging MRI pelvis 12/15/2023: No progression  -genetic testing 12/19/2023: Negative  -S/P concurrent chemoradiation therapy to pelvis 01/03/2024-02/12/2024 (concurrent with capecitabine):  -screening mammogram 02/16/2024: BI-RADS 2  -restaging MRI pelvis 03/05/2024, post completion of neoadjuvant therapy:  Rectal mass smaller and improved; new indeterminate 8 mm enhancing lesion right sacral ala  -restaging CT chest and abdomen 03/05/2024, post completion of neoadjuvant therapy: No metastases  -bone scan 03/20/2024:  No bone metastases; no  scintigraphic correlate for the subcentimeter lesion at the sacrum seen on MRI  -05/20/2024:  Laparoscopic/robotic low anterior resection with EN bloc total mesorectal excision and low pelvic anastomosis; creation of diverting loop ileostomy:  Pathology:  Moderately-differentiated adenocarcinoma, 1.2 cm, invading through the muscularis propria into the pericolonic or perirectal tissue into the pericolonic or perirectal tissue, incomplete response to neoadjuvant therapy, no LVI, no PNI, margins negative, 2/13 lymph nodes positive  >>> ypT3 ypN1b  -ostomy reversal planned by Surgical Oncology on 07/08/2024      Left upper extremity DVT secondary to PICC line 07/27/2023:  -developed acute DVT left subclavian/axillary/brachial veins secondary to PICC line; started on Eliquis; PICC line removed  >>>  -anticoagulation was planned to continue for at least 3 months, i.e., until the end of October 2023   -11/07/2023:  Told me that she stopped anticoagulation Halloween day (10/31/2023), appropriately      Chemotherapy-induced peripheral neuropathy:  -10/17/2023: Chemotherapy induced peripheral neuropathy in the form of tingling in hands; no neuropathy in feet; no numbness or pain  -10/17/2020: Started on Cymbalta 30 mg p.o. q.h.s.  -Cymbalta discontinued 10/30/2023 because of increased anxiety with Cymbalta reported by her      Family history of colon cancer:  -grandfather experienced colon cancer; father experienced colon polyp      Plan:  Follow-up end of July with contrast-enhanced CT scans of C/A/P, CBC, CMP, CEA level  -------------------------------------------      -10/17/2023: Chemotherapy induced peripheral neuropathy in the form of tingling in hands; no neuropathy in feet; no numbness or pain  -10/17/2020: Started on Cymbalta 30 mg p.o. q.h.s.  -Cymbalta discontinued 10/30/2023 because of increased anxiety with Cymbalta reported by her  -10/17/2023:  Referred to behavioral health for management of  anxiety.    -adenocarcinoma rectum, moderately differentiated  -radiologically, T3b N2b, stage IIIC  -Fertility risk discussion/counseling if premenopausal   (she had been postmenopausal for last 2 years and indicated that she had no desire to preserve fertility)  -S/P neoadjuvant FOLFOX every 2 weeks x8 cycles (07/19/2023-11/20/2023)  -positive response  -followed by concurrent chemoradiation therapy to pelvis 01/03/2024-02/12/2024 (concurrent with capecitabine):  -good response  -05/20/2024:  Laparoscopic/robotic low anterior resection with EN bloc total mesorectal excision and low pelvic anastomosis; creation of diverting loop ileostomy:  Pathology:  Moderately-differentiated adenocarcinoma, 1.2 cm, invading through the muscularis propria into the pericolonic or perirectal tissue into the pericolonic or perirectal tissue, incomplete response to neoadjuvant therapy, no LVI, no PNI, margins negative, 2/13 lymph nodes positive  >>> ypT3 ypN1b  >>>  -underwent robotic low anterior resection 05/20/2024  -ostomy reversal planned by Surgical Oncology on 07/08/2024  -surveillance to start after completion of surgical procedures  -new indeterminate 8 mm enhancing lesion right sacral ala on restaging MRI pelvis 03/05/2024 she will follow-up    Surveillance:  1. History and physical examination and CEA level every 3-6 months for 2 years (05/2024-05/2026), then every 6 months for total of 5 years  2. Surveillance CTs C/A/P with contrast every 6-12 months for total of 5 years (05/2024-05/2029)  3. Surveillance colonoscopy 1 year (05/2025) after surgery, etc.  4. FDG PET-CT scan is not recommended     Family history of colon cancer & colon polyp   -genetic testing 12/19/2023: Negative    Follow-up end of July with contrast-enhanced CT scans of C/A/P, CBC, CMP, CEA level    Above discussed leg length with her.  All questions answered.    Discussed pathology report.  Discussed plan of surveillance.  She understands and agrees  with this plan.    Follow-up:  No follow-ups on file.    Answers submitted by the patient for this visit:  Review of Systems Questionnaire (Submitted on 6/20/2024)  appetite change : No  unexpected weight change: No  mouth sores: No  visual disturbance: No  cough: No  shortness of breath: No  chest pain: No  abdominal pain: No  diarrhea: No  frequency: No  back pain: No  rash: No  headaches: No  adenopathy: No  nervous/ anxious: Yes

## 2024-06-27 ENCOUNTER — ANESTHESIA EVENT (OUTPATIENT)
Dept: SURGERY | Facility: HOSPITAL | Age: 54
End: 2024-06-27
Payer: MEDICAID

## 2024-07-05 NOTE — PRE-PROCEDURE INSTRUCTIONS
"[facility]: Outpatient Surgery  Preprocedure Check-In Instructions     Your arrival time for your surgery or procedure is 0500.  We ask patients to arrive about 2 hours before surgery to allow for enough time to review your health history & medications, start your IV, complete any outstanding labwork or tests, and meet your Anesthesiologist.    Expectations: "Because of inconsistent procedure completion times, an unexpected wait may occur. The Physicians would like you to be here to prepare in the event they run ahead of time. We will make you as comfortable as possible and keep you informed. We apologize in advance if this happens."    You will arrive at [facility], [address], [place].  Enter through the [place] entrance next to the Emergency Room, and come to the 6th floor to the Outpatient Surgery Department.     Visitory Policy:  You are allowed 2 adult visitors to be with you in the hospital. All hospital visitors should be in good current health.  No small children.     What to Bring:  Please have your ID, insurance cards, and all home medication bottles with you at check in.  Bring your CPAP machine if one is used at home.     Fasting:  Nothing to eat or drink after midnight the night before your procedure. This includes no ice, gum, hard candies, and/or tobacco products.  Follow your doctor's instructions for taking any medications on the morning of your procedure.  If no instructions for taking medications were given, do not take any medications but bring your medications in their bottles to your procedure check in.     Follow your doctor's preoperative instructions regarding skin prep, bowel prep, bathing, or showering prior to your procedure.  If any special soaps were provided to you, please use according to your doctor's instructions. If no instructions were given from your doctor, take a good bath or shower with antibacterial soap the night before " and the morning of your procedure.  On the morning of procedure, wear loose, comfortable clothing.  No lotions, makeup, perfumes, colognes, deodorant, or jewelry to your procedure.  Removable items (glasses, contact lenses, dentures, retainers, hearing aids) need to be removed for your procedure.  Bring your storage containers for these items if you wear them.     Artificial nails, body jewelry, eyelash extensions, and/or hair extensions with metal clips are not allowed during your surgery.  If you currently wear any of these items, please arrange for them to be removed prior to your arrival to the hospital.     Outpatient or Same Day Surgeries:  Any patients receiving sedation/anesthesia are advised not to drive for 24 hours after their procedure.  We do not allow patients to drive themselves home after discharge.  If you are going home after your procedure, please have someone available to drive you home from the hospital.        You may call the Outpatient Surgery Department at [phone] with any questions or concerns.  We are looking forward to meeting you and taking great care of you for your procedure.  Thank you for choosing [facility] for your surgical needs.

## 2024-07-08 ENCOUNTER — ANESTHESIA (OUTPATIENT)
Dept: SURGERY | Facility: HOSPITAL | Age: 54
End: 2024-07-08
Payer: MEDICAID

## 2024-07-08 ENCOUNTER — HOSPITAL ENCOUNTER (INPATIENT)
Facility: HOSPITAL | Age: 54
LOS: 1 days | Discharge: HOME | DRG: 330 | End: 2024-07-09
Attending: SURGERY | Admitting: SURGERY
Payer: MEDICAID

## 2024-07-08 DIAGNOSIS — C20 RECTAL CANCER: ICD-10-CM

## 2024-07-08 PROCEDURE — 25000003 PHARM REV CODE 250: Performed by: NURSE ANESTHETIST, CERTIFIED REGISTERED

## 2024-07-08 PROCEDURE — 63600175 PHARM REV CODE 636 W HCPCS: Performed by: NURSE PRACTITIONER

## 2024-07-08 PROCEDURE — 63600175 PHARM REV CODE 636 W HCPCS: Mod: JZ,JG | Performed by: SURGERY

## 2024-07-08 PROCEDURE — 63600175 PHARM REV CODE 636 W HCPCS: Performed by: NURSE ANESTHETIST, CERTIFIED REGISTERED

## 2024-07-08 PROCEDURE — 71000033 HC RECOVERY, INTIAL HOUR: Performed by: SURGERY

## 2024-07-08 PROCEDURE — 44620 REPAIR BOWEL OPENING: CPT | Mod: ,,, | Performed by: SURGERY

## 2024-07-08 PROCEDURE — 63600175 PHARM REV CODE 636 W HCPCS: Performed by: SURGERY

## 2024-07-08 PROCEDURE — 27201423 OPTIME MED/SURG SUP & DEVICES STERILE SUPPLY: Performed by: SURGERY

## 2024-07-08 PROCEDURE — 36000707: Performed by: SURGERY

## 2024-07-08 PROCEDURE — 88307 TISSUE EXAM BY PATHOLOGIST: CPT | Performed by: SURGERY

## 2024-07-08 PROCEDURE — 37000008 HC ANESTHESIA 1ST 15 MINUTES: Performed by: SURGERY

## 2024-07-08 PROCEDURE — 11000001 HC ACUTE MED/SURG PRIVATE ROOM

## 2024-07-08 PROCEDURE — 25000003 PHARM REV CODE 250: Performed by: SURGERY

## 2024-07-08 PROCEDURE — 0DBB0ZZ EXCISION OF ILEUM, OPEN APPROACH: ICD-10-PCS | Performed by: SURGERY

## 2024-07-08 PROCEDURE — 27000221 HC OXYGEN, UP TO 24 HOURS

## 2024-07-08 PROCEDURE — 37000009 HC ANESTHESIA EA ADD 15 MINS: Performed by: SURGERY

## 2024-07-08 PROCEDURE — 25000003 PHARM REV CODE 250: Performed by: NURSE PRACTITIONER

## 2024-07-08 PROCEDURE — 44620 REPAIR BOWEL OPENING: CPT | Mod: AS,,, | Performed by: NURSE PRACTITIONER

## 2024-07-08 PROCEDURE — 25000003 PHARM REV CODE 250: Performed by: ANESTHESIOLOGY

## 2024-07-08 PROCEDURE — 36000706: Performed by: SURGERY

## 2024-07-08 PROCEDURE — 63600175 PHARM REV CODE 636 W HCPCS: Performed by: ANESTHESIOLOGY

## 2024-07-08 RX ORDER — HYDROMORPHONE HYDROCHLORIDE 2 MG/ML
1 INJECTION, SOLUTION INTRAMUSCULAR; INTRAVENOUS; SUBCUTANEOUS
Status: DISCONTINUED | OUTPATIENT
Start: 2024-07-08 | End: 2024-07-09 | Stop reason: HOSPADM

## 2024-07-08 RX ORDER — GLYCOPYRROLATE 0.2 MG/ML
INJECTION INTRAMUSCULAR; INTRAVENOUS
Status: DISCONTINUED | OUTPATIENT
Start: 2024-07-08 | End: 2024-07-08

## 2024-07-08 RX ORDER — PHENYLEPHRINE HYDROCHLORIDE 10 MG/ML
INJECTION INTRAVENOUS
Status: DISCONTINUED | OUTPATIENT
Start: 2024-07-08 | End: 2024-07-08

## 2024-07-08 RX ORDER — BUSPIRONE HYDROCHLORIDE 5 MG/1
15 TABLET ORAL 2 TIMES DAILY PRN
Status: DISCONTINUED | OUTPATIENT
Start: 2024-07-08 | End: 2024-07-09 | Stop reason: HOSPADM

## 2024-07-08 RX ORDER — ENOXAPARIN SODIUM 100 MG/ML
30 INJECTION SUBCUTANEOUS EVERY 24 HOURS
Status: DISCONTINUED | OUTPATIENT
Start: 2024-07-09 | End: 2024-07-09 | Stop reason: HOSPADM

## 2024-07-08 RX ORDER — SCOLOPAMINE TRANSDERMAL SYSTEM 1 MG/1
1 PATCH, EXTENDED RELEASE TRANSDERMAL
Status: DISCONTINUED | OUTPATIENT
Start: 2024-07-08 | End: 2024-07-09 | Stop reason: HOSPADM

## 2024-07-08 RX ORDER — ACETAMINOPHEN 10 MG/ML
INJECTION, SOLUTION INTRAVENOUS
Status: DISCONTINUED | OUTPATIENT
Start: 2024-07-08 | End: 2024-07-08

## 2024-07-08 RX ORDER — ALVIMOPAN 12 MG/1
12 CAPSULE ORAL ONCE
Status: COMPLETED | OUTPATIENT
Start: 2024-07-08 | End: 2024-07-08

## 2024-07-08 RX ORDER — OXYCODONE AND ACETAMINOPHEN 5; 325 MG/1; MG/1
1 TABLET ORAL EVERY 4 HOURS PRN
Status: DISCONTINUED | OUTPATIENT
Start: 2024-07-08 | End: 2024-07-09 | Stop reason: HOSPADM

## 2024-07-08 RX ORDER — PROMETHAZINE HYDROCHLORIDE 25 MG/1
25 TABLET ORAL EVERY 6 HOURS PRN
Status: DISCONTINUED | OUTPATIENT
Start: 2024-07-08 | End: 2024-07-09 | Stop reason: HOSPADM

## 2024-07-08 RX ORDER — DIPHENHYDRAMINE HYDROCHLORIDE 50 MG/ML
25 INJECTION INTRAMUSCULAR; INTRAVENOUS EVERY 6 HOURS PRN
Status: DISCONTINUED | OUTPATIENT
Start: 2024-07-08 | End: 2024-07-08 | Stop reason: HOSPADM

## 2024-07-08 RX ORDER — NALOXONE HCL 0.4 MG/ML
0.02 VIAL (ML) INJECTION
Status: DISCONTINUED | OUTPATIENT
Start: 2024-07-08 | End: 2024-07-09 | Stop reason: HOSPADM

## 2024-07-08 RX ORDER — GLUCAGON 1 MG
1 KIT INJECTION
Status: DISCONTINUED | OUTPATIENT
Start: 2024-07-08 | End: 2024-07-09 | Stop reason: HOSPADM

## 2024-07-08 RX ORDER — PROPOFOL 10 MG/ML
VIAL (ML) INTRAVENOUS
Status: DISCONTINUED | OUTPATIENT
Start: 2024-07-08 | End: 2024-07-08

## 2024-07-08 RX ORDER — ONDANSETRON HYDROCHLORIDE 2 MG/ML
4 INJECTION, SOLUTION INTRAVENOUS ONCE
Status: DISCONTINUED | OUTPATIENT
Start: 2024-07-08 | End: 2024-07-08 | Stop reason: HOSPADM

## 2024-07-08 RX ORDER — METRONIDAZOLE 500 MG/100ML
500 INJECTION, SOLUTION INTRAVENOUS
Status: COMPLETED | OUTPATIENT
Start: 2024-07-08 | End: 2024-07-08

## 2024-07-08 RX ORDER — SODIUM CHLORIDE, SODIUM LACTATE, POTASSIUM CHLORIDE, CALCIUM CHLORIDE 600; 310; 30; 20 MG/100ML; MG/100ML; MG/100ML; MG/100ML
INJECTION, SOLUTION INTRAVENOUS CONTINUOUS
Status: DISCONTINUED | OUTPATIENT
Start: 2024-07-08 | End: 2024-07-09 | Stop reason: HOSPADM

## 2024-07-08 RX ORDER — FENTANYL CITRATE 50 UG/ML
INJECTION, SOLUTION INTRAMUSCULAR; INTRAVENOUS
Status: DISCONTINUED | OUTPATIENT
Start: 2024-07-08 | End: 2024-07-08

## 2024-07-08 RX ORDER — DIPHENHYDRAMINE HYDROCHLORIDE 50 MG/ML
12.5 INJECTION INTRAMUSCULAR; INTRAVENOUS EVERY 6 HOURS PRN
Status: DISCONTINUED | OUTPATIENT
Start: 2024-07-08 | End: 2024-07-09 | Stop reason: HOSPADM

## 2024-07-08 RX ORDER — DEXAMETHASONE SODIUM PHOSPHATE 4 MG/ML
INJECTION, SOLUTION INTRA-ARTICULAR; INTRALESIONAL; INTRAMUSCULAR; INTRAVENOUS; SOFT TISSUE
Status: DISCONTINUED | OUTPATIENT
Start: 2024-07-08 | End: 2024-07-08

## 2024-07-08 RX ORDER — ACETAMINOPHEN 10 MG/ML
1000 INJECTION, SOLUTION INTRAVENOUS EVERY 8 HOURS
Status: DISCONTINUED | OUTPATIENT
Start: 2024-07-08 | End: 2024-07-09 | Stop reason: HOSPADM

## 2024-07-08 RX ORDER — HYDROMORPHONE HYDROCHLORIDE 2 MG/ML
0.5 INJECTION, SOLUTION INTRAMUSCULAR; INTRAVENOUS; SUBCUTANEOUS EVERY 5 MIN PRN
Status: DISCONTINUED | OUTPATIENT
Start: 2024-07-08 | End: 2024-07-08 | Stop reason: HOSPADM

## 2024-07-08 RX ORDER — ENOXAPARIN SODIUM 100 MG/ML
30 INJECTION SUBCUTANEOUS EVERY 24 HOURS
Status: DISCONTINUED | OUTPATIENT
Start: 2024-07-08 | End: 2024-07-08

## 2024-07-08 RX ORDER — LIDOCAINE HYDROCHLORIDE 20 MG/ML
INJECTION, SOLUTION EPIDURAL; INFILTRATION; INTRACAUDAL; PERINEURAL
Status: DISCONTINUED | OUTPATIENT
Start: 2024-07-08 | End: 2024-07-08

## 2024-07-08 RX ORDER — ONDANSETRON HYDROCHLORIDE 2 MG/ML
INJECTION, SOLUTION INTRAVENOUS
Status: DISCONTINUED | OUTPATIENT
Start: 2024-07-08 | End: 2024-07-08

## 2024-07-08 RX ORDER — ROCURONIUM BROMIDE 10 MG/ML
INJECTION, SOLUTION INTRAVENOUS
Status: DISCONTINUED | OUTPATIENT
Start: 2024-07-08 | End: 2024-07-08

## 2024-07-08 RX ORDER — HYDROMORPHONE HYDROCHLORIDE 2 MG/ML
1 INJECTION, SOLUTION INTRAMUSCULAR; INTRAVENOUS; SUBCUTANEOUS EVERY 4 HOURS PRN
Status: DISCONTINUED | OUTPATIENT
Start: 2024-07-08 | End: 2024-07-09 | Stop reason: HOSPADM

## 2024-07-08 RX ORDER — DULOXETIN HYDROCHLORIDE 20 MG/1
20 CAPSULE, DELAYED RELEASE ORAL DAILY
Status: DISCONTINUED | OUTPATIENT
Start: 2024-07-08 | End: 2024-07-09 | Stop reason: HOSPADM

## 2024-07-08 RX ORDER — ESCITALOPRAM OXALATE 10 MG/1
20 TABLET ORAL NIGHTLY
Status: DISCONTINUED | OUTPATIENT
Start: 2024-07-08 | End: 2024-07-09 | Stop reason: HOSPADM

## 2024-07-08 RX ORDER — MIDAZOLAM HYDROCHLORIDE 1 MG/ML
INJECTION INTRAMUSCULAR; INTRAVENOUS
Status: DISCONTINUED | OUTPATIENT
Start: 2024-07-08 | End: 2024-07-08

## 2024-07-08 RX ORDER — KETOROLAC TROMETHAMINE 30 MG/ML
INJECTION, SOLUTION INTRAMUSCULAR; INTRAVENOUS
Status: DISCONTINUED | OUTPATIENT
Start: 2024-07-08 | End: 2024-07-08

## 2024-07-08 RX ORDER — ALVIMOPAN 12 MG/1
12 CAPSULE ORAL 2 TIMES DAILY
Status: DISCONTINUED | OUTPATIENT
Start: 2024-07-08 | End: 2024-07-09 | Stop reason: HOSPADM

## 2024-07-08 RX ORDER — HYDROMORPHONE HYDROCHLORIDE 2 MG/ML
0.2 INJECTION, SOLUTION INTRAMUSCULAR; INTRAVENOUS; SUBCUTANEOUS EVERY 5 MIN PRN
Status: DISCONTINUED | OUTPATIENT
Start: 2024-07-08 | End: 2024-07-08 | Stop reason: HOSPADM

## 2024-07-08 RX ORDER — ONDANSETRON HYDROCHLORIDE 2 MG/ML
8 INJECTION, SOLUTION INTRAVENOUS EVERY 8 HOURS PRN
Status: DISCONTINUED | OUTPATIENT
Start: 2024-07-08 | End: 2024-07-09 | Stop reason: HOSPADM

## 2024-07-08 RX ORDER — DIPHENHYDRAMINE HYDROCHLORIDE 50 MG/ML
12.5 INJECTION INTRAMUSCULAR; INTRAVENOUS EVERY 4 HOURS PRN
Status: DISCONTINUED | OUTPATIENT
Start: 2024-07-08 | End: 2024-07-09 | Stop reason: HOSPADM

## 2024-07-08 RX ORDER — MEPERIDINE HYDROCHLORIDE 25 MG/ML
12.5 INJECTION INTRAMUSCULAR; INTRAVENOUS; SUBCUTANEOUS ONCE
Status: DISCONTINUED | OUTPATIENT
Start: 2024-07-08 | End: 2024-07-08 | Stop reason: HOSPADM

## 2024-07-08 RX ADMIN — PROPOFOL 150 MG: 10 INJECTION, EMULSION INTRAVENOUS at 07:07

## 2024-07-08 RX ADMIN — PHENYLEPHRINE HYDROCHLORIDE 100 MCG: 10 INJECTION INTRAVENOUS at 07:07

## 2024-07-08 RX ADMIN — MIDAZOLAM HYDROCHLORIDE 2 MG: 1 INJECTION, SOLUTION INTRAMUSCULAR; INTRAVENOUS at 07:07

## 2024-07-08 RX ADMIN — SUGAMMADEX 200 MG: 100 INJECTION, SOLUTION INTRAVENOUS at 08:07

## 2024-07-08 RX ADMIN — LIDOCAINE HYDROCHLORIDE 4 ML: 20 INJECTION, SOLUTION INTRAVENOUS at 07:07

## 2024-07-08 RX ADMIN — HYDROMORPHONE HYDROCHLORIDE 0.5 MG: 2 INJECTION INTRAMUSCULAR; INTRAVENOUS; SUBCUTANEOUS at 08:07

## 2024-07-08 RX ADMIN — OXYCODONE HYDROCHLORIDE AND ACETAMINOPHEN 1 TABLET: 5; 325 TABLET ORAL at 03:07

## 2024-07-08 RX ADMIN — ESCITALOPRAM OXALATE 20 MG: 10 TABLET ORAL at 09:07

## 2024-07-08 RX ADMIN — HYDROMORPHONE HYDROCHLORIDE 1 MG: 2 INJECTION INTRAMUSCULAR; INTRAVENOUS; SUBCUTANEOUS at 12:07

## 2024-07-08 RX ADMIN — KETOROLAC TROMETHAMINE 30 MG: 30 INJECTION, SOLUTION INTRAMUSCULAR; INTRAVENOUS at 08:07

## 2024-07-08 RX ADMIN — OXYCODONE HYDROCHLORIDE AND ACETAMINOPHEN 1 TABLET: 5; 325 TABLET ORAL at 07:07

## 2024-07-08 RX ADMIN — LIDOCAINE HYDROCHLORIDE 1 ML: 20 INJECTION, SOLUTION INTRAVENOUS at 08:07

## 2024-07-08 RX ADMIN — ALVIMOPAN 12 MG: 12 CAPSULE ORAL at 09:07

## 2024-07-08 RX ADMIN — ENOXAPARIN SODIUM 30 MG: 30 INJECTION SUBCUTANEOUS at 06:07

## 2024-07-08 RX ADMIN — OXYCODONE HYDROCHLORIDE AND ACETAMINOPHEN 1 TABLET: 5; 325 TABLET ORAL at 10:07

## 2024-07-08 RX ADMIN — SODIUM CHLORIDE, POTASSIUM CHLORIDE, SODIUM LACTATE AND CALCIUM CHLORIDE: 600; 310; 30; 20 INJECTION, SOLUTION INTRAVENOUS at 10:07

## 2024-07-08 RX ADMIN — METRONIDAZOLE 500 MG: 500 INJECTION, SOLUTION INTRAVENOUS at 07:07

## 2024-07-08 RX ADMIN — ACETAMINOPHEN 1000 MG: 10 INJECTION, SOLUTION INTRAVENOUS at 09:07

## 2024-07-08 RX ADMIN — GLYCOPYRROLATE 0.2 MG: 0.2 INJECTION INTRAMUSCULAR; INTRAVENOUS at 07:07

## 2024-07-08 RX ADMIN — ALVIMOPAN 12 MG: 12 CAPSULE ORAL at 10:07

## 2024-07-08 RX ADMIN — DEXAMETHASONE SODIUM PHOSPHATE 4 MG: 4 INJECTION, SOLUTION INTRA-ARTICULAR; INTRALESIONAL; INTRAMUSCULAR; INTRAVENOUS; SOFT TISSUE at 07:07

## 2024-07-08 RX ADMIN — ROCURONIUM BROMIDE 40 MG: 10 SOLUTION INTRAVENOUS at 07:07

## 2024-07-08 RX ADMIN — DULOXETINE HYDROCHLORIDE 20 MG: 20 CAPSULE, DELAYED RELEASE ORAL at 10:07

## 2024-07-08 RX ADMIN — SCOPOLAMINE 1 PATCH: 1 PATCH TRANSDERMAL at 06:07

## 2024-07-08 RX ADMIN — CEFTRIAXONE SODIUM 1 G: 1 INJECTION, POWDER, FOR SOLUTION INTRAMUSCULAR; INTRAVENOUS at 07:07

## 2024-07-08 RX ADMIN — ROCURONIUM BROMIDE 10 MG: 10 SOLUTION INTRAVENOUS at 07:07

## 2024-07-08 RX ADMIN — ACETAMINOPHEN 1000 MG: 10 INJECTION, SOLUTION INTRAVENOUS at 01:07

## 2024-07-08 RX ADMIN — HYDROMORPHONE HYDROCHLORIDE 0.5 MG: 2 INJECTION INTRAMUSCULAR; INTRAVENOUS; SUBCUTANEOUS at 09:07

## 2024-07-08 RX ADMIN — ALVIMOPAN 12 MG: 12 CAPSULE ORAL at 05:07

## 2024-07-08 RX ADMIN — ACETAMINOPHEN 1000 MG: 10 INJECTION, SOLUTION INTRAVENOUS at 07:07

## 2024-07-08 RX ADMIN — ONDANSETRON 4 MG: 2 INJECTION INTRAMUSCULAR; INTRAVENOUS at 08:07

## 2024-07-08 RX ADMIN — HYDROMORPHONE HYDROCHLORIDE 1 MG: 2 INJECTION, SOLUTION INTRAMUSCULAR; INTRAVENOUS; SUBCUTANEOUS at 10:07

## 2024-07-08 RX ADMIN — FENTANYL CITRATE 100 MCG: 50 INJECTION, SOLUTION INTRAMUSCULAR; INTRAVENOUS at 07:07

## 2024-07-08 NOTE — PLAN OF CARE
Problem: Adult Inpatient Plan of Care  Goal: Plan of Care Review  Reactivated  Goal: Patient-Specific Goal (Individualized)  Reactivated  Goal: Absence of Hospital-Acquired Illness or Injury  Reactivated  Goal: Optimal Comfort and Wellbeing  Reactivated  Goal: Readiness for Transition of Care  Reactivated     Problem: Wound  Goal: Optimal Coping  Reactivated  Goal: Optimal Functional Ability  Reactivated  Goal: Absence of Infection Signs and Symptoms  Reactivated  Goal: Improved Oral Intake  Reactivated  Goal: Optimal Pain Control and Function  Reactivated  Goal: Skin Health and Integrity  Reactivated  Goal: Optimal Wound Healing  Reactivated     Problem: Fall Injury Risk  Goal: Absence of Fall and Fall-Related Injury  Reactivated     Problem: Pain Acute  Goal: Optimal Pain Control and Function  Reactivated     Problem: Surgical Site Infection  Goal: Absence of Infection Signs and Symptoms  Reactivated

## 2024-07-08 NOTE — OP NOTE
Date of Surgery:  07/08/2024    Surgeon:  Lee Rosenthal MD    Assistant:  Elayne MYERS                                        Pre-op Diagnosis:  Rectal cancer, ileostomy in place    Post-op Diagnosis:  Same    Procedure:  Ileostomy reversal    Anesthesia:  GETA    EBL:  Less than 25 cc    Specimens:  Previous ileostomy for gross examination only    Findings:  Previous ileostomy was taken down, side-to-side functional end-to-end staple anastomosis was performed without difficulty.    Procedure in detail:  After informed consent was obtained the patient was brought to the operating room placed supine position.  General endotracheal anesthesia was administered without difficulty.  The patient's ileostomy site was prepped and draped in sterile fashion.  An elliptical incision was made around the ileostomy carried down through the subcutaneous tissues.  The 2 limbs of the loop ileostomy were dissected free from the subcutaneous tissues, fascia and muscle using sharp dissection with Metzenbaum scissors meticulously.  Once the bowel was completely freed the small bowel was dissected circumferentially proximal and distal to the ileostomy site and divided using a LINDA stapler.  The mesentery was divided between hemostat clamps and 2-0 silk ligatures.  The specimen was sent for gross examination only.  The 2 ends of the bowel were then apposed and the corners of the staple line excised.  Side-to-side functional end-to-end stapled anastomosis was performed using a LINDA 75 mm blue load stapler.  The common enterotomy was closed using a TA 60 mm blue load stapler.  The anastomosis was carefully examined it was of adequate patency.  It was reduced back into the abdominal cavity.  The fascia was closed using 0 Vicryl suture.  The subcutaneous tissues were irrigated and the wound was closed with absorbable suture and skin staples.  Sterile dressings were applied the patient tolerated the procedure well.    Significant surgical  tasks conducted by the assistant:  The skilled assistance of the nurse practitioner LOUIS Dotson was necessary for the successful completion of this case.  She was essential for the proper positioning of the patient, manipulation of instruments, proper exposure, manipulation of tissue, and wound closure        Lee Rosenthal MD  Surgical Oncology  Complex General, Gastrointestinal and Hepatobiliary Surgery

## 2024-07-08 NOTE — NURSING
Nurses Note -- 4 Eyes      7/8/2024   10:54 AM      Skin assessed during: Admit      [x] No Altered Skin Integrity Present    []Prevention Measures Documented      [] Yes- Altered Skin Integrity Present or Discovered   [] LDA Added if Not in Epic (Describe Wound)   [] New Altered Skin Integrity was Present on Admit and Documented in LDA   [] Wound Image Taken    Wound Care Consulted? No    Attending Nurse:  Jessica KAT RN    Second RN/Staff Member:   JOSEPHINE Rendon    Redness to R-abd where ostomy use to be

## 2024-07-08 NOTE — ANESTHESIA POSTPROCEDURE EVALUATION
Anesthesia Post Evaluation    Patient: Nikkie Sharpe    Procedure(s) Performed: Procedure(s) (LRB):  CLOSURE, ILEOSTOMY (N/A)    Final Anesthesia Type: general      Patient location during evaluation: PACU  Patient participation: Yes- Able to Participate  Level of consciousness: awake and alert  Post-procedure vital signs: reviewed and stable  Pain management: adequate  Airway patency: patent      Anesthetic complications: no      Cardiovascular status: hemodynamically stable  Respiratory status: unassisted  Hydration status: euvolemic  Follow-up not needed.              Vitals Value Taken Time   /68 07/08/24 0938   Temp 36 °C (96.8 °F) 07/08/24 0938   Pulse 91 07/08/24 0938   Resp 20 07/08/24 0931   SpO2 97 % 07/08/24 0938   Vitals shown include unfiled device data.      Event Time   Out of Recovery 09:33:00         Pain/Roni Score: Pain Rating Prior to Med Admin: 7 (7/8/2024  9:00 AM)  Roni Score: 9 (7/8/2024  9:33 AM)

## 2024-07-08 NOTE — TRANSFER OF CARE
"Anesthesia Transfer of Care Note    Patient: Nikkie Sharpe    Procedure(s) Performed: Procedure(s) (LRB):  CLOSURE, ILEOSTOMY (N/A)    Patient location: PACU    Anesthesia Type: general    Transport from OR: Transported from OR on room air with adequate spontaneous ventilation    Post pain: adequate analgesia    Post assessment: no apparent anesthetic complications and tolerated procedure well    Post vital signs: stable    Level of consciousness: responds to stimulation    Nausea/Vomiting: no nausea/vomiting    Complications: none    Transfer of care protocol was followed      Last vitals: Visit Vitals  /70   Pulse 76   Temp 36.8 °C (98.2 °F) (Oral)   Resp 17   Ht 5' 2" (1.575 m)   Wt 61.4 kg (135 lb 5.8 oz)   LMP  (LMP Unknown)   SpO2 98%   Breastfeeding No   BMI 24.76 kg/m²     "

## 2024-07-08 NOTE — ANESTHESIA PROCEDURE NOTES
Intubation    Date/Time: 7/8/2024 7:20 AM    Performed by: Abhijit Ford CRNA  Authorized by: Vanessa Elliott MD    Intubation:     Induction:  Intravenous    Intubated:  Postinduction    Mask Ventilation:  Easy with oral airway    Attempts:  1    Attempted By:  CRNA and student    Method of Intubation:  Direct    Blade:  Sharpe 2    Laryngeal View Grade: Grade IIA - cords partially seen      Difficult Airway Encountered?: No      Complications:  None    Airway Device:  Oral endotracheal tube    Airway Device Size:  7.5    Style/Cuff Inflation:  Cuffed (inflated to minimal occlusive pressure)    Inflation Amount (mL):  8    Tube secured:  21    Secured at:  The lips    Placement Verified By:  Capnometry    Complicating Factors:  Small mouth    Findings Post-Intubation:  BS equal bilateral

## 2024-07-08 NOTE — ANESTHESIA PREPROCEDURE EVALUATION
07/08/2024  Nikkie Sharpe is a 54 y.o., female.  S/p LAR, now presents for ilostomy take-down  H/o GERD      Pre-op Assessment    I have reviewed the Patient Summary Reports.     I have reviewed the Nursing Notes. I have reviewed the NPO Status.   I have reviewed the Medications.     Review of Systems  Hepatic/GI:     GERD      Gerd          Neurological:    Neuromuscular Disease,                                 Neuromuscular Disease   Psych:  Psychiatric History                  Physical Exam  General: Well nourished, Cooperative, Alert and Oriented    Airway:  Mallampati: II   Mouth Opening: Normal  TM Distance: Normal  Tongue: Normal  Neck ROM: Normal ROM    Dental:  Intact        Anesthesia Plan  Type of Anesthesia, risks & benefits discussed:    Anesthesia Type: Gen ETT  Intra-op Monitoring Plan: Standard ASA Monitors  Post Op Pain Control Plan: multimodal analgesia  Induction:  IV  Airway Plan: Direct, Post-Induction  Informed Consent: Informed consent signed with the Patient and all parties understand the risks and agree with anesthesia plan.  All questions answered. Patient consented to blood products? Yes  ASA Score: 2  Day of Surgery Review of History & Physical: H&P Update referred to the surgeon/provider.    Ready For Surgery From Anesthesia Perspective.     .

## 2024-07-09 VITALS
HEART RATE: 74 BPM | DIASTOLIC BLOOD PRESSURE: 62 MMHG | WEIGHT: 135.38 LBS | TEMPERATURE: 98 F | SYSTOLIC BLOOD PRESSURE: 98 MMHG | BODY MASS INDEX: 24.91 KG/M2 | OXYGEN SATURATION: 94 % | RESPIRATION RATE: 18 BRPM | HEIGHT: 62 IN

## 2024-07-09 DIAGNOSIS — C20 RECTAL CANCER: Primary | ICD-10-CM

## 2024-07-09 PROBLEM — K62.89 RECTAL MASS: Status: ACTIVE | Noted: 2024-07-09

## 2024-07-09 LAB
ALBUMIN SERPL-MCNC: 3.4 G/DL (ref 3.5–5)
ALBUMIN/GLOB SERPL: 1.6 RATIO (ref 1.1–2)
ALP SERPL-CCNC: 84 UNIT/L (ref 40–150)
ALT SERPL-CCNC: 14 UNIT/L (ref 0–55)
ANION GAP SERPL CALC-SCNC: 8 MEQ/L
AST SERPL-CCNC: 15 UNIT/L (ref 5–34)
BASOPHILS # BLD AUTO: 0.02 X10(3)/MCL
BASOPHILS NFR BLD AUTO: 0.4 %
BILIRUB SERPL-MCNC: 1.6 MG/DL
BUN SERPL-MCNC: 6.5 MG/DL (ref 9.8–20.1)
CALCIUM SERPL-MCNC: 9 MG/DL (ref 8.4–10.2)
CHLORIDE SERPL-SCNC: 106 MMOL/L (ref 98–107)
CO2 SERPL-SCNC: 24 MMOL/L (ref 22–29)
CREAT SERPL-MCNC: 0.73 MG/DL (ref 0.55–1.02)
CREAT/UREA NIT SERPL: 9
EOSINOPHIL # BLD AUTO: 0.02 X10(3)/MCL (ref 0–0.9)
EOSINOPHIL NFR BLD AUTO: 0.4 %
ERYTHROCYTE [DISTWIDTH] IN BLOOD BY AUTOMATED COUNT: 13.7 % (ref 11.5–17)
GFR SERPLBLD CREATININE-BSD FMLA CKD-EPI: >60 ML/MIN/1.73/M2
GLOBULIN SER-MCNC: 2.1 GM/DL (ref 2.4–3.5)
GLUCOSE SERPL-MCNC: 103 MG/DL (ref 74–100)
HCT VFR BLD AUTO: 30.9 % (ref 37–47)
HGB BLD-MCNC: 10.4 G/DL (ref 12–16)
IMM GRANULOCYTES # BLD AUTO: 0.01 X10(3)/MCL (ref 0–0.04)
IMM GRANULOCYTES NFR BLD AUTO: 0.2 %
LYMPHOCYTES # BLD AUTO: 0.62 X10(3)/MCL (ref 0.6–4.6)
LYMPHOCYTES NFR BLD AUTO: 10.9 %
MCH RBC QN AUTO: 29.5 PG (ref 27–31)
MCHC RBC AUTO-ENTMCNC: 33.7 G/DL (ref 33–36)
MCV RBC AUTO: 87.5 FL (ref 80–94)
MONOCYTES # BLD AUTO: 0.43 X10(3)/MCL (ref 0.1–1.3)
MONOCYTES NFR BLD AUTO: 7.6 %
NEUTROPHILS # BLD AUTO: 4.59 X10(3)/MCL (ref 2.1–9.2)
NEUTROPHILS NFR BLD AUTO: 80.5 %
NRBC BLD AUTO-RTO: 0 %
PLATELET # BLD AUTO: 167 X10(3)/MCL (ref 130–400)
PMV BLD AUTO: 9.5 FL (ref 7.4–10.4)
POTASSIUM SERPL-SCNC: 3.8 MMOL/L (ref 3.5–5.1)
PROT SERPL-MCNC: 5.5 GM/DL (ref 6.4–8.3)
PSYCHE PATHOLOGY RESULT: NORMAL
RBC # BLD AUTO: 3.53 X10(6)/MCL (ref 4.2–5.4)
SODIUM SERPL-SCNC: 138 MMOL/L (ref 136–145)
WBC # BLD AUTO: 5.69 X10(3)/MCL (ref 4.5–11.5)

## 2024-07-09 PROCEDURE — 36415 COLL VENOUS BLD VENIPUNCTURE: CPT | Performed by: NURSE PRACTITIONER

## 2024-07-09 PROCEDURE — 85025 COMPLETE CBC W/AUTO DIFF WBC: CPT | Performed by: NURSE PRACTITIONER

## 2024-07-09 PROCEDURE — 80053 COMPREHEN METABOLIC PANEL: CPT | Performed by: NURSE PRACTITIONER

## 2024-07-09 PROCEDURE — 94760 N-INVAS EAR/PLS OXIMETRY 1: CPT

## 2024-07-09 PROCEDURE — 25000003 PHARM REV CODE 250: Performed by: NURSE PRACTITIONER

## 2024-07-09 PROCEDURE — 27000221 HC OXYGEN, UP TO 24 HOURS

## 2024-07-09 PROCEDURE — 63600175 PHARM REV CODE 636 W HCPCS: Performed by: NURSE PRACTITIONER

## 2024-07-09 RX ORDER — PROMETHAZINE HYDROCHLORIDE 25 MG/1
25 TABLET ORAL EVERY 6 HOURS PRN
Qty: 10 TABLET | Refills: 0 | Status: SHIPPED | OUTPATIENT
Start: 2024-07-09

## 2024-07-09 RX ORDER — OXYCODONE AND ACETAMINOPHEN 5; 325 MG/1; MG/1
1 TABLET ORAL EVERY 4 HOURS PRN
Qty: 20 TABLET | Refills: 0 | Status: SHIPPED | OUTPATIENT
Start: 2024-07-09

## 2024-07-09 RX ADMIN — SODIUM CHLORIDE, POTASSIUM CHLORIDE, SODIUM LACTATE AND CALCIUM CHLORIDE: 600; 310; 30; 20 INJECTION, SOLUTION INTRAVENOUS at 02:07

## 2024-07-09 RX ADMIN — HYDROMORPHONE HYDROCHLORIDE 1 MG: 2 INJECTION INTRAMUSCULAR; INTRAVENOUS; SUBCUTANEOUS at 10:07

## 2024-07-09 RX ADMIN — ALVIMOPAN 12 MG: 12 CAPSULE ORAL at 09:07

## 2024-07-09 RX ADMIN — DULOXETINE HYDROCHLORIDE 20 MG: 20 CAPSULE, DELAYED RELEASE ORAL at 09:07

## 2024-07-09 RX ADMIN — HYDROMORPHONE HYDROCHLORIDE 1 MG: 2 INJECTION, SOLUTION INTRAMUSCULAR; INTRAVENOUS; SUBCUTANEOUS at 05:07

## 2024-07-09 NOTE — PLAN OF CARE
Problem: Adult Inpatient Plan of Care  Goal: Plan of Care Review  Outcome: Progressing  Goal: Patient-Specific Goal (Individualized)  Outcome: Progressing  Goal: Absence of Hospital-Acquired Illness or Injury  Outcome: Progressing  Goal: Optimal Comfort and Wellbeing  Outcome: Progressing  Goal: Readiness for Transition of Care  Outcome: Progressing     Problem: Wound  Goal: Optimal Coping  Outcome: Progressing  Goal: Optimal Functional Ability  Outcome: Progressing  Goal: Absence of Infection Signs and Symptoms  Outcome: Progressing  Goal: Improved Oral Intake  Outcome: Progressing  Goal: Optimal Pain Control and Function  Outcome: Progressing  Goal: Skin Health and Integrity  Outcome: Progressing  Goal: Optimal Wound Healing  Outcome: Progressing     Problem: Fall Injury Risk  Goal: Absence of Fall and Fall-Related Injury  Outcome: Progressing     Problem: Pain Acute  Goal: Optimal Pain Control and Function  Outcome: Progressing     Problem: Surgical Site Infection  Goal: Absence of Infection Signs and Symptoms  Outcome: Progressing

## 2024-07-09 NOTE — PLAN OF CARE
Problem: Adult Inpatient Plan of Care  Goal: Plan of Care Review  7/9/2024 0739 by Dionna Landa RN  Outcome: Progressing  7/9/2024 0738 by Dionna Landa RN  Outcome: Progressing  Goal: Patient-Specific Goal (Individualized)  7/9/2024 0739 by Dionna Landa RN  Outcome: Progressing  7/9/2024 0738 by Dionna Landa RN  Outcome: Progressing  Goal: Absence of Hospital-Acquired Illness or Injury  7/9/2024 0739 by Dionna Landa RN  Outcome: Progressing  7/9/2024 0738 by Dionna Landa RN  Outcome: Progressing  Goal: Optimal Comfort and Wellbeing  7/9/2024 0739 by Dionna Landa RN  Outcome: Progressing  7/9/2024 0738 by Dionna Landa RN  Outcome: Progressing  Goal: Readiness for Transition of Care  7/9/2024 0739 by Dionna Landa RN  Outcome: Progressing  7/9/2024 0738 by Dionna Landa RN  Outcome: Progressing     Problem: Wound  Goal: Optimal Coping  7/9/2024 0739 by Dionna Landa RN  Outcome: Progressing  7/9/2024 0738 by Dionna Landa RN  Outcome: Progressing  Goal: Optimal Functional Ability  7/9/2024 0739 by Dionna Landa RN  Outcome: Progressing  7/9/2024 0738 by Dionna Landa RN  Outcome: Progressing  Goal: Absence of Infection Signs and Symptoms  7/9/2024 0739 by Dionna Landa RN  Outcome: Progressing  7/9/2024 0738 by Dionna Landa RN  Outcome: Progressing  Goal: Improved Oral Intake  7/9/2024 0739 by Dionna Landa RN  Outcome: Progressing  7/9/2024 0738 by Dionna Landa RN  Outcome: Progressing  Goal: Optimal Pain Control and Function  7/9/2024 0739 by Dionna Landa RN  Outcome: Progressing  7/9/2024 0738 by Dionna Landa RN  Outcome: Progressing  Goal: Skin Health and Integrity  7/9/2024 0739 by Dionna Landa RN  Outcome: Progressing  7/9/2024 0738 by Dionna Landa RN  Outcome: Progressing  Goal: Optimal Wound Healing  7/9/2024 0739 by Dionna Landa RN  Outcome: Progressing  7/9/2024 0738 by Dionna Landa RN  Outcome: Progressing     Problem: Fall Injury Risk  Goal:  Absence of Fall and Fall-Related Injury  7/9/2024 0739 by Dionna Landa RN  Outcome: Progressing  7/9/2024 0738 by Dionna Landa RN  Outcome: Progressing     Problem: Surgical Site Infection  Goal: Absence of Infection Signs and Symptoms  7/9/2024 0739 by Dionna Landa RN  Outcome: Progressing  7/9/2024 0738 by Dionna Landa RN  Outcome: Progressing

## 2024-07-09 NOTE — PROGRESS NOTES
Pt was given discharge instructions, home medications, and follow up appointments. She was discharged to home in stable condition with all questions answered.

## 2024-07-09 NOTE — PLAN OF CARE
Problem: Adult Inpatient Plan of Care  Goal: Plan of Care Review  Outcome: Met  Goal: Patient-Specific Goal (Individualized)  Outcome: Met  Goal: Absence of Hospital-Acquired Illness or Injury  Outcome: Met  Goal: Optimal Comfort and Wellbeing  Outcome: Met  Goal: Readiness for Transition of Care  Outcome: Met     Problem: Wound  Goal: Optimal Coping  Outcome: Met  Goal: Optimal Functional Ability  Outcome: Met  Goal: Absence of Infection Signs and Symptoms  Outcome: Met  Goal: Improved Oral Intake  Outcome: Met  Goal: Optimal Pain Control and Function  Outcome: Met  Goal: Skin Health and Integrity  Outcome: Met  Goal: Optimal Wound Healing  Outcome: Met     Problem: Fall Injury Risk  Goal: Absence of Fall and Fall-Related Injury  Outcome: Met     Problem: Pain Acute  Goal: Optimal Pain Control and Function  Outcome: Met     Problem: Surgical Site Infection  Goal: Absence of Infection Signs and Symptoms  Outcome: Met

## 2024-07-09 NOTE — PROGRESS NOTES
Inpatient Nutrition Evaluation    Admit Date: 7/8/2024   Total duration of encounter: 1 day   Patient Age: 54 y.o.    Nutrition Recommendation/Prescription     -Advance diet as tolerated per MD. Goal Diet: Low Residue Diet.   -Monitor wt, labs, and intake.     Nutrition Assessment     Chart Review    Reason Seen: continuous nutrition monitoring    Malnutrition Screening Tool Results   Have you recently lost weight without trying?: No  Have you been eating poorly because of a decreased appetite?: No   MST Score: 0   Diagnosis:  Rectal cancer s/p ileostomy reversal    Relevant Medical History:   Anesthesia complication   Anxiety disorder, unspecified   Depression   GERD (gastroesophageal reflux disease)   Ileostomy present   Insomnia   Rectal cancer       Scheduled Medications:  acetaminophen, 1,000 mg, Q8H  alvimopan, 12 mg, BID  DULoxetine, 20 mg, Daily  enoxparin, 30 mg, Q24H (prophylaxis, 1700)  EScitalopram oxalate, 20 mg, QHS  scopolamine, 1 patch, Q3 Days    Continuous Infusions:  lactated ringers, Last Rate: 75 mL/hr at 07/09/24 0207    PRN Medications:   Current Facility-Administered Medications:     busPIRone, 15 mg, Oral, BID PRN    dextrose 10%, 12.5 g, Intravenous, PRN    dextrose 10%, 25 g, Intravenous, PRN    diphenhydrAMINE, 12.5 mg, Intravenous, Q6H PRN    diphenhydrAMINE, 12.5 mg, Intravenous, Q4H PRN    glucagon (human recombinant), 1 mg, Intramuscular, PRN    HYDROmorphone, 1 mg, Intravenous, Q3H PRN    HYDROmorphone, 1 mg, Intravenous, Q4H PRN    naloxone, 0.02 mg, Intravenous, PRN    ondansetron, 8 mg, Intravenous, Q8H PRN    oxyCODONE-acetaminophen, 1 tablet, Oral, Q4H PRN    promethazine, 25 mg, Oral, Q6H PRN    Recent Labs   Lab 07/09/24  0516      K 3.8   CALCIUM 9.0   CO2 24   BUN 6.5*   CREATININE 0.73   EGFRNORACEVR >60   GLUCOSE 103*   BILITOT 1.6*   ALKPHOS 84   ALT 14   AST 15   ALBUMIN 3.4*   WBC 5.69   HGB 10.4*   HCT 30.9*     Nutrition Orders:  Diet Clear  "Liquid      Appetite/Oral Intake: fair/50-75% of meals  Factors Affecting Nutritional Intake: clear liquid diet  Food/Cheondoism/Cultural Preferences: none reported  Food Allergies: no known food allergies  Last Bowel Movement: 07/08/24  Wound(s):  laparoscopic punctures    Comments    7/9/24: Pt reports tolerating clears; intake is fair; reports usual good intake and a usual wt of ~120 lbs.     Anthropometrics    Height: 5' 2" (157.5 cm), Height Method: Stated  Last Weight: 61.4 kg (135 lb 5.8 oz) (07/08/24 1146), Weight Method: Standard Scale  BMI (Calculated): 24.8  BMI Classification: normal (BMI 18.5-24.9)     Ideal Body Weight (IBW), Female: 110 lb     % Ideal Body Weight, Female (lb): 123.05 %                             Usual Weight Provided By: patient    Wt Readings from Last 5 Encounters:   07/08/24 61.4 kg (135 lb 5.8 oz)   06/20/24 61.5 kg (135 lb 9.6 oz)   06/12/24 60.2 kg (132 lb 12.8 oz)   05/28/24 58.2 kg (128 lb 6.4 oz)   05/21/24 61 kg (134 lb 7.7 oz)     Weight Change(s) Since Admission:   Wt Readings from Last 1 Encounters:   07/08/24 1146 61.4 kg (135 lb 5.8 oz)   07/08/24 0541 61.4 kg (135 lb 5.8 oz)   06/27/24 1518 59 kg (130 lb)   Admit Weight: 59 kg (130 lb) (06/27/24 1518), Weight Method: Stated    Patient Education     Not applicable.    Nutrition Goals & Monitoring     Dietitian will monitor: energy intake and weight    Nutrition Risk/Follow-Up: low (follow-up in 5-7 days)  Patients assigned 'low nutrition risk' status do not qualify for a full nutritional assessment but will be monitored and re-evaluated in a 5-7 day time period. Please consult if re-evaluation needed sooner.   "

## 2024-07-09 NOTE — PROGRESS NOTES
SURG ONC POD 1    PT LOOKS GREAT  TELLS ME SHE IS DOING WELL  TOLERATING DIET  SORENESS ONLY    ABD SOFT  INCISION CLEAN AND INTACT    VSS; NO FEVER  WBC 5000  HH 10/30  LYTES GOOD  UO MORE THAN ADEQUATE    PLAN  DC HOME   FU OFFICE 2 WEEKS  RX PERCOCET

## 2024-07-11 ENCOUNTER — DOCUMENT SCAN (OUTPATIENT)
Dept: HOME HEALTH SERVICES | Facility: HOSPITAL | Age: 54
End: 2024-07-11
Payer: MEDICAID

## 2024-07-16 NOTE — DISCHARGE SUMMARY
[facility] - 8th Floor Med Surg  General Surgery  Discharge Summary      Patient Name: Nikkie Sharpe  MRN: [id number]  Admission Date: 7/8/2024  Hospital Length of Stay: 1 days  Discharge Date and Time: 7/9/2024 12:04 PM  Attending Physician: No att. providers found   Discharging Provider: LOUIS Vargas  Primary Care Provider: Catherine Jacobo NP    HPI:   No notes on file    Procedure(s) (LRB):  CLOSURE, ILEOSTOMY (N/A)      Indwelling Lines/Drains at time of discharge:   Lines/Drains/Airways       Peripherally Inserted Central Catheter Line  Duration             PICC Double Lumen 01/06/23 1132 left basilic 26 days                  Hospital Course: 54 YEAR OLD FEMALE DX RECTAL CANCER UNDERWENT ILEOSTOMY REVERSAL; NO POST OPERATIVE COMPLICATIONS; STABLE ON DISCHARGE    Goals of Care Treatment Preferences:         Consults:       Significant Diagnostic Studies: N/A    Pending Diagnostic Studies:       None          Final Active Diagnoses:    Diagnosis Date Noted POA    PRINCIPAL PROBLEM:  Rectal mass [K62.89] 07/09/2024 Yes      Problems Resolved During this Admission:      Discharged Condition: good    Disposition: Home or Self Care    Follow Up:   Follow-up Information       Elayne Alvarez FNP Follow up in 2 week(s).    Specialties: Surgical Oncology, General Surgery  Why: appointment july 23 @1015  Contact information:  [address]  [address]  [facility] [address] [phone]                           Patient Instructions:   No discharge procedures on file.  Medications:  Reconciled Home Medications:      Medication List        CHANGE how you take these medications      * oxyCODONE-acetaminophen 5-325 mg per tablet  Commonly known as: PERCOCET  Take 1 tablet by mouth every 4 (four) hours as needed for Pain.  What changed: Another medication with the same name was added. Make sure you understand how and when to take each.     * oxyCODONE-acetaminophen 5-325 mg per tablet  Commonly known as:  PERCOCET  Take 1 tablet by mouth every 4 (four) hours as needed for Pain.  What changed: You were already taking a medication with the same name, and this prescription was added. Make sure you understand how and when to take each.           * This list has 2 medication(s) that are the same as other medications prescribed for you. Read the directions carefully, and ask your doctor or other care provider to review them with you.                CONTINUE taking these medications      ALPRAZolam 0.5 MG tablet  Commonly known as: XANAX  Take 0.5 mg by mouth 2 (two) times daily as needed.     b complex vitamins capsule  Take 1 capsule by mouth once daily.     busPIRone 15 MG tablet  Commonly known as: BUSPAR  Take 15 mg by mouth 2 (two) times daily as needed (anxiety).     DULoxetine 20 MG capsule  Commonly known as: CYMBALTA  Take 20 mg by mouth once daily.     EScitalopram oxalate 20 MG tablet  Commonly known as: LEXAPRO  Take 1 tablet by mouth every evening.     famotidine 40 MG tablet  Commonly known as: PEPCID  Take 1 tablet (40 mg total) by mouth nightly as needed for Heartburn.     hydrOXYzine pamoate 25 MG Cap  Commonly known as: VISTARIL  Take 25 mg by mouth 4 (four) times daily.     multivitamin per tablet  Commonly known as: THERAGRAN  Take 1 tablet by mouth once daily.     NON FORMULARY MEDICATION  Take 10 mg by mouth Daily. Medical Marijuana for Insomnia     ondansetron 8 MG tablet  Commonly known as: ZOFRAN  Take 1 tablet (8 mg total) by mouth every 8 (eight) hours as needed for Nausea.     pantoprazole 40 MG tablet  Commonly known as: PROTONIX  Take 1 tablet (40 mg total) by mouth every morning.            ASK your doctor about these medications      dicyclomine 10 MG capsule  Commonly known as: BENTYL  Take 10 mg by mouth every 6 (six) hours as needed.     * HYDROcodone-acetaminophen 7.5-325 mg per tablet  Commonly known as: NORCO  Take 1 tablet by mouth every 6 (six) hours as needed for Pain.     *  HYDROcodone-acetaminophen 7.5-325 mg per tablet  Commonly known as: NORCO  Take 1 tablet by mouth every 6 (six) hours as needed for Pain.     hyoscyamine 0.125 mg Tab  Commonly known as: ANASPAZ,LEVSIN  Take 1 tablet (125 mcg total) by mouth every 6 (six) hours as needed (abdomen pain).     metroNIDAZOLE 250 MG tablet  Commonly known as: FLAGYL  TAKE 2 TABLETS AT 1PM, 5PM AND 9PM THE EVENING PRIOR TO SURGERY     MIRALAX 17 gram/dose powder  Generic drug: polyethylene glycol  Take 17 g by mouth.     neomycin 500 mg Tab  Commonly known as: MYCIFRADIN  TAKE 2 TABLETS AT 1PM, 5PM AND 9PM THE EVENING PRIOR TO SURGERY     promethazine 25 MG tablet  Commonly known as: PHENERGAN  Take 1 tablet (25 mg total) by mouth every 6 (six) hours as needed for Nausea.           * This list has 2 medication(s) that are the same as other medications prescribed for you. Read the directions carefully, and ask your doctor or other care provider to review them with you.                Time spent on the discharge of patient:  minutes    LOUIS Vargas  General Surgery  [facility] - 8th Floor Med Surg

## 2024-07-23 ENCOUNTER — DOCUMENTATION ONLY (OUTPATIENT)
Dept: SURGICAL ONCOLOGY | Facility: CLINIC | Age: 54
End: 2024-07-23

## 2024-07-23 ENCOUNTER — OFFICE VISIT (OUTPATIENT)
Dept: SURGICAL ONCOLOGY | Facility: CLINIC | Age: 54
End: 2024-07-23
Payer: MEDICAID

## 2024-07-23 VITALS — HEIGHT: 62 IN | BODY MASS INDEX: 25.14 KG/M2 | WEIGHT: 136.63 LBS

## 2024-07-23 DIAGNOSIS — C20 RECTAL CANCER: Primary | ICD-10-CM

## 2024-07-23 PROCEDURE — 99999 PR PBB SHADOW E&M-EST. PATIENT-LVL III: CPT | Mod: PBBFAC,,, | Performed by: SURGERY

## 2024-07-23 PROCEDURE — 99213 OFFICE O/P EST LOW 20 MIN: CPT | Mod: PBBFAC | Performed by: SURGERY

## 2024-07-23 NOTE — PROGRESS NOTES
Staples removed from ileostomy closure site.  Incision is healed and closed.  Instructed patient she can shower over daily with soap and water.  No dressing applied.

## 2024-07-23 NOTE — PROGRESS NOTES
Patient returns for postop visit after ileostomy reversal, she reports her loose stools have decreased somewhat and she is using Imodium as needed.  She reports no issues of incontinence    On exam her abdomen is soft nontender nondistended, ileostomy reversal site is healing well without complication    Continue Imodium p.r.n., add fiber supplementation and see if this helps as well    She follows up with medical oncology soon, defer to them on surveillance imaging protocol    Return to clinic in 2 months

## 2024-07-24 ENCOUNTER — HOSPITAL ENCOUNTER (OUTPATIENT)
Dept: RADIOLOGY | Facility: HOSPITAL | Age: 54
Discharge: HOME | End: 2024-07-24
Attending: INTERNAL MEDICINE
Payer: MEDICAID

## 2024-07-24 DIAGNOSIS — R59.0 PELVIC LYMPHADENOPATHY: ICD-10-CM

## 2024-07-24 DIAGNOSIS — C20 ADENOCARCINOMA OF RECTUM, STAGE 3: ICD-10-CM

## 2024-07-24 DIAGNOSIS — Z83.719 FAMILY HX COLONIC POLYPS: ICD-10-CM

## 2024-07-24 DIAGNOSIS — D12.3 ADENOMATOUS POLYP OF TRANSVERSE COLON: ICD-10-CM

## 2024-07-24 DIAGNOSIS — R93.5 ABNORMAL MRI, PELVIS: ICD-10-CM

## 2024-07-24 DIAGNOSIS — T82.868A THROMBOSIS IN PERIPHERALLY INSERTED CENTRAL CATHETER (PICC): ICD-10-CM

## 2024-07-24 DIAGNOSIS — G62.0 CHEMOTHERAPY-INDUCED NEUROPATHY: ICD-10-CM

## 2024-07-24 DIAGNOSIS — T45.1X5A CHEMOTHERAPY-INDUCED NEUROPATHY: ICD-10-CM

## 2024-07-24 DIAGNOSIS — Z80.0 FAMILY HISTORY OF COLON CANCER: ICD-10-CM

## 2024-07-24 PROCEDURE — 74177 CT ABD & PELVIS W/CONTRAST: CPT | Mod: TC

## 2024-07-24 PROCEDURE — 25500020 PHARM REV CODE 255

## 2024-07-24 RX ADMIN — IOHEXOL 100 ML: 350 INJECTION, SOLUTION INTRAVENOUS at 09:07

## 2024-07-30 ENCOUNTER — TELEPHONE (OUTPATIENT)
Dept: HEMATOLOGY/ONCOLOGY | Facility: CLINIC | Age: 54
End: 2024-07-30
Payer: MEDICAID

## 2024-07-30 DIAGNOSIS — C20 ADENOCARCINOMA OF RECTUM: Primary | ICD-10-CM

## 2024-07-31 ENCOUNTER — APPOINTMENT (OUTPATIENT)
Dept: HEMATOLOGY/ONCOLOGY | Facility: CLINIC | Age: 54
End: 2024-07-31
Payer: MEDICAID

## 2024-07-31 ENCOUNTER — OFFICE VISIT (OUTPATIENT)
Dept: HEMATOLOGY/ONCOLOGY | Facility: CLINIC | Age: 54
End: 2024-07-31
Attending: INTERNAL MEDICINE
Payer: MEDICAID

## 2024-07-31 VITALS
SYSTOLIC BLOOD PRESSURE: 106 MMHG | RESPIRATION RATE: 18 BRPM | WEIGHT: 135.38 LBS | OXYGEN SATURATION: 98 % | HEIGHT: 62 IN | BODY MASS INDEX: 24.91 KG/M2 | HEART RATE: 78 BPM | TEMPERATURE: 98 F | DIASTOLIC BLOOD PRESSURE: 69 MMHG

## 2024-07-31 DIAGNOSIS — C20 ADENOCARCINOMA OF RECTUM, STAGE 3: Primary | ICD-10-CM

## 2024-07-31 DIAGNOSIS — C20 ADENOCARCINOMA OF RECTUM: ICD-10-CM

## 2024-07-31 DIAGNOSIS — M53.3 SACRAL LESION: ICD-10-CM

## 2024-07-31 DIAGNOSIS — T45.1X5A CHEMOTHERAPY-INDUCED NEUROPATHY: Primary | ICD-10-CM

## 2024-07-31 DIAGNOSIS — R59.0 PELVIC LYMPHADENOPATHY: ICD-10-CM

## 2024-07-31 DIAGNOSIS — T82.868A THROMBOSIS IN PERIPHERALLY INSERTED CENTRAL CATHETER (PICC): ICD-10-CM

## 2024-07-31 DIAGNOSIS — G62.0 CHEMOTHERAPY-INDUCED NEUROPATHY: Primary | ICD-10-CM

## 2024-07-31 DIAGNOSIS — Z83.719 FAMILY HX COLONIC POLYPS: ICD-10-CM

## 2024-07-31 DIAGNOSIS — D12.3 ADENOMATOUS POLYP OF TRANSVERSE COLON: ICD-10-CM

## 2024-07-31 DIAGNOSIS — Z80.0 FAMILY HISTORY OF COLON CANCER: ICD-10-CM

## 2024-07-31 DIAGNOSIS — C20 ADENOCARCINOMA OF RECTUM, STAGE 3: ICD-10-CM

## 2024-07-31 DIAGNOSIS — C20 RECTAL CANCER: ICD-10-CM

## 2024-07-31 LAB
ALBUMIN SERPL-MCNC: 4 G/DL (ref 3.5–5)
ALBUMIN/GLOB SERPL: 1.3 RATIO (ref 1.1–2)
ALP SERPL-CCNC: 90 UNIT/L (ref 40–150)
ALT SERPL-CCNC: 16 UNIT/L (ref 0–55)
ANION GAP SERPL CALC-SCNC: 9 MEQ/L
AST SERPL-CCNC: 21 UNIT/L (ref 5–34)
BASOPHILS # BLD AUTO: 0.04 X10(3)/MCL
BASOPHILS NFR BLD AUTO: 1.1 %
BILIRUB SERPL-MCNC: 2 MG/DL
BUN SERPL-MCNC: 8.4 MG/DL (ref 9.8–20.1)
CALCIUM SERPL-MCNC: 10.1 MG/DL (ref 8.4–10.2)
CEA SERPL-MCNC: <1.73 NG/ML (ref 0–3)
CHLORIDE SERPL-SCNC: 109 MMOL/L (ref 98–107)
CO2 SERPL-SCNC: 26 MMOL/L (ref 22–29)
CREAT SERPL-MCNC: 0.85 MG/DL (ref 0.55–1.02)
CREAT/UREA NIT SERPL: 10
EOSINOPHIL # BLD AUTO: 0.26 X10(3)/MCL (ref 0–0.9)
EOSINOPHIL NFR BLD AUTO: 7.2 %
ERYTHROCYTE [DISTWIDTH] IN BLOOD BY AUTOMATED COUNT: 13.2 % (ref 11.5–17)
GFR SERPLBLD CREATININE-BSD FMLA CKD-EPI: >60 ML/MIN/1.73/M2
GLOBULIN SER-MCNC: 3 GM/DL (ref 2.4–3.5)
GLUCOSE SERPL-MCNC: 99 MG/DL (ref 74–100)
HCT VFR BLD AUTO: 38 % (ref 37–47)
HGB BLD-MCNC: 12.9 G/DL (ref 12–16)
IMM GRANULOCYTES # BLD AUTO: 0.01 X10(3)/MCL (ref 0–0.04)
IMM GRANULOCYTES NFR BLD AUTO: 0.3 %
LYMPHOCYTES # BLD AUTO: 0.82 X10(3)/MCL (ref 0.6–4.6)
LYMPHOCYTES NFR BLD AUTO: 22.8 %
MCH RBC QN AUTO: 30 PG (ref 27–31)
MCHC RBC AUTO-ENTMCNC: 33.9 G/DL (ref 33–36)
MCV RBC AUTO: 88.4 FL (ref 80–94)
MONOCYTES # BLD AUTO: 0.29 X10(3)/MCL (ref 0.1–1.3)
MONOCYTES NFR BLD AUTO: 8.1 %
NEUTROPHILS # BLD AUTO: 2.17 X10(3)/MCL (ref 2.1–9.2)
NEUTROPHILS NFR BLD AUTO: 60.5 %
NRBC BLD AUTO-RTO: 0 %
PLATELET # BLD AUTO: 287 X10(3)/MCL (ref 130–400)
PMV BLD AUTO: 9.4 FL (ref 7.4–10.4)
POTASSIUM SERPL-SCNC: 3.7 MMOL/L (ref 3.5–5.1)
PROT SERPL-MCNC: 7 GM/DL (ref 6.4–8.3)
RBC # BLD AUTO: 4.3 X10(6)/MCL (ref 4.2–5.4)
SODIUM SERPL-SCNC: 144 MMOL/L (ref 136–145)
WBC # BLD AUTO: 3.59 X10(3)/MCL (ref 4.5–11.5)

## 2024-07-31 PROCEDURE — 3074F SYST BP LT 130 MM HG: CPT | Mod: CPTII,,, | Performed by: INTERNAL MEDICINE

## 2024-07-31 PROCEDURE — 1111F DSCHRG MED/CURRENT MED MERGE: CPT | Mod: CPTII,,, | Performed by: INTERNAL MEDICINE

## 2024-07-31 PROCEDURE — 1159F MED LIST DOCD IN RCRD: CPT | Mod: CPTII,,, | Performed by: INTERNAL MEDICINE

## 2024-07-31 PROCEDURE — 1160F RVW MEDS BY RX/DR IN RCRD: CPT | Mod: CPTII,,, | Performed by: INTERNAL MEDICINE

## 2024-07-31 PROCEDURE — 99215 OFFICE O/P EST HI 40 MIN: CPT | Mod: PBBFAC | Performed by: INTERNAL MEDICINE

## 2024-07-31 PROCEDURE — 85025 COMPLETE CBC W/AUTO DIFF WBC: CPT

## 2024-07-31 PROCEDURE — 36415 COLL VENOUS BLD VENIPUNCTURE: CPT

## 2024-07-31 PROCEDURE — 80053 COMPREHEN METABOLIC PANEL: CPT

## 2024-07-31 PROCEDURE — 82378 CARCINOEMBRYONIC ANTIGEN: CPT

## 2024-07-31 PROCEDURE — 3008F BODY MASS INDEX DOCD: CPT | Mod: CPTII,,, | Performed by: INTERNAL MEDICINE

## 2024-07-31 PROCEDURE — 99214 OFFICE O/P EST MOD 30 MIN: CPT | Mod: S$PBB,,, | Performed by: INTERNAL MEDICINE

## 2024-07-31 PROCEDURE — 3078F DIAST BP <80 MM HG: CPT | Mod: CPTII,,, | Performed by: INTERNAL MEDICINE

## 2024-07-31 NOTE — PROGRESS NOTES
History:  Past Medical History:   Diagnosis Date    Anesthesia complication     difficulty breathing during     Anxiety disorder, unspecified     Depression     GERD (gastroesophageal reflux disease)     Ileostomy present     Insomnia     Rectal cancer        Past Surgical History:   Procedure Laterality Date    APPENDECTOMY      BACK SURGERY       SECTION  2004    COLONOSCOPY      cyst coccyx      1985 x3 with cyst and had cystectomy on tailbone in     ESOPHAGOGASTRODUODENOSCOPY      EYE SURGERY      RK corrective surgery    ILEOSTOMY CLOSURE N/A 2024    Procedure: CLOSURE, ILEOSTOMY;  Surgeon: Lee Rosenthal MD;  Location: [facility];  Service: General;  Laterality: N/A;  XX    REFRACTIVE SURGERY      ROBOT-ASSISTED LOW ANTERIOR RESECTION OF COLON N/A 2024    Procedure: ROBOTIC RESECTION, COLON, LOW ANTERIOR;  Surgeon: Lee Rosenthal MD;  Location: [facility] OR;  Service: General;  Laterality: N/A;  WITH ROBOT ILEOSTOMY    WISDOM TOOTH EXTRACTION     Past medical history:  Anxiety.    Procedure/surgical history:  Appendectomy.  Back surgery.  Social history:  Single.  Lives in [place], [place].  Has 2 children.  Works as a .  No history of tobacco, alcohol, or illicit drug abuse.  Family history:  Paternal grandfather experience colon cancer in his 70s.  Father experienced multiple colon polyps.  Health maintenance:  No screening colonoscopy prior to most recent colonoscopy which led to the diagnosis of rectal cancer.  -2019:  Bilateral screening mammogram pelvic comparison:  2018 mammogram):  BI-RADS 0 (indeterminate)  -2019:  Diagnostic left mammogram, limited ultrasound left breast (comparison:  2019 mammogram):  Overall study BI-RADS 2: Benign     Family History   Problem Relation Name Age of Onset    Colon polyps Father Delvin Sharpe     Alcohol abuse Father Delvin Sharpe     Colon cancer Maternal Grandfather      Colon  cancer Paternal Grandfather WANJU Sharpe     Cancer Paternal Grandfather WANJU Sharpe     Cancer Maternal Aunt      Diabetes Maternal Grandmother Yasmine rose       Reason for Follow-up:  -adenocarcinoma rectum, moderately differentiated, partially obstructing mass, S/P colonoscopy 06/19/2023   -tubular adenoma transverse colon   -regional lymphadenopathy on CT abdomen pelvis with contrast 06/21/2023  -05/20/2024:  Laparoscopic/robotic low anterior resection with EN bloc total mesorectal excision and low pelvic anastomosis; creation of diverting loop ileostomy:  Pathology:  Moderately-differentiated adenocarcinoma, 1.2 cm, invading through the muscularis propria into the pericolonic or perirectal tissue into the pericolonic or perirectal tissue, incomplete response to neoadjuvant therapy, no LVI, no PNI, margins negative, 2/13 lymph nodes positive  >>> ypT3 ypN1b  -MMR proficient  -acute left upper extremity DVT related to PICC line  -grandfather experienced colon cancer; father experienced colon polyp  -chemotherapy-induced peripheral neuropathy    History of Present Illness:   Adenocarcinoma of rectum, stage 3     Oncologic/Hematologic History:  Oncology History   Adenocarcinoma of rectum, stage 3   7/1/2023 Initial Diagnosis    Adenocarcinoma of rectum, stage 3     7/19/2023 - 11/22/2023 Chemotherapy    Treatment Summary   Plan Name: OP mFOLFOX 6 Q2W  Treatment Goal: Curative  Status: Inactive  Start Date: 7/19/2023  End Date: 11/22/2023  Provider: John Bazan MD  Chemotherapy: fluorouraciL injection 650 mg, 400 mg/m2 = 650 mg, Intravenous, Clinic/HOD 1 time, 3 of 3 cycles  Administration: 650 mg (7/19/2023), 650 mg (8/1/2023), 650 mg (8/22/2023)  oxaliplatin (ELOXATIN) 85 mg/m2 = 139 mg in dextrose 5 % (D5W) 592.8 mL chemo infusion, 85 mg/m2 = 139 mg, Intravenous, Clinic/HOD 1 time, 8 of 8 cycles  Administration: 139 mg (7/19/2023), 139 mg (8/1/2023), 140 mg (9/18/2023), 135 mg (10/2/2023), 135 mg (11/7/2023),  135 mg (8/22/2023), 130 mg (11/20/2023), 135 mg (10/25/2023)     12/26/2023 - 12/26/2023 Chemotherapy    Treatment Summary   Plan Name: OP CAPECITABINE 5 DAYS + RADIOTHERAPY  Treatment Goal: Curative  Status: Inactive  Start Date:   End Date:   Provider: John Bazan MD  Chemotherapy: capecitabine (XELODA) tablet 1,250 mg, 825 mg/m2 = 1,250 mg, Oral, 2 times daily, 0 of 1 cycle, Start date: --, End date: --     5/20/2024 Cancer Staged    Staging form: Colon and Rectum, AJCC 8th Edition  - Pathologic stage from 5/20/2024: Stage IIIB (ypT3, pN1b, cM0)     53-year-old lady, referred by Migel Ortiz MD, GI, with rectal cancer.    Family history pos for colon cancer in grandfather, colon polyp in father  Evaluated by Migel Ortiz MD, GI, 05/22/2023 for nervous stomach; change in bowel habits/pattern; change in bowel function started several months ago; currently, 1 bowel movement daily.  Blood in stool.  Lower abdominal pain.  Heartburn.  Epigastric pain.  MiraLax resulted in improvement.  Bright red blood in stool.    06/13/2023: EGD:  1. Esophagus: Erythema of mucosa in the lower 3rd of esophagus, compatible esophagitis  2. Stomach: Erythema of mucosa in the antrum, compatible with gastritis  3. Duodenum:  Normal mucosa in the whole duodenum      06/13/2023:  EGD:  1. Gastric antrum, biopsy:  Mild chronic inactive gastritis; negative for intestinal metaplasia; negative for H pylori organisms  2. Gastric body, biopsy:  Mild chronic inactive gastritis; negative for intestinal metaplasia; negative for H pylori organisms    06/19/2023:  Colonoscopy (screening colonoscopy):  -single 9 mm polyp transverse colon, polypectomy, completely removed  -ulcerated necrotic infiltrative 90% circumferential, bleeding, 5 cm in length, mass of malignant appearance in the rectum at 4 cm from the anus, causing partial obstruction; scope traversed the lesion  Pathology:  1. Transverse colon polyp, polypectomy:  Tubular adenoma  2.  Rectal mass, biopsy:  Invasive moderately differentiated adenocarcinoma; no LVI    MMR proficient  Low probability of MSI-H    06/21/2023: CT abdomen pelvis with and without contrast:  -large rectal mass consistent with malignancy; some lymphadenopathy is seen adjacent to the mass  (large rectal mass with circumferential wall thickening in rectum; associated inflammatory changes; no bowel obstruction; some lymph nodes are seen in the perirectal region on the right and left side; representative lymph node on right side of rectum 1 cm x 8 mm; representative lymph node on the left side of rectum 8 mm x 8 mm)    Labs reviewed:  05/24/2023:  WBC 8.0.  Hemoglobin 12.5.  MCV 88.  Platelets 306 K.  Differential count normal.    07/05/2023:   Pleasant lady who presents for initial medical oncology consultation, accompanied by her brother.    Her symptoms started 2 years ago, in the form of intermittent small amount hematochezia, initially on toilet wife's, and for last few weeks, in toilet bowel as well.  She brought the symptoms to the attention of her gyn several months ago and does not remember the recommendation.  Regardless, she never got a screening colonoscopy done.  Around [place] time 2022, she started feeling bloated.  She started having constant uncomfortable feeling because of bloating, as well as in the anorectal area.  Hematochezia continued.  She brought this to the attention of her PCP in [place] who suggested taking MiraLax.  The PCP also arranged for colonoscopy.  On today's visit, she reports that she has been constipated her entire life.  She has been constipated for at least 10 years.  Hematochezia for 2 years.  No anorectal pain.  Does have constant feeling of incomplete evacuation.  Appetite is down and she has lost 30 lb in last 3-4 months.  Appetite is more less preserved but she is afraid to eat because eating causes bloating and worsening of uncomfortable feeling in rectum.    Interval  History:  PICC DOUBLE LUMEN LINE FLUSH   [No matching plan found]     07/31/2024:   -07/08/2024: Ileostomy reversal: Previous ileostomy taken down, side-to-side functional end-to-end staple anastomosis performed  -07/08/2024:  Ileostomy reversal:  Pathology:  Bowel tissue:  No dysplasia or malignancy  -07/24/2024: Surveillance CTs C/A/P with contrast (comparison: CT 03/05/2024, bone scan 03/22/2024, MRI 03/05/2024):  1. Postsurgical changes of low anterior resection.  No evidence of local residual/recurrent disease.  No metastatic disease identified in the chest, abdomen or pelvis.  2. Mild wall thickening of the distal colon/rectum may reflect post treatment changes or an infectious/inflammatory colitis.  Clinical correlation recommended.  3. Decreased hepatic attenuation suggesting steatosis.  -07/31/2024:  WBC 3.59; CBC essentially unremarkable; ANC 2.17; hemoglobin 12.9; CMP unremarkable; CEA <1.73  Presents for a follow-up visit.  Doing well.  Appetite is okay.  No symptoms of concern.  No weakness, fatigue, malaise, abdominal pain, nausea, vomiting, change in bowel habits, GI bleeding, chest pain, cough, dyspnea, any new lumps or lymphadenopathy, anorexia, unusual headaches, focal neurological symptoms, etc..  ECOG 1.  Very thankful for the care provided.    Medications:  Current Outpatient Medications on File Prior to Visit   Medication Sig Dispense Refill    ALPRAZolam (XANAX) 0.5 MG tablet Take 0.5 mg by mouth 2 (two) times daily as needed.      b complex vitamins capsule Take 1 capsule by mouth once daily.      busPIRone (BUSPAR) 15 MG tablet Take 15 mg by mouth 2 (two) times daily as needed (anxiety).      dicyclomine (BENTYL) 10 MG capsule Take 10 mg by mouth every 6 (six) hours as needed.      DULoxetine (CYMBALTA) 20 MG capsule Take 20 mg by mouth once daily.      EScitalopram oxalate (LEXAPRO) 20 MG tablet Take 1 tablet by mouth every evening.      HYDROcodone-acetaminophen (NORCO) 7.5-325 mg per  tablet Take 1 tablet by mouth every 6 (six) hours as needed for Pain. 10 tablet 0    HYDROcodone-acetaminophen (NORCO) 7.5-325 mg per tablet Take 1 tablet by mouth every 6 (six) hours as needed for Pain. 15 tablet 0    hydrOXYzine pamoate (VISTARIL) 25 MG Cap Take 25 mg by mouth 4 (four) times daily.      hyoscyamine (ANASPAZ,LEVSIN) 0.125 mg Tab Take 1 tablet (125 mcg total) by mouth every 6 (six) hours as needed (abdomen pain). 60 tablet 2    metroNIDAZOLE (FLAGYL) 250 MG tablet TAKE 2 TABLETS AT 1PM, 5PM AND 9PM THE EVENING PRIOR TO SURGERY 6 tablet 0    MIRALAX 17 gram/dose powder Take 17 g by mouth.      multivitamin (THERAGRAN) per tablet Take 1 tablet by mouth once daily.      neomycin (MYCIFRADIN) 500 mg Tab TAKE 2 TABLETS AT 1PM, 5PM AND 9PM THE EVENING PRIOR TO SURGERY 6 tablet 0    NON FORMULARY MEDICATION Take 10 mg by mouth Daily. Medical Marijuana for Insomnia      ondansetron (ZOFRAN) 8 MG tablet Take 1 tablet (8 mg total) by mouth every 8 (eight) hours as needed for Nausea. 60 tablet 2    oxyCODONE-acetaminophen (PERCOCET) 5-325 mg per tablet Take 1 tablet by mouth every 4 (four) hours as needed for Pain. 56 tablet 0    oxyCODONE-acetaminophen (PERCOCET) 5-325 mg per tablet Take 1 tablet by mouth every 4 (four) hours as needed for Pain. 20 tablet 0    pantoprazole (PROTONIX) 40 MG tablet Take 1 tablet (40 mg total) by mouth every morning. 30 tablet 1    promethazine (PHENERGAN) 25 MG tablet Take 1 tablet (25 mg total) by mouth every 6 (six) hours as needed for Nausea. 10 tablet 0    famotidine (PEPCID) 40 MG tablet Take 1 tablet (40 mg total) by mouth nightly as needed for Heartburn. 30 tablet 1     No current facility-administered medications on file prior to visit.     Review of Systems:   All systems reviewed and found to be negative except for the symptoms detailed above    Physical Examination:   VITAL SIGNS:   Vitals:    07/31/24 1345   BP: 106/69   Pulse: 78   Resp: 18   Temp: 97.9 °F (36.6  "°C)       GENERAL:  In no apparent distress.    HEAD:  No signs of head trauma.  EYES:  Pupils are equal.  Extraocular motions intact.    EARS:  Hearing grossly intact.  MOUTH:  Oropharynx is normal.   NECK:  No adenopathy, no JVD.     CHEST:  Chest with clear breath sounds bilaterally.  No wheezes, rales, rhonchi.    CARDIAC:  Regular rate and rhythm.  S1 and S2, without murmurs, gallops, rubs.  VASCULAR:  No Edema.  Peripheral pulses normal and equal in all extremities.  ABDOMEN:  Soft, without detectable tenderness.  No sign of distention.  No   rebound or guarding, and no masses palpated.   Bowel Sounds normal.  MUSCULOSKELETAL:  Good range of motion of all major joints. Extremities without clubbing, cyanosis or edema.    NEUROLOGIC EXAM:  Alert and oriented x 3.  No focal sensory or strength deficits.   Speech normal.  Follows commands.  PSYCHIATRIC:  Mood normal.    No results for input(s): "CBC" in the last 72 hours.   No results for input(s): "CMP" in the last 72 hours.     Assessment:  Problem List Items Addressed This Visit          Neuro    Chemotherapy-induced neuropathy - Primary       Cardiac/Vascular    Thrombosis in peripherally inserted central catheter (PICC)       Oncology    Adenocarcinoma of rectum, stage 3    Family history of colon cancer       GI    Family hx colonic polyps       Orthopedic    Sacral lesion       Other    Pelvic lymphadenopathy       Adenocarcinoma of rectum, moderately differentiated:   -presentation:  05/2023:  Change in bowel habits, hematochezia, lower abdominal pain  -colonoscopy 06/19/2023:    9 mm tubular adenoma transverse colon, removed;   invasive moderately differentiated adenocarcinoma of rectum presenting as ulcerated necrotic infiltrative 90% circumferential, bleeding, partially obstructing rectal mass, 5 cm long, 4 cm from anus, scope traversed the lesion  -MMR proficient; low probability of MSI-H  -CT abdomen pelvis with contrast 06/21/2023:  No metastasis; " large rectal mass and some regional lymphadenopathy on CT abdomen pelvis with contrast 06/21/2023, largest perirectal lymph node 1 cm x 8 mm  -CEA level < 1.73, normal (07/05/2023)  -no lung metastases on CT chest 07/18/2023  -MRI pelvis 07/18/2023: Large mid to distal rectal mass (T3b); at least 10 perirectal lymph nodes with increased signal on DWI imaging, largest 9 mm (T2b)  >>> radiographically, T3b N2b (stage IIIC)  -07/27/2023:  developed acute DVT left subclavian/axillary/brachial veins secondary to PICC line; started on Eliquis; PICC line removed  -neoadjuvant FOLFOX started 07/19/2023  -cycle 4 of neoadjuvant FOLFOX started 09/18/2023  -cycle 5 of neoadjuvant FOLFOX started 10/02/2023  -positive response on restaging CTs C/A/P 10 09/2023, S/P neoadjuvant FOLFOX x5 cycles (marked improvement in rectal mass; improved perirectal lymphadenopathy)  -restaging pelvic MRI 10/24/2020: Improved rectal mass and perirectal lymph nodes   -cycle 6 of neoadjuvant FOLFOX started 10/25/2023  -10/30/2023: Cymbalta discontinued because she experienced increased anxiety with Cymbalta  -Neoadjuvant FOLFOX:  Cycle 7 on 11/07/2023; cycle 8 on 11/20/2023  -12/04/2023: Genetics consultation  -12/15/2023: Restaging CTs chest and abdomen with contrast:  No metastases  -12/15/2023:  Restaging MRI pelvis with and without contrast (comparison:  MRI 10/24/2023):  Little overall change in rectal mass since 10/24/2023  -restaging MRI pelvis 12/15/2023: No progression  -genetic testing 12/19/2023: Negative  -S/P concurrent chemoradiation therapy to pelvis 01/03/2024-02/12/2024 (concurrent with capecitabine):  -screening mammogram 02/16/2024: BI-RADS 2  -restaging MRI pelvis 03/05/2024, post completion of neoadjuvant therapy:  Rectal mass smaller and improved; new indeterminate 8 mm enhancing lesion right sacral ala  -restaging CT chest and abdomen 03/05/2024, post completion of neoadjuvant therapy: No metastases  -bone scan 03/20/2024:   No bone metastases; no scintigraphic correlate for the subcentimeter lesion at the sacrum seen on MRI  -05/20/2024:  Laparoscopic/robotic low anterior resection with EN bloc total mesorectal excision and low pelvic anastomosis; creation of diverting loop ileostomy:  Pathology:  Moderately-differentiated adenocarcinoma, 1.2 cm, invading through the muscularis propria into the pericolonic or perirectal tissue into the pericolonic or perirectal tissue, incomplete response to neoadjuvant therapy, no LVI, no PNI, margins negative, 2/13 lymph nodes positive  >>> ypT3 ypN1b  -ostomy reversal planned by Surgical Oncology on 07/08/2024  -S/P ileostomy reversal 07/08/2024   -surveillance CTs C/A/P 0 07/24/2024: BREANNA      Left upper extremity DVT secondary to PICC line 07/27/2023:  -developed acute DVT left subclavian/axillary/brachial veins secondary to PICC line; started on Eliquis; PICC line removed  >>>  -anticoagulation was planned to continue for at least 3 months, i.e., until the end of October 2023   -11/07/2023:  Told me that she stopped anticoagulation Halloween day (10/31/2023), appropriately      Chemotherapy-induced peripheral neuropathy:  -10/17/2023: Chemotherapy induced peripheral neuropathy in the form of tingling in hands; no neuropathy in feet; no numbness or pain  -10/17/2020: Started on Cymbalta 30 mg p.o. q.h.s.  -Cymbalta discontinued 10/30/2023 because of increased anxiety with Cymbalta reported by her      Family history of colon cancer:  -grandfather experienced colon cancer; father experienced colon polyp      Plan:  In 3 months, CBC, CMP, CEA level, then follow-up with NP   Surveillance CT scans of C/A/P with contrast in January 2025   One year surveillance colonoscopy will be due May 2025  Follow-up with NP in 3 months, with labs  ----------------------------------------------------------      -10/17/2023: Chemotherapy induced peripheral neuropathy in the form of tingling in hands; no neuropathy in  feet; no numbness or pain  -10/17/2020: Started on Cymbalta 30 mg p.o. q.h.s.  -Cymbalta discontinued 10/30/2023 because of increased anxiety with Cymbalta reported by her  -10/17/2023:  Referred to behavioral health for management of anxiety.    -adenocarcinoma rectum, moderately differentiated  -radiographically, T3b N2b, stage IIIC  -Fertility risk discussion/counseling if premenopausal   (she had been postmenopausal for last 2 years and indicated that she had no desire to preserve fertility)  -S/P neoadjuvant FOLFOX every 2 weeks x8 cycles (07/19/2023-11/20/2023)  -positive response  -followed by concurrent chemoradiation therapy to pelvis 01/03/2024-02/12/2024 (concurrent with capecitabine):  -good response  -05/20/2024:  Laparoscopic/robotic low anterior resection with EN bloc total mesorectal excision and low pelvic anastomosis; creation of diverting loop ileostomy:  Pathology:  Moderately-differentiated adenocarcinoma, 1.2 cm, invading through the muscularis propria into the pericolonic or perirectal tissue into the pericolonic or perirectal tissue, incomplete response to neoadjuvant therapy, no LVI, no PNI, margins negative, 2/13 lymph nodes positive  >>> ypT3 ypN1b  -S/P ileostomy reversal 07/08/2024   -surveillance CTs C/A/P 0 07/24/2024: BREANNA  >>>  -continue surveillance (see below)    Surveillance:  1. History and physical examination and CEA level every 3-6 months for 2 years (05/2024-05/2026), then every 6 months for total of 5 years  2. Surveillance CTs C/A/P with contrast every 6-12 months for total of 5 years (05/2024-05/2029)  3. Surveillance colonoscopy 1 year (05/2025) after surgery, etc.  4. FDG PET-CT scan is not recommended   >>>  -new indeterminate 8 mm enhancing lesion right sacral ala on restaging MRI pelvis 03/05/2024; we will follow-up  -S/P ileostomy reversal 07/08/2024   -surveillance CTs C/A/P 0 07/24/2024: BREANNA  >>>  -check CBC, CMP, CEA level now, then, in 3 months   -surveillance CTs  C/A/P with contrast in 6 months (January 2025)  -surveillance 1 year colonoscopy in 1 year (05/2025)    Family history of colon cancer & colon polyp   -genetic testing 12/19/2023: Negative    Follow-up with NP in 3 months, with labs    Above discussed at length with the patient.  All questions answered.    Discussed labs, scans, and pathology report, and gave her copies of relevant records.    Plan of management discussed in detail.    She understands and agrees with this plan.    Follow-up:  No follow-ups on file.  Answers submitted by the patient for this visit:  Review of Systems Questionnaire (Submitted on 7/28/2024)  appetite change : No  unexpected weight change: No  mouth sores: No  visual disturbance: No  cough: No  shortness of breath: No  chest pain: No  abdominal pain: No  diarrhea: No  frequency: No  back pain: No  rash: No  headaches: No  adenopathy: No  nervous/ anxious: Yes

## 2024-07-31 NOTE — Clinical Note
In 3 months, CBC, CMP, CEA level, then follow-up with NP  Surveillance CT scans of C/A/P with contrast in January 2025  One year surveillance colonoscopy will be due May 2025 Follow-up with NP in 3 months, with labs

## 2024-08-22 ENCOUNTER — PATIENT MESSAGE (OUTPATIENT)
Dept: HEMATOLOGY/ONCOLOGY | Facility: CLINIC | Age: 54
End: 2024-08-22
Payer: MEDICAID

## 2024-08-26 ENCOUNTER — TELEPHONE (OUTPATIENT)
Dept: HEMATOLOGY/ONCOLOGY | Facility: CLINIC | Age: 54
End: 2024-08-26
Payer: MEDICAID

## 2024-08-26 NOTE — NURSING
Received message that patient is requesting assistance to obtain nutritional supplement through [facility]. INNA Us spoke with Tati at [facility] Cancer services. Informed that patient has received the maximum number of cases. Patient has not had a progression or new diagnosis. Spoke with patient per phone contact and informed of above. Provided information for obtaining nutritional supplement through [facility]. Patient voiced appreciation for the information.

## 2024-09-03 ENCOUNTER — DOCUMENT SCAN (OUTPATIENT)
Dept: HOME HEALTH SERVICES | Facility: HOSPITAL | Age: 54
End: 2024-09-03
Payer: MEDICAID

## 2024-09-17 ENCOUNTER — TELEPHONE (OUTPATIENT)
Dept: SURGICAL ONCOLOGY | Facility: CLINIC | Age: 54
End: 2024-09-17
Payer: MEDICAID

## 2024-09-30 ENCOUNTER — OFFICE VISIT (OUTPATIENT)
Dept: SURGICAL ONCOLOGY | Facility: CLINIC | Age: 54
End: 2024-09-30
Payer: MEDICAID

## 2024-09-30 VITALS
WEIGHT: 132.63 LBS | SYSTOLIC BLOOD PRESSURE: 124 MMHG | TEMPERATURE: 99 F | DIASTOLIC BLOOD PRESSURE: 79 MMHG | HEIGHT: 62 IN | HEART RATE: 83 BPM | BODY MASS INDEX: 24.41 KG/M2

## 2024-09-30 DIAGNOSIS — C20 RECTAL CANCER: Primary | ICD-10-CM

## 2024-09-30 PROCEDURE — 99214 OFFICE O/P EST MOD 30 MIN: CPT | Mod: PBBFAC | Performed by: SURGERY

## 2024-09-30 PROCEDURE — 99999 PR PBB SHADOW E&M-EST. PATIENT-LVL IV: CPT | Mod: PBBFAC,,, | Performed by: SURGERY

## 2024-09-30 PROCEDURE — 3074F SYST BP LT 130 MM HG: CPT | Mod: CPTII,,, | Performed by: SURGERY

## 2024-09-30 PROCEDURE — 1159F MED LIST DOCD IN RCRD: CPT | Mod: CPTII,,, | Performed by: SURGERY

## 2024-09-30 PROCEDURE — 99024 POSTOP FOLLOW-UP VISIT: CPT | Mod: ,,, | Performed by: SURGERY

## 2024-09-30 PROCEDURE — 3078F DIAST BP <80 MM HG: CPT | Mod: CPTII,,, | Performed by: SURGERY

## 2024-09-30 NOTE — PROGRESS NOTES
Patient returns for follow up underwent very low anterior resection and subsequent ileostomy reversal.  She had some initial issues with frequent loose stools but she is now managing this well with diet and p.r.n. Imodium.  She is being followed for surveillance with Medical Oncology at [facility]    On exam she appears well in no apparent distress  Abdomen is soft nontender nondistended, incisions are well healed with no evidence of mass or hernia    Continue surveillance with Medical Oncology    Return to clinic in 6 months

## 2024-10-04 ENCOUNTER — DOCUMENT SCAN (OUTPATIENT)
Dept: HOME HEALTH SERVICES | Facility: HOSPITAL | Age: 54
End: 2024-10-04
Payer: MEDICAID

## 2024-10-29 ENCOUNTER — TELEPHONE (OUTPATIENT)
Dept: HEMATOLOGY/ONCOLOGY | Facility: CLINIC | Age: 54
End: 2024-10-29
Payer: MEDICAID

## 2024-10-30 ENCOUNTER — APPOINTMENT (OUTPATIENT)
Dept: HEMATOLOGY/ONCOLOGY | Facility: CLINIC | Age: 54
End: 2024-10-30
Payer: MEDICAID

## 2024-10-30 ENCOUNTER — OFFICE VISIT (OUTPATIENT)
Dept: HEMATOLOGY/ONCOLOGY | Facility: CLINIC | Age: 54
End: 2024-10-30
Payer: MEDICAID

## 2024-10-30 VITALS
HEIGHT: 62 IN | SYSTOLIC BLOOD PRESSURE: 97 MMHG | DIASTOLIC BLOOD PRESSURE: 64 MMHG | WEIGHT: 128.19 LBS | RESPIRATION RATE: 18 BRPM | TEMPERATURE: 98 F | BODY MASS INDEX: 23.59 KG/M2 | OXYGEN SATURATION: 98 % | HEART RATE: 87 BPM

## 2024-10-30 DIAGNOSIS — C20 RECTAL CANCER: ICD-10-CM

## 2024-10-30 DIAGNOSIS — C20 ADENOCARCINOMA OF RECTUM, STAGE 3: ICD-10-CM

## 2024-10-30 DIAGNOSIS — C20 ADENOCARCINOMA OF RECTUM, STAGE 3: Primary | ICD-10-CM

## 2024-10-30 DIAGNOSIS — R19.7 DIARRHEA, UNSPECIFIED TYPE: ICD-10-CM

## 2024-10-30 DIAGNOSIS — Z83.719 FAMILY HX COLONIC POLYPS: ICD-10-CM

## 2024-10-30 DIAGNOSIS — C20 ADENOCARCINOMA OF RECTUM: ICD-10-CM

## 2024-10-30 LAB
ALBUMIN SERPL-MCNC: 4.1 G/DL (ref 3.5–5)
ALBUMIN/GLOB SERPL: 1.4 RATIO (ref 1.1–2)
ALP SERPL-CCNC: 95 UNIT/L (ref 40–150)
ALT SERPL-CCNC: 11 UNIT/L (ref 0–55)
ANION GAP SERPL CALC-SCNC: 7 MEQ/L
AST SERPL-CCNC: 17 UNIT/L (ref 5–34)
BASOPHILS # BLD AUTO: 0.02 X10(3)/MCL
BASOPHILS NFR BLD AUTO: 0.5 %
BILIRUB SERPL-MCNC: 2 MG/DL
BUN SERPL-MCNC: 11 MG/DL (ref 9.8–20.1)
CALCIUM SERPL-MCNC: 9.9 MG/DL (ref 8.4–10.2)
CEA SERPL-MCNC: <1.73 NG/ML (ref 0–3)
CHLORIDE SERPL-SCNC: 108 MMOL/L (ref 98–107)
CO2 SERPL-SCNC: 28 MMOL/L (ref 22–29)
CREAT SERPL-MCNC: 0.87 MG/DL (ref 0.55–1.02)
CREAT/UREA NIT SERPL: 13
EOSINOPHIL # BLD AUTO: 0.31 X10(3)/MCL (ref 0–0.9)
EOSINOPHIL NFR BLD AUTO: 8.3 %
ERYTHROCYTE [DISTWIDTH] IN BLOOD BY AUTOMATED COUNT: 13.3 % (ref 11.5–17)
GFR SERPLBLD CREATININE-BSD FMLA CKD-EPI: >60 ML/MIN/1.73/M2
GLOBULIN SER-MCNC: 3 GM/DL (ref 2.4–3.5)
GLUCOSE SERPL-MCNC: 114 MG/DL (ref 74–100)
HCT VFR BLD AUTO: 38.2 % (ref 37–47)
HGB BLD-MCNC: 13.7 G/DL (ref 12–16)
IMM GRANULOCYTES # BLD AUTO: 0.01 X10(3)/MCL (ref 0–0.04)
IMM GRANULOCYTES NFR BLD AUTO: 0.3 %
LYMPHOCYTES # BLD AUTO: 1 X10(3)/MCL (ref 0.6–4.6)
LYMPHOCYTES NFR BLD AUTO: 26.9 %
MCH RBC QN AUTO: 30.9 PG (ref 27–31)
MCHC RBC AUTO-ENTMCNC: 35.9 G/DL (ref 33–36)
MCV RBC AUTO: 86.2 FL (ref 80–94)
MONOCYTES # BLD AUTO: 0.28 X10(3)/MCL (ref 0.1–1.3)
MONOCYTES NFR BLD AUTO: 7.5 %
NEUTROPHILS # BLD AUTO: 2.1 X10(3)/MCL (ref 2.1–9.2)
NEUTROPHILS NFR BLD AUTO: 56.5 %
NRBC BLD AUTO-RTO: 0 %
PLATELET # BLD AUTO: 224 X10(3)/MCL (ref 130–400)
PMV BLD AUTO: 9.7 FL (ref 7.4–10.4)
POTASSIUM SERPL-SCNC: 3.7 MMOL/L (ref 3.5–5.1)
PROT SERPL-MCNC: 7.1 GM/DL (ref 6.4–8.3)
RBC # BLD AUTO: 4.43 X10(6)/MCL (ref 4.2–5.4)
SODIUM SERPL-SCNC: 143 MMOL/L (ref 136–145)
WBC # BLD AUTO: 3.72 X10(3)/MCL (ref 4.5–11.5)

## 2024-10-30 PROCEDURE — 82378 CARCINOEMBRYONIC ANTIGEN: CPT

## 2024-10-30 PROCEDURE — 85025 COMPLETE CBC W/AUTO DIFF WBC: CPT

## 2024-10-30 PROCEDURE — 3074F SYST BP LT 130 MM HG: CPT | Mod: CPTII,,, | Performed by: NURSE PRACTITIONER

## 2024-10-30 PROCEDURE — 99215 OFFICE O/P EST HI 40 MIN: CPT | Mod: PBBFAC | Performed by: NURSE PRACTITIONER

## 2024-10-30 PROCEDURE — 1159F MED LIST DOCD IN RCRD: CPT | Mod: CPTII,,, | Performed by: NURSE PRACTITIONER

## 2024-10-30 PROCEDURE — 80053 COMPREHEN METABOLIC PANEL: CPT

## 2024-10-30 PROCEDURE — 99214 OFFICE O/P EST MOD 30 MIN: CPT | Mod: S$PBB,,, | Performed by: NURSE PRACTITIONER

## 2024-10-30 PROCEDURE — 3008F BODY MASS INDEX DOCD: CPT | Mod: CPTII,,, | Performed by: NURSE PRACTITIONER

## 2024-10-30 PROCEDURE — 36415 COLL VENOUS BLD VENIPUNCTURE: CPT

## 2024-10-30 PROCEDURE — 1160F RVW MEDS BY RX/DR IN RCRD: CPT | Mod: CPTII,,, | Performed by: NURSE PRACTITIONER

## 2024-10-30 PROCEDURE — 3078F DIAST BP <80 MM HG: CPT | Mod: CPTII,,, | Performed by: NURSE PRACTITIONER

## 2024-10-31 ENCOUNTER — PATIENT MESSAGE (OUTPATIENT)
Dept: HEMATOLOGY/ONCOLOGY | Facility: CLINIC | Age: 54
End: 2024-10-31
Payer: MEDICAID

## 2024-10-31 DIAGNOSIS — C20 RECTAL CANCER: ICD-10-CM

## 2024-10-31 DIAGNOSIS — R11.0 NAUSEA: Primary | ICD-10-CM

## 2024-10-31 RX ORDER — PROMETHAZINE HYDROCHLORIDE 25 MG/1
25 TABLET ORAL EVERY 6 HOURS PRN
Qty: 30 TABLET | Refills: 2 | Status: SHIPPED | OUTPATIENT
Start: 2024-10-31

## 2024-10-31 RX ORDER — ONDANSETRON 8 MG/1
8 TABLET, ORALLY DISINTEGRATING ORAL EVERY 8 HOURS PRN
Qty: 90 TABLET | Refills: 2 | Status: SHIPPED | OUTPATIENT
Start: 2024-10-31

## 2025-01-02 ENCOUNTER — PATIENT MESSAGE (OUTPATIENT)
Dept: HEMATOLOGY/ONCOLOGY | Facility: CLINIC | Age: 55
End: 2025-01-02
Payer: MEDICAID

## 2025-01-03 ENCOUNTER — OFFICE VISIT (OUTPATIENT)
Dept: URGENT CARE | Facility: CLINIC | Age: 55
End: 2025-01-03
Payer: MEDICAID

## 2025-01-03 ENCOUNTER — TELEPHONE (OUTPATIENT)
Dept: HEMATOLOGY/ONCOLOGY | Facility: CLINIC | Age: 55
End: 2025-01-03
Payer: MEDICAID

## 2025-01-03 VITALS
WEIGHT: 130.19 LBS | RESPIRATION RATE: 18 BRPM | HEART RATE: 71 BPM | DIASTOLIC BLOOD PRESSURE: 77 MMHG | TEMPERATURE: 98 F | HEIGHT: 62 IN | SYSTOLIC BLOOD PRESSURE: 123 MMHG | BODY MASS INDEX: 23.96 KG/M2

## 2025-01-03 DIAGNOSIS — R82.90 ABNORMAL URINE FINDINGS: Primary | ICD-10-CM

## 2025-01-03 DIAGNOSIS — R39.9 UTI SYMPTOMS: ICD-10-CM

## 2025-01-03 DIAGNOSIS — R39.15 URINARY URGENCY: ICD-10-CM

## 2025-01-03 DIAGNOSIS — M54.50 ACUTE BILATERAL LOW BACK PAIN, UNSPECIFIED WHETHER SCIATICA PRESENT: ICD-10-CM

## 2025-01-03 LAB
BILIRUB UR QL STRIP: NEGATIVE
GLUCOSE UR QL STRIP: NEGATIVE
KETONES UR QL STRIP: NEGATIVE
LEUKOCYTE ESTERASE UR QL STRIP: POSITIVE
PH, POC UA: 6.5
POC BLOOD, URINE: POSITIVE
POC NITRATES, URINE: NEGATIVE
PROT UR QL STRIP: NEGATIVE
SP GR UR STRIP: 1.02 (ref 1–1.03)
UROBILINOGEN UR STRIP-ACNC: ABNORMAL (ref 0.1–1.1)

## 2025-01-03 PROCEDURE — 99215 OFFICE O/P EST HI 40 MIN: CPT | Mod: PBBFAC | Performed by: NURSE PRACTITIONER

## 2025-01-03 PROCEDURE — 87086 URINE CULTURE/COLONY COUNT: CPT | Performed by: NURSE PRACTITIONER

## 2025-01-03 RX ORDER — BACLOFEN 10 MG/1
10 TABLET ORAL 3 TIMES DAILY
Qty: 21 TABLET | Refills: 0 | Status: SHIPPED | OUTPATIENT
Start: 2025-01-03 | End: 2025-01-10

## 2025-01-03 RX ORDER — NITROFURANTOIN 25; 75 MG/1; MG/1
100 CAPSULE ORAL 2 TIMES DAILY
Qty: 14 CAPSULE | Refills: 0 | Status: SHIPPED | OUTPATIENT
Start: 2025-01-03 | End: 2025-01-10

## 2025-01-03 NOTE — PROGRESS NOTES
"Subjective:      Patient ID: Nikkie Sharpe is a 54 y.o. female.    Vitals:  height is 5' 2" (1.575 m) and weight is 59.1 kg (130 lb 3.2 oz). Her oral temperature is 98 °F (36.7 °C). Her blood pressure is 123/77 and her pulse is 71. Her respiration is 18.     Chief Complaint: Back Pain (X 2 wks, urgency, bladder fullness, mild nausea//(Had colostomy reversal 7/8/24))    HPI As stated in CC.  Pt reports that she spoke with PC and scheduled anni on 1/6/2024.  Patient denies fever, shortness for breath or chest pain, dizziness, weakness, stomach pain, nausea or vomiting.  Patient reports low back pain that started about 2-3 days ago as well however patient states no heavy lifting much she did pull up on her toddler family member but did not notice any pain at that time.  Patient denies taking any medication for treatment of pain or symptoms.  ROS   Objective:     Physical Exam   Constitutional: She appears well-developed.  Non-toxic appearance. She does not appear ill. No distress.   HENT:   Head: Atraumatic.   Nose: No purulent discharge. Right sinus exhibits no maxillary sinus tenderness and no frontal sinus tenderness. Left sinus exhibits no maxillary sinus tenderness and no frontal sinus tenderness.   Mouth/Throat: Uvula is midline.   Eyes: Conjunctivae are normal. Right eye exhibits no discharge. Left eye exhibits no discharge. Extraocular movement intact   Neck: Neck supple. No neck rigidity present.   Cardiovascular: Regular rhythm.   Pulmonary/Chest: Effort normal and breath sounds normal. No respiratory distress. She has no wheezes. She has no rhonchi. She has no rales. She exhibits no tenderness.   Abdominal: Normal appearance and bowel sounds are normal. She exhibits no distension. Soft. There is no abdominal tenderness. There is left CVA tenderness. There is no guarding and no right CVA tenderness.   Lymphadenopathy:     She has no cervical adenopathy.   Neurological: She is alert.   Skin: Skin is warm, dry " and not diaphoretic.   Psychiatric: Her behavior is normal.   Nursing note and vitals reviewed.      Assessment:     1. Abnormal urine findings    2. UTI symptoms    3. Urinary urgency    4. Acute bilateral low back pain, unspecified whether sciatica present      Results for orders placed or performed in visit on 01/03/25   POCT Urinalysis, Dipstick, Automated, W/O Scope    Collection Time: 01/03/25  3:29 PM   Result Value Ref Range    POC Blood, Urine Positive (A) Negative    POC Bilirubin, Urine Negative Negative    POC Urobilinogen, Urine 0.2 E.U/dL 0.1 - 1.1    POC Ketones, Urine Negative Negative    POC Protein, Urine Negative Negative    POC Nitrates, Urine Negative Negative    POC Glucose, Urine Negative Negative    pH, UA 6.5     POC Specific Gravity, Urine 1.020 1.003 - 1.029    POC Leukocytes, Urine Positive (A) Negative       Plan:   Abdominal exam benign ER precautions given and discussed  Urine culture pending  - Increase your water intake to 5-6 bottles per day  - No cranberry juice  - Start antibiotics today  Abnormal urine findings  -     nitrofurantoin, macrocrystal-monohydrate, (MACROBID) 100 MG capsule; Take 1 capsule (100 mg total) by mouth 2 (two) times daily. for 7 days  Dispense: 14 capsule; Refill: 0    UTI symptoms  -     POCT Urinalysis, Dipstick, Automated, W/O Scope  -     Urine Culture High Risk  -     nitrofurantoin, macrocrystal-monohydrate, (MACROBID) 100 MG capsule; Take 1 capsule (100 mg total) by mouth 2 (two) times daily. for 7 days  Dispense: 14 capsule; Refill: 0    Urinary urgency  -     Urine Culture High Risk  -     nitrofurantoin, macrocrystal-monohydrate, (MACROBID) 100 MG capsule; Take 1 capsule (100 mg total) by mouth 2 (two) times daily. for 7 days  Dispense: 14 capsule; Refill: 0    Acute bilateral low back pain, unspecified whether sciatica present  -     baclofen (LIORESAL) 10 MG tablet; Take 1 tablet (10 mg total) by mouth 3 (three) times daily. for 7 days  Dispense:  21 tablet; Refill: 0

## 2025-01-03 NOTE — PATIENT INSTRUCTIONS
Please follow instructions on patient education material.      Return to urgent care in 2 to 3 days if symptoms are not improving, immediately if you develop any new or worsening symptoms.   Urine culture pending  - Increase your water intake to 5-6 bottles per day  - No cranberry juice  - Start antibiotics today, if your urine culture results show that you need to change antibiotics to clear your infection, we will know that in 3 days. If you need a change, we will automatically send new medication to your pharmacy and call you to let you know we did that. Otherwise, if you do not hear from us, finish your entire antibiotic Rx.    - If you start to have fevers 100.4+, significant lower back/lower abdominal pain, vomiting, chills/body aches, or worsening bladder/urination symptoms - go to the ER.

## 2025-01-04 ENCOUNTER — PATIENT MESSAGE (OUTPATIENT)
Dept: URGENT CARE | Facility: CLINIC | Age: 55
End: 2025-01-04
Payer: MEDICAID

## 2025-01-04 LAB
BACTERIA UR CULT: ABNORMAL

## 2025-01-04 NOTE — PROGRESS NOTES
Possible poor urine specimen collection , continue with Macrobid. Follow up with PCP or return if symptoms have not improved.

## 2025-01-15 ENCOUNTER — HOSPITAL ENCOUNTER (OUTPATIENT)
Dept: RADIOLOGY | Facility: HOSPITAL | Age: 55
Discharge: HOME | End: 2025-01-15
Attending: INTERNAL MEDICINE
Payer: MEDICAID

## 2025-01-15 DIAGNOSIS — C20 ADENOCARCINOMA OF RECTUM, STAGE 3: ICD-10-CM

## 2025-01-15 DIAGNOSIS — C20 RECTAL CANCER: ICD-10-CM

## 2025-01-15 DIAGNOSIS — C20 ADENOCARCINOMA OF RECTUM: ICD-10-CM

## 2025-01-15 PROCEDURE — 74177 CT ABD & PELVIS W/CONTRAST: CPT | Mod: TC

## 2025-01-15 PROCEDURE — 25500020 PHARM REV CODE 255

## 2025-01-15 RX ADMIN — IOHEXOL 100 ML: 350 INJECTION, SOLUTION INTRAVENOUS at 07:01

## 2025-02-05 DIAGNOSIS — C20 RECTAL CANCER: Primary | ICD-10-CM

## 2025-02-07 ENCOUNTER — TELEPHONE (OUTPATIENT)
Dept: HEMATOLOGY/ONCOLOGY | Facility: CLINIC | Age: 55
End: 2025-02-07
Payer: MEDICAID

## 2025-02-09 PROBLEM — Z08 ENCOUNTER FOR FOLLOW-UP SURVEILLANCE OF RECTAL CANCER: Status: ACTIVE | Noted: 2025-02-09

## 2025-02-09 PROBLEM — Z85.048 ENCOUNTER FOR FOLLOW-UP SURVEILLANCE OF RECTAL CANCER: Status: ACTIVE | Noted: 2025-02-09

## 2025-02-10 ENCOUNTER — OFFICE VISIT (OUTPATIENT)
Dept: HEMATOLOGY/ONCOLOGY | Facility: CLINIC | Age: 55
End: 2025-02-10
Attending: INTERNAL MEDICINE
Payer: MEDICAID

## 2025-02-10 ENCOUNTER — APPOINTMENT (OUTPATIENT)
Dept: HEMATOLOGY/ONCOLOGY | Facility: CLINIC | Age: 55
End: 2025-02-10
Payer: MEDICAID

## 2025-02-10 VITALS
BODY MASS INDEX: 23.81 KG/M2 | DIASTOLIC BLOOD PRESSURE: 74 MMHG | OXYGEN SATURATION: 99 % | SYSTOLIC BLOOD PRESSURE: 111 MMHG | WEIGHT: 129.38 LBS | HEART RATE: 82 BPM | TEMPERATURE: 98 F | HEIGHT: 62 IN | RESPIRATION RATE: 18 BRPM

## 2025-02-10 DIAGNOSIS — T45.1X5A CHEMOTHERAPY-INDUCED NEUROPATHY: Primary | ICD-10-CM

## 2025-02-10 DIAGNOSIS — Z85.048 ENCOUNTER FOR FOLLOW-UP SURVEILLANCE OF RECTAL CANCER: Primary | ICD-10-CM

## 2025-02-10 DIAGNOSIS — D12.3 ADENOMATOUS POLYP OF TRANSVERSE COLON: ICD-10-CM

## 2025-02-10 DIAGNOSIS — Z08 ENCOUNTER FOR FOLLOW-UP SURVEILLANCE OF RECTAL CANCER: Primary | ICD-10-CM

## 2025-02-10 DIAGNOSIS — R59.0 PELVIC LYMPHADENOPATHY: ICD-10-CM

## 2025-02-10 DIAGNOSIS — G62.0 CHEMOTHERAPY-INDUCED NEUROPATHY: Primary | ICD-10-CM

## 2025-02-10 DIAGNOSIS — Z83.719 FAMILY HX COLONIC POLYPS: ICD-10-CM

## 2025-02-10 DIAGNOSIS — Z80.0 FAMILY HISTORY OF COLON CANCER: ICD-10-CM

## 2025-02-10 DIAGNOSIS — C20 ADENOCARCINOMA OF RECTUM, STAGE 3: ICD-10-CM

## 2025-02-10 DIAGNOSIS — Z08 ENCOUNTER FOR FOLLOW-UP SURVEILLANCE OF RECTAL CANCER: ICD-10-CM

## 2025-02-10 DIAGNOSIS — Z85.048 ENCOUNTER FOR FOLLOW-UP SURVEILLANCE OF RECTAL CANCER: ICD-10-CM

## 2025-02-10 DIAGNOSIS — T82.868A THROMBOSIS IN PERIPHERALLY INSERTED CENTRAL CATHETER (PICC): ICD-10-CM

## 2025-02-10 DIAGNOSIS — C20 RECTAL CANCER: ICD-10-CM

## 2025-02-10 LAB
ALBUMIN SERPL-MCNC: 4.1 G/DL (ref 3.5–5)
ALBUMIN/GLOB SERPL: 1.3 RATIO (ref 1.1–2)
ALP SERPL-CCNC: 97 UNIT/L (ref 40–150)
ALT SERPL-CCNC: 17 UNIT/L (ref 0–55)
ANION GAP SERPL CALC-SCNC: 7 MEQ/L
AST SERPL-CCNC: 20 UNIT/L (ref 5–34)
BASOPHILS # BLD AUTO: 0.03 X10(3)/MCL
BASOPHILS NFR BLD AUTO: 0.9 %
BILIRUB SERPL-MCNC: 1.1 MG/DL
BUN SERPL-MCNC: 11.3 MG/DL (ref 9.8–20.1)
CALCIUM SERPL-MCNC: 9.9 MG/DL (ref 8.4–10.2)
CEA SERPL-MCNC: <1.73 NG/ML (ref 0–3)
CHLORIDE SERPL-SCNC: 111 MMOL/L (ref 98–107)
CO2 SERPL-SCNC: 26 MMOL/L (ref 22–29)
CREAT SERPL-MCNC: 0.7 MG/DL (ref 0.55–1.02)
CREAT/UREA NIT SERPL: 16
EOSINOPHIL # BLD AUTO: 0.19 X10(3)/MCL (ref 0–0.9)
EOSINOPHIL NFR BLD AUTO: 5.7 %
ERYTHROCYTE [DISTWIDTH] IN BLOOD BY AUTOMATED COUNT: 12.6 % (ref 11.5–17)
GFR SERPLBLD CREATININE-BSD FMLA CKD-EPI: >60 ML/MIN/1.73/M2
GLOBULIN SER-MCNC: 3.1 GM/DL (ref 2.4–3.5)
GLUCOSE SERPL-MCNC: 100 MG/DL (ref 74–100)
HCT VFR BLD AUTO: 39.3 % (ref 37–47)
HGB BLD-MCNC: 13.4 G/DL (ref 12–16)
IMM GRANULOCYTES # BLD AUTO: 0 X10(3)/MCL (ref 0–0.04)
IMM GRANULOCYTES NFR BLD AUTO: 0 %
LYMPHOCYTES # BLD AUTO: 1.04 X10(3)/MCL (ref 0.6–4.6)
LYMPHOCYTES NFR BLD AUTO: 31.2 %
MCH RBC QN AUTO: 30.4 PG (ref 27–31)
MCHC RBC AUTO-ENTMCNC: 34.1 G/DL (ref 33–36)
MCV RBC AUTO: 89.1 FL (ref 80–94)
MONOCYTES # BLD AUTO: 0.26 X10(3)/MCL (ref 0.1–1.3)
MONOCYTES NFR BLD AUTO: 7.8 %
NEUTROPHILS # BLD AUTO: 1.81 X10(3)/MCL (ref 2.1–9.2)
NEUTROPHILS NFR BLD AUTO: 54.4 %
NRBC BLD AUTO-RTO: 0 %
PLATELET # BLD AUTO: 198 X10(3)/MCL (ref 130–400)
PMV BLD AUTO: 9.2 FL (ref 7.4–10.4)
POTASSIUM SERPL-SCNC: 4 MMOL/L (ref 3.5–5.1)
PROT SERPL-MCNC: 7.2 GM/DL (ref 6.4–8.3)
RBC # BLD AUTO: 4.41 X10(6)/MCL (ref 4.2–5.4)
SODIUM SERPL-SCNC: 144 MMOL/L (ref 136–145)
WBC # BLD AUTO: 3.33 X10(3)/MCL (ref 4.5–11.5)

## 2025-02-10 PROCEDURE — 99214 OFFICE O/P EST MOD 30 MIN: CPT | Mod: S$PBB,,, | Performed by: INTERNAL MEDICINE

## 2025-02-10 PROCEDURE — 80053 COMPREHEN METABOLIC PANEL: CPT

## 2025-02-10 PROCEDURE — 82378 CARCINOEMBRYONIC ANTIGEN: CPT

## 2025-02-10 PROCEDURE — 99215 OFFICE O/P EST HI 40 MIN: CPT | Mod: PBBFAC | Performed by: INTERNAL MEDICINE

## 2025-02-10 PROCEDURE — 36415 COLL VENOUS BLD VENIPUNCTURE: CPT

## 2025-02-10 PROCEDURE — 3074F SYST BP LT 130 MM HG: CPT | Mod: CPTII,,, | Performed by: INTERNAL MEDICINE

## 2025-02-10 PROCEDURE — 1159F MED LIST DOCD IN RCRD: CPT | Mod: CPTII,,, | Performed by: INTERNAL MEDICINE

## 2025-02-10 PROCEDURE — 1160F RVW MEDS BY RX/DR IN RCRD: CPT | Mod: CPTII,,, | Performed by: INTERNAL MEDICINE

## 2025-02-10 PROCEDURE — 85025 COMPLETE CBC W/AUTO DIFF WBC: CPT

## 2025-02-10 PROCEDURE — 3008F BODY MASS INDEX DOCD: CPT | Mod: CPTII,,, | Performed by: INTERNAL MEDICINE

## 2025-02-10 PROCEDURE — 3078F DIAST BP <80 MM HG: CPT | Mod: CPTII,,, | Performed by: INTERNAL MEDICINE

## 2025-02-10 NOTE — PROGRESS NOTES
History:  Past Medical History:   Diagnosis Date    Anesthesia complication     difficulty breathing during     Anxiety disorder, unspecified     Depression     GERD (gastroesophageal reflux disease)     Ileostomy present     Insomnia     Rectal cancer        Past Surgical History:   Procedure Laterality Date    APPENDECTOMY      BACK SURGERY       SECTION  2004    COLONOSCOPY      cyst coccyx      1985 x3 with cyst and had cystectomy on tailbone in     ESOPHAGOGASTRODUODENOSCOPY      EYE SURGERY      RK corrective surgery    ILEOSTOMY CLOSURE N/A 2024    Procedure: CLOSURE, ILEOSTOMY;  Surgeon: Lee Rosenthal MD;  Location: [facility] OR;  Service: General;  Laterality: N/A;  XX    REFRACTIVE SURGERY      ROBOT-ASSISTED LOW ANTERIOR RESECTION OF COLON N/A 2024    Procedure: ROBOTIC RESECTION, COLON, LOW ANTERIOR;  Surgeon: Lee Rosenthal MD;  Location: [facility] OR;  Service: General;  Laterality: N/A;  WITH ROBOT ILEOSTOMY    WISDOM TOOTH EXTRACTION        Family History   Problem Relation Name Age of Onset    Colon polyps Father Delvin Sharpe     Alcohol abuse Father Delvin Sharpe     Colon cancer Maternal Grandfather      Colon cancer Paternal Grandfather WANJU Dell     Cancer Paternal Grandfather WANJU Dell     Cancer Maternal Aunt      Diabetes Maternal Grandmother Yasmine rose       Reason for Follow-up:  -adenocarcinoma rectum, moderately differentiated, partially obstructing mass, S/P colonoscopy 2023   -tubular adenoma transverse colon   -regional lymphadenopathy on CT abdomen pelvis with contrast 2023  -2024:  Laparoscopic/robotic low anterior resection with EN bloc total mesorectal excision and low pelvic anastomosis; creation of diverting loop ileostomy:    Pathology:  Moderately-differentiated adenocarcinoma, 1.2 cm, invading through the muscularis propria into the pericolonic or perirectal tissue into the pericolonic or perirectal tissue,  incomplete response to neoadjuvant therapy, no LVI, no PNI, margins negative, 2/13 lymph nodes positive  >>> ypT3 ypN1b  -MMR proficient  -acute left upper extremity DVT related to PICC line  -grandfather experienced colon cancer; father experienced colon polyp  -chemotherapy-induced peripheral neuropathy    History of Present Illness:   Adenocarcinoma of rectum, stage 3     Oncologic/Hematologic History:  Oncology History   Adenocarcinoma of rectum, stage 3   7/1/2023 Initial Diagnosis    Adenocarcinoma of rectum, stage 3     7/19/2023 - 11/22/2023 Chemotherapy    Treatment Summary   Plan Name: OP mFOLFOX 6 Q2W  Treatment Goal: Curative  Status: Inactive  Start Date: 7/19/2023  End Date: 11/22/2023  Provider: John Bazan MD  Chemotherapy: fluorouraciL injection 650 mg, 400 mg/m2 = 650 mg, Intravenous, Clinic/HOD 1 time, 3 of 3 cycles  Administration: 650 mg (7/19/2023), 650 mg (8/1/2023), 650 mg (8/22/2023)  oxaliplatin (ELOXATIN) 85 mg/m2 = 139 mg in dextrose 5 % (D5W) 592.8 mL chemo infusion, 85 mg/m2 = 139 mg, Intravenous, Clinic/HOD 1 time, 8 of 8 cycles  Administration: 139 mg (7/19/2023), 139 mg (8/1/2023), 140 mg (9/18/2023), 135 mg (10/2/2023), 135 mg (11/7/2023), 135 mg (8/22/2023), 130 mg (11/20/2023), 135 mg (10/25/2023)     12/26/2023 - 12/26/2023 Chemotherapy    Treatment Summary   Plan Name: OP CAPECITABINE 5 DAYS + RADIOTHERAPY  Treatment Goal: Curative  Status: Inactive  Start Date:   End Date:   Provider: John Bazan MD  Chemotherapy: capecitabine (XELODA) tablet 1,250 mg, 825 mg/m2 = 1,250 mg, Oral, 2 times daily, 0 of 1 cycle, Start date: --, End date: --     5/20/2024 Cancer Staged    Staging form: Colon and Rectum, AJCC 8th Edition  - Pathologic stage from 5/20/2024: Stage IIIB (ypT3, pN1b, cM0)     Past medical history:  Anxiety.    Procedure/surgical history:  Appendectomy.  Back surgery.  Social history:  Single.  Lives in [place], [place].  Has 2 children.  Works as a  .  No history of tobacco, alcohol, or illicit drug abuse.  Family history:  Paternal grandfather experience colon cancer in his 70s.  Father experienced multiple colon polyps.  Health maintenance:  No screening colonoscopy prior to most recent colonoscopy which led to the diagnosis of rectal cancer.  -09/13/2019:  Bilateral screening mammogram pelvic comparison:  09/13/2018 mammogram):  BI-RADS 0 (indeterminate)  -09/25/2019:  Diagnostic left mammogram, limited ultrasound left breast (comparison:  09/13/2019 mammogram):  Overall study BI-RADS 2: Benign    53-year-old lady, referred by Migel Otriz MD, GI, with rectal cancer.  Please refer to assessment and plan section for details.    07/05/2023:  Pleasant lady who presents for initial medical oncology consultation, accompanied by her brother.    Her symptoms started 2 years ago, in the form of intermittent small amount hematochezia, initially on toilet wife's, and for last few weeks, in toilet bowel as well.  She brought the symptoms to the attention of her gyn several months ago and does not remember the recommendation.  Regardless, she never got a screening colonoscopy done.  Around [place] time 2022, she started feeling bloated.  She started having constant uncomfortable feeling because of bloating, as well as in the anorectal area.  Hematochezia continued.  She brought this to the attention of her PCP in [place] who suggested taking MiraLax.  The PCP also arranged for colonoscopy.  On today's visit, she reports that she has been constipated her entire life.  She has been constipated for at least 10 years.  Hematochezia for 2 years.  No anorectal pain.  Does have constant feeling of incomplete evacuation.  Appetite is down and she has lost 30 lb in last 3-4 months.  Appetite is more less preserved but she is afraid to eat because eating causes bloating and worsening of uncomfortable feeling in rectum.    Interval History:  PICC DOUBLE LUMEN LINE  FLUSH   [No matching plan found]     02/10/2025:   -10/30/2024:  Hemoglobin 13.7, CBC unremarkable, bilirubin 2.0 elevated (was 2.0 on 07/31/2024, was 1.6 on 07/09/2024; was normal prior to that); CEA <1.73  -01/15/2025:  Surveillance CTs C/A/P with contrast (comparison:  07/24/2024):  No evidence of recurrent malignancy or metastatic disease  -02/10/2024: Labs reviewed; hemoglobin 13.4, WBC 3.33, ANC 1.81, CMP unremarkable, CEA level pending  Presents for a follow-up visit.  Accompanied by family.  In no acute discomfort.  Her main concern is that ever since surgery, the frequency and the urge of defecation has made quality of her life miserable.  She is experiencing more bowel movements than usual, especially postprandial, especially after certain foods.  When she has urge to defecate, she has to go to the bathroom immediately.  As a result, she says, that she does not feel that she is in a position to be able to hold irregular jaw.  She is also wary of recreational outings and to drink sudden beverages like hot chocolate, coffee, etc..  She is trying to experiment with different diets to make her life more livable rather have to constantly focus on and be worried about next bowel movement which she may feel urge for at any moment and that too, without warning.  Otherwise, overall, feels well.  Some nausea.  Appetite is great.  No weakness or fatigue.  No blood in stool.  ECOG 0.  She will follow-up with Dr. Ortiz GI, in [place], for 1 year surveillance colonoscopy in May.        Medications:  Current Outpatient Medications on File Prior to Visit   Medication Sig Dispense Refill    ALPRAZolam (XANAX) 0.5 MG tablet Take 0.5 mg by mouth 2 (two) times daily as needed.      b complex vitamins capsule Take 1 capsule by mouth once daily.      busPIRone (BUSPAR) 15 MG tablet Take 15 mg by mouth 2 (two) times daily as needed (anxiety).      EScitalopram oxalate (LEXAPRO) 20 MG tablet Take 1 tablet by mouth every  evening.      multivitamin (THERAGRAN) per tablet Take 1 tablet by mouth once daily.      ondansetron (ZOFRAN) 8 MG tablet Take 1 tablet (8 mg total) by mouth every 8 (eight) hours as needed for Nausea. 60 tablet 2    ondansetron (ZOFRAN-ODT) 8 MG TbDL Take 1 tablet (8 mg total) by mouth every 8 (eight) hours as needed (nausea). 90 tablet 2    oxyCODONE-acetaminophen (PERCOCET) 5-325 mg per tablet Take 1 tablet by mouth every 4 (four) hours as needed for Pain. 56 tablet 0    promethazine (PHENERGAN) 25 MG tablet Take 1 tablet (25 mg total) by mouth every 6 (six) hours as needed for Nausea. 30 tablet 2    baclofen (LIORESAL) 10 MG tablet Take 1 tablet (10 mg total) by mouth 3 (three) times daily. for 7 days 21 tablet 0    dicyclomine (BENTYL) 10 MG capsule Take 10 mg by mouth every 6 (six) hours as needed. (Patient not taking: Reported on 1/3/2025)      DULoxetine (CYMBALTA) 20 MG capsule Take 20 mg by mouth once daily. (Patient not taking: Reported on 1/3/2025)      famotidine (PEPCID) 40 MG tablet Take 1 tablet (40 mg total) by mouth nightly as needed for Heartburn. 30 tablet 1    HYDROcodone-acetaminophen (NORCO) 7.5-325 mg per tablet Take 1 tablet by mouth every 6 (six) hours as needed for Pain. (Patient not taking: Reported on 1/3/2025) 10 tablet 0    HYDROcodone-acetaminophen (NORCO) 7.5-325 mg per tablet Take 1 tablet by mouth every 6 (six) hours as needed for Pain. (Patient not taking: Reported on 1/3/2025) 15 tablet 0    hydrOXYzine pamoate (VISTARIL) 25 MG Cap Take 25 mg by mouth 4 (four) times daily. (Patient not taking: Reported on 1/3/2025)      hyoscyamine (ANASPAZ,LEVSIN) 0.125 mg Tab Take 1 tablet (125 mcg total) by mouth every 6 (six) hours as needed (abdomen pain). (Patient not taking: Reported on 1/3/2025) 60 tablet 2    metroNIDAZOLE (FLAGYL) 250 MG tablet TAKE 2 TABLETS AT 1PM, 5PM AND 9PM THE EVENING PRIOR TO SURGERY (Patient not taking: Reported on 1/3/2025) 6 tablet 0    MIRALAX 17 gram/dose  "powder Take 17 g by mouth. (Patient not taking: Reported on 1/3/2025)      neomycin (MYCIFRADIN) 500 mg Tab TAKE 2 TABLETS AT 1PM, 5PM AND 9PM THE EVENING PRIOR TO SURGERY (Patient not taking: Reported on 1/3/2025) 6 tablet 0    NON FORMULARY MEDICATION Take 10 mg by mouth Daily. Medical Marijuana for Insomnia (Patient not taking: Reported on 1/3/2025)      oxyCODONE-acetaminophen (PERCOCET) 5-325 mg per tablet Take 1 tablet by mouth every 4 (four) hours as needed for Pain. (Patient not taking: Reported on 2/10/2025) 20 tablet 0    pantoprazole (PROTONIX) 40 MG tablet Take 1 tablet (40 mg total) by mouth every morning. (Patient not taking: Reported on 1/3/2025) 30 tablet 1     No current facility-administered medications on file prior to visit.     Review of Systems:   All systems reviewed and found to be negative except for the symptoms detailed above    Physical Examination:   VITAL SIGNS:   Vitals:    02/10/25 1310   BP: 111/74   Pulse: 82   Resp: 18   Temp: 97.9 °F (36.6 °C)       GENERAL:  In no apparent distress.    HEAD:  No signs of head trauma.  EYES:  Pupils are equal.  Extraocular motions intact.    EARS:  Hearing grossly intact.  MOUTH:  Oropharynx is normal.   NECK:  No adenopathy, no JVD.     CHEST:  Chest with clear breath sounds bilaterally.  No wheezes, rales, rhonchi.    CARDIAC:  Regular rate and rhythm.  S1 and S2, without murmurs, gallops, rubs.  VASCULAR:  No Edema.  Peripheral pulses normal and equal in all extremities.  ABDOMEN:  Soft, without detectable tenderness.  No sign of distention.  No   rebound or guarding, and no masses palpated.   Bowel Sounds normal.  MUSCULOSKELETAL:  Good range of motion of all major joints. Extremities without clubbing, cyanosis or edema.    NEUROLOGIC EXAM:  Alert and oriented x 3.  No focal sensory or strength deficits.   Speech normal.  Follows commands.  PSYCHIATRIC:  Mood normal.    No results for input(s): "CBC" in the last 72 hours.   No results for " "input(s): "CMP" in the last 72 hours.     Assessment:  Problem List Items Addressed This Visit       Adenocarcinoma of rectum, stage 3    Pelvic lymphadenopathy    Family history of colon cancer    Family hx colonic polyps    Thrombosis in peripherally inserted central catheter (PICC)    Polyp of transverse colon    Chemotherapy-induced neuropathy - Primary    Encounter for follow-up surveillance of rectal cancer       Orders for 02/10/2025:   In 3 months, check CBC, CMP, CEA level, then follow-up with NP  Surveillance CT scans of C/A/P contrast in July, then follow-up with me with CBC, CMP, CEA level  One year surveillance colonoscopy will be due in May    Above discussed with the patient.  All questions answered.    Discussed labs and scans and gave her copies of relevant results.    She understands and agrees with this plan.  =================================    # Adenocarcinoma of rectum, moderately differentiated:   -presentation: 05/2023: Change in bowel habits, hematochezia, lower abdominal pain  -colonoscopy 06/19/2023:  Partially obstructing rectal mass, 9 mm tubular adenoma transferrin colon  -MMR proficient, low probability of MSI-H  -CTs abdomen pelvis 06/21/2023:  No metastases; large rectal mass; perirectal lymphadenopathy  -CEA level normal 07/05/2023  -CT chest 07/18/2023: No lung metastases  -MRI pelvis 07/18/2023:  Large mid to distal rectal mass, T3b; at least 10 perirectal lymph nodes, largest 9 mm (T2b)  -radiologically, T3b N2b, stage IIIC  -S/P neoadjuvant FOLFOX on 8 cycles: 07/19/2023-11/22/2023  -restaging CTs C/A/P 10 09/2023: S/P neoadjuvant FOLFOX x5 cycles:  Marked improvement  -restaging CTs chest/abdomen +restaging pelvic MRI 12/15/2023 (post neoadjuvant FOLFOX x8 cycles):  -S/P neoadjuvant chemoradiation therapy:  01/03/2024-02/12/2024  -restaging CTs chest/abdomen +pelvic MRI 03/05/2024: Post completion of neoadjuvant therapy:  Improved  -MRI pelvis 03/05/2024:  New indeterminate 8 " mm enhancing lesion right sacral ala  -bone scan 03/20/2024: No bone metastases  -S/P laparoscopic/robotic low anterior resection with EN bloc total mesorectal excision and low pelvic anastomosis, creation of diverting loop colostomy  -moderately differentiated adenocarcinoma, 1.2 cm, invading through the muscularis propria into the pericolonic/perirectal tissue, incomplete response to neoadjuvant therapy, no LVI, no PNI, margins negative, 2/13 lymph nodes positive  -ypT3 ypN1b  -S/P ileostomy reversal 07/08/2024  -surveillance CTs C/A/P 0 07/24/2024: BREANNA  -10/30/2024:  Bilirubin 2.0 elevated (was 2.0 on 07/31/2024, 1.6 on 07/09/2024; was normal prior to that); CEA <1.73  -surveillance CTs C/A/P 01/15/2025: BREANNA  Plan:   -continue surveillance (see below)  -check CBC, CMP, CEA level now, then, in 3 months   -surveillance CTs C/A/P with contrast in 6 months (July 2025)  -surveillance 1 year colonoscopy in 1 year (05/2025) (Dr. Ortiz, GI, [place])  -regarding lifestyle artery change in bowel habits, including increased frequency of bowel movements with increased urgency, advised her to follow-up with Dr. Lee Rosenthal for further advice, specifically, if any surgical procedure might help with that    Surveillance:  1. History and physical examination and CEA level every 3-6 months for 2 years (05/2024-05/2026), then every 6 months for total of 5 years (05/2026-05/2029)  2. Surveillance CTs C/A/P with contrast every 6-12 months for total of 5 years (05/2024-05/2029)  3. Surveillance colonoscopy 1 year (05/2025) after surgery, etc.  4. FDG PET-CT scan is not recommended     # Left upper extremity DVT secondary to PICC line 07/27/2023:  -developed acute DVT left subclavian/axillary/brachial veins secondary to PICC line; started on Eliquis; PICC line removed  >>>  -anticoagulation was planned to continue for at least 3 months, i.e., until the end of October 2023   -11/07/2023:  Told me that she stopped anticoagulation  Halloween day (10/31/2023), appropriately    # Chemotherapy-induced peripheral neuropathy:  -10/17/2023: Chemotherapy induced peripheral neuropathy in the form of tingling in hands; no neuropathy in feet; no numbness or pain  -10/17/2020: Started on Cymbalta 30 mg p.o. q.h.s.  -Cymbalta discontinued 10/30/2023 because of increased anxiety with Cymbalta reported by her    # Family history of colon cancer:  -grandfather experienced colon cancer; father experienced colon polyp  -genetic testing 12/19/2023: Negative      Follow-up:  Answers submitted by the patient for this visit:  Review of Systems Questionnaire (Submitted on 2/5/2025)  appetite change : No  unexpected weight change: No  mouth sores: No  visual disturbance: No  cough: No  shortness of breath: No  chest pain: No  abdominal pain: No  diarrhea: No  frequency: No  back pain: Yes  rash: No  headaches: No  adenopathy: No  nervous/ anxious: Yes    Answers submitted by the patient for this visit:  Review of Systems Questionnaire (Submitted on 2/5/2025)  appetite change : No  unexpected weight change: No  mouth sores: No  visual disturbance: No  cough: No  shortness of breath: No  chest pain: No  abdominal pain: No  diarrhea: No  frequency: No  back pain: Yes  rash: No  headaches: No  adenopathy: No  nervous/ anxious: Yes

## 2025-04-30 ENCOUNTER — OFFICE VISIT (OUTPATIENT)
Dept: SURGICAL ONCOLOGY | Facility: CLINIC | Age: 55
End: 2025-04-30
Payer: MEDICAID

## 2025-04-30 VITALS
DIASTOLIC BLOOD PRESSURE: 74 MMHG | HEART RATE: 77 BPM | BODY MASS INDEX: 24.11 KG/M2 | WEIGHT: 131 LBS | OXYGEN SATURATION: 99 % | HEIGHT: 62 IN | SYSTOLIC BLOOD PRESSURE: 108 MMHG

## 2025-04-30 DIAGNOSIS — C20 RECTAL CANCER: Primary | ICD-10-CM

## 2025-04-30 PROCEDURE — 99213 OFFICE O/P EST LOW 20 MIN: CPT | Mod: S$PBB,,, | Performed by: SURGERY

## 2025-04-30 PROCEDURE — 99999 PR PBB SHADOW E&M-EST. PATIENT-LVL IV: CPT | Mod: PBBFAC,,, | Performed by: SURGERY

## 2025-04-30 PROCEDURE — 3078F DIAST BP <80 MM HG: CPT | Mod: CPTII,,, | Performed by: SURGERY

## 2025-04-30 PROCEDURE — 1159F MED LIST DOCD IN RCRD: CPT | Mod: CPTII,,, | Performed by: SURGERY

## 2025-04-30 PROCEDURE — 3008F BODY MASS INDEX DOCD: CPT | Mod: CPTII,,, | Performed by: SURGERY

## 2025-04-30 PROCEDURE — 3074F SYST BP LT 130 MM HG: CPT | Mod: CPTII,,, | Performed by: SURGERY

## 2025-04-30 PROCEDURE — 99214 OFFICE O/P EST MOD 30 MIN: CPT | Mod: PBBFAC | Performed by: SURGERY

## 2025-04-30 NOTE — PROGRESS NOTES
Patient returns for follow up underwent very low anterior resection and subsequent ileostomy reversal.  She had some initial issues with frequent loose stools but she is now managing this well with diet and p.r.n. Imodium.  She is being followed for surveillance with Medical Oncology at [facility]    On exam she appears well in no apparent distress  Abdomen is soft nontender nondistended, incisions are well healed with no evidence of mass or hernia    Continue surveillance with Medical Oncology    Return to clinic in 12 months

## 2025-05-07 ENCOUNTER — TELEPHONE (OUTPATIENT)
Dept: HEMATOLOGY/ONCOLOGY | Facility: CLINIC | Age: 55
End: 2025-05-07
Payer: MEDICAID

## 2025-05-07 NOTE — PROGRESS NOTES
[facility] Hematology/Oncology  PROGRESS NOTE    Subjective:       Patient ID: Nikkie Sharpe is a 54 y.o. female.    Diagnosis:   -adenocarcinoma rectum, moderately differentiated, partially obstructing mass, S/P colonoscopy 06/19/2023   -tubular adenoma transverse colon   -regional lymphadenopathy on CT abdomen pelvis with contrast 06/21/2023  -05/20/2024:  Laparoscopic/robotic low anterior resection with EN bloc total mesorectal excision and low pelvic anastomosis; creation of diverting loop ileostomy:    Pathology:  Moderately-differentiated adenocarcinoma, 1.2 cm, invading through the muscularis propria into the pericolonic or perirectal tissue into the pericolonic or perirectal tissue, incomplete response to neoadjuvant therapy, no LVI, no PNI, margins negative, 2/13 lymph nodes positive  >>> ypT3 ypN1b  -MMR proficient  -acute left upper extremity DVT related to PICC line  -grandfather experienced colon cancer; father experienced colon polyp  -chemotherapy-induced peripheral neuropathy    Current Treatment:  Surveillance    Treatment History:  -S/P neoadjuvant FOLFOX on 8 cycles: 07/19/2023-11/22/2023     -S/P neoadjuvant chemoradiation therapy: 01/03/2024-02/12/2024     -S/P laparoscopic/robotic low anterior resection with EN bloc total mesorectal excision and low pelvic anastomosis, creation of diverting loop colostomy     -S/P ileostomy reversal 07/08/2024     Chief Complaint: Follow-up (Patient reports no new concerns as of today.)    HPI:  53-year-old lady, referred by Migel Ortiz MD, GI, with rectal cancer.  Please refer to assessment and plan section for details.     07/05/2023:  Pleasant lady who presents for initial medical oncology consultation, accompanied by her brother.    Her symptoms started 2 years ago, in the form of intermittent small amount hematochezia, initially on toilet wife's, and for last few weeks, in toilet bowel as well.  She brought the symptoms to the attention of her gyn several  months ago and does not remember the recommendation.  Regardless, she never got a screening colonoscopy done.  Around [place] time 2022, she started feeling bloated.  She started having constant uncomfortable feeling because of bloating, as well as in the anorectal area.  Hematochezia continued.  She brought this to the attention of her PCP in [place] who suggested taking MiraLax.  The PCP also arranged for colonoscopy.  On today's visit, she reports that she has been constipated her entire life.  She has been constipated for at least 10 years.  Hematochezia for 2 years.  No anorectal pain.  Does have constant feeling of incomplete evacuation.  Appetite is down and she has lost 30 lb in last 3-4 months.  Appetite is more less preserved but she is afraid to eat because eating causes bloating and worsening of uncomfortable feeling in rectum    Past medical history:  Anxiety.    Procedure/surgical history:  Appendectomy.  Back surgery.  Social history:  Single.  Lives in [place], [place].  Has 2 children.  Works as a .  No history of tobacco, alcohol, or illicit drug abuse.  Family history:  Paternal grandfather experience colon cancer in his 70s.  Father experienced multiple colon polyps.  Health maintenance:  No screening colonoscopy prior to most recent colonoscopy which led to the diagnosis of rectal cancer.  -09/13/2019:  Bilateral screening mammogram pelvic comparison:  09/13/2018 mammogram):  BI-RADS 0 (indeterminate)  -09/25/2019:  Diagnostic left mammogram, limited ultrasound left breast (comparison:  09/13/2019 mammogram):  Overall study BI-RADS 2: Benign    Interval History:  Patient presents to clinic for a scheduled surveillance visit. She denies any abdominal pain, constipation, change in bowel pattern, unintentional weight loss, fever, chest pain, dyspnea, or any new pain. She continues with fatigue and diarrhea. She does take imodium as needed. She had a colonoscopy on 04/16/2025 with  Dr. Todd Flores, 1 polyp removed, repeat in 1 year. Labs and plan of care reviewed in detail with patient.     PMX/PSHx  Past Medical History:   Diagnosis Date    Anesthesia complication     difficulty breathing during     Anxiety disorder, unspecified     Depression     GERD (gastroesophageal reflux disease)     Ileostomy present     Insomnia     Rectal cancer       Past Surgical History:   Procedure Laterality Date    APPENDECTOMY      BACK SURGERY       SECTION  2004    COLONOSCOPY      cyst coccyx      1985 x3 with cyst and had cystectomy on tailbone in     ESOPHAGOGASTRODUODENOSCOPY      EYE SURGERY      RK corrective surgery    ILEOSTOMY CLOSURE N/A 2024    Procedure: CLOSURE, ILEOSTOMY;  Surgeon: Lee Rosenthal MD;  Location: [facility] OR;  Service: General;  Laterality: N/A;  XX    REFRACTIVE SURGERY      ROBOT-ASSISTED LOW ANTERIOR RESECTION OF COLON N/A 2024    Procedure: ROBOTIC RESECTION, COLON, LOW ANTERIOR;  Surgeon: Lee Rosenthal MD;  Location: [facility] OR;  Service: General;  Laterality: N/A;  WITH ROBOT ILEOSTOMY    WISDOM TOOTH EXTRACTION       Allergies:  Review of patient's allergies indicates:   Allergen Reactions    Codeine Itching    Hydrocodone Nausea Only    Morphine Itching      Social History:   Social History[1]  Medications:  Current Outpatient Medications   Medication Instructions    ALPRAZolam (XANAX) 0.5 mg, 2 times daily PRN    b complex vitamins capsule 1 capsule, Daily    baclofen (LIORESAL) 10 mg, Oral, 3 times daily    busPIRone (BUSPAR) 15 mg, 2 times daily PRN    dicyclomine (BENTYL) 10 mg, Every 6 hours PRN    DULoxetine (CYMBALTA) 20 mg, Daily    EScitalopram oxalate (LEXAPRO) 20 MG tablet 1 tablet, Nightly    famotidine (PEPCID) 40 mg, Oral, Nightly PRN    HYDROcodone-acetaminophen (NORCO) 7.5-325 mg per tablet 1 tablet, Oral, Every 6 hours PRN    HYDROcodone-acetaminophen (NORCO) 7.5-325 mg per tablet 1 tablet, Oral, Every 6  hours PRN    hydrOXYzine pamoate (VISTARIL) 25 mg, 4 times daily    hyoscyamine (ANASPAZ,LEVSIN) 125 mcg, Oral, Every 6 hours PRN    metroNIDAZOLE (FLAGYL) 250 MG tablet TAKE 2 TABLETS AT 1PM, 5PM AND 9PM THE EVENING PRIOR TO SURGERY    MIRALAX 17 g    multivitamin (THERAGRAN) per tablet 1 tablet, Daily    neomycin (MYCIFRADIN) 500 mg Tab TAKE 2 TABLETS AT 1PM, 5PM AND 9PM THE EVENING PRIOR TO SURGERY    NON FORMULARY MEDICATION 10 mg, Daily    ondansetron (ZOFRAN) 8 mg, Oral, Every 8 hours PRN    ondansetron (ZOFRAN-ODT) 8 mg, Oral, Every 8 hours PRN    oxyCODONE-acetaminophen (PERCOCET) 5-325 mg per tablet 1 tablet, Oral, Every 4 hours PRN    oxyCODONE-acetaminophen (PERCOCET) 5-325 mg per tablet 1 tablet, Oral, Every 4 hours PRN    pantoprazole (PROTONIX) 40 mg, Oral, Every morning    promethazine (PHENERGAN) 25 mg, Oral, Every 6 hours PRN     ROS:  Review of Systems   Constitutional:  Negative for appetite change, chills, fatigue, fever and unexpected weight change.   HENT:  Negative for facial swelling, mouth sores, nosebleeds, sinus pressure/congestion and sore throat.    Eyes:  Negative for photophobia, pain and visual disturbance.   Respiratory:  Negative for cough, chest tightness, shortness of breath and wheezing.    Cardiovascular:  Negative for chest pain, palpitations and leg swelling.   Gastrointestinal:  Positive for diarrhea. Negative for abdominal pain, blood in stool, change in bowel habit, constipation, nausea and vomiting.   Endocrine: Negative.    Genitourinary:  Negative for dysuria, frequency, hematuria, hot flashes, pelvic pain, urgency and vaginal pain.   Musculoskeletal:  Negative for arthralgias, back pain, gait problem, joint swelling and myalgias.   Integumentary:  Negative for pallor, rash, wound, breast mass, breast discharge and breast tenderness.   Allergic/Immunologic: Negative.  Negative for immunocompromised state.   Neurological:  Negative for dizziness, vertigo, syncope,  weakness, numbness and headaches.   Hematological:  Negative for adenopathy. Does not bruise/bleed easily.   Psychiatric/Behavioral:  Negative for behavioral problems and dysphoric mood. The patient is nervous/anxious.    All other systems reviewed and are negative.  Breast: Negative for mass and tenderness      Objective:   Vitals:  Vitals:    05/08/25 1017   BP: 107/71   Pulse: 72   Resp: 19   Temp: 97.9 °F (36.6 °C)      Wt Readings from Last 3 Encounters:   05/08/25 1017 60.8 kg (134 lb)   04/30/25 1112 59.4 kg (131 lb)   02/10/25 1310 58.7 kg (129 lb 6.4 oz)      Physical Examination:  Physical Exam  Vitals and nursing note reviewed.   HENT:      Head: Normocephalic and atraumatic.      Mouth/Throat:      Mouth: Mucous membranes are moist.      Pharynx: Oropharynx is clear.   Eyes:      Extraocular Movements: Extraocular movements intact.      Conjunctiva/sclera: Conjunctivae normal.      Pupils: Pupils are equal, round, and reactive to light.   Cardiovascular:      Rate and Rhythm: Normal rate and regular rhythm.   Pulmonary:      Effort: Pulmonary effort is normal.      Breath sounds: Normal breath sounds.   Abdominal:      General: Abdomen is flat. Bowel sounds are normal.      Palpations: Abdomen is soft.   Musculoskeletal:         General: Normal range of motion.      Cervical back: Normal range of motion and neck supple.   Skin:     General: Skin is warm and dry.   Neurological:      General: No focal deficit present.      Mental Status: She is alert.   Psychiatric:         Mood and Affect: Mood normal.       ECOG SCORE           Labs:  Lab Visit on 05/08/2025   Component Date Value Ref Range Status    Sodium 05/08/2025 143  136 - 145 mmol/L Final    Potassium 05/08/2025 3.8  3.5 - 5.1 mmol/L Final    Chloride 05/08/2025 110 (H)  98 - 107 mmol/L Final    CO2 05/08/2025 27  22 - 29 mmol/L Final    Glucose 05/08/2025 97  74 - 100 mg/dL Final    Blood Urea Nitrogen 05/08/2025 12.5  9.8 - 20.1 mg/dL Final     Creatinine 05/08/2025 0.78  0.55 - 1.02 mg/dL Final    Calcium 05/08/2025 9.2  8.4 - 10.2 mg/dL Final    Protein Total 05/08/2025 7.0  6.4 - 8.3 gm/dL Final    Albumin 05/08/2025 4.0  3.5 - 5.0 g/dL Final    Globulin 05/08/2025 3.0  2.4 - 3.5 gm/dL Final    Albumin/Globulin Ratio 05/08/2025 1.3  1.1 - 2.0 ratio Final    Bilirubin Total 05/08/2025 1.3  <=1.5 mg/dL Final    ALP 05/08/2025 91  40 - 150 unit/L Final    ALT 05/08/2025 16  0 - 55 unit/L Final    AST 05/08/2025 17  11 - 45 unit/L Final    eGFR 05/08/2025 >60  mL/min/1.73/m2 Final    Estimated GFR calculated using the CKD-EPI creatinine (2021) equation.    Anion Gap 05/08/2025 6.0  mEq/L Final    BUN/Creatinine Ratio 05/08/2025 16   Final    Carcinoembryonic Antigen 05/08/2025 <1.73  0.00 - 3.00 ng/mL Final    WBC 05/08/2025 3.76 (L)  4.50 - 11.50 x10(3)/mcL Final    RBC 05/08/2025 4.23  4.20 - 5.40 x10(6)/mcL Final    Hgb 05/08/2025 13.0  12.0 - 16.0 g/dL Final    Hct 05/08/2025 38.1  37.0 - 47.0 % Final    MCV 05/08/2025 90.1  80.0 - 94.0 fL Final    MCH 05/08/2025 30.7  27.0 - 31.0 pg Final    MCHC 05/08/2025 34.1  33.0 - 36.0 g/dL Final    RDW 05/08/2025 12.7  11.5 - 17.0 % Final    Platelet 05/08/2025 190  130 - 400 x10(3)/mcL Final    MPV 05/08/2025 9.7  7.4 - 10.4 fL Final    Neut % 05/08/2025 44.1  % Final    Lymph % 05/08/2025 35.6  % Final    Mono % 05/08/2025 10.4  % Final    Eos % 05/08/2025 8.5  % Final    Basophil % 05/08/2025 1.1  % Final    Imm Grans % 05/08/2025 0.3  % Final    Neut # 05/08/2025 1.66 (L)  2.1 - 9.2 x10(3)/mcL Final    Lymph # 05/08/2025 1.34  0.6 - 4.6 x10(3)/mcL Final    Mono # 05/08/2025 0.39  0.1 - 1.3 x10(3)/mcL Final    Eos # 05/08/2025 0.32  0 - 0.9 x10(3)/mcL Final    Baso # 05/08/2025 0.04  <=0.2 x10(3)/mcL Final    Imm Gran # 05/08/2025 0.01  0.00 - 0.04 x10(3)/mcL Final    NRBC% 05/08/2025 0.0  % Final        Pathology:  -colonoscopy 06/19/2023:  Partially obstructing rectal mass, 9 mm tubular adenoma transferrin  colon  -MMR proficient, low probability of MSI-H  -MRI pelvis 07/18/2023:  Large mid to distal rectal mass, T3b; at least 10 perirectal lymph nodes, largest 9 mm (T2b)  -radiologically, T3b N2b, stage IIIC  -S/P laparoscopic/robotic low anterior resection with EN bloc total mesorectal excision and low pelvic anastomosis, creation of diverting loop colostomy  -moderately differentiated adenocarcinoma, 1.2 cm, invading through the muscularis propria into the pericolonic/perirectal tissue, incomplete response to neoadjuvant therapy, no LVI, no PNI, margins negative, 2/13 lymph nodes positive  -ypT3 ypN1b    Radiology/Diagnostics:  -CTs abdomen pelvis 06/21/2023: No metastases; large rectal mass; perirectal lymphadenopathy   -CT chest 07/18/2023: No lung metastases  -MRI pelvis 07/18/2023:  Large mid to distal rectal mass, T3b; at least 10 perirectal lymph nodes, largest 9 mm (T2b)  -restaging CTs C/A/P 10 09/2023: S/P neoadjuvant FOLFOX x5 cycles:  Marked improvement  -restaging CTs chest/abdomen +restaging pelvic MRI 12/15/2023 (post neoadjuvant FOLFOX x8 cycles):  -restaging CTs chest/abdomen +pelvic MRI 03/05/2024: Post completion of neoadjuvant therapy:  Improved  -MRI pelvis 03/05/2024:  New indeterminate 8 mm enhancing lesion right sacral ala  -bone scan 03/20/2024: No bone metastases  -surveillance CTs C/A/P 0 07/24/2024: BREANNA  -10/30/2024:  Bilirubin 2.0 elevated (was 2.0 on 07/31/2024, 1.6 on 07/09/2024; was normal prior to that); CEA <1.73  -surveillance CTs C/A/P 01/15/2025: BREANNA  -04/16/2025 colonoscopy: large intestine, colon, polypectomy; tubulovillous adenoma, no evidence of high grade dysplasia, repeat in 1 year.    I have reviewed all available lab results and radiology reports.  Assessment:   Rectal cancer  Surveillance CT scans of C/A/P due July   One year surveillance colonoscopy due May 2026  RTC in 2 months with labs and MD for scan review  CBC CMP CEA  Problem List Items Addressed This Visit        Rectal cancer    Relevant Medications    ondansetron (ZOFRAN-ODT) 8 MG TbDL    Polyp of transverse colon    Nausea    Relevant Medications    ondansetron (ZOFRAN-ODT) 8 MG TbDL    Encounter for follow-up surveillance of rectal cancer         Plan:    05/08/2025  Labs and exam stable  Surveillance CT scans of C/A/P due July   One year surveillance colonoscopy due May 2026  RTC in 2 months with labs and MD for scan review  CBC CMP CEA      Providers:       Orders for 02/10/2025:   In 3 months, check CBC, CMP, CEA level, then follow-up with NP  Surveillance CT scans of C/A/P contrast in July, then follow-up with me with CBC, CMP, CEA level  One year surveillance colonoscopy will be due in May     Above discussed with the patient.  All questions answered.    Discussed labs and scans and gave her copies of relevant results.    She understands and agrees with this plan.  =================================     # Adenocarcinoma of rectum, moderately differentiated:   -presentation: 05/2023: Change in bowel habits, hematochezia, lower abdominal pain  -colonoscopy 06/19/2023:  Partially obstructing rectal mass, 9 mm tubular adenoma transferrin colon  -MMR proficient, low probability of MSI-H  -CTs abdomen pelvis 06/21/2023:  No metastases; large rectal mass; perirectal lymphadenopathy  -CEA level normal 07/05/2023  -CT chest 07/18/2023: No lung metastases  -MRI pelvis 07/18/2023:  Large mid to distal rectal mass, T3b; at least 10 perirectal lymph nodes, largest 9 mm (T2b)  -radiologically, T3b N2b, stage IIIC  -S/P neoadjuvant FOLFOX on 8 cycles: 07/19/2023-11/22/2023  -restaging CTs C/A/P 10 09/2023: S/P neoadjuvant FOLFOX x5 cycles:  Marked improvement  -restaging CTs chest/abdomen +restaging pelvic MRI 12/15/2023 (post neoadjuvant FOLFOX x8 cycles):  -S/P neoadjuvant chemoradiation therapy:  01/03/2024-02/12/2024  -restaging CTs chest/abdomen +pelvic MRI 03/05/2024: Post completion of neoadjuvant therapy:  Improved  -MRI  pelvis 03/05/2024:  New indeterminate 8 mm enhancing lesion right sacral ala  -bone scan 03/20/2024: No bone metastases  -S/P laparoscopic/robotic low anterior resection with EN bloc total mesorectal excision and low pelvic anastomosis, creation of diverting loop colostomy  -moderately differentiated adenocarcinoma, 1.2 cm, invading through the muscularis propria into the pericolonic/perirectal tissue, incomplete response to neoadjuvant therapy, no LVI, no PNI, margins negative, 2/13 lymph nodes positive  -ypT3 ypN1b  -S/P ileostomy reversal 07/08/2024  -surveillance CTs C/A/P 0 07/24/2024: BREANNA  -10/30/2024:  Bilirubin 2.0 elevated (was 2.0 on 07/31/2024, 1.6 on 07/09/2024; was normal prior to that); CEA <1.73  -surveillance CTs C/A/P 01/15/2025: BREANNA  Plan:   -continue surveillance (see below)  -check CBC, CMP, CEA level now, then, in 3 months   -surveillance CTs C/A/P with contrast in 6 months (July 2025)  -surveillance 1 year colonoscopy in 1 year (05/2025) (Dr. Ortiz, GI, [place])  -regarding lifestyle artery change in bowel habits, including increased frequency of bowel movements with increased urgency, advised her to follow-up with Dr. Lee Rosenthal for further advice, specifically, if any surgical procedure might help with that     Surveillance:  1. History and physical examination and CEA level every 3-6 months for 2 years (05/2024-05/2026), then every 6 months for total of 5 years (05/2026-05/2029)  2. Surveillance CTs C/A/P with contrast every 6-12 months for total of 5 years (05/2024-05/2029)  3. Surveillance colonoscopy 1 year (05/2025) after surgery, etc.  4. FDG PET-CT scan is not recommended      # Left upper extremity DVT secondary to PICC line 07/27/2023:  -developed acute DVT left subclavian/axillary/brachial veins secondary to PICC line; started on Eliquis; PICC line removed  >>>  -anticoagulation was planned to continue for at least 3 months, i.e., until the end of October 2023   -11/07/2023:   Told me that she stopped anticoagulation Halloween day (10/31/2023), appropriately     # Chemotherapy-induced peripheral neuropathy:  -10/17/2023: Chemotherapy induced peripheral neuropathy in the form of tingling in hands; no neuropathy in feet; no numbness or pain  -10/17/2020: Started on Cymbalta 30 mg p.o. q.h.s.  -Cymbalta discontinued 10/30/2023 because of increased anxiety with Cymbalta reported by her     # Family history of colon cancer:  -grandfather experienced colon cancer; father experienced colon polyp  -genetic testing 12/19/2023: Negative          I have explained all of the above in detail and the patient understands all of the current recommendation(s). I have answered all of their questions to the best of my ability and to their complete satisfaction.       Penny Obrien, FNP-C  Oncology/Hematology   [facility]           [1]   Social History  Tobacco Use    Smoking status: Never     Passive exposure: Never    Smokeless tobacco: Never   Substance Use Topics    Alcohol use: Not Currently     Alcohol/week: 1.0 standard drink of alcohol     Comment: occassionally    Drug use: Yes     Types: Marijuana     Comment: medical marijuana-gummy yesterday 10 mg

## 2025-05-08 ENCOUNTER — LAB VISIT (OUTPATIENT)
Dept: HEMATOLOGY/ONCOLOGY | Facility: CLINIC | Age: 55
End: 2025-05-08
Attending: INTERNAL MEDICINE
Payer: MEDICAID

## 2025-05-08 VITALS
DIASTOLIC BLOOD PRESSURE: 71 MMHG | SYSTOLIC BLOOD PRESSURE: 107 MMHG | WEIGHT: 134 LBS | HEART RATE: 72 BPM | BODY MASS INDEX: 24.66 KG/M2 | OXYGEN SATURATION: 99 % | HEIGHT: 62 IN | RESPIRATION RATE: 19 BRPM | TEMPERATURE: 98 F

## 2025-05-08 DIAGNOSIS — Z08 ENCOUNTER FOR FOLLOW-UP SURVEILLANCE OF RECTAL CANCER: ICD-10-CM

## 2025-05-08 DIAGNOSIS — Z85.048 ENCOUNTER FOR FOLLOW-UP SURVEILLANCE OF RECTAL CANCER: ICD-10-CM

## 2025-05-08 DIAGNOSIS — C20 ADENOCARCINOMA OF RECTUM, STAGE 3: ICD-10-CM

## 2025-05-08 DIAGNOSIS — D12.3 ADENOMATOUS POLYP OF TRANSVERSE COLON: ICD-10-CM

## 2025-05-08 DIAGNOSIS — R11.0 NAUSEA: ICD-10-CM

## 2025-05-08 DIAGNOSIS — C20 RECTAL CANCER: ICD-10-CM

## 2025-05-08 LAB
ALBUMIN SERPL-MCNC: 4 G/DL (ref 3.5–5)
ALBUMIN/GLOB SERPL: 1.3 RATIO (ref 1.1–2)
ALP SERPL-CCNC: 91 UNIT/L (ref 40–150)
ALT SERPL-CCNC: 16 UNIT/L (ref 0–55)
ANION GAP SERPL CALC-SCNC: 6 MEQ/L
AST SERPL-CCNC: 17 UNIT/L (ref 11–45)
BASOPHILS # BLD AUTO: 0.04 X10(3)/MCL
BASOPHILS NFR BLD AUTO: 1.1 %
BILIRUB SERPL-MCNC: 1.3 MG/DL
BUN SERPL-MCNC: 12.5 MG/DL (ref 9.8–20.1)
CALCIUM SERPL-MCNC: 9.2 MG/DL (ref 8.4–10.2)
CEA SERPL-MCNC: <1.73 NG/ML (ref 0–3)
CHLORIDE SERPL-SCNC: 110 MMOL/L (ref 98–107)
CO2 SERPL-SCNC: 27 MMOL/L (ref 22–29)
CREAT SERPL-MCNC: 0.78 MG/DL (ref 0.55–1.02)
CREAT/UREA NIT SERPL: 16
EOSINOPHIL # BLD AUTO: 0.32 X10(3)/MCL (ref 0–0.9)
EOSINOPHIL NFR BLD AUTO: 8.5 %
ERYTHROCYTE [DISTWIDTH] IN BLOOD BY AUTOMATED COUNT: 12.7 % (ref 11.5–17)
GFR SERPLBLD CREATININE-BSD FMLA CKD-EPI: >60 ML/MIN/1.73/M2
GLOBULIN SER-MCNC: 3 GM/DL (ref 2.4–3.5)
GLUCOSE SERPL-MCNC: 97 MG/DL (ref 74–100)
HCT VFR BLD AUTO: 38.1 % (ref 37–47)
HGB BLD-MCNC: 13 G/DL (ref 12–16)
IMM GRANULOCYTES # BLD AUTO: 0.01 X10(3)/MCL (ref 0–0.04)
IMM GRANULOCYTES NFR BLD AUTO: 0.3 %
LYMPHOCYTES # BLD AUTO: 1.34 X10(3)/MCL (ref 0.6–4.6)
LYMPHOCYTES NFR BLD AUTO: 35.6 %
MCH RBC QN AUTO: 30.7 PG (ref 27–31)
MCHC RBC AUTO-ENTMCNC: 34.1 G/DL (ref 33–36)
MCV RBC AUTO: 90.1 FL (ref 80–94)
MONOCYTES # BLD AUTO: 0.39 X10(3)/MCL (ref 0.1–1.3)
MONOCYTES NFR BLD AUTO: 10.4 %
NEUTROPHILS # BLD AUTO: 1.66 X10(3)/MCL (ref 2.1–9.2)
NEUTROPHILS NFR BLD AUTO: 44.1 %
NRBC BLD AUTO-RTO: 0 %
PLATELET # BLD AUTO: 190 X10(3)/MCL (ref 130–400)
PMV BLD AUTO: 9.7 FL (ref 7.4–10.4)
POTASSIUM SERPL-SCNC: 3.8 MMOL/L (ref 3.5–5.1)
PROT SERPL-MCNC: 7 GM/DL (ref 6.4–8.3)
RBC # BLD AUTO: 4.23 X10(6)/MCL (ref 4.2–5.4)
SODIUM SERPL-SCNC: 143 MMOL/L (ref 136–145)
WBC # BLD AUTO: 3.76 X10(3)/MCL (ref 4.5–11.5)

## 2025-05-08 PROCEDURE — 82378 CARCINOEMBRYONIC ANTIGEN: CPT

## 2025-05-08 PROCEDURE — 85025 COMPLETE CBC W/AUTO DIFF WBC: CPT

## 2025-05-08 PROCEDURE — 80053 COMPREHEN METABOLIC PANEL: CPT

## 2025-05-08 PROCEDURE — 99215 OFFICE O/P EST HI 40 MIN: CPT | Mod: PBBFAC

## 2025-05-08 RX ORDER — ONDANSETRON 8 MG/1
8 TABLET, ORALLY DISINTEGRATING ORAL EVERY 8 HOURS PRN
Qty: 90 TABLET | Refills: 2 | Status: SHIPPED | OUTPATIENT
Start: 2025-05-08

## 2025-05-15 ENCOUNTER — PATIENT MESSAGE (OUTPATIENT)
Dept: HEMATOLOGY/ONCOLOGY | Facility: CLINIC | Age: 55
End: 2025-05-15
Payer: MEDICAID

## 2025-05-16 ENCOUNTER — TELEPHONE (OUTPATIENT)
Dept: HEMATOLOGY/ONCOLOGY | Facility: CLINIC | Age: 55
End: 2025-05-16
Payer: MEDICAID

## 2025-05-19 DIAGNOSIS — K13.79 MOUTH SORES: Primary | ICD-10-CM

## 2025-07-03 ENCOUNTER — HOSPITAL ENCOUNTER (OUTPATIENT)
Dept: RADIOLOGY | Facility: HOSPITAL | Age: 55
Discharge: HOME | End: 2025-07-03
Attending: INTERNAL MEDICINE
Payer: MEDICAID

## 2025-07-03 DIAGNOSIS — C20 ADENOCARCINOMA OF RECTUM, STAGE 3: ICD-10-CM

## 2025-07-03 DIAGNOSIS — Z85.048 ENCOUNTER FOR FOLLOW-UP SURVEILLANCE OF RECTAL CANCER: ICD-10-CM

## 2025-07-03 DIAGNOSIS — D12.3 ADENOMATOUS POLYP OF TRANSVERSE COLON: ICD-10-CM

## 2025-07-03 DIAGNOSIS — Z08 ENCOUNTER FOR FOLLOW-UP SURVEILLANCE OF RECTAL CANCER: ICD-10-CM

## 2025-07-03 PROCEDURE — 74177 CT ABD & PELVIS W/CONTRAST: CPT | Mod: TC

## 2025-07-03 PROCEDURE — 25500020 PHARM REV CODE 255: Performed by: INTERNAL MEDICINE

## 2025-07-03 RX ADMIN — IOHEXOL 75 ML: 350 INJECTION, SOLUTION INTRAVENOUS at 09:07

## 2025-07-09 ENCOUNTER — TELEPHONE (OUTPATIENT)
Dept: HEMATOLOGY/ONCOLOGY | Facility: CLINIC | Age: 55
End: 2025-07-09
Payer: MEDICAID

## 2025-07-10 ENCOUNTER — OFFICE VISIT (OUTPATIENT)
Dept: HEMATOLOGY/ONCOLOGY | Facility: CLINIC | Age: 55
End: 2025-07-10
Attending: INTERNAL MEDICINE
Payer: MEDICAID

## 2025-07-10 ENCOUNTER — LAB VISIT (OUTPATIENT)
Dept: HEMATOLOGY/ONCOLOGY | Facility: CLINIC | Age: 55
End: 2025-07-10
Payer: MEDICAID

## 2025-07-10 VITALS
WEIGHT: 134.63 LBS | DIASTOLIC BLOOD PRESSURE: 66 MMHG | BODY MASS INDEX: 24.78 KG/M2 | SYSTOLIC BLOOD PRESSURE: 94 MMHG | OXYGEN SATURATION: 100 % | HEART RATE: 77 BPM | RESPIRATION RATE: 18 BRPM | TEMPERATURE: 98 F | HEIGHT: 62 IN

## 2025-07-10 DIAGNOSIS — D12.3 ADENOMATOUS POLYP OF TRANSVERSE COLON: ICD-10-CM

## 2025-07-10 DIAGNOSIS — Z85.048 ENCOUNTER FOR FOLLOW-UP SURVEILLANCE OF RECTAL CANCER: ICD-10-CM

## 2025-07-10 DIAGNOSIS — R59.0 PELVIC LYMPHADENOPATHY: ICD-10-CM

## 2025-07-10 DIAGNOSIS — G62.0 CHEMOTHERAPY-INDUCED NEUROPATHY: Primary | ICD-10-CM

## 2025-07-10 DIAGNOSIS — C20 RECTAL CANCER: ICD-10-CM

## 2025-07-10 DIAGNOSIS — C20 ADENOCARCINOMA OF RECTUM, STAGE 3: ICD-10-CM

## 2025-07-10 DIAGNOSIS — Z08 ENCOUNTER FOR FOLLOW-UP SURVEILLANCE OF RECTAL CANCER: ICD-10-CM

## 2025-07-10 DIAGNOSIS — T45.1X5A CHEMOTHERAPY-INDUCED NEUROPATHY: Primary | ICD-10-CM

## 2025-07-10 DIAGNOSIS — T82.868A THROMBOSIS IN PERIPHERALLY INSERTED CENTRAL CATHETER (PICC): ICD-10-CM

## 2025-07-10 DIAGNOSIS — Z80.0 FAMILY HISTORY OF COLON CANCER: ICD-10-CM

## 2025-07-10 DIAGNOSIS — Z83.719 FAMILY HX COLONIC POLYPS: ICD-10-CM

## 2025-07-10 DIAGNOSIS — I82.722 CHRONIC DEEP VEIN THROMBOSIS (DVT) OF LEFT UPPER EXTREMITY, UNSPECIFIED VEIN: ICD-10-CM

## 2025-07-10 LAB
ALBUMIN SERPL-MCNC: 4.2 G/DL (ref 3.5–5)
ALBUMIN/GLOB SERPL: 1.3 RATIO (ref 1.1–2)
ALP SERPL-CCNC: 85 UNIT/L (ref 40–150)
ALT SERPL-CCNC: 14 UNIT/L (ref 0–55)
ANION GAP SERPL CALC-SCNC: 7 MEQ/L
AST SERPL-CCNC: 20 UNIT/L (ref 11–45)
BASOPHILS # BLD AUTO: 0.04 X10(3)/MCL
BASOPHILS NFR BLD AUTO: 0.8 %
BILIRUB SERPL-MCNC: 1.6 MG/DL
BUN SERPL-MCNC: 14.5 MG/DL (ref 9.8–20.1)
CALCIUM SERPL-MCNC: 9.9 MG/DL (ref 8.4–10.2)
CEA SERPL-MCNC: <1.73 NG/ML (ref 0–3)
CHLORIDE SERPL-SCNC: 107 MMOL/L (ref 98–107)
CO2 SERPL-SCNC: 30 MMOL/L (ref 22–29)
CREAT SERPL-MCNC: 0.8 MG/DL (ref 0.55–1.02)
CREAT/UREA NIT SERPL: 18
EOSINOPHIL # BLD AUTO: 0.25 X10(3)/MCL (ref 0–0.9)
EOSINOPHIL NFR BLD AUTO: 5.3 %
ERYTHROCYTE [DISTWIDTH] IN BLOOD BY AUTOMATED COUNT: 12.2 % (ref 11.5–17)
GFR SERPLBLD CREATININE-BSD FMLA CKD-EPI: >60 ML/MIN/1.73/M2
GLOBULIN SER-MCNC: 3.3 GM/DL (ref 2.4–3.5)
GLUCOSE SERPL-MCNC: 78 MG/DL (ref 74–100)
HCT VFR BLD AUTO: 38.4 % (ref 37–47)
HGB BLD-MCNC: 13.2 G/DL (ref 12–16)
IMM GRANULOCYTES # BLD AUTO: 0 X10(3)/MCL (ref 0–0.04)
IMM GRANULOCYTES NFR BLD AUTO: 0 %
LYMPHOCYTES # BLD AUTO: 1.38 X10(3)/MCL (ref 0.6–4.6)
LYMPHOCYTES NFR BLD AUTO: 29.2 %
MCH RBC QN AUTO: 30.9 PG (ref 27–31)
MCHC RBC AUTO-ENTMCNC: 34.4 G/DL (ref 33–36)
MCV RBC AUTO: 89.9 FL (ref 80–94)
MONOCYTES # BLD AUTO: 0.38 X10(3)/MCL (ref 0.1–1.3)
MONOCYTES NFR BLD AUTO: 8 %
NEUTROPHILS # BLD AUTO: 2.68 X10(3)/MCL (ref 2.1–9.2)
NEUTROPHILS NFR BLD AUTO: 56.7 %
NRBC BLD AUTO-RTO: 0 %
PLATELET # BLD AUTO: 223 X10(3)/MCL (ref 130–400)
PMV BLD AUTO: 9.4 FL (ref 7.4–10.4)
POTASSIUM SERPL-SCNC: 4 MMOL/L (ref 3.5–5.1)
PROT SERPL-MCNC: 7.5 GM/DL (ref 6.4–8.3)
RBC # BLD AUTO: 4.27 X10(6)/MCL (ref 4.2–5.4)
SODIUM SERPL-SCNC: 144 MMOL/L (ref 136–145)
WBC # BLD AUTO: 4.73 X10(3)/MCL (ref 4.5–11.5)

## 2025-07-10 PROCEDURE — 80053 COMPREHEN METABOLIC PANEL: CPT

## 2025-07-10 PROCEDURE — 82378 CARCINOEMBRYONIC ANTIGEN: CPT

## 2025-07-10 PROCEDURE — 3074F SYST BP LT 130 MM HG: CPT | Mod: CPTII,,, | Performed by: INTERNAL MEDICINE

## 2025-07-10 PROCEDURE — 99214 OFFICE O/P EST MOD 30 MIN: CPT | Mod: S$PBB,,, | Performed by: INTERNAL MEDICINE

## 2025-07-10 PROCEDURE — 1160F RVW MEDS BY RX/DR IN RCRD: CPT | Mod: CPTII,,, | Performed by: INTERNAL MEDICINE

## 2025-07-10 PROCEDURE — 85025 COMPLETE CBC W/AUTO DIFF WBC: CPT

## 2025-07-10 PROCEDURE — 99215 OFFICE O/P EST HI 40 MIN: CPT | Mod: PBBFAC | Performed by: INTERNAL MEDICINE

## 2025-07-10 PROCEDURE — 1159F MED LIST DOCD IN RCRD: CPT | Mod: CPTII,,, | Performed by: INTERNAL MEDICINE

## 2025-07-10 PROCEDURE — 3008F BODY MASS INDEX DOCD: CPT | Mod: CPTII,,, | Performed by: INTERNAL MEDICINE

## 2025-07-10 PROCEDURE — 3078F DIAST BP <80 MM HG: CPT | Mod: CPTII,,, | Performed by: INTERNAL MEDICINE

## 2025-07-10 NOTE — PROGRESS NOTES
History:  Past Medical History:   Diagnosis Date    Anesthesia complication     difficulty breathing during     Anxiety disorder, unspecified     Depression     GERD (gastroesophageal reflux disease)     Ileostomy present     Insomnia     Rectal cancer        Past Surgical History:   Procedure Laterality Date    APPENDECTOMY      BACK SURGERY       SECTION  2004    COLONOSCOPY      cyst coccyx      1985 x3 with cyst and had cystectomy on tailbone in     ESOPHAGOGASTRODUODENOSCOPY      EYE SURGERY      RK corrective surgery    ILEOSTOMY CLOSURE N/A 2024    Procedure: CLOSURE, ILEOSTOMY;  Surgeon: Lee Rosenthal MD;  Location: [facility] OR;  Service: General;  Laterality: N/A;  XX    REFRACTIVE SURGERY      ROBOT-ASSISTED LOW ANTERIOR RESECTION OF COLON N/A 2024    Procedure: ROBOTIC RESECTION, COLON, LOW ANTERIOR;  Surgeon: Lee Rosenthal MD;  Location: [facility] OR;  Service: General;  Laterality: N/A;  WITH ROBOT ILEOSTOMY    WISDOM TOOTH EXTRACTION        Family History   Problem Relation Name Age of Onset    Colon polyps Father Delvin Sharpe     Alcohol abuse Father Delvin Sharpe     Colon cancer Maternal Grandfather      Colon cancer Paternal Grandfather WANJU Dell     Cancer Paternal Grandfather WANJU Dell     Cancer Maternal Aunt      Diabetes Maternal Grandmother Yasmine rose       Reason for Follow-up:  -adenocarcinoma rectum, moderately differentiated, partially obstructing mass, S/P colonoscopy 2023   -tubular adenoma transverse colon   -regional lymphadenopathy on CT abdomen pelvis with contrast 2023  -2024:  Laparoscopic/robotic low anterior resection with EN bloc total mesorectal excision and low pelvic anastomosis; creation of diverting loop ileostomy:    Pathology:  Moderately-differentiated adenocarcinoma, 1.2 cm, invading through the muscularis propria into the pericolonic or perirectal tissue into the pericolonic or perirectal tissue,  incomplete response to neoadjuvant therapy, no LVI, no PNI, margins negative, 2/13 lymph nodes positive  >>> ypT3 ypN1b  -MMR proficient  -acute left upper extremity DVT related to PICC line  -grandfather experienced colon cancer; father experienced colon polyp  -chemotherapy-induced peripheral neuropathy    History of Present Illness:   Adenocarcinoma of rectum, stage 3     Oncologic/Hematologic History:  Oncology History   Rectal cancer   7/1/2023 Initial Diagnosis    Adenocarcinoma of rectum, stage 3     7/19/2023 - 11/22/2023 Chemotherapy    Treatment Summary   Plan Name: OP mFOLFOX 6 Q2W  Treatment Goal: Curative  Status: Inactive  Start Date: 7/19/2023  End Date: 11/22/2023  Provider: John Bazan MD  Chemotherapy: fluorouraciL injection 650 mg, 400 mg/m2 = 650 mg, Intravenous, Clinic/HOD 1 time, 3 of 3 cycles  Administration: 650 mg (7/19/2023), 650 mg (8/1/2023), 650 mg (8/22/2023)  oxaliplatin (ELOXATIN) 85 mg/m2 = 139 mg in dextrose 5 % (D5W) 592.8 mL chemo infusion, 85 mg/m2 = 139 mg, Intravenous, Clinic/HOD 1 time, 8 of 8 cycles  Administration: 139 mg (7/19/2023), 139 mg (8/1/2023), 140 mg (9/18/2023), 135 mg (10/2/2023), 135 mg (11/7/2023), 135 mg (8/22/2023), 130 mg (11/20/2023), 135 mg (10/25/2023)     12/26/2023 - 12/26/2023 Chemotherapy    Treatment Summary   Plan Name: OP CAPECITABINE 5 DAYS + RADIOTHERAPY  Treatment Goal: Curative  Status: Inactive  Start Date:   End Date:   Provider: John Bazan MD  Chemotherapy: capecitabine (XELODA) tablet 1,250 mg, 825 mg/m2 = 1,250 mg, Oral, 2 times daily, 0 of 1 cycle, Start date: --, End date: --     5/20/2024 Cancer Staged    Staging form: Colon and Rectum, AJCC 8th Edition  - Pathologic stage from 5/20/2024: Stage IIIB (ypT3, pN1b, cM0)     Past medical history:  Anxiety.    Procedure/surgical history:  Appendectomy.  Back surgery.  Social history:  Single.  Lives in [place], [place].  Has 2 children.  Works as a .  No history of  tobacco, alcohol, or illicit drug abuse.  Family history:  Paternal grandfather experience colon cancer in his 70s.  Father experienced multiple colon polyps.  Health maintenance:  No screening colonoscopy prior to most recent colonoscopy which led to the diagnosis of rectal cancer.  -09/13/2019:  Bilateral screening mammogram pelvic comparison:  09/13/2018 mammogram):  BI-RADS 0 (indeterminate)  -09/25/2019:  Diagnostic left mammogram, limited ultrasound left breast (comparison:  09/13/2019 mammogram):  Overall study BI-RADS 2: Benign    53-year-old lady, referred by Migel Ortiz MD, GI, with rectal cancer.  Please refer to assessment and plan section for details.    07/05/2023:  Pleasant lady who presents for initial medical oncology consultation, accompanied by her brother.    Her symptoms started 2 years ago, in the form of intermittent small amount hematochezia, initially on toilet wife's, and for last few weeks, in toilet bowel as well.  She brought the symptoms to the attention of her gyn several months ago and does not remember the recommendation.  Regardless, she never got a screening colonoscopy done.  Around [place] time 2022, she started feeling bloated.  She started having constant uncomfortable feeling because of bloating, as well as in the anorectal area.  Hematochezia continued.  She brought this to the attention of her PCP in [place] who suggested taking MiraLax.  The PCP also arranged for colonoscopy.  On today's visit, she reports that she has been constipated her entire life.  She has been constipated for at least 10 years.  Hematochezia for 2 years.  No anorectal pain.  Does have constant feeling of incomplete evacuation.  Appetite is down and she has lost 30 lb in last 3-4 months.  Appetite is more less preserved but she is afraid to eat because eating causes bloating and worsening of uncomfortable feeling in rectum.    Interval History:  PICC DOUBLE LUMEN LINE FLUSH   [No matching plan  found]     02/10/2025:   -10/30/2024:  Hemoglobin 13.7, CBC unremarkable, bilirubin 2.0 elevated (was 2.0 on 07/31/2024, was 1.6 on 07/09/2024; was normal prior to that); CEA <1.73  -01/15/2025:  Surveillance CTs C/A/P with contrast (comparison:  07/24/2024):  No evidence of recurrent malignancy or metastatic disease  -02/10/2024: Labs reviewed; hemoglobin 13.4, WBC 3.33, ANC 1.81, CMP unremarkable, CEA level pending  Presents for a follow-up visit.  Accompanied by family.  In no acute discomfort.  Her main concern is that ever since surgery, the frequency and the urge of defecation has made quality of her life miserable.  She is experiencing more bowel movements than usual, especially postprandial, especially after certain foods.  When she has urge to defecate, she has to go to the bathroom immediately.  As a result, she says, that she does not feel that she is in a position to be able to hold irregular jaw.  She is also wary of recreational outings and to drink sudden beverages like hot chocolate, coffee, etc..  She is trying to experiment with different diets to make her life more livable rather have to constantly focus on and be worried about next bowel movement which she may feel urge for at any moment and that too, without warning.  Otherwise, overall, feels well.  Some nausea.  Appetite is great.  No weakness or fatigue.  No blood in stool.  ECOG 0.  She will follow-up with Dr. Ortiz GI, in [place], for 1 year surveillance colonoscopy in May.    07/10/2025:   -03/11/2025:  Bilateral screening mammogram (comparison:  Mammogram 02/16/2024): BI-RADS: 2-benign  -04/16/2025:  Surveillance colonoscopy (Todd Flores MD):  1 cm polyp on a stalk in sigmoid colon, removed and retrieved in his entirety with a snare cautery polypectomy; widely patent anastomosis at about 5 cm (repeat colonoscopy in 1 year)  Path: Tubulovillous adenoma, no evidence of high-grade dysplasia (repeat colonoscopy in 1  year)  -2025: Surveillance CTs C/A/P with contrast: No evidence of malignancy or metastatic disease  -07/10/2025: CBC normal, hemoglobin 13.2; CMP and CEA level are pending  Presents for a follow-up visit.  She is experimenting with different kinds of  to regulate her bowels, specifically in order to minimize the frequency of loose bowels.  This was a quality of life issue for her.  Appetite is okay.  Overall, feels well and stable.  No unusual headaches, focal neurological symptoms, chest pain, cough, dyspnea, abdominal pain, nausea, vomiting, constipation, blood in stool, any urinary problems, etc..  No weakness or fatigue.  No anorexia or unintentional weight loss.  On last visit, her main concern was that ever since surgery, the frequency and the urge of defecation has made quality of her life miserable.  She was experiencing more bowel movements than usual, especially postprandial, especially after certain foods.  When she had urge to defecate, she had to go to the bathroom immediately.  As a result, she she said, that she did not feel that she was in a position to be able to hold a regular job. She was also wary of recreational outings and to drink certain beverages like hot chocolate, coffee, etc..  She was trying to experiment with different diets to make her life more livable rather than have to constantly focus on and be worried about next bowel movement which she could feel urge for at any moment and that too, without warning.  Otherwise, overall, feels well.  Some nausea.     There is no immunization history on file for this patient.    Review of patient's allergies indicates:   Allergen Reactions    Codeine Itching    Hydrocodone Nausea Only    Morphine Itching     Medications:  Current Outpatient Medications on File Prior to Visit   Medication Sig Dispense Refill    (Magic mouthwash) 1:1:1 diphenhydrAMINE(Benadryl) 12.5mg/5ml liq, aluminum & magnesium hydroxide-simethicone (Maalox), LIDOcaine  viscous 2% Swish and spit 15 mLs every 4 (four) hours as needed (mouth pain). 450 mL 0    ALPRAZolam (XANAX) 0.5 MG tablet Take 0.5 mg by mouth 2 (two) times daily as needed.      b complex vitamins capsule Take 1 capsule by mouth once daily.      busPIRone (BUSPAR) 15 MG tablet Take 15 mg by mouth 2 (two) times daily as needed (anxiety).      dicyclomine (BENTYL) 10 MG capsule Take 10 mg by mouth every 6 (six) hours as needed.      DULoxetine (CYMBALTA) 20 MG capsule Take 20 mg by mouth once daily.      EScitalopram oxalate (LEXAPRO) 20 MG tablet Take 1 tablet by mouth every evening.      HYDROcodone-acetaminophen (NORCO) 7.5-325 mg per tablet Take 1 tablet by mouth every 6 (six) hours as needed for Pain. 10 tablet 0    HYDROcodone-acetaminophen (NORCO) 7.5-325 mg per tablet Take 1 tablet by mouth every 6 (six) hours as needed for Pain. 15 tablet 0    hydrOXYzine pamoate (VISTARIL) 25 MG Cap Take 25 mg by mouth 4 (four) times daily.      hyoscyamine (ANASPAZ,LEVSIN) 0.125 mg Tab Take 1 tablet (125 mcg total) by mouth every 6 (six) hours as needed (abdomen pain). 60 tablet 2    metroNIDAZOLE (FLAGYL) 250 MG tablet TAKE 2 TABLETS AT 1PM, 5PM AND 9PM THE EVENING PRIOR TO SURGERY 6 tablet 0    MIRALAX 17 gram/dose powder Take 17 g by mouth.      multivitamin (THERAGRAN) per tablet Take 1 tablet by mouth once daily.      neomycin (MYCIFRADIN) 500 mg Tab TAKE 2 TABLETS AT 1PM, 5PM AND 9PM THE EVENING PRIOR TO SURGERY 6 tablet 0    NON FORMULARY MEDICATION Take 10 mg by mouth Daily. Medical Marijuana for Insomnia      ondansetron (ZOFRAN) 8 MG tablet Take 1 tablet (8 mg total) by mouth every 8 (eight) hours as needed for Nausea. 60 tablet 2    ondansetron (ZOFRAN-ODT) 8 MG TbDL Take 1 tablet (8 mg total) by mouth every 8 (eight) hours as needed (nausea). 90 tablet 2    oxyCODONE-acetaminophen (PERCOCET) 5-325 mg per tablet Take 1 tablet by mouth every 4 (four) hours as needed for Pain. 56 tablet 0     oxyCODONE-acetaminophen (PERCOCET) 5-325 mg per tablet Take 1 tablet by mouth every 4 (four) hours as needed for Pain. 20 tablet 0    pantoprazole (PROTONIX) 40 MG tablet Take 1 tablet (40 mg total) by mouth every morning. 30 tablet 1    promethazine (PHENERGAN) 25 MG tablet Take 1 tablet (25 mg total) by mouth every 6 (six) hours as needed for Nausea. 30 tablet 2    baclofen (LIORESAL) 10 MG tablet Take 1 tablet (10 mg total) by mouth 3 (three) times daily. for 7 days 21 tablet 0    famotidine (PEPCID) 40 MG tablet Take 1 tablet (40 mg total) by mouth nightly as needed for Heartburn. 30 tablet 1     No current facility-administered medications on file prior to visit.     Review of Systems:   All systems reviewed and found to be negative except for the symptoms detailed above    Physical Examination:   VITAL SIGNS:   Vitals:    07/10/25 1348   BP: 94/66   Pulse: 77   Resp: 18   Temp: 98.4 °F (36.9 °C)       GENERAL:  In no apparent distress.    HEAD:  No signs of head trauma.  EYES:  Pupils are equal.  Extraocular motions intact.    EARS:  Hearing grossly intact.  MOUTH:  Oropharynx is normal.   NECK:  No adenopathy, no JVD.     CHEST:  Chest with clear breath sounds bilaterally.  No wheezes, rales, rhonchi.    CARDIAC:  Regular rate and rhythm.  S1 and S2, without murmurs, gallops, rubs.  VASCULAR:  No Edema.  Peripheral pulses normal and equal in all extremities.  ABDOMEN:  Soft, without detectable tenderness.  No sign of distention.  No   rebound or guarding, and no masses palpated.   Bowel Sounds normal.  MUSCULOSKELETAL:  Good range of motion of all major joints. Extremities without clubbing, cyanosis or edema.    NEUROLOGIC EXAM:  Alert and oriented x 3.  No focal sensory or strength deficits.   Speech normal.  Follows commands.  PSYCHIATRIC:  Mood normal.    Assessment:  Problem List Items Addressed This Visit       Rectal cancer    Pelvic lymphadenopathy    Family history of colon cancer    Family hx colonic  polyps    Thrombosis in peripherally inserted central catheter (PICC)    Polyp of transverse colon    Chemotherapy-induced neuropathy - Primary    Encounter for follow-up surveillance of rectal cancer    Chronic deep vein thrombosis (DVT) of left upper extremity     Orders for 07/10/2025:   Check CBC, CMP, CEA level in 3 months   Surveillance CT scans of C/A/P with contrast in January 2026   Repeat surveillance colonoscopy in 1 year (04/2026)  -1 year screening mammogram in March 2026 (deferred to PCP)  Follow-up with NP in 3 months, with labs.    Above discussed with the patient.  All questions answered.    Discussed labs and scans and gave her copies of relevant results.    She understands and agrees with this plan.  =================================    # Adenocarcinoma of rectum, moderately differentiated:   -presentation: 05/2023: Change in bowel habits, hematochezia, lower abdominal pain  -colonoscopy 06/19/2023:  Partially obstructing rectal mass, 9 mm tubular adenoma transferrin colon  -MMR proficient, low probability of MSI-H  -CTs abdomen pelvis 06/21/2023:  No metastases; large rectal mass; perirectal lymphadenopathy  -CEA level normal 07/05/2023  -CT chest 07/18/2023: No lung metastases  -MRI pelvis 07/18/2023:  Large mid to distal rectal mass, T3b; at least 10 perirectal lymph nodes, largest 9 mm (T2b)  -radiologically, T3b N2b, stage IIIC  -S/P neoadjuvant FOLFOX on 8 cycles: 07/19/2023-11/22/2023  -restaging CTs C/A/P 10 09/2023: S/P neoadjuvant FOLFOX x5 cycles:  Marked improvement  -restaging CTs chest/abdomen +restaging pelvic MRI 12/15/2023 (post neoadjuvant FOLFOX x8 cycles):  -S/P neoadjuvant chemoradiation therapy:  01/03/2024-02/12/2024  -restaging CTs chest/abdomen +pelvic MRI 03/05/2024: Post completion of neoadjuvant therapy:  Improved  -MRI pelvis 03/05/2024:  New indeterminate 8 mm enhancing lesion right sacral ala  -bone scan 03/20/2024: No bone metastases  -S/P laparoscopic/robotic low  anterior resection with EN bloc total mesorectal excision and low pelvic anastomosis, creation of diverting loop colostomy 05/20/2024  -moderately differentiated adenocarcinoma, 1.2 cm, invading through the muscularis propria into the pericolonic/perirectal tissue, incomplete response to neoadjuvant therapy, no LVI, no PNI, margins negative, 2/13 lymph nodes positive  -ypT3 ypN1b  -S/P ileostomy reversal 07/08/2024  -surveillance CTs C/A/P 0 07/24/2024: BREANNA  -10/30/2024:  Bilirubin 2.0 elevated (was 2.0 on 07/31/2024, 1.6 on 07/09/2024; was normal prior to that); CEA <1.73  -surveillance CTs C/A/P 01/15/2025: BREANNA  -screening mammogram 03/11/2025: BI-RADS:  2-benign  -surveillance colonoscopy 04/16/2025 (Todd Flores MD):  1 cm pedunculated polyp sigmoid colon, removed; repeat colonoscopy in 1 year  Path: Tubulovillous adenoma (repeat colonoscopy in 1 year)  -surveillance CTs C/A/P 07/03/2025: BREANNA  Briefly:  -adenocarcinoma of rectum, moderately differentiated  -colonoscopy 06/19/2023  -MMR proficient  -radiologically, T3b N2b, stage IIIC  -S/P neoadjuvant FOLFOX x8 cycles, with marked improvement  -followed by neoadjuvant chemoradiation therapy, with improvement  -S/P laparoscopic/robotic LAR 05/20/2024  -ypT3 ypN1b (2/13 lymph nodes positive)  -ileostomy reversal 07/08/2024  -surveillance colonoscopy 04/16/2025:  Tubulovillous adenoma sigmoid colon, 1 cm, pedunculated  -repeat colonoscopy in 1 year (04/2026)  -surveillance CTs C/A/P 07/03/2025: BREANNA  Plan:   -continue surveillance (see below)  -check CBC, CMP, CEA level now, then, in 3 months   -surveillance CTs C/A/P with contrast in 6 months (January 2026)  -repeat colonoscopy in 1 year (04/2026) (Dr. Todd Flores MD)  -regarding lifestyle altering change in bowel habits, including increased frequency of bowel movements with increased urgency, was advised to follow-up with Dr. Lee Rosenthal; apparently, being managed conservatively with dietary  modifications and Imodium  -1 year screening mammogram in March 2026 (deferred to PCP)    Surveillance:  1. History and physical examination and CEA level every 3-6 months for 2 years (05/2024-05/2026), then every 6 months for total of 5 years (05/2026-05/2029)  2. Surveillance CTs C/A/P with contrast every 6-12 months for total of 5 years (05/2024-05/2029)  3. Surveillance colonoscopy 1 year (05/2025) after surgery, etc.  4. FDG PET-CT scan is not recommended     # Left upper extremity DVT secondary to PICC line 07/27/2023:  -developed acute DVT left subclavian/axillary/brachial veins secondary to PICC line; started on Eliquis; PICC line removed  >>>  -anticoagulation was planned to continue for at least 3 months, i.e., until the end of October 2023   -11/07/2023:  Told me that she stopped anticoagulation Halloween day (10/31/2023), appropriately so    # Chemotherapy-induced peripheral neuropathy:  -10/17/2023: Chemotherapy induced peripheral neuropathy in the form of tingling in hands; no neuropathy in feet; no numbness or pain  -10/17/2020: Started on Cymbalta 30 mg p.o. q.h.s.  -Cymbalta discontinued 10/30/2023 because of increased anxiety with Cymbalta reported by her    # Family history of colon cancer:  -grandfather experienced colon cancer; father experienced colon polyp  -genetic testing 12/19/2023: Negative      Follow-up:  Answers submitted by the patient for this visit:  Review of Systems Questionnaire (Submitted on 7/3/2025)  appetite change : No  unexpected weight change: No  mouth sores: No  visual disturbance: No  cough: No  shortness of breath: No  chest pain: No  abdominal pain: No  diarrhea: No  frequency: No  back pain: No  rash: No  headaches: No  adenopathy: No  nervous/ anxious: Yes

## 2025-07-10 NOTE — Clinical Note
Orders for 07/10/2025:  Check CBC, CMP, CEA level in 3 months  Surveillance CT scans of C/A/P with contrast in January 2026  Repeat surveillance colonoscopy in 1 year (04/2026) Follow-up with NP in 3 months, with labs.

## (undated) DEVICE — SUT PDS PLUS MONO 1 CT 36IN

## (undated) DEVICE — OBTURATOR BLADELESS 8MM XI CLR

## (undated) DEVICE — KIT GEN LAPAROSCOPY LAFAYETTE

## (undated) DEVICE — SUT VICRYL PLUS 3-0 SH 18IN

## (undated) DEVICE — CATH ALL PUR URTHL RR INT 16FR

## (undated) DEVICE — COVER CAMERA/LASER 9X96IN

## (undated) DEVICE — HOLDER STRIP-T SELF ADH 2X10IN

## (undated) DEVICE — CUTTER PROXIMATE BLUE 75MM

## (undated) DEVICE — GLOVE PROTEXIS BLUE LATEX 7

## (undated) DEVICE — STAPLER SUREFORM SGL USE 45

## (undated) DEVICE — SOL IRRI STRL WATER 1000ML

## (undated) DEVICE — RELOAD PROXIMATE CUT BLUE 75MM

## (undated) DEVICE — SYR ONLY LUER LOCK 20CC

## (undated) DEVICE — SUT VLOC 90 3-0 CV-23 NDL

## (undated) DEVICE — SOL ELECTROLUBE ANTI-STIC

## (undated) DEVICE — POUCH OST BLUE 2 PC 12 2.75IN

## (undated) DEVICE — Device

## (undated) DEVICE — SUT SILK 3-0 SH 18IN BLACK

## (undated) DEVICE — CANNULA SEAL 12MM

## (undated) DEVICE — SEALER VESSEL EXTEND

## (undated) DEVICE — SCISSOR CURVED ENDOPATH 5MM

## (undated) DEVICE — PAD PREP CUFFED NS 24X48IN

## (undated) DEVICE — CANNULA REDUCER 12-8MM

## (undated) DEVICE — RELOAD SUREFORM 45 3.5 BLU 6R

## (undated) DEVICE — GLOVE PROTEXIS HYDROGEL SZ6.5

## (undated) DEVICE — GLOVE PROTEXIS HYDROGEL SZ7

## (undated) DEVICE — ADHESIVE DERMABOND ADVANCED

## (undated) DEVICE — KIT SURGICAL TURNOVER

## (undated) DEVICE — DRESSING TELFA + BARR 4X6IN

## (undated) DEVICE — TRAY CATH FOL SIL URIMTR 16FR

## (undated) DEVICE — RELOAD SUREFORM 45 2.5 WHT 6R

## (undated) DEVICE — DRAPE LEGGINGS CUFF 31X48IN

## (undated) DEVICE — SUT VICRYL+ 27 UR-6 VIOL

## (undated) DEVICE — SOL BETADINE 5%

## (undated) DEVICE — DRAPE TOP 53X102IN

## (undated) DEVICE — DRAPE UNDER BUTTOCKS SUC PORT

## (undated) DEVICE — DRAPE ARM DAVINCI XI

## (undated) DEVICE — COVER PROBE US 5.5X58L NON LTX

## (undated) DEVICE — DEVICE TRIVERSE HOLSTER 4.5MCO

## (undated) DEVICE — DRAPE MEDI-SLUSH 44X44IN

## (undated) DEVICE — BULB BLADDER ASSEMBLY STRL

## (undated) DEVICE — NDL 20GX1-1/2IN IB

## (undated) DEVICE — BAG INZII TISS RETRV 12/15MM

## (undated) DEVICE — SUT MCRYL PLUS 4-0 PS2 27IN

## (undated) DEVICE — STAPLER SKIN PROXIMATE WIDE

## (undated) DEVICE — PAD PINK TRENDELENBURG POS XL

## (undated) DEVICE — COVER MAYO STAND REINFRCD 30

## (undated) DEVICE — PORT ACCESS 8MM W/120MM LOW

## (undated) DEVICE — SUT SILK 0 SH 30IN BLK BR

## (undated) DEVICE — SPONGE LAP 18X18 PREWASHED

## (undated) DEVICE — ELECTRODE PATIENT RETURN DISP

## (undated) DEVICE — SET TRI-LUMEN FILTERED TUBE

## (undated) DEVICE — SUT SILK 2-0 SH 18IN BLACK

## (undated) DEVICE — STAPLER INTERNAL EEA MED 28MM

## (undated) DEVICE — COVER TIP CURVED SCISSORS XI

## (undated) DEVICE — IRRIGATOR SUCTION W/TIP

## (undated) DEVICE — STAPLER INT PROX TX 60X3.5MM

## (undated) DEVICE — ELECTRODE REM PLYHSV RETURN 9

## (undated) DEVICE — SEAL UNIVERSAL 5MM-8MM XI

## (undated) DEVICE — SEALER LIGASURE IMPACT 18CM

## (undated) DEVICE — SUT 2/0 30IN SILK BLK BRAI

## (undated) DEVICE — DRESSING TRANS 4X4 TEGADERM

## (undated) DEVICE — TROCAR KII FIOS ZTHREAD 11X100

## (undated) DEVICE — SOL CLEARIFY VISUALIZATION LAP

## (undated) DEVICE — DRAPE COLUMN DAVINCI XI

## (undated) DEVICE — DRAPE MEDIUM SHEET 40X70IN

## (undated) DEVICE — CONTAINER SPECIMEN SCREW 4OZ